# Patient Record
Sex: FEMALE | Race: WHITE | HISPANIC OR LATINO | Employment: UNEMPLOYED | URBAN - METROPOLITAN AREA
[De-identification: names, ages, dates, MRNs, and addresses within clinical notes are randomized per-mention and may not be internally consistent; named-entity substitution may affect disease eponyms.]

---

## 2017-01-13 ENCOUNTER — NON-PROVIDER VISIT (OUTPATIENT)
Dept: OBGYN | Facility: CLINIC | Age: 32
End: 2017-01-13
Payer: MEDICAID

## 2017-01-13 DIAGNOSIS — Z32.01 PREGNANCY TEST POSITIVE: ICD-10-CM

## 2017-01-13 LAB
INT CON NEG: NORMAL
INT CON POS: NORMAL
POC URINE PREGNANCY TEST: POSITIVE

## 2017-01-13 PROCEDURE — 81025 URINE PREGNANCY TEST: CPT | Performed by: PHYSICIAN ASSISTANT

## 2017-02-15 ENCOUNTER — INITIAL PRENATAL (OUTPATIENT)
Dept: OBGYN | Facility: CLINIC | Age: 32
End: 2017-02-15
Payer: MEDICAID

## 2017-02-15 VITALS
DIASTOLIC BLOOD PRESSURE: 62 MMHG | HEIGHT: 61 IN | WEIGHT: 150 LBS | BODY MASS INDEX: 28.32 KG/M2 | SYSTOLIC BLOOD PRESSURE: 104 MMHG

## 2017-02-15 DIAGNOSIS — Z34.81 ENCOUNTER FOR SUPERVISION OF OTHER NORMAL PREGNANCY IN FIRST TRIMESTER: ICD-10-CM

## 2017-02-15 DIAGNOSIS — Z87.59 H/O FETAL DEMISE, NOT CURRENTLY PREGNANT: ICD-10-CM

## 2017-02-15 DIAGNOSIS — Z32.01 PREGNANCY EXAMINATION OR TEST, POSITIVE RESULT: Primary | ICD-10-CM

## 2017-02-15 DIAGNOSIS — O09.291 PREVIOUS PREGNANCY COMPLICATED BY CHROMOSOMAL ABNORMALITY IN FIRST TRIMESTER, ANTEPARTUM: ICD-10-CM

## 2017-02-15 PROBLEM — O09.891 SUPERVISION OF OTHER HIGH RISK PREGNANCIES, FIRST TRIMESTER: Status: ACTIVE | Noted: 2017-02-15

## 2017-02-15 LAB
APPEARANCE UR: NORMAL
BILIRUB UR STRIP-MCNC: NORMAL MG/DL
COLOR UR AUTO: NORMAL
GLUCOSE UR STRIP.AUTO-MCNC: NORMAL MG/DL
INT CON NEG: NEGATIVE
INT CON POS: POSITIVE
KETONES UR STRIP.AUTO-MCNC: NEGATIVE MG/DL
LEUKOCYTE ESTERASE UR QL STRIP.AUTO: NORMAL
NITRITE UR QL STRIP.AUTO: NEGATIVE
PH UR STRIP.AUTO: 6 [PH] (ref 5–8)
POC URINE PREGNANCY TEST: POSITIVE
PROT UR QL STRIP: NEGATIVE MG/DL
RBC UR QL AUTO: NORMAL
SP GR UR STRIP.AUTO: 1.03
UROBILINOGEN UR STRIP-MCNC: NORMAL MG/DL

## 2017-02-15 PROCEDURE — 81025 URINE PREGNANCY TEST: CPT | Performed by: NURSE PRACTITIONER

## 2017-02-15 PROCEDURE — 81002 URINALYSIS NONAUTO W/O SCOPE: CPT | Performed by: NURSE PRACTITIONER

## 2017-02-15 PROCEDURE — 59401 PR NEW OB VISIT: CPT | Performed by: NURSE PRACTITIONER

## 2017-02-15 NOTE — MR AVS SNAPSHOT
"        Buffy Chong   2/15/2017 3:00 PM   Initial Prenatal   MRN: [id number]    [facility]   Dept Phone:  [phone]    Description:  Female : 1985   Provider:  Loyda Winters C.N.M.; [facility] INTA[facility]           Allergies as of 2/15/2017     No Known Allergies      You were diagnosed with     Pregnancy examination or test, positive result   [V72.42.ICD-9-CM]  -  Primary     Encounter for supervision of other normal pregnancy in first trimester   [1698692]       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, antepartum   [1480626]       H/O fetal demise, not currently pregnant   [[id number]]         Vital Signs     Blood Pressure Height Weight Body Mass Index Last Menstrual Period Breastfeeding?    104/62 mmHg 1.549 m (5' 1\") 68.04 kg (150 lb) 28.36 kg/m2 2016 Unknown    Smoking Status                   Never Smoker            Basic Information     Date Of Birth Sex Race Ethnicity Preferred Language    1985 Female  or , White  Origin (Bulgarian,Brazilian,Colombian,Sumanth, etc) English      Problem List              ICD-10-CM Priority Class Noted - Resolved    H/O fetal demise Z87.59   8/10/2016 - Present    Supervision of other high risk pregnancies, first trimester O09.891   2/15/2017 - Present    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, 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   2/15/2017 - Present      Health Maintenance        Date Due Completion Dates    IMM DTaP/Tdap/Td Vaccine (1 - Tdap) 2002 ---    PAP SMEAR 2004 ---    IMM INFLUENZA (1) 2016 ---            Results     POCT Urinalysis      Component Value Standard Range & Units    POC Color  Negative    POC Appearance  Negative    POC Leukocyte Esterase Small Negative    POC Nitrites Negative Negative    POC Urobiligen  Negative (0.2) mg/dL    POC Protein Negative Negative mg/dL    POC Urine PH 6.0 5.0 - 8.0    POC Blood Trace Negative   " POC Specific Gravity 1.030 <1.005 - >1.030    POC Ketones Negative Negative mg/dL    POC Biliruben  Negative mg/dL    POC Glucose 1+ Negative mg/dL                POCT Pregnancy      Component Value Standard Range & Units    POC Urine Pregnancy Test Positive Negative    LMP: 11/27/2016    Internal Control Positive Positive     Internal Control Negative Negative                         Current Immunizations     No immunizations on file.      Below and/or attached are the medications your provider expects you to take. Review all of your home medications and newly ordered medications with your provider and/or pharmacist. Follow medication instructions as directed by your provider and/or pharmacist. Please keep your medication list with you and share with your provider. Update the information when medications are discontinued, doses are changed, or new medications (including over-the-counter products) are added; and carry medication information at all times in the event of emergency situations     Allergies:  No Known Allergies          Medications  Valid as of: February 15, 2017 -  4:38 PM    Generic Name Brand Name Tablet Size Instructions for use    Prenatal MV-Min-Fe Fum-FA-DHA   Take  by mouth.        .                 Medicines prescribed today were sent to:     [facility]/PHARMACY #[id number] [facility] [address] [address]    Phone: [phone] Fax: [phone]    Open 24 Hours?: No      Medication refill instructions:       If your prescription bottle indicates you have medication refills left, it is not necessary to call your provider’s office. Please contact your pharmacy and they will refill your medication.    If your prescription bottle indicates you do not have any refills left, you may request refills at any time through one of the following ways: The online 3D Data system (except Urgent Care), by calling your provider’s office, or by asking your pharmacy to  contact your provider’s office with a refill request. Medication refills are processed only during regular business hours and may not be available until the next business day. Your provider may request additional information or to have a follow-up visit with you prior to refilling your medication.   *Please Note: Medication refills are assigned a new Rx number when refilled electronically. Your pharmacy may indicate that no refills were authorized even though a new prescription for the same medication is available at the pharmacy. Please request the medicine by name with the pharmacy before contacting your provider for a refill.        Your To Do List     Future Labs/Procedures Complete By Expires    PREG CNTR PRENATAL PN  As directed 2/16/2018    THINPREP RFLX HPV ASCUS W/CTNG  As directed 2/16/2018      Referral     A referral request has been sent to our patient care coordination department. Please allow 3-5 business days for us to process this request and contact you either by phone or mail. If you do not hear from us by the 5th business day, please call us at [phone].        Instructions    P:  1.  GC/CT done. Pap done.         2.  Prenatal labs ordered - lab slip given        3.  Discussed PNV, diet, and adequate water intake        4.  NOB packet given        5.  Return to office in 4 wks        6.  Referral sent to [facility] [place] Access Code: [email or web address]  Expires: 3/17/2017  3:20 PM    [employer]  A secure, online tool to manage your health information     [employer]’s [employer]® is a secure, online tool that connects you to your personalized health information from the privacy of your home -- day or night - making it very easy for you to manage your healthcare. Once the activation process is completed, you can even access your medical information using the [employer] corey, which is available for free in the Apple Corey store or Google Play store.     [employer] provides the following levels of  access (as shown below):   My Chart Features   [facility] Doctor [facility]  Specialists [facility]  Urgent  Care Non-[facility]  Primary Care  Doctor   Email your healthcare team securely and privately 24/7 X X X    Manage appointments: schedule your next appointment; view details of past/upcoming appointments X      Request prescription refills. X      View recent personal medical records, including lab and immunizations X X X X   View health record, including health history, allergies, medications X X X X   Read reports about your outpatient visits, procedures, consult and ER notes X X X X   See your discharge summary, which is a recap of your hospital and/or ER visit that includes your diagnosis, lab results, and care plan. X X       How to register for natue:  1. Go to  [email or web address].  2. Click on the Sign Up Now box, which takes you to the New Member Sign Up page. You will need to provide the following information:  a. Enter your natue Access Code exactly as it appears at the top of this page. (You will not need to use this code after you’ve completed the sign-up process. If you do not sign up before the expiration date, you must request a new code.)   b. Enter your date of birth.   c. Enter your home email address.   d. Click Submit, and follow the next screen’s instructions.  3. Create a natue ID. This will be your natue login ID and cannot be changed, so think of one that is secure and easy to remember.  4. Create a natue password. You can change your password at any time.  5. Enter your Password Reset Question and Answer. This can be used at a later time if you forget your password.   6. Enter your e-mail address. This allows you to receive e-mail notifications when new information is available in natue.  7. Click Sign Up. You can now view your health information.    For assistance activating your natue account, call [phone]

## 2017-02-15 NOTE — PROGRESS NOTES
Pt here for NOB visit  Pt c/o nausea. Pt states she bled for 1 day and passed clots 2 weeks ago.   Good phone #[phone]

## 2017-02-16 NOTE — PATIENT INSTRUCTIONS
P:  1.  GC/CT done. Pap done.         2.  Prenatal labs ordered - lab slip given        3.  Discussed PNV, diet, and adequate water intake        4.  NOB packet given        5.  Return to office in 4 wks        6.  Referral sent to [facility].

## 2017-02-16 NOTE — PROGRESS NOTES
"S:  Buffy Chong is a 31 y.o.   @ EGA: 11w3d CAROLE: Estimated Date of Delivery: 9/3/17  per [facility] who presents for her new OB exam.  She has no complaints.  Desires AFP.  Declines CF.  Reports good FM.  Denies LOF, or cramping.  Denies dysuria, vaginal DC.  Pt is single and lives with boyfriend (Srinivasa). Different FOB as IUFD.   Works in a [employer].  Pregnancy is unplanned but wanted.  Last preg was a hx IUFD @ 24wks.  Baby had obvious chromosomal problems, but no testing was done.    O:    Filed Vitals:    02/15/17 1603   BP: 104/62   Height: 1.549 m (5' 1\")   Weight: 68.04 kg (150 lb)    See H&P Prenatal Physical.  Wet mount: deferred        FHTs: 155        Fundal ht: 13     A:   1.  IUP @ 11w3d CAROLE: Estimated Date of Delivery: 9/3/17 per [facility]         2.  S=D        3.    Patient Active Problem List    Diagnosis Date Noted   • Supervision of other high risk pregnancies, first trimester 02/15/2017   • 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, antepartum 02/15/2017   • H/O fetal demise 08/10/2016         P:  1.  GC/CT done. Pap done.         2.  Prenatal labs ordered - lab slip given        3.  Discussed PNV, diet, and adequate water intake        4.  NOB packet given        5.  Return to office in 4 wks        6.  Referral sent to [facility].    "

## 2017-02-16 NOTE — PROGRESS NOTES
Referral faxed to [facility] on 2/16/17  They will contact patient to schedule appt.  Please check with patient if appt was made/ document. Thank you

## 2017-03-01 ENCOUNTER — TELEPHONE (OUTPATIENT)
Dept: OBGYN | Facility: CLINIC | Age: 32
End: 2017-03-01

## 2017-03-01 DIAGNOSIS — Z87.59 H/O FETAL DEMISE, NOT CURRENTLY PREGNANT: ICD-10-CM

## 2017-03-01 DIAGNOSIS — O09.891 SUPERVISION OF OTHER HIGH RISK PREGNANCIES, FIRST TRIMESTER: Primary | ICD-10-CM

## 2017-03-01 NOTE — TELEPHONE ENCOUNTER
Cherie from [facility] called stated that pt's CAROLE is wrong pt is 16 weeks and needs to have an AFP, lupus anti-coagulant, Anti cardio lipin, Beta 2 glyco protein.     Lab ordered by Singh Scales. KERLINE.    Pt to  lab work to go to [facility].

## 2017-03-03 NOTE — TELEPHONE ENCOUNTER
Pt still not pick in up lab slips to go to [facility].  Lab order @ .    Unable to contact pt msg left to call back

## 2017-03-17 ENCOUNTER — ROUTINE PRENATAL (OUTPATIENT)
Dept: OBGYN | Facility: CLINIC | Age: 32
End: 2017-03-17
Payer: MEDICAID

## 2017-03-17 VITALS — BODY MASS INDEX: 29.49 KG/M2 | DIASTOLIC BLOOD PRESSURE: 62 MMHG | SYSTOLIC BLOOD PRESSURE: 102 MMHG | WEIGHT: 156 LBS

## 2017-03-17 DIAGNOSIS — O09.291 PREVIOUS PREGNANCY COMPLICATED BY CHROMOSOMAL ABNORMALITY IN FIRST TRIMESTER, ANTEPARTUM: ICD-10-CM

## 2017-03-17 DIAGNOSIS — O09.891 SUPERVISION OF OTHER HIGH RISK PREGNANCIES, FIRST TRIMESTER: Primary | ICD-10-CM

## 2017-03-17 PROCEDURE — 90040 PR PRENATAL FOLLOW UP: CPT | Performed by: NURSE PRACTITIONER

## 2017-03-17 NOTE — PATIENT INSTRUCTIONS
P:  1.  Reviewed labs w pt. Encouraged pt to complete other labs asap.        2.  AFP wnl.        3.  D/w pt helps for dry nose.          4.  Questions answered.        5.  Encouraged adequate water intake.        6.  F/u 4 wks.        7.  FYI: none.

## 2017-03-17 NOTE — MR AVS SNAPSHOT
Buffy TongKevon   3/17/2017 3:00 PM   Routine Prenatal   MRN: [id number]    [facility]   Dept Phone:  [phone]    Description:  Female : 1985   Provider:  Loyda Winters C.N.M.           Allergies as of 3/17/2017     No Known Allergies      You were diagnosed with     Supervision of other high risk pregnancies, first trimester   [4836029]  -  Primary     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, antepartum   [[id number]]         Vital Signs     Blood Pressure Weight Last Menstrual Period Smoking Status          102/62 mmHg 70.761 kg (156 lb) 2016 Never Smoker         Basic Information     Date Of Birth Sex Race Ethnicity Preferred Language    1985 Female  or , White  Origin (South African,Azerbaijani,Tajik,Sumanth, etc) English      Problem List              ICD-10-CM Priority Class Noted - Resolved    H/O fetal demise Z87.59   8/10/2016 - Present    Supervision of other high risk pregnancies, first trimester O09.891   2/15/2017 - Present    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, 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   2/15/2017 - Present      Health Maintenance        Date Due Completion Dates    IMM DTaP/Tdap/Td Vaccine (1 - Tdap) 2002 ---    IMM INFLUENZA (1) 2016 ---    PAP SMEAR 2020            Current Immunizations     No immunizations on file.      Below and/or attached are the medications your provider expects you to take. Review all of your home medications and newly ordered medications with your provider and/or pharmacist. Follow medication instructions as directed by your provider and/or pharmacist. Please keep your medication list with you and share with your provider. Update the information when medications are discontinued, doses are changed, or new medications (including over-the-counter products) are added; and carry medication information at all times  in the event of emergency situations     Allergies:  No Known Allergies          Medications  Valid as of: March 17, 2017 -  3:02 PM    Generic Name Brand Name Tablet Size Instructions for use    Prenatal MV-Min-Fe Fum-FA-DHA   Take  by mouth.        .                 Medicines prescribed today were sent to:     [facility]/PHARMACY #[id number] - [place], [place] - [address]    [address] [address]    Phone: [phone] Fax: [phone]    Open 24 Hours?: No      Medication refill instructions:       If your prescription bottle indicates you have medication refills left, it is not necessary to call your provider’s office. Please contact your pharmacy and they will refill your medication.    If your prescription bottle indicates you do not have any refills left, you may request refills at any time through one of the following ways: The online SMART system (except Urgent Care), by calling your provider’s office, or by asking your pharmacy to contact your provider’s office with a refill request. Medication refills are processed only during regular business hours and may not be available until the next business day. Your provider may request additional information or to have a follow-up visit with you prior to refilling your medication.   *Please Note: Medication refills are assigned a new Rx number when refilled electronically. Your pharmacy may indicate that no refills were authorized even though a new prescription for the same medication is available at the pharmacy. Please request the medicine by name with the pharmacy before contacting your provider for a refill.        Instructions    P:  1.  Reviewed labs w pt. Encouraged pt to complete other labs asap.        2.  AFP wnl.        3.  D/w pt helps for dry nose.          4.  Questions answered.        5.  Encouraged adequate water intake.        6.  F/u 4 wks.        7.  FYI: none.            SMART Access Code: [email or web address]  Expires:  3/17/2017  4:20 PM    [employer]  A secure, online tool to manage your health information     [employer]’s [employer]® is a secure, online tool that connects you to your personalized health information from the privacy of your home -- day or night - making it very easy for you to manage your healthcare. Once the activation process is completed, you can even access your medical information using the [email or web address] corey, which is available for free in the Apple Corey store or Google Play store.     [email or web address] provides the following levels of access (as shown below):   My Chart Features   [facility] Doctor [facility]  Specialists [facility]  Urgent  Care Non-[facility]  Primary Care  Doctor   Email your healthcare team securely and privately 24/7 X X X    Manage appointments: schedule your next appointment; view details of past/upcoming appointments X      Request prescription refills. X      View recent personal medical records, including lab and immunizations X X X X   View health record, including health history, allergies, medications X X X X   Read reports about your outpatient visits, procedures, consult and ER notes X X X X   See your discharge summary, which is a recap of your hospital and/or ER visit that includes your diagnosis, lab results, and care plan. X X       How to register for [email or web address]:  1. Go to  [email or web address].  2. Click on the Sign Up Now box, which takes you to the New Member Sign Up page. You will need to provide the following information:  a. Enter your [email or web address] Access Code exactly as it appears at the top of this page. (You will not need to use this code after you’ve completed the sign-up process. If you do not sign up before the expiration date, you must request a new code.)   b. Enter your date of birth.   c. Enter your home email address.   d. Click Submit, and follow the next screen’s instructions.  3. Create a [email or web address] ID. This will be your [email or web address] login ID and cannot be changed, so think of one  that is secure and easy to remember.  4. Create a [employer] password. You can change your password at any time.  5. Enter your Password Reset Question and Answer. This can be used at a later time if you forget your password.   6. Enter your e-mail address. This allows you to receive e-mail notifications when new information is available in [employer].  7. Click Sign Up. You can now view your health information.    For assistance activating your [employer] account, call [phone]

## 2017-03-19 LAB
2ND TRIMESTER 4 SCREEN SERPL-IMP: NEGATIVE
ABO GROUP BLD: NORMAL
BLD GP AB SCN SERPL QL: NEGATIVE
HCT VFR BLD AUTO: 11.6 %
HCV AB S/CO SERPL IA: NEGATIVE
HGB BLD-MCNC: 35.5 G/DL
HIV 1 0 2 IC ZHVIC: NEGATIVE
PLATELET # BLD AUTO: 322 10*3/UL
RH BLD: POSITIVE
RPR SER QL: NEGATIVE
RUBV IGG SERPL IA-ACNC: NORMAL

## 2017-04-14 ENCOUNTER — ROUTINE PRENATAL (OUTPATIENT)
Dept: OBGYN | Facility: CLINIC | Age: 32
End: 2017-04-14
Payer: MEDICAID

## 2017-04-14 VITALS — BODY MASS INDEX: 29.87 KG/M2 | WEIGHT: 158 LBS | SYSTOLIC BLOOD PRESSURE: 110 MMHG | DIASTOLIC BLOOD PRESSURE: 60 MMHG

## 2017-04-14 DIAGNOSIS — O09.891 SUPERVISION OF OTHER HIGH RISK PREGNANCIES, FIRST TRIMESTER: ICD-10-CM

## 2017-04-14 DIAGNOSIS — O09.291 PREVIOUS PREGNANCY COMPLICATED BY CHROMOSOMAL ABNORMALITY IN FIRST TRIMESTER, ANTEPARTUM: ICD-10-CM

## 2017-04-14 PROCEDURE — 90040 PR PRENATAL FOLLOW UP: CPT | Performed by: NURSE PRACTITIONER

## 2017-04-14 NOTE — PROGRESS NOTES
S) Pt is a 31 y.o.   at 22w4d  gestation. Routine prenatal care today. States no specific problems today.  Reports good  fetal movement. Denies cramping, bleeding or leaking of fluid. Denies dysuria. Generally feels well today. Good self-care activities identified. Denies headaches.     O) see flow         Filed Vitals:    17 0813   BP: 110/60   Weight: 71.668 kg (158 lb)           Lab: normal prenatal panel, normal AFP       Pertinent ultrasound - See [facility]           A) IUP at 22w4d       S=D         Patient Active Problem List    Diagnosis Date Noted   • Supervision of other high risk pregnancies, first trimester 02/15/2017   • 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, antepartum 02/15/2017   • H/O fetal demise 08/10/2016       P) s/s ptl vs general discomforts. Fetal movements reviewed. General ed and anticipatory guidance. Nutrition/exercise/vitamin. Needs full fetal survey - schedule at [facility]. Per [facility] recommendation needs lupus labs, has not yet done. Lab slip given. Also slip given and explanation for glucose testing.

## 2017-04-14 NOTE — MR AVS SNAPSHOT
Buffy QUINTEROS Arlette   2017 8:00 AM   Routine Prenatal   MRN: [id number]    [facility]   Dept Phone:  [phone]    Description:  Female : 1985   Provider:  Chelsea Torres D.N.P.           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, antepartum   [[id number]]         Vital Signs     Blood Pressure Weight Last Menstrual Period Smoking Status          110/60 mmHg 71.668 kg (158 lb) 2016 Never Smoker         Basic Information     Date Of Birth Sex Race Ethnicity Preferred Language    1985 Female  or , White  Origin (Portuguese,[place]er,[place]an,Sumanth, etc) Portuguese      Problem List              ICD-10-CM Priority Class Noted - Resolved    H/O fetal demise Z87.59   8/10/2016 - Present    Supervision of other high risk pregnancies, first trimester O09.891   2/15/2017 - Present    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      Below and/or attached are the medications your provider expects you to take. Review all of your home medications and newly ordered medications with your provider and/or pharmacist. Follow medication instructions as directed by your provider and/or pharmacist. Please keep your medication list with you and share with your provider. Update the information when medications are discontinued, doses are changed, or new medications (including over-the-counter products) are added; and carry medication information at all times in the event of emergency situations     Allergies:  No Known Allergies          Medications  Valid as of: April 14, 2017 -  8:32 AM    Generic Name Brand Name Tablet Size Instructions for use    Prenatal MV-Min-Fe Fum-FA-DHA   Take  by mouth.        .                 Medicines prescribed today were sent to:     [facility]/PHARMACY #[id number] - [facility] [place] - [address]    [address] [address]    Phone: [phone] Fax: [phone]    Open 24 Hours?: No      Medication refill instructions:       If your prescription bottle indicates you have medication refills left, it is not necessary to call your provider’s office. Please contact your pharmacy and they will refill your medication.    If your prescription bottle indicates you do not have any refills left, you may request refills at any time through one of the following ways: The online Ezuza system (except Urgent Care), by calling your provider’s office, or by asking your pharmacy to contact your provider’s office with a refill request. Medication refills are processed only during regular business hours and may not be available until the next business day. Your provider may request additional information or to have a follow-up visit with you prior to refilling your medication.   *Please Note: Medication refills are assigned a new Rx number when refilled electronically. Your pharmacy may indicate that no refills were authorized even though a new prescription for the same medication is available at the pharmacy. Please request the medicine by name with the pharmacy before contacting your provider for a refill.        Your To Do List     Future Labs/Procedures Complete By Expires    GLUCOSE 1HR GESTATIONAL  As directed 4/14/2018    HCT  As directed 4/14/2018    HGB  As directed 4/14/2018    T.PALLIDUM AB EIA  As directed 4/14/2018    US-OB 2ND 3RD TRI COMPLETE  As directed 4/14/2018         Ezuza Access Code: [email or web address]  Expires: 4/17/2017 10:14 AM    Ezuza  A secure,  online tool to manage your health information     [employer]’s SimplyInsured® is a secure, online tool that connects you to your personalized health information from the privacy of your home -- day or night - making it very easy for you to manage your healthcare. Once the activation process is completed, you can even access your medical information using the SimplyInsured corey, which is available for free in the Apple Corey store or Google Play store.     SimplyInsured provides the following levels of access (as shown below):   My Chart Features   [facility] Primary [facility] Doctor [facility]  Specialists [facility]  Urgent  Care Non-[facility]  Primary Care  Doctor   Email your healthcare team securely and privately 24/7 X X X    Manage appointments: schedule your next appointment; view details of past/upcoming appointments X      Request prescription refills. X      View recent personal medical records, including lab and immunizations X X X X   View health record, including health history, allergies, medications X X X X   Read reports about your outpatient visits, procedures, consult and ER notes X X X X   See your discharge summary, which is a recap of your hospital and/or ER visit that includes your diagnosis, lab results, and care plan. X X       How to register for SimplyInsured:  1. Go to  [email or web address].  2. Click on the Sign Up Now box, which takes you to the New Member Sign Up page. You will need to provide the following information:  a. Enter your SimplyInsured Access Code exactly as it appears at the top of this page. (You will not need to use this code after you’ve completed the sign-up process. If you do not sign up before the expiration date, you must request a new code.)   b. Enter your date of birth.   c. Enter your home email address.   d. Click Submit, and follow the next screen’s instructions.  3. Create a SimplyInsured ID. This will be your SimplyInsured login ID and cannot be changed, so think of one that is secure and easy to  remember.  4. Create a Bilibot password. You can change your password at any time.  5. Enter your Password Reset Question and Answer. This can be used at a later time if you forget your password.   6. Enter your e-mail address. This allows you to receive e-mail notifications when new information is available in Bilibot.  7. Click Sign Up. You can now view your health information.    For assistance activating your Bilibot account, call [phone]

## 2017-04-14 NOTE — PROGRESS NOTES
Pt here today for OB follow up  Reports +FM  Denies any complaints   Labs ordered  WT:158lb  BP: 110/60  Good # [phone]

## 2017-05-04 ENCOUNTER — TELEPHONE (OUTPATIENT)
Dept: OBGYN | Facility: CLINIC | Age: 32
End: 2017-05-04

## 2017-05-04 DIAGNOSIS — O09.291 PREVIOUS PREGNANCY COMPLICATED BY CHROMOSOMAL ABNORMALITY IN FIRST TRIMESTER, ANTEPARTUM: ICD-10-CM

## 2017-05-04 DIAGNOSIS — Z87.59 H/O FETAL DEMISE, NOT CURRENTLY PREGNANT: ICD-10-CM

## 2017-05-04 NOTE — TELEPHONE ENCOUNTER
Pt  Here states was told that she needed a complete scan w/[facility], pt was originally scheduled w/[facility] on 5/4/17 and she went to [facility]'s office.  I consulted Dr. Janina ADLER. States she needs to go back to w/[facility] to f/u for detailed scan.  I called [facility] and notify Marleni CAN stated we need to send a new referral.

## 2017-05-11 ENCOUNTER — ROUTINE PRENATAL (OUTPATIENT)
Dept: OBGYN | Facility: CLINIC | Age: 32
End: 2017-05-11
Payer: MEDICAID

## 2017-05-11 VITALS — DIASTOLIC BLOOD PRESSURE: 60 MMHG | SYSTOLIC BLOOD PRESSURE: 108 MMHG | WEIGHT: 158 LBS | BODY MASS INDEX: 29.87 KG/M2

## 2017-05-11 DIAGNOSIS — O09.291 PREVIOUS PREGNANCY COMPLICATED BY CHROMOSOMAL ABNORMALITY IN FIRST TRIMESTER, ANTEPARTUM: ICD-10-CM

## 2017-05-11 DIAGNOSIS — O09.891 SUPERVISION OF OTHER HIGH RISK PREGNANCIES, FIRST TRIMESTER: Primary | ICD-10-CM

## 2017-05-11 PROCEDURE — 90040 PR PRENATAL FOLLOW UP: CPT | Performed by: NURSE PRACTITIONER

## 2017-05-11 NOTE — PATIENT INSTRUCTIONS
P:  1. Questions answered.           2. Encouraged adequate water intake        3.   labor precautions reviewed.        4.  F/u 4 wks.        5.  FYI: none.        6.  No f/u needed @ [facility].        7.  28wk labs will be done tomorrow.

## 2017-05-11 NOTE — PROGRESS NOTES
Ob F/U  +FM  Pt c/o nausea, increased pressure, hip pain and back pain.   Good phone number-[phone]  1 hr glucose lab work not done yet, pt states she has a appt tomorrow to get it done   Pt had a [facility] appt today, no follow up

## 2017-05-11 NOTE — PROGRESS NOTES
S:  Pt is  at 26w3d here for routine OB follow up.  Reports hip and back pain.  Reports good FM.  Denies VB, RUCs, LOF or vaginal DC.    O:  Please see above vitals.        FHTs: 151        Fundal ht: 26 cm.    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 Questions answered.           2. Encouraged adequate water intake        3.   labor precautions reviewed.        4.  F/u 4 wks.        5.  FYI: none.        6.  [facility]        7.  28wk labs will be done tomorrow.

## 2017-05-11 NOTE — MR AVS SNAPSHOT
Buffy QUINTEROS GonzalezMax   2017 4:30 PM   Routine Prenatal   MRN: [id number]    [facility]   Dept Phone:  [phone]    Description:  Female : 1985   Provider:  Loyda Winters C.N.M.           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, antepartum   [[id number]]         Vital Signs     Blood Pressure Weight Last Menstrual Period Smoking Status          108/60 mmHg 71.668 kg (158 lb) 2016 Never Smoker         Basic Information     Date Of Birth Sex Race Ethnicity Preferred Language    1985 Female  or , White  Origin (Citizen of the [place],Lebanese,Iranian,Sumanth, etc) Citizen of the [place]      Problem List              ICD-10-CM Priority Class Noted - Resolved    H/O fetal demise Z87.59   8/10/2016 - Present    Supervision of other high risk pregnancies, first trimester O09.891   2/15/2017 - Present    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      Below and/or attached are the medications your provider expects you to take. Review all of your home medications and newly ordered medications with your provider and/or pharmacist. Follow medication instructions as directed by your provider and/or pharmacist. Please keep your medication list with you and share with your provider. Update the information when medications are discontinued, doses are changed, or new medications (including over-the-counter products) are added; and carry medication information at all times in the event of emergency  situations     Allergies:  No Known Allergies          Medications  Valid as of: May 11, 2017 -  4:53 PM    Generic Name Brand Name Tablet Size Instructions for use    Prenatal MV-Min-Fe Fum-FA-DHA   Take  by mouth.        .                 Medicines prescribed today were sent to:     [facility]/PHARMACY #[id number] - [facility] [place] - [address]    [address] [address]    Phone: [phone] Fax: [phone]    Open 24 Hours?: No      Medication refill instructions:       If your prescription bottle indicates you have medication refills left, it is not necessary to call your provider’s office. Please contact your pharmacy and they will refill your medication.    If your prescription bottle indicates you do not have any refills left, you may request refills at any time through one of the following ways: The online TurningArt system (except Urgent Care), by calling your provider’s office, or by asking your pharmacy to contact your provider’s office with a refill request. Medication refills are processed only during regular business hours and may not be available until the next business day. Your provider may request additional information or to have a follow-up visit with you prior to refilling your medication.   *Please Note: Medication refills are assigned a new Rx number when refilled electronically. Your pharmacy may indicate that no refills were authorized even though a new prescription for the same medication is available at the pharmacy. Please request the medicine by name with the pharmacy before contacting your provider for a refill.        Instructions    P:  1. Questions answered.           2. Encouraged adequate water intake        3.   labor precautions reviewed.        4.  F/u 4 wks.        5.  FYI: none.        6.  No f/u needed @ [facility].        7.  28wk labs will be done tomorrow.        Other Notes About Your Plan     Baby Boy           TurningArt Access Code:  [email or web address]  Expires: 5/30/2017 10:36 AM    JoGuru  A secure, online tool to manage your health information     [employer]’s JoGuru® is a secure, online tool that connects you to your personalized health information from the privacy of your home -- day or night - making it very easy for you to manage your healthcare. Once the activation process is completed, you can even access your medical information using the JoGuru corey, which is available for free in the Apple Corey store or Google Play store.     JoGuru provides the following levels of access (as shown below):   My Chart Features   [facility] Primary Care Doctor [facility]  Specialists [facility]  Urgent  Care Non-[facility]  Primary Care  Doctor   Email your healthcare team securely and privately 24/7 X X X    Manage appointments: schedule your next appointment; view details of past/upcoming appointments X      Request prescription refills. X      View recent personal medical records, including lab and immunizations X X X X   View health record, including health history, allergies, medications X X X X   Read reports about your outpatient visits, procedures, consult and ER notes X X X X   See your discharge summary, which is a recap of your hospital and/or ER visit that includes your diagnosis, lab results, and care plan. X X       How to register for JoGuru:  1. Go to  [email or web address].  2. Click on the Sign Up Now box, which takes you to the New Member Sign Up page. You will need to provide the following information:  a. Enter your JoGuru Access Code exactly as it appears at the top of this page. (You will not need to use this code after you’ve completed the sign-up process. If you do not sign up before the expiration date, you must request a new code.)   b. Enter your date of birth.   c. Enter your home email address.   d. Click Submit, and follow the next screen’s instructions.  3. Create a JoGuru ID. This will be your JoGuru login ID and cannot be  changed, so think of one that is secure and easy to remember.  4. Create a WiredBenefits password. You can change your password at any time.  5. Enter your Password Reset Question and Answer. This can be used at a later time if you forget your password.   6. Enter your e-mail address. This allows you to receive e-mail notifications when new information is available in WiredBenefits.  7. Click Sign Up. You can now view your health information.    For assistance activating your WiredBenefits account, call [phone]

## 2017-05-23 ENCOUNTER — TELEPHONE (OUTPATIENT)
Dept: OBGYN | Facility: CLINIC | Age: 32
End: 2017-05-23

## 2017-05-23 NOTE — TELEPHONE ENCOUNTER
Pt called the triage line requesting a letter so she could be taken off work. I explained to the Pt that there were no medical indications for her to be off work, and that it was her request to be off work ad that is what the letter would state. Pt verbalized understanding and stated she would come by later to  the letter. Letter was signed off by Zoe Roque.

## 2017-06-01 ENCOUNTER — TELEPHONE (OUTPATIENT)
Dept: OBGYN | Facility: CLINIC | Age: 32
End: 2017-06-01

## 2017-06-01 NOTE — TELEPHONE ENCOUNTER
Needs:  1) 3 hr GTT, 2)Fe TID OTC w/meals, 3) ASA 81 mg QD, 4) repeat LA-dRVVT in 12 weeks, 5) repeat fetal growth @ 32 weeks. 6) NST's 2 x a week @ 32 weeks. Per Dr. Horne's notes.    Unable to contact pt msg left to call back.     Pt called back,   Pt notified of abnormal 1hr gtt and need to do 3hr gtt this time. Pt instructed to fast 8-10 hrs  pt only allow to drink plain water during fasting time. Pt will call lab to schedule an appt. Advised to bring a snack for after the test is done. Pt notified will be staying in the labs for the 3hr. Pt agreed to do it 6/3/17. Will  lab slip on 6/2/17 to go to [facility].     Pt notified of need to start on Iron supplementation to take 325 mg TID. Recommended to take it with orange juice, to avoid milk products 1hr before and 2hr after. Not to take with PNV. To increase water intake to decrease side effects of constipation.    Pt notified of need to start ASA 81 mg QD. Verbalized understanding.    Pt will call [facility] to set up an appt for growth @ 32 weeks.     Notified of need to start NST's 2 x a week @ 32 weeks.    Verbalized understanding

## 2017-06-06 ENCOUNTER — ROUTINE PRENATAL (OUTPATIENT)
Dept: OBGYN | Facility: CLINIC | Age: 32
End: 2017-06-06
Payer: MEDICAID

## 2017-06-06 VITALS — SYSTOLIC BLOOD PRESSURE: 110 MMHG | DIASTOLIC BLOOD PRESSURE: 54 MMHG | BODY MASS INDEX: 29.87 KG/M2 | WEIGHT: 158 LBS

## 2017-06-06 DIAGNOSIS — O09.291 PREVIOUS PREGNANCY COMPLICATED BY CHROMOSOMAL ABNORMALITY IN FIRST TRIMESTER, ANTEPARTUM: ICD-10-CM

## 2017-06-06 DIAGNOSIS — O09.891 SUPERVISION OF OTHER HIGH RISK PREGNANCIES, FIRST TRIMESTER: Primary | ICD-10-CM

## 2017-06-06 PROCEDURE — 90715 TDAP VACCINE 7 YRS/> IM: CPT | Performed by: PHYSICIAN ASSISTANT

## 2017-06-06 PROCEDURE — 90471 IMMUNIZATION ADMIN: CPT | Performed by: PHYSICIAN ASSISTANT

## 2017-06-06 PROCEDURE — 90040 PR PRENATAL FOLLOW UP: CPT | Performed by: PHYSICIAN ASSISTANT

## 2017-06-06 NOTE — Clinical Note
Cuente los Movimientos de montgomery Bebé  Otro paso importante para la vince de montgomery bebé    Lizeth Gonzalez-Kevon     [facility]            Dept: [phone]    ¿Cuántas semanas tiene de embarazo? 30w1d    Fecha cuando tiene que comenzar a contar el movimiento: 06/06/2017                  Ya debe usar silas diagrama    Rajiv manera en que montgomery doctor puede controlar a vince de montgomery bebé es sabiendo cuantas veces se mueve montgomery bebé en el útero, o por medio de las “pataditas”.  Usted podrá ayudarle a montgomery médico al usar cada día el siguiente diagrama.    Cada día, usted debe prestar atención a cuantas horas le lleva a montgomery bebé moverse 10 veces.  Comience a contar en la mañana, lo antes posible después de haberse levantado.    · Primeramente, escriba la hora en que se mueve montgomery bebé, hasta llegar a 10 veces.  · Colóquele un check o palomita a cada cuadrito cada vez que montgomery bebé se mueva hasta que complete 10 veces.  · Escriba la hora cuando termine de contar 10 veces en la última columna.  · Sume el total del tiempo que le llevó contar los 10 movimientos.  · Finalmente, complete el cuadrito de cuantas horas le llevó hacerlo.    Después de jany contado los 10 movimientos, ya no tendrá que contar los demás movimientos por el gwendolyn del día.  A la mañana siguiente, comience a contar de nuevo cuantas veces se mueve el bebé desde el momento en que se levante.    ¿Qué tendría que considerarse un “movimiento”?  Es difícil de decirlo porque es distinto de rajiv madre a otra, y de un embarazo a otro.  Lo importante es que cuente el movimiento de la misma manera loretta el transcurso de montgomery embarazo.  Si tiene preguntas adicionales, pregúntele a montgomery doctor.    ¡Cuente cuidadosamente cada día!     MUESTRA:  Semana 28    ¿Cuántas horas le ha llevado sentir 10 movimientos?        Hora de Inicio     1     2     3     4     5     6     7     8     9     10   Hora de Finlizar   Lin. 8:20 ·  ·  ·  ·  ·  ·  ·  ·  ·  ·  11:40   Mar.               Mié.                Jue.               Vie.               Sáb.               Dom.                 IMPORTANTE:  Usted debe contactar a montgomery doctor si le lleva más de 2 horas sentir 10 movimientos de montgomery bebé.    Cada mañana, escriba la hora de inicio y comience a contar los movimientos de montgomery bebé.  Hágalo colocándole un check o palomita a cada cuadrito cada vez que sienta un movimiento de montgomery bebé.  Cuando haya sentido 10 “pataditas”, escriba la hora en que terminó de contar en la última columna.  Luego, complete en la cajita (arriba de la qamar de check o palomita) el número total de horas que le llevó hacerlo.  Asegúrese de leer completamente las instrucciones en la página anterior.

## 2017-06-06 NOTE — PROGRESS NOTES
Pt has no complaints with cramping, UCs, Vb, LOF, though pt has had incr pressure, dawna with walking. +FM - [facility] sheet given today. [facility] US wnl, no f/u needed per notes. Per notes, 1hr GTT elevated and pt has anemia, so pt instructed to take iron TID, which she is doing, also 3hr GTT slip given to pt - MA requesting 1hr GTT records today, as we do not have hard copy. BTL consent signed today. TDap given. RTC 2 wk with MD, also will start NST then. If another US needed, will order then for growth. Also, repeat LA-dRVVT in 12 wk.

## 2017-06-06 NOTE — PROGRESS NOTES
Pt here today for OB follow up  Reports +FM  WT: 158 lb  BP: 110/54  Pt states no complaints today  Desires Tdap   Desires BTL. Consent to be signed today  MAGDALENO sheet given and explained today   Pt aware that she need to so the 3 hr gtt, due to 1 hr gtt results are elevated. Pt notified and instructions given today.  Good # [phone]

## 2017-06-06 NOTE — MR AVS SNAPSHOT
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, antepartum   [0812799]         Vital Signs     Blood Pressure Weight Last Menstrual Period Smoking Status          110/54 mmHg 71.668 kg (158 lb) 2016 Never Smoker         Basic Information     Date Of Birth Sex Race Ethnicity Preferred Language    1985 Female  or , White  Origin (Iraqi,Congolese,South African,Sumanth, etc) Iraqi      Problem List              ICD-10-CM Priority Class Noted - Resolved    H/O fetal demise Z87.59   8/10/2016 - Present    Supervision of other high risk pregnancies, first trimester O09.891   2/15/2017 - Present    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 Review all of your home medications and newly ordered medications with your provider and/or pharmacist. Follow medication instructions as directed by your provider and/or pharmacist. Please keep your medication list with you and share with your provider. Update the information when medications are discontinued, doses are changed, or new medications (including over-the-counter products) are added; and carry medication information at all times in the event of emergency  situations     Allergies:  No Known Allergies          Medications  Valid as of: June 06, 2017 -  5:11 PM    Generic Name Brand Name Tablet Size Instructions for use    Prenatal MV-Min-Fe Fum-FA-DHA   Take  by mouth.        .                 Medicines prescribed today were sent to:     [facility]/PHARMACY #[id number] - [facility] [place] - [address]    [address] [address]    Phone: [phone] Fax: [phone]    Open 24 Hours?: No      Medication refill instructions:       If your prescription bottle indicates you have medication refills left, it is not necessary to call your provider’s office. Please contact your pharmacy and they will refill your medication.    If your prescription bottle indicates you do not have any refills left, you may request refills at any time through one of the following ways: The online 15Five system (except Urgent Care), by calling your provider’s office, or by asking your pharmacy to contact your provider’s office with a refill request. Medication refills are processed only during regular business hours and may not be available until the next business day. Your provider may request additional information or to have a follow-up visit with you prior to refilling your medication.   *Please Note: Medication refills are assigned a new Rx number when refilled electronically. Your pharmacy may indicate that no refills were authorized even though a new prescription for the same medication is available at the pharmacy. Please request the medicine by name with the pharmacy before contacting your provider for a refill.        Your To Do List     Future Labs/Procedures Complete By Expires    GLUCOSE 3 HR GESTATIONAL  As directed 6/7/2018      Other Notes About Your Plan     Baby Boy - Srinivasa Barlow           15Five Access Code: [email or web address]  Expires: 7/6/2017  5:11 PM    15Five  A secure, online tool to manage your health information     [employer]’s 15Five® is a secure,  online tool that connects you to your personalized health information from the privacy of your home -- day or night - making it very easy for you to manage your healthcare. Once the activation process is completed, you can even access your medical information using the FiscalNote corey, which is available for free in the Apple Corey store or Google Play store.     FiscalNote provides the following levels of access (as shown below):   My Chart Features   [facility] Doctor [facility]  Specialists [facility]  Urgent  Care Non-[facility]  Primary Care  Doctor   Email your healthcare team securely and privately 24/7 X X X    Manage appointments: schedule your next appointment; view details of past/upcoming appointments X      Request prescription refills. X      View recent personal medical records, including lab and immunizations X X X X   View health record, including health history, allergies, medications X X X X   Read reports about your outpatient visits, procedures, consult and ER notes X X X X   See your discharge summary, which is a recap of your hospital and/or ER visit that includes your diagnosis, lab results, and care plan. X X       How to register for FiscalNote:  1. Go to  [email or web address].  2. Click on the Sign Up Now box, which takes you to the New Member Sign Up page. You will need to provide the following information:  a. Enter your FiscalNote Access Code exactly as it appears at the top of this page. (You will not need to use this code after you’ve completed the sign-up process. If you do not sign up before the expiration date, you must request a new code.)   b. Enter your date of birth.   c. Enter your home email address.   d. Click Submit, and follow the next screen’s instructions.  3. Create a FiscalNote ID. This will be your FiscalNote login ID and cannot be changed, so think of one that is secure and easy to remember.  4. Create a FiscalNote password. You can change your password at any time.  5. Enter your  Password Reset Question and Answer. This can be used at a later time if you forget your password.   6. Enter your e-mail address. This allows you to receive e-mail notifications when new information is available in Acer.  7. Click Sign Up. You can now view your health information.    For assistance activating your Acer account, call [phone]

## 2017-06-21 ENCOUNTER — ROUTINE PRENATAL (OUTPATIENT)
Dept: OBGYN | Facility: CLINIC | Age: 32
End: 2017-06-21
Payer: MEDICAID

## 2017-06-21 VITALS — WEIGHT: 160 LBS | SYSTOLIC BLOOD PRESSURE: 114 MMHG | BODY MASS INDEX: 30.25 KG/M2 | DIASTOLIC BLOOD PRESSURE: 62 MMHG

## 2017-06-21 DIAGNOSIS — O09.291 PREVIOUS PREGNANCY COMPLICATED BY CHROMOSOMAL ABNORMALITY IN FIRST TRIMESTER, ANTEPARTUM: ICD-10-CM

## 2017-06-21 DIAGNOSIS — Z87.59 H/O FETAL DEMISE, NOT CURRENTLY PREGNANT: ICD-10-CM

## 2017-06-21 DIAGNOSIS — O09.891 SUPERVISION OF OTHER HIGH RISK PREGNANCIES, FIRST TRIMESTER: ICD-10-CM

## 2017-06-21 LAB
NST ACOUSTIC STIMULATION: YES
NST ACTION NECESSARY: NORMAL
NST ASSESSMENT: NORMAL
NST BASELINE: 150
NST INDICATIONS: NORMAL
NST OTHER DATA: NORMAL
NST READ BY: NORMAL
NST RETURN: NORMAL
NST UTERINE ACTIVITY: NORMAL

## 2017-06-21 PROCEDURE — 90040 PR PRENATAL FOLLOW UP: CPT | Performed by: OBSTETRICS & GYNECOLOGY

## 2017-06-21 PROCEDURE — 59025 FETAL NON-STRESS TEST: CPT | Performed by: OBSTETRICS & GYNECOLOGY

## 2017-06-21 NOTE — MR AVS SNAPSHOT
Buffy Chong   2017 3:30 PM   Routine Prenatal   MRN: [id number]    Department[facility]  [facility]   Dept Phone:  [phone]    Description:  Female : 1985   Provider:  Rosalina Gonzales M.D.           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, antepartum   [[id number]]         Vital Signs     Blood Pressure Weight Last Menstrual Period Smoking Status          114/62 mmHg 72.576 kg (160 lb) 2016 Never Smoker         Basic Information     Date Of Birth Sex Race Ethnicity Preferred Language    1985 Female  or , White  Origin (Greek,Citizen of [place],Citizen of [place], etc) Greek      Problem List              ICD-10-CM Priority Class Noted - Resolved    H/O fetal demise Z87.59   8/10/2016 - Present    Supervision of other high risk pregnancies, first trimester O09.891   2/15/2017 - Present    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 Review all of your home medications and newly ordered medications with your provider and/or pharmacist. Follow medication instructions as directed by your provider and/or pharmacist. Please keep your medication list with you and share with your provider. Update the information when medications are discontinued, doses are changed, or new medications (including over-the-counter products) are added; and carry medication information at all times in the event of emergency situations     Allergies:  No Known Allergies          Medications  Valid as of: June 21, 2017 -  4:28 PM    Generic Name Brand Name Tablet Size Instructions for use    Prenatal MV-Min-Fe Fum-FA-DHA   Take  by mouth.        .                 Medicines prescribed today were sent to:     [facility]/PHARMACY #[id number] - [facility] [place] - [address]    [address] [address]    Phone: [phone] Fax: [phone]    Open 24 Hours?: No      Medication refill instructions:       If your prescription bottle indicates you have medication refills left, it is not necessary to call your provider’s office. Please contact your pharmacy and they will refill your medication.    If your prescription bottle indicates you do not have any refills left, you may request refills at any time through one of the following ways: The online Trace Technologies SA system (except Urgent Care), by calling your provider’s office, or by asking your pharmacy to contact your provider’s office with a refill request. Medication refills are processed only during regular business hours and may not be available until the next business day. Your provider may request additional information or to have a follow-up visit with you prior to refilling your medication.   *Please Note: Medication refills are assigned a new Rx number when refilled electronically. Your pharmacy may indicate that no refills were authorized even though a new prescription for the same medication is available at the pharmacy. Please request the medicine by name with the pharmacy before contacting your provider for a refill.        Instructions    PTL precautions       Other Notes About Your Plan     Baby Boy - Srinivasa Barlow           Trace [employer] SA Access Code: [email or web address]  Expires: 7/6/2017  5:11 PM    Trace Technologies SA  A secure, online tool to manage your health information     [employer]’s Trace Technologies SA® is a secure, online tool that connects you to your personalized health information from the privacy of your  home -- day or night - making it very easy for you to manage your healthcare. Once the activation process is completed, you can even access your medical information using the RelayRides corey, which is available for free in the Apple Corey store or Google Play store.     RelayRides provides the following levels of access (as shown below):   My Chart Features   [facility] Doctor [facility]  Specialists [facility]  Urgent  Care Non-[facility]  Primary Care  Doctor   Email your healthcare team securely and privately 24/7 X X X    Manage appointments: schedule your next appointment; view details of past/upcoming appointments X      Request prescription refills. X      View recent personal medical records, including lab and immunizations X X X X   View health record, including health history, allergies, medications X X X X   Read reports about your outpatient visits, procedures, consult and ER notes X X X X   See your discharge summary, which is a recap of your hospital and/or ER visit that includes your diagnosis, lab results, and care plan. X X       How to register for RelayRides:  1. Go to  [email or web address].  2. Click on the Sign Up Now box, which takes you to the New Member Sign Up page. You will need to provide the following information:  a. Enter your RelayRides Access Code exactly as it appears at the top of this page. (You will not need to use this code after you’ve completed the sign-up process. If you do not sign up before the expiration date, you must request a new code.)   b. Enter your date of birth.   c. Enter your home email address.   d. Click Submit, and follow the next screen’s instructions.  3. Create a RelayRides ID. This will be your RelayRides login ID and cannot be changed, so think of one that is secure and easy to remember.  4. Create a RelayRides password. You can change your password at any time.  5. Enter your Password Reset Question and Answer. This can be used at a later time if you forget your password.    6. Enter your e-mail address. This allows you to receive e-mail notifications when new information is available in gamigo.  7. Click Sign Up. You can now view your health information.    For assistance activating your gamigo account, call [phone]

## 2017-06-21 NOTE — PROGRESS NOTES
Pt denies CTX, LOF, VB or any other c/o.  Good fetal movement.    Lab:  Recent Results (from the past 672 hour(s))   GLUCOSE TOLERANCE 3 HOUR    Collection Time: 17 12:00 AM   Result Value Ref Range    Glucose 3 Hour 84, 173, 123, 111  mg/dL        A/P:  31 y.o.  at 32w2d presents for obstetric follow-up. H/O Fetal Demise @ 24 weeks - start NST now. Begin twice weekly NST    1.  Continue prenatal vitamins.  2.  Begin/continue kick counts at 28 weeks   3.  Watch weight gain.  4.  Increase water intake.  5.  Follow-up in 1 weeks.  6.  PTL/labor precautions given

## 2017-06-21 NOTE — PROGRESS NOTES
OB f/u   + FM; No VB, LOF or UC's.  Good phone # [phone]  Preferred pharmacy confirmed  3 hr GTT normal

## 2017-06-21 NOTE — MR AVS SNAPSHOT
Buffy GonzalezMax   2017 3:00 PM   Routine Prenatal   MRN: [id number]    Department[facility]  [facility]   Dept Phone:  [phone]    Description:  Female : 1985   Provider:  Rosalina Gonzales M.D.           Allergies as of 2017     No Known Allergies      You were diagnosed with     H/O fetal demise, not currently pregnant   [[id number]]         Vital Signs     Last Menstrual Period Smoking Status                2016 Never Smoker           Basic Information     Date Of Birth Sex Race Ethnicity Preferred Language    1985 Female  or , White  Origin (Citizen of [place]ian,[place]an,[place], etc) Citizen of [place]      Problem List              ICD-10-CM Priority Class Noted - Resolved    H/O fetal demise Z87.59   8/10/2016 - Present    Supervision of other high risk pregnancies, first trimester O09.891   2/15/2017 - Present    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, 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   2/15/2017 - Present      Health Maintenance        Date Due Completion Dates    PAP SMEAR 2020    IMM DTaP/Tdap/Td Vaccine (2 - Td) 2027            Results     POCT NST      Component    NST Indications    h/o fetal demise    NST Baseline    150    NST Uterine Activity    none    NST Acoustic Stimulation    yes    NST Assessment    reactive, cat 1    NST Action Necessary    NST Other Data    NST Return    NST Read By                        Current Immunizations     Tdap Vaccine 2017  4:54 PM      Below and/or attached are the medications your provider expects you to take. Review all of your home medications and newly ordered medications with your provider and/or pharmacist. Follow medication instructions as directed by your provider and/or pharmacist. Please keep your medication list with you and share with your provider. Update the information when medications are discontinued, doses are changed, or new medications (including  over-the-counter products) are added; and carry medication information at all times in the event of emergency situations     Allergies:  No Known Allergies          Medications  Valid as of: June 21, 2017 -  4:28 PM    Generic Name Brand Name Tablet Size Instructions for use    Prenatal MV-Min-Fe Fum-FA-DHA   Take  by mouth.        .                 Medicines prescribed today were sent to:     [facility]/PHARMACY #[id number] - [place], [place] - [address]    [address] [address]    Phone: [phone] Fax: [phone]    Open 24 Hours?: No      Medication refill instructions:       If your prescription bottle indicates you have medication refills left, it is not necessary to call your provider’s office. Please contact your pharmacy and they will refill your medication.    If your prescription bottle indicates you do not have any refills left, you may request refills at any time through one of the following ways: The online Vindi system (except Urgent Care), by calling your provider’s office, or by asking your pharmacy to contact your provider’s office with a refill request. Medication refills are processed only during regular business hours and may not be available until the next business day. Your provider may request additional information or to have a follow-up visit with you prior to refilling your medication.   *Please Note: Medication refills are assigned a new Rx number when refilled electronically. Your pharmacy may indicate that no refills were authorized even though a new prescription for the same medication is available at the pharmacy. Please request the medicine by name with the pharmacy before contacting your provider for a refill.        Other Notes About Your Plan     Baby Boy - Brett           Vindi Access Code: [email or web address]  Expires: 7/6/2017  5:11 PM    Vindi  A secure, online tool to manage your health information     [employer]’s Vindi® is a secure, online  tool that connects you to your personalized health information from the privacy of your home -- day or night - making it very easy for you to manage your healthcare. Once the activation process is completed, you can even access your medical information using the TELOS corey, which is available for free in the Apple Corey store or Google Play store.     TELOS provides the following levels of access (as shown below):   My Chart Features   [facility] Doctor [facility]  Specialists [facility]  Urgent  Care Non-[facility]  Primary Care  Doctor   Email your healthcare team securely and privately 24/7 X X X    Manage appointments: schedule your next appointment; view details of past/upcoming appointments X      Request prescription refills. X      View recent personal medical records, including lab and immunizations X X X X   View health record, including health history, allergies, medications X X X X   Read reports about your outpatient visits, procedures, consult and ER notes X X X X   See your discharge summary, which is a recap of your hospital and/or ER visit that includes your diagnosis, lab results, and care plan. X X       How to register for TELOS:  1. Go to  [email or web address].  2. Click on the Sign Up Now box, which takes you to the New Member Sign Up page. You will need to provide the following information:  a. Enter your TELOS Access Code exactly as it appears at the top of this page. (You will not need to use this code after you’ve completed the sign-up process. If you do not sign up before the expiration date, you must request a new code.)   b. Enter your date of birth.   c. Enter your home email address.   d. Click Submit, and follow the next screen’s instructions.  3. Create a TELOS ID. This will be your TELOS login ID and cannot be changed, so think of one that is secure and easy to remember.  4. Create a TELOS password. You can change your password at any time.  5. Enter your Password  Reset Question and Answer. This can be used at a later time if you forget your password.   6. Enter your e-mail address. This allows you to receive e-mail notifications when new information is available in MedPlastst.  7. Click Sign Up. You can now view your health information.    For assistance activating your Zoyi account, call [phone]

## 2017-06-26 ENCOUNTER — ROUTINE PRENATAL (OUTPATIENT)
Dept: OBGYN | Facility: CLINIC | Age: 32
End: 2017-06-26

## 2017-06-26 DIAGNOSIS — O09.293 PRIOR PREGNANCY WITH FETAL DEMISE AND CURRENT PREGNANCY, THIRD TRIMESTER: ICD-10-CM

## 2017-06-26 LAB
NST ACOUSTIC STIMULATION: NORMAL
NST ACTION NECESSARY: NORMAL
NST ASSESSMENT: NORMAL
NST BASELINE: 130
NST INDICATIONS: NORMAL
NST OTHER DATA: NORMAL
NST READ BY: NORMAL
NST RETURN: NORMAL
NST UTERINE ACTIVITY: NORMAL

## 2017-06-26 PROCEDURE — 59025 FETAL NON-STRESS TEST: CPT | Performed by: NURSE PRACTITIONER

## 2017-06-26 NOTE — MR AVS SNAPSHOT
Buffy Chong   2017 8:30 AM   Routine Prenatal   MRN: [id number]    [facility]   Dept Phone:  [phone]    Description:  Female : 1985   Provider:  KATYA JACOBS           Allergies as of 2017     No Known Allergies      You were diagnosed with     Prior pregnancy with fetal demise and current pregnancy, third trimester   [798013]         Vital Signs     Last Menstrual Period Smoking Status                2016 Never Smoker           Basic Information     Date Of Birth Sex Race Ethnicity Preferred Language    1985 Female  or , White  Origin (Bulgarian,Qatari,Angolan,South African, etc) Bulgarian      Your appointments     2017  2:30 PM   Fetal Non-Stress Test with KATYA JACOBS   [facility] [address]  [place] [address]   [phone]            2017  3:00 PM   OB Follow Up with KATYA ADLER   [facility] [address]  [place] [address]   [phone]            2017  3:30 PM   Fetal Non-Stress Test with KATYA JACOBS   [facility] [address]  [place] [address]   [phone]            2017  3:30 PM   Fetal Non-Stress Test with PC ARLENE   [facility] [address]  [place] [address]   [phone]            2017  4:00 PM   OB Follow Up with KATYA ADLER   [facility] [address]  [place] [address]   [phone]              Problem List              ICD-10-CM Priority Class Noted - Resolved    H/O fetal demise Z87.59   8/10/2016 - Present    Supervision of other high risk pregnancies, first trimester O09.891   2/15/2017 - Present    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 Review all of your home medications and newly ordered medications with your provider and/or pharmacist. Follow medication instructions as directed by your provider and/or pharmacist. Please keep your medication list with you and share with your provider. Update the information when medications are discontinued, doses are changed, or new medications (including over-the-counter products) are added; and carry medication information at all times in the event of emergency situations     Allergies:  No Known Allergies          Medications  Valid as of: June 26, 2017 -  9:30 AM    Generic Name Brand Name Tablet Size Instructions for use    Prenatal MV-Min-Fe Fum-FA-DHA   Take  by mouth.        .                 Medicines prescribed today were sent to:     [facility]Y #[id number] - [place], [place] - [address]    [address] [facility] [address]    Phone: [phone] Fax: [phone]    Open 24 Hours?: No      Medication refill instructions:       If your prescription bottle indicates you have medication refills left, it is not necessary to call your provider’s office. Please contact your pharmacy and they will refill your medication.    If your prescription bottle indicates you do not have any refills left, you may request refills at any time through one of the following ways: The online Sightly system (except Urgent Care), by calling your provider’s office, or by asking your pharmacy to contact your provider’s office with a refill request. Medication refills are processed only during regular business hours and may not be available until the next business day. Your provider may request additional information or to have a follow-up visit with you prior to refilling your medication.   *Please Note: Medication refills are assigned a  new Rx number when refilled electronically. Your pharmacy may indicate that no refills were authorized even though a new prescription for the same medication is available at the pharmacy. Please request the medicine by name with the pharmacy before contacting your provider for a refill.        Other Notes About Your Plan     Baby Boy - Srinivasa BreauxorianaFoodily Access Code: [email or web address]  Expires: 7/6/2017  5:11 PM    e|tab  A secure, online tool to manage your health information     [employer]’s e|tab® is a secure, online tool that connects you to your personalized health information from the privacy of your home -- day or night - making it very easy for you to manage your healthcare. Once the activation process is completed, you can even access your medical information using the e|tab corey, which is available for free in the Apple Corey store or Google Play store.     e|tab provides the following levels of access (as shown below):   My Chart Features   [facility] Primary Care Doctor [facility]  Specialists [facility]  Urgent  Care [facility]  Primary Care  Doctor   Email your healthcare team securely and privately 24/7 X X X    Manage appointments: schedule your next appointment; view details of past/upcoming appointments X      Request prescription refills. X      View recent personal medical records, including lab and immunizations X X X X   View health record, including health history, allergies, medications X X X X   Read reports about your outpatient visits, procedures, consult and ER notes X X X X   See your discharge summary, which is a recap of your hospital and/or ER visit that includes your diagnosis, lab results, and care plan. X X       How to register for e|tab:  1. Go to  [email or web address].  2. Click on the Sign Up Now box, which takes you to the New Member Sign Up page. You will need to provide the following information:  a. Enter your e|tab Access Code exactly as it appears at the  top of this page. (You will not need to use this code after you’ve completed the sign-up process. If you do not sign up before the expiration date, you must request a new code.)   b. Enter your date of birth.   c. Enter your home email address.   d. Click Submit, and follow the next screen’s instructions.  3. Create a Domain Media ID. This will be your Domain Media login ID and cannot be changed, so think of one that is secure and easy to remember.  4. Create a Domain Media password. You can change your password at any time.  5. Enter your Password Reset Question and Answer. This can be used at a later time if you forget your password.   6. Enter your e-mail address. This allows you to receive e-mail notifications when new information is available in Domain Media.  7. Click Sign Up. You can now view your health information.    For assistance activating your Domain Media account, call [phone]

## 2017-06-26 NOTE — PROGRESS NOTES
S: Pt is a 31 y.o.  at 33w0d with  Estimated Date of Delivery: 17 who presents for scheduled NST for history of FD at 24 weeks. No complaints.  Denies VB, RUCs, LOF.  Reports good FM.      O: LMP 2016         FHTs: baseline 130, accels positive,  decels negative,  Variability moderate       [place]: Irregular UCs           A/P  Patient Active Problem List    Diagnosis Date Noted   • Supervision of other high risk pregnancies, first trimester 02/15/2017   • 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, antepartum 02/15/2017   • H/O fetal demise 08/10/2016       1.  IUP @ 33w0d  2.  Reactive, category 1 NST.  3.  F/u as sched.  4.  Continue 2x weekly NST's

## 2017-06-29 ENCOUNTER — ROUTINE PRENATAL (OUTPATIENT)
Dept: OBGYN | Facility: CLINIC | Age: 32
End: 2017-06-29
Payer: MEDICAID

## 2017-06-29 VITALS — WEIGHT: 157 LBS | DIASTOLIC BLOOD PRESSURE: 64 MMHG | BODY MASS INDEX: 29.68 KG/M2 | SYSTOLIC BLOOD PRESSURE: 99 MMHG

## 2017-06-29 DIAGNOSIS — O09.291 PREVIOUS PREGNANCY COMPLICATED BY CHROMOSOMAL ABNORMALITY IN FIRST TRIMESTER, ANTEPARTUM: ICD-10-CM

## 2017-06-29 DIAGNOSIS — O09.891 SUPERVISION OF OTHER HIGH RISK PREGNANCIES, FIRST TRIMESTER: ICD-10-CM

## 2017-06-29 DIAGNOSIS — O09.293 PRIOR PREGNANCY WITH FETAL DEMISE AND CURRENT PREGNANCY IN THIRD TRIMESTER: ICD-10-CM

## 2017-06-29 LAB
NST ACOUSTIC STIMULATION: NORMAL
NST ACTION NECESSARY: NORMAL
NST ASSESSMENT: NORMAL
NST BASELINE: NORMAL
NST INDICATIONS: NORMAL
NST OTHER DATA: NORMAL
NST READ BY: NORMAL
NST RETURN: NORMAL
NST UTERINE ACTIVITY: NORMAL

## 2017-06-29 PROCEDURE — 90040 PR PRENATAL FOLLOW UP: CPT | Performed by: OBSTETRICS & GYNECOLOGY

## 2017-06-29 PROCEDURE — 59025 FETAL NON-STRESS TEST: CPT | Performed by: OBSTETRICS & GYNECOLOGY

## 2017-06-29 NOTE — PROGRESS NOTES
Pt. Here for OB/FU and NST today. Reports Good FM.   Good # [phone]  Pt states having contractions that come and go.   Pharmacy verified.

## 2017-06-29 NOTE — MR AVS SNAPSHOT
Buffy Chong   2017 2:30 PM   Routine Prenatal   MRN: [id number]    [facility]   Dept Phone:  [phone]    Description:  Female : 1985   Provider:  Jovana Atkinson M.D.           Allergies as of 2017     No Known Allergies      You were diagnosed with     Prior pregnancy with fetal demise and current pregnancy in third trimester   [4757585]         Vital Signs     Last Menstrual Period Smoking Status                2016 Never Smoker           Basic Information     Date Of Birth Sex Race Ethnicity Preferred Language    1985 Female  or , White  Origin (Cameroonian,Dutch,Indonesian,Sumanth, etc) Cameroonian      Your appointments     2017  3:30 PM   Fetal Non-Stress Test with PC NST   [facility] [address]  [place] [address]   [phone]            2017  3:30 PM   Fetal Non-Stress Test with PC NST   [facility] [address]  [place] [address]   [phone]            2017  4:00 PM   OB Follow Up with PC MD   [facility] [address]  [place] [address]   [phone]              Problem List              ICD-10-CM Priority Class Noted - Resolved    H/O fetal demise Z87.59   8/10/2016 - Present    Supervision of other high risk pregnancies, first trimester O09.891   2/15/2017 - Present    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, 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   2/15/2017 - Present      Health Maintenance        Date Due Completion Dates    PAP SMEAR 2020    IMM DTaP/Tdap/Td Vaccine (2 - Td) 2027            Results     POCT Fetal Nonstress Test      Component    NST Indications    Hx of fetal demise at 24 wks.    NST Baseline    130s    NST Uterine Activity    irregular with irritability    NST Acoustic Stimulation    NST Assessment    Cat 1    NST Action Necessary    NST  Other Data    NST Return    twice weekly    NST Read By    [place]                        Current Immunizations     Tdap Vaccine 6/6/2017  4:54 PM      Below and/or attached are the medications your provider expects you to take. Review all of your home medications and newly ordered medications with your provider and/or pharmacist. Follow medication instructions as directed by your provider and/or pharmacist. Please keep your medication list with you and share with your provider. Update the information when medications are discontinued, doses are changed, or new medications (including over-the-counter products) are added; and carry medication information at all times in the event of emergency situations     Allergies:  No Known Allergies          Medications  Valid as of: June 29, 2017 -  4:11 PM    Generic Name Brand Name Tablet Size Instructions for use    Prenatal MV-Min-Fe Fum-FA-DHA   Take  by mouth.        .                 Medicines prescribed today were sent to:     [facility]/PHARMACY #[id number] - [place], [place] - [address] [place] [address]    [address] [place] [facility]. [place] [address]    Phone: [phone] Fax: [phone]    Open 24 Hours?: No      Medication refill instructions:       If your prescription bottle indicates you have medication refills left, it is not necessary to call your provider’s office. Please contact your pharmacy and they will refill your medication.    If your prescription bottle indicates you do not have any refills left, you may request refills at any time through one of the following ways: The online zerved system (except Urgent Care), by calling your provider’s office, or by asking your pharmacy to contact your provider’s office with a refill request. Medication refills are processed only during regular business hours and may not be available until the next business day. Your provider may request additional information or to have a follow-up visit with you prior to refilling your medication.   *Please Note:  Medication refills are assigned a new Rx number when refilled electronically. Your pharmacy may indicate that no refills were authorized even though a new prescription for the same medication is available at the pharmacy. Please request the medicine by name with the pharmacy before contacting your provider for a refill.        Other Notes About Your Plan     Baby Boy - Srinivasa OsmanAlnara Pharmaceuticals Access Code: [email or web address]  Expires: 7/6/2017  5:11 PM    Stuffle  A secure, online tool to manage your health information     [employer]’s Stuffle® is a secure, online tool that connects you to your personalized health information from the privacy of your home -- day or night - making it very easy for you to manage your healthcare. Once the activation process is completed, you can even access your medical information using the Stuffle corey, which is available for free in the Apple Corey store or Google Play store.     Stuffle provides the following levels of access (as shown below):   My Chart Features   [facility] Primary Care Doctor [facility]  Specialists [facility]  Urgent  Care Non-[facility]  Primary Care  Doctor   Email your healthcare team securely and privately 24/7 X X X    Manage appointments: schedule your next appointment; view details of past/upcoming appointments X      Request prescription refills. X      View recent personal medical records, including lab and immunizations X X X X   View health record, including health history, allergies, medications X X X X   Read reports about your outpatient visits, procedures, consult and ER notes X X X X   See your discharge summary, which is a recap of your hospital and/or ER visit that includes your diagnosis, lab results, and care plan. X X       How to register for Stuffle:  1. Go to  [email or web address].  2. Click on the Sign Up Now box, which takes you to the New Member Sign Up page. You will need to provide the following information:  a. Enter your Stuffle Access  Code exactly as it appears at the top of this page. (You will not need to use this code after you’ve completed the sign-up process. If you do not sign up before the expiration date, you must request a new code.)   b. Enter your date of birth.   c. Enter your home email address.   d. Click Submit, and follow the next screen’s instructions.  3. Create a ABC Live ID. This will be your ABC Live login ID and cannot be changed, so think of one that is secure and easy to remember.  4. Create a ABC Live password. You can change your password at any time.  5. Enter your Password Reset Question and Answer. This can be used at a later time if you forget your password.   6. Enter your e-mail address. This allows you to receive e-mail notifications when new information is available in ABC Live.  7. Click Sign Up. You can now view your health information.    For assistance activating your ABC Live account, call [phone]

## 2017-06-29 NOTE — PROGRESS NOTES
31 y.o.  33w3d The patient is here for routine obstetrical followup. She is without complaints and reports good fetal movement. She denies contractions, vaginal bleeding, or leaking of fluid.    The patient's pregnancy is complicated by   Patient Active Problem List    Diagnosis Date Noted   • Supervision of other high risk pregnancies, first trimester 02/15/2017   • 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, antepartum 02/15/2017   • H/O fetal demise 08/10/2016     Cat 1 NST.  Irritability noted with irregular contractions - pt not feeling contractions.   Spec exam - cervix closed and thick.  Digital exam - closed/thick/high.    Followup in 1 week   labor precautions were discussed with patient  Fetal kick counts were discussed with patient

## 2017-06-29 NOTE — MR AVS SNAPSHOT
Buffy Chong   2017 3:00 PM   Routine Prenatal   MRN: [id number]    [facility]   Dept Phone:  [phone]    Description:  Female : 1985   Provider:  Jovana Atkinson M.D.           Allergies as of 2017     No Known Allergies      You were diagnosed with     Supervision of other high risk pregnancies, first trimester   [[id number]]       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, antepartum   [[id number]]         Vital Signs     Blood Pressure Weight Last Menstrual Period Smoking Status          99/64 mmHg 71.215 kg (157 lb) 2016 Never Smoker         Basic Information     Date Of Birth Sex Race Ethnicity Preferred Language    1985 Female  or , White  Origin (Citizen of [place],Northern Irish,Stateless,Sumanth, etc) Citizen of [place]      Your appointments     2017  3:30 PM   Fetal Non-Stress Test with PC NST   [facility] [address]  [place] [address]   [phone]            2017  3:30 PM   Fetal Non-Stress Test with PC NST   [facility])    [address] [place] [address] [address]  [place] [address]   [phone]            2017  4:00 PM   OB Follow Up with PC MD   [facility] [address]  [place] [address]   [phone]              Problem List              ICD-10-CM Priority Class Noted - Resolved    H/O fetal demise Z87.59   8/10/2016 - Present    Supervision of other high risk pregnancies, first trimester O09.891   2/15/2017 - Present    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 Review all of your home medications and newly ordered medications with your provider and/or pharmacist. Follow medication instructions as directed by your provider and/or pharmacist. Please keep your medication list with you and share with your provider. Update the information when medications are discontinued, doses are changed, or new medications (including over-the-counter products) are added; and carry medication information at all times in the event of emergency situations     Allergies:  No Known Allergies          Medications  Valid as of: June 29, 2017 -  4:11 PM    Generic Name Brand Name Tablet Size Instructions for use    Prenatal MV-Min-Fe Fum-FA-DHA   Take  by mouth.        .                 Medicines prescribed today were sent to:     [facility]/PHARMACY #[id number] - [place], [place] - [address] [place] [address]    [address] [place] [address]    Phone: [phone] Fax: [phone]    Open 24 Hours?: No      Medication refill instructions:       If your prescription bottle indicates you have medication refills left, it is not necessary to call your provider’s office. Please contact your pharmacy and they will refill your medication.    If your prescription bottle indicates you do not have any refills left, you may request refills at any time through one of the following ways: The online SoundOut system (except Urgent Care), by calling your provider’s office, or by asking your pharmacy to contact your provider’s office with a refill request. Medication refills are processed only during regular business hours and may not be available until the next business day. Your provider may request additional information or to have a follow-up visit with you prior to refilling your medication.   *Please Note: Medication refills are assigned a new Rx number when refilled electronically. Your pharmacy may indicate that no refills were authorized even though a new prescription for the same medication is available at the pharmacy.  Please request the medicine by name with the pharmacy before contacting your provider for a refill.        Other Notes About Your Plan     Baby Boy - Srinivasa BreauxInSequent Access Code: [email or web address]  Expires: 7/6/2017  5:11 PM    Vertascale  A secure, online tool to manage your health information     [employer]’s Vertascale® is a secure, online tool that connects you to your personalized health information from the privacy of your home -- day or night - making it very easy for you to manage your healthcare. Once the activation process is completed, you can even access your medical information using the Vertascale corey, which is available for free in the Apple Corey store or Google Play store.     Vertascale provides the following levels of access (as shown below):   My Chart Features   [facility] Primary Care Doctor [facility]  Specialists [facility]  Urgent  Care Non-[facility]  Primary Care  Doctor   Email your healthcare team securely and privately 24/7 X X X    Manage appointments: schedule your next appointment; view details of past/upcoming appointments X      Request prescription refills. X      View recent personal medical records, including lab and immunizations X X X X   View health record, including health history, allergies, medications X X X X   Read reports about your outpatient visits, procedures, consult and ER notes X X X X   See your discharge summary, which is a recap of your hospital and/or ER visit that includes your diagnosis, lab results, and care plan. X X       How to register for Vertascale:  1. Go to  [email or web address].  2. Click on the Sign Up Now box, which takes you to the New Member Sign Up page. You will need to provide the following information:  a. Enter your Vertascale Access Code exactly as it appears at the top of this page. (You will not need to use this code after you’ve completed the sign-up process. If you do not sign up before the expiration date, you must request a new code.)   b. Enter  your date of birth.   c. Enter your home email address.   d. Click Submit, and follow the next screen’s instructions.  3. Create a VoulezVousDiner ID. This will be your VoulezVousDiner login ID and cannot be changed, so think of one that is secure and easy to remember.  4. Create a ConXtecht password. You can change your password at any time.  5. Enter your Password Reset Question and Answer. This can be used at a later time if you forget your password.   6. Enter your e-mail address. This allows you to receive e-mail notifications when new information is available in VoulezVousDiner.  7. Click Sign Up. You can now view your health information.    For assistance activating your VoulezVousDiner account, call [phone]

## 2017-07-03 ENCOUNTER — ROUTINE PRENATAL (OUTPATIENT)
Dept: OBGYN | Facility: CLINIC | Age: 32
End: 2017-07-03
Payer: MEDICAID

## 2017-07-03 DIAGNOSIS — O09.299 PRIOR PREGNANCY WITH FETAL DEMISE: ICD-10-CM

## 2017-07-03 LAB
NST ACOUSTIC STIMULATION: NORMAL
NST ACTION NECESSARY: NORMAL
NST ASSESSMENT: REACTIVE
NST BASELINE: 135
NST INDICATIONS: NORMAL
NST OTHER DATA: NORMAL
NST READ BY: NORMAL
NST RETURN: NORMAL
NST UTERINE ACTIVITY: NORMAL

## 2017-07-03 PROCEDURE — 59025 FETAL NON-STRESS TEST: CPT | Performed by: OBSTETRICS & GYNECOLOGY

## 2017-07-03 PROCEDURE — 90040 PR PRENATAL FOLLOW UP: CPT | Performed by: OBSTETRICS & GYNECOLOGY

## 2017-07-03 NOTE — MR AVS SNAPSHOT
Buffy Chong   7/3/2017 3:30 PM   Routine Prenatal   MRN: [id number]    Department:  [facility]   Dept Phone:  [phone]    Description:  Female : 1985   Provider:  Malachi Calvin M.D.           Allergies as of 7/3/2017     No Known Allergies      You were diagnosed with     Prior pregnancy with fetal demise   [720182]         Vital Signs     Last Menstrual Period Smoking Status                2016 Never Smoker           Basic Information     Date Of Birth Sex Race Ethnicity Preferred Language    1985 Female  or , White  Origin (Wolof,Tajik,American,Sumanth, etc) Wolof      Your appointments     2017  3:30 PM   Fetal Non-Stress Test with PC NST   [facility])    [address]  [place] [id number] [phone]            2017  4:00 PM   OB Follow Up with PC MD   [facility])    [address]  [place] [address] [phone]              Problem List              ICD-10-CM Priority Class Noted - Resolved    H/O fetal demise Z87.59   8/10/2016 - Present    Supervision of other high risk pregnancies, first trimester O09.891   2/15/2017 - Present    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, 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   2/15/2017 - Present      Health Maintenance        Date Due Completion Dates    IMM INFLUENZA (1) 2017 ---    PAP SMEAR 2020    IMM DTaP/Tdap/Td Vaccine (2 - Td) 2027            Results     POCT Fetal Nonstress Test      Component    NST Indications    history of fetal demise    NST Baseline    135    NST Uterine Activity    none    NST Acoustic Stimulation    ×1    NST Assessment    reactive    NST Action Necessary    none    NST Other Data    NST Return    NST Read By                        Current Immunizations     Tdap Vaccine 2017  4:54 PM      Below and/or attached are the medications your provider  expects you to take. Review all of your home medications and newly ordered medications with your provider and/or pharmacist. Follow medication instructions as directed by your provider and/or pharmacist. Please keep your medication list with you and share with your provider. Update the information when medications are discontinued, doses are changed, or new medications (including over-the-counter products) are added; and carry medication information at all times in the event of emergency situations     Allergies:  No Known Allergies          Medications  Valid as of: July 03, 2017 -  4:18 PM    Generic Name Brand Name Tablet Size Instructions for use    Prenatal MV-Min-Fe Fum-FA-DHA   Take  by mouth.        .                 Medicines prescribed today were sent to:     [facility]/PHARMACY #[id number] - [place], [place] - [address]    [address] [place] [address]    Phone: [phone] Fax: [phone]    Open 24 Hours?: No      Medication refill instructions:       If your prescription bottle indicates you have medication refills left, it is not necessary to call your provider’s office. Please contact your pharmacy and they will refill your medication.    If your prescription bottle indicates you do not have any refills left, you may request refills at any time through one of the following ways: The online [employer] system (except Urgent Care), by calling your provider’s office, or by asking your pharmacy to contact your provider’s office with a refill request. Medication refills are processed only during regular business hours and may not be available until the next business day. Your provider may request additional information or to have a follow-up visit with you prior to refilling your medication.   *Please Note: Medication refills are assigned a new Rx number when refilled electronically. Your pharmacy may indicate that no refills were authorized even though a new prescription for the same medication is available at  the pharmacy. Please request the medicine by name with the pharmacy before contacting your provider for a refill.        Other Notes About Your Plan     Baby Boy - Srinivasa OsmanZaizher.im Access Code: [email or web address]  Expires: 7/6/2017  5:11 PM    [employer]  A secure, online tool to manage your health information     [employer]’s [employer]® is a secure, online tool that connects you to your personalized health information from the privacy of your home -- day or night - making it very easy for you to manage your healthcare. Once the activation process is completed, you can even access your medical information using the [employer] corey, which is available for free in the Apple Corey store or Google Play store.     [employer] provides the following levels of access (as shown below):   My Chart Features   [facility] Primary [facility] Doctor [facility]  Specialists [facility]  Urgent  Care Non-[facility]  Primary Care  Doctor   Email your healthcare team securely and privately 24/7 X X X    Manage appointments: schedule your next appointment; view details of past/upcoming appointments X      Request prescription refills. X      View recent personal medical records, including lab and immunizations X X X X   View health record, including health history, allergies, medications X X X X   Read reports about your outpatient visits, procedures, consult and ER notes X X X X   See your discharge summary, which is a recap of your hospital and/or ER visit that includes your diagnosis, lab results, and care plan. X X       How to register for [employer]:  1. Go to  [email or web address].  2. Click on the Sign Up Now box, which takes you to the New Member Sign Up page. You will need to provide the following information:  a. Enter your [employer] Access Code exactly as it appears at the top of this page. (You will not need to use this code after you’ve completed the sign-up process. If you do not sign up before the expiration date, you must request a new  code.)   b. Enter your date of birth.   c. Enter your home email address.   d. Click Submit, and follow the next screen’s instructions.  3. Create a TripOvation ID. This will be your TripOvation login ID and cannot be changed, so think of one that is secure and easy to remember.  4. Create a Hyperfairt password. You can change your password at any time.  5. Enter your Password Reset Question and Answer. This can be used at a later time if you forget your password.   6. Enter your e-mail address. This allows you to receive e-mail notifications when new information is available in TripOvation.  7. Click Sign Up. You can now view your health information.    For assistance activating your TripOvation account, call [phone]

## 2017-07-06 ENCOUNTER — ROUTINE PRENATAL (OUTPATIENT)
Dept: OBGYN | Facility: CLINIC | Age: 32
End: 2017-07-06
Payer: MEDICAID

## 2017-07-06 VITALS — BODY MASS INDEX: 30.44 KG/M2 | WEIGHT: 161 LBS | DIASTOLIC BLOOD PRESSURE: 56 MMHG | SYSTOLIC BLOOD PRESSURE: 99 MMHG

## 2017-07-06 DIAGNOSIS — Z87.59 H/O FETAL DEMISE, NOT CURRENTLY PREGNANT: ICD-10-CM

## 2017-07-06 DIAGNOSIS — O09.291 PREVIOUS PREGNANCY COMPLICATED BY CHROMOSOMAL ABNORMALITY IN FIRST TRIMESTER, ANTEPARTUM: ICD-10-CM

## 2017-07-06 DIAGNOSIS — O09.891 SUPERVISION OF OTHER HIGH RISK PREGNANCIES, FIRST TRIMESTER: ICD-10-CM

## 2017-07-06 LAB
NST ACOUSTIC STIMULATION: NORMAL
NST ACTION NECESSARY: NORMAL
NST ASSESSMENT: REACTIVE
NST BASELINE: 140
NST INDICATIONS: NORMAL
NST OTHER DATA: NORMAL
NST READ BY: NORMAL
NST RETURN: NORMAL
NST UTERINE ACTIVITY: NORMAL

## 2017-07-06 PROCEDURE — 90040 PR PRENATAL FOLLOW UP: CPT | Performed by: OBSTETRICS & GYNECOLOGY

## 2017-07-06 PROCEDURE — 59025 FETAL NON-STRESS TEST: CPT | Performed by: OBSTETRICS & GYNECOLOGY

## 2017-07-06 NOTE — MR AVS SNAPSHOT
Buffy Chong   2017 4:00 PM   Routine Prenatal   MRN: [id number]    Department[facility]  [facility]   Dept Phone:  [phone]    Description:  Female : 1985   Provider:  Buffy Parkinson M.D.           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, antepartum   [9290902]         Vital Signs     Blood Pressure Weight Last Menstrual Period Smoking Status          99/56 mmHg 73.029 kg (161 lb) 2016 Never Smoker         Basic Information     Date Of Birth Sex Race Ethnicity Preferred Language    1985 Female  or , White  Origin (Luxembour[place],Citizen of [place],Norwegian,Guinean, etc) Luxembourger      Your appointments     Jul 10, 2017  2:00 PM   Fetal Non-Stress Test with PC NST   [facility])    [address] [place] [address] [address]  [facility] [address]   [phone]            2017  2:30 PM   Fetal Non-Stress Test with PC NST   [facility])    [address] [place] [address] [address]  [place] [address]   [phone]            2017  3:00 PM   OB Follow Up with LEVAR Avendano   [facility])    [address] [place] [address] [address]  [place] [address]   [phone]              Problem List              ICD-10-CM Priority Class Noted - Resolved    H/O fetal demise Z87.59   8/10/2016 - Present    Supervision of other high risk pregnancies, first trimester O09.891   2/15/2017 - Present    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 Review all of your home medications and newly ordered medications with your provider and/or pharmacist. Follow medication instructions as directed by your provider and/or pharmacist. Please keep your medication list with you and share with your provider. Update the information when medications are discontinued, doses are changed, or new medications (including over-the-counter products) are added; and carry medication information at all times in the event of emergency situations     Allergies:  No Known Allergies          Medications  Valid as of: July 06, 2017 -  4:13 PM    Generic Name Brand Name Tablet Size Instructions for use    Prenatal MV-Min-Fe Fum-FA-DHA   Take  by mouth.        .                 Medicines prescribed today were sent to:     [facility]/PHARMACY #[id number] - [place], [place] - [address] [place] [address]    [address] [place] [address] [place] [address]    Phone: [phone] Fax: [phone]    Open 24 Hours?: No      Medication refill instructions:       If your prescription bottle indicates you have medication refills left, it is not necessary to call your provider’s office. Please contact your pharmacy and they will refill your medication.    If your prescription bottle indicates you do not have any refills left, you may request refills at any time through one of the following ways: The online [employer] system (except Urgent Care), by calling your provider’s office, or by asking your pharmacy to contact your provider’s office with a refill request. Medication refills are processed only during regular business hours and may not be available until the next business day. Your provider may request additional information or to have a follow-up visit with you prior to refilling your medication.   *Please Note: Medication refills are assigned a new Rx number when refilled electronically. Your pharmacy may indicate that no refills were authorized even though a new  prescription for the same medication is available at the pharmacy. Please request the medicine by name with the pharmacy before contacting your provider for a refill.        Other Notes About Your Plan     Baby Boy - Srinivasa Sierra Access Code: [email or web address]  Expires: 7/6/2017  5:11 PM    Habet  A secure, online tool to manage your health information     [employer]’s Habet® is a secure, online tool that connects you to your personalized health information from the privacy of your home -- day or night - making it very easy for you to manage your healthcare. Once the activation process is completed, you can even access your medical information using the Habet corey, which is available for free in the Apple Corey store or Google Play store.     Habet provides the following levels of access (as shown below):   My Chart Features   [facility]  Specialists [facility]  Urgent  Care Non-[facility]  Primary Care  Doctor   Email your healthcare team securely and privately 24/7 X X X    Manage appointments: schedule your next appointment; view details of past/upcoming appointments X      Request prescription refills. X      View recent personal medical records, including lab and immunizations X X X X   View health record, including health history, allergies, medications X X X X   Read reports about your outpatient visits, procedures, consult and ER notes X X X X   See your discharge summary, which is a recap of your hospital and/or ER visit that includes your diagnosis, lab results, and care plan. X X       How to register for Habet:  1. Go to  [email or web address].  2. Click on the Sign Up Now box, which takes you to the New Member Sign Up page. You will need to provide the following information:  a. Enter your Habet Access Code exactly as it appears at the top of this page. (You will not need to use this code after you’ve completed the sign-up process. If you do not sign up  before the expiration date, you must request a new code.)   b. Enter your date of birth.   c. Enter your home email address.   d. Click Submit, and follow the next screen’s instructions.  3. Create a Spanlink Communicationst ID. This will be your Span[employer] Communicationst login ID and cannot be changed, so think of one that is secure and easy to remember.  4. Create a Spanlink Communicationst password. You can change your password at any time.  5. Enter your Password Reset Question and Answer. This can be used at a later time if you forget your password.   6. Enter your e-mail address. This allows you to receive e-mail notifications when new information is available in [employer].  7. Click Sign Up. You can now view your health information.    For assistance activating your [employer] account, call [phone]

## 2017-07-06 NOTE — PROGRESS NOTES
Patient is at 34w3d. Doing fine. Good FM, no contractions, no LOF< no VB  Patients' weight gain, fluid intake and exercise level discussed.Vitals, fundal height , fetal position, and FHR reviewed on flowsheet.    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, antepartum 02/15/2017   • H/O fetal demise 08/10/2016     Lab:  Recent Results (from the past 336 hour(s))   POCT Fetal Nonstress Test    Collection Time: 06/26/17  8:54 AM   Result Value Ref Range    NST Indications h/o FD     NST Baseline 130     NST Uterine Activity irreg UC's     NST Acoustic Stimulation n/a     NST Assessment Reactive, Cat 1     NST Action Necessary cont 2x wkly NST     NST Other Data 33wks     NST Return as sched     NST Read By SAIMA Roque CNM    POCT Fetal Nonstress Test    Collection Time: 06/29/17  3:08 PM   Result Value Ref Range    NST Indications Hx of fetal demise at 24 wks.     NST Baseline 130s     NST Uterine Activity irregular with irritability     NST Acoustic Stimulation      NST Assessment Cat 1     NST Action Necessary      NST Other Data      NST Return twice weekly     NST Read By [place]    POCT Fetal Nonstress Test    Collection Time: 07/03/17  4:14 PM   Result Value Ref Range    NST Indications history of fetal demise     NST Baseline 135     NST Uterine Activity none     NST Acoustic Stimulation ×1     NST Assessment reactive     NST Action Necessary none     NST Other Data      NST Return      NST Read By     POCT NST    Collection Time: 07/06/17  3:30 PM   Result Value Ref Range    NST Indications h/o fetal demise     NST Baseline 140     NST Uterine Activity none     NST Acoustic Stimulation none     NST  Assessment reactive     NST Action Necessary      NST Other Data      NST Return 2 x a week     NST Read By SM      Assessment: 31 year old   1  34w3d  2. Doing well  3. Size equals Dates and/or Scan  4. Weight gain: normal: Yes, excessive:No                    Plan:  1. Rediscuss diet.  2. Increase water intake   3. Continue vitamins.  4. Fetal kick counts  5. Continue NST 2 x a week  6. OB ff-up visit 1 week

## 2017-07-06 NOTE — MR AVS SNAPSHOT
Buffy Chong   2017 3:30 PM   Routine Prenatal   MRN: [id number]    Department[facility]  [facility]   Dept Phone:  [phone]    Description:  Female : 1985   Provider:  Buffy Parkinson M.D.           Allergies as of 2017     No Known Allergies      You were diagnosed with     H/O fetal demise, not currently pregnant   [7577731]         Vital Signs     Last Menstrual Period Smoking Status                2016 Never Smoker           Basic Information     Date Of Birth Sex Race Ethnicity Preferred Language    1985 Female  or , White  Origin (Greek,Haitian,Micronesian,Sumanth, etc) Greek      Your appointments     Jul 10, 2017  2:00 PM   Fetal Non-Stress Test with PC NST   [facility])    [address] [place] [address] [address]  [place] [address]   [phone]            2017  2:30 PM   Fetal Non-Stress Test with PC NST   [facility] [address]  [place] [address] [phone]            2017  3:00 PM   OB Follow Up with LEVAR Avendano   [facility])    [address] [place] [address] [address]  [place] [address] [phone]              Problem List              ICD-10-CM Priority Class Noted - Resolved    H/O fetal demise Z87.59   8/10/2016 - Present    Supervision of other high risk pregnancies, first trimester O09.891   2/15/2017 - Present    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, 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   2/15/2017 - Present      Health Maintenance        Date Due Completion Dates    IMM INFLUENZA (1) 2017 ---    PAP SMEAR 2020    IMM DTaP/Tdap/Td Vaccine (2 - Td) 2027            Results     POCT NST      Component    NST Indications    h/o fetal demise    NST Baseline    140    NST Uterine Activity    none    NST Acoustic Stimulation    none    NST Assessment    reactive    NST Action Necessary    NST Other Data    NST Return    2 x a week    NST Read By                            Current Immunizations     Tdap Vaccine 6/6/2017  4:54 PM      Below and/or attached are the medications your provider expects you to take. Review all of your home medications and newly ordered medications with your provider and/or pharmacist. Follow medication instructions as directed by your provider and/or pharmacist. Please keep your medication list with you and share with your provider. Update the information when medications are discontinued, doses are changed, or new medications (including over-the-counter products) are added; and carry medication information at all times in the event of emergency situations     Allergies:  No Known Allergies          Medications  Valid as of: July 06, 2017 -  4:13 PM    Generic Name Brand Name Tablet Size Instructions for use    Prenatal MV-Min-Fe Fum-FA-DHA   Take  by mouth.        .                 Medicines prescribed today were sent to:     [facility]/PHARMACY #[id number] - [place], [place] - [address] [place] [address]    [address] [facility]. [place] [address]    Phone: [phone] Fax: [phone]    Open 24 Hours?: No      Medication refill instructions:       If your prescription bottle indicates you have medication refills left, it is not necessary to call your provider’s office. Please contact your pharmacy and they will refill your medication.    If your prescription bottle indicates you do not have any refills left, you may request refills at any time through one of the following ways: The online [employer] system (except Urgent Care), by calling your provider’s office, or by asking your pharmacy to contact your provider’s office with a refill request. Medication refills are processed only during regular business hours and may not be available until the next business day. Your provider may request additional information or to have a follow-up visit with you prior to refilling your medication.   *Please Note: Medication refills are  assigned a new Rx number when refilled electronically. Your pharmacy may indicate that no refills were authorized even though a new prescription for the same medication is available at the pharmacy. Please request the medicine by name with the pharmacy before contacting your provider for a refill.        Other Notes About Your Plan     Baby Boy - Srinivasa BreauxorianaData [employer] Access Code: [email or web address]  Expires: 7/6/2017  5:11 PM    [employer]  A secure, online tool to manage your health information     [employer]’s [employer]® is a secure, online tool that connects you to your personalized health information from the privacy of your home -- day or night - making it very easy for you to manage your healthcare. Once the activation process is completed, you can even access your medical information using the [employer] corey, which is available for free in the Apple Corey store or Google Play store.     [employer] provides the following levels of access (as shown below):   My Chart Features   [facility] Primary Care Doctor [facility]  Specialists [facility]  Urgent  Care Non-[facility]  Primary Care  Doctor   Email your healthcare team securely and privately 24/7 X X X    Manage appointments: schedule your next appointment; view details of past/upcoming appointments X      Request prescription refills. X      View recent personal medical records, including lab and immunizations X X X X   View health record, including health history, allergies, medications X X X X   Read reports about your outpatient visits, procedures, consult and ER notes X X X X   See your discharge summary, which is a recap of your hospital and/or ER visit that includes your diagnosis, lab results, and care plan. X X       How to register for [employer]:  1. Go to  [email or web address].  2. Click on the Sign Up Now box, which takes you to the New Member Sign Up page. You will need to provide the following information:  a. Enter your [employer] Access Code exactly as it  appears at the top of this page. (You will not need to use this code after you’ve completed the sign-up process. If you do not sign up before the expiration date, you must request a new code.)   b. Enter your date of birth.   c. Enter your home email address.   d. Click Submit, and follow the next screen’s instructions.  3. Create a Recombine ID. This will be your Re[employer] login ID and cannot be changed, so think of one that is secure and easy to remember.  4. Create a [employer]ine password. You can change your password at any time.  5. Enter your Password Reset Question and Answer. This can be used at a later time if you forget your password.   6. Enter your e-mail address. This allows you to receive e-mail notifications when new information is available in Recombine.  7. Click Sign Up. You can now view your health information.    For assistance activating your Re[employer]ine account, call [phone]

## 2017-07-06 NOTE — PROGRESS NOTES
Pt here for OB f/u. Denies VB, LOF. Reports +FM and irregular UCs.  Good phone# [phone]  Pharmacy verified with pt.

## 2017-07-10 ENCOUNTER — ROUTINE PRENATAL (OUTPATIENT)
Dept: OBGYN | Facility: CLINIC | Age: 32
End: 2017-07-10
Payer: MEDICAID

## 2017-07-10 DIAGNOSIS — O09.299 PRIOR PREGNANCY WITH FETAL DEMISE: ICD-10-CM

## 2017-07-10 LAB
NST ACOUSTIC STIMULATION: NO
NST ACTION NECESSARY: NORMAL
NST ASSESSMENT: REACTIVE
NST BASELINE: 130
NST INDICATIONS: NORMAL
NST OTHER DATA: NORMAL
NST READ BY: NORMAL
NST RETURN: NORMAL
NST UTERINE ACTIVITY: NO

## 2017-07-10 PROCEDURE — 59025 FETAL NON-STRESS TEST: CPT | Performed by: OBSTETRICS & GYNECOLOGY

## 2017-07-10 NOTE — MR AVS SNAPSHOT
Buffy Chong   7/10/2017 2:00 PM   Routine Prenatal   MRN: [id number]    [facility]   Dept Phone:  [phone]    Description:  Female : 1985   Provider:  Leti Campbell M.D.           Allergies as of 7/10/2017     No Known Allergies      You were diagnosed with     Prior pregnancy with fetal demise   [679109]         Vital Signs     Last Menstrual Period Smoking Status                2016 Never Smoker           Basic Information     Date Of Birth Sex Race Ethnicity Preferred Language    1985 Female  or , White  Origin (Danish,Tanzanian,Estonian,Comoran, etc) Danish      Your appointments     2017  2:30 PM   Fetal Non-Stress Test with PC NST   [facility])    [address]  [facility] [id number] [phone]            2017  3:00 PM   OB Follow Up with LEVAR Avendano   [facility])    [address] [place] [address] [address]  [facility] [id number] [phone]              Problem List              ICD-10-CM Priority Class Noted - Resolved    H/O fetal demise Z87.59   8/10/2016 - Present    Supervision of other high risk pregnancies, first trimester O09.891   2/15/2017 - Present    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, 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   2/15/2017 - Present      Health Maintenance        Date Due Completion Dates    IMM INFLUENZA (1) 2017 ---    PAP SMEAR 2020    IMM DTaP/Tdap/Td Vaccine (2 - Td) 2027            Results     POCT Fetal Nonstress Test      Component    NST Indications    h/o iufd    NST Baseline    130    NST Uterine Activity    no    NST Acoustic Stimulation    no    NST Assessment    reactive    NST Action Necessary    NST Other Data    NST Return    NST Read By                        Current Immunizations     Tdap Vaccine 2017  4:54 PM      Below and/or attached are the medications your provider  expects you to take. Review all of your home medications and newly ordered medications with your provider and/or pharmacist. Follow medication instructions as directed by your provider and/or pharmacist. Please keep your medication list with you and share with your provider. Update the information when medications are discontinued, doses are changed, or new medications (including over-the-counter products) are added; and carry medication information at all times in the event of emergency situations     Allergies:  No Known Allergies          Medications  Valid as of: July 10, 2017 -  2:39 PM    Generic Name Brand Name Tablet Size Instructions for use    Prenatal MV-Min-Fe Fum-FA-DHA   Take  by mouth.        .                 Medicines prescribed today were sent to:     [id number] - [place], [place] - [address]    [address] [facility]. [place] [address]    Phone: [phone] Fax: [phone]    Open 24 Hours?: No      Medication refill instructions:       If your prescription bottle indicates you have medication refills left, it is not necessary to call your provider’s office. Please contact your pharmacy and they will refill your medication.    If your prescription bottle indicates you do not have any refills left, you may request refills at any time through one of the following ways: The online aScentias system (except Urgent Care), by calling your provider’s office, or by asking your pharmacy to contact your provider’s office with a refill request. Medication refills are processed only during regular business hours and may not be available until the next business day. Your provider may request additional information or to have a follow-up visit with you prior to refilling your medication.   *Please Note: Medication refills are assigned a new Rx number when refilled electronically. Your pharmacy may indicate that no refills were authorized even though a new prescription for the same medication is available at  the pharmacy. Please request the medicine by name with the pharmacy before contacting your provider for a refill.        Other Notes About Your Plan     Baby Boy - Srinivasa OsmanCardiio Access Code: [email or web address]  Expires: 8/9/2017  2:39 PM    Nex3 Communications  A secure, online tool to manage your health information     [employer]’s Nex3 Communications® is a secure, online tool that connects you to your personalized health information from the privacy of your home -- day or night - making it very easy for you to manage your healthcare. Once the activation process is completed, you can even access your medical information using the Nex3 Communications corey, which is available for free in the Apple Corey store or Google Play store.     Nex3 Communications provides the following levels of access (as shown below):   My Chart Features   [facility] Doctor [facility]  Specialists [facility]  Urgent  Care [facility]  Primary Care  Doctor   Email your healthcare team securely and privately 24/7 X X X    Manage appointments: schedule your next appointment; view details of past/upcoming appointments X      Request prescription refills. X      View recent personal medical records, including lab and immunizations X X X X   View health record, including health history, allergies, medications X X X X   Read reports about your outpatient visits, procedures, consult and ER notes X X X X   See your discharge summary, which is a recap of your hospital and/or ER visit that includes your diagnosis, lab results, and care plan. X X       How to register for Nex3 Communications:  1. Go to  [email or web address].  2. Click on the Sign Up Now box, which takes you to the New Member Sign Up page. You will need to provide the following information:  a. Enter your Nex3 Communications Access Code exactly as it appears at the top of this page. (You will not need to use this code after you’ve completed the sign-up process. If you do not sign up before the expiration date, you must request a new  code.)   b. Enter your date of birth.   c. Enter your home email address.   d. Click Submit, and follow the next screen’s instructions.  3. Create a Informed Trades ID. This will be your Informed Trades login ID and cannot be changed, so think of one that is secure and easy to remember.  4. Create a Neos Therapeuticst password. You can change your password at any time.  5. Enter your Password Reset Question and Answer. This can be used at a later time if you forget your password.   6. Enter your e-mail address. This allows you to receive e-mail notifications when new information is available in Informed Trades.  7. Click Sign Up. You can now view your health information.    For assistance activating your Informed Trades account, call [phone]

## 2017-07-13 ENCOUNTER — ROUTINE PRENATAL (OUTPATIENT)
Dept: OBGYN | Facility: CLINIC | Age: 32
End: 2017-07-13
Payer: MEDICAID

## 2017-07-13 VITALS
WEIGHT: 162 LBS | HEIGHT: 61 IN | SYSTOLIC BLOOD PRESSURE: 97 MMHG | BODY MASS INDEX: 30.58 KG/M2 | DIASTOLIC BLOOD PRESSURE: 56 MMHG

## 2017-07-13 DIAGNOSIS — O09.291 PREVIOUS PREGNANCY COMPLICATED BY CHROMOSOMAL ABNORMALITY IN FIRST TRIMESTER, ANTEPARTUM: ICD-10-CM

## 2017-07-13 DIAGNOSIS — Z87.59 H/O FETAL DEMISE, NOT CURRENTLY PREGNANT: ICD-10-CM

## 2017-07-13 DIAGNOSIS — S72.001D CLOSED FRACTURE OF RIGHT HIP WITH ROUTINE HEALING: ICD-10-CM

## 2017-07-13 DIAGNOSIS — O09.891 SUPERVISION OF OTHER HIGH RISK PREGNANCIES, FIRST TRIMESTER: ICD-10-CM

## 2017-07-13 LAB
NST ACOUSTIC STIMULATION: NO
NST ACTION NECESSARY: NORMAL
NST ASSESSMENT: NORMAL
NST BASELINE: 130
NST INDICATIONS: NORMAL
NST OTHER DATA: NORMAL
NST READ BY: NORMAL
NST RETURN: NORMAL
NST UTERINE ACTIVITY: NORMAL

## 2017-07-13 PROCEDURE — 90040 PR PRENATAL FOLLOW UP: CPT | Performed by: NURSE PRACTITIONER

## 2017-07-13 PROCEDURE — 59025 FETAL NON-STRESS TEST: CPT | Performed by: OBSTETRICS & GYNECOLOGY

## 2017-07-13 NOTE — MR AVS SNAPSHOT
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, antepartum   [6028421]         Vital Signs     Blood Pressure Height Weight Body Mass Index Last Menstrual Period Smoking Status    97/56 mmHg 1.549 m (5' 1\") 73.483 kg (162 lb) 30.63 kg/m2 2016 Never Smoker       Basic Information     Date Of Birth Sex Race Ethnicity Preferred Language    1985 Female  or , White  Origin (Cape Verdean,Bruneian,Dutch,Malagasy, etc) Cape Verdean      Your appointments     2017  1:30 PM   Fetal Non-Stress Test with PC NST   [facility])    [address] [place] [address] [address]  [place] [address]   [phone]            2017  3:00 PM   Fetal Non-Stress Test with PC NST   [facility])    [address] [place] [address] [address]  [place] [address]   [phone]            2017  3:30 PM   OB Follow Up with Chelsea Torres D.N.P.   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 Review all of your home medications and newly ordered medications with your provider and/or pharmacist. Follow medication instructions as directed by your provider and/or pharmacist. Please keep your medication list with you and share with your provider. Update the information when medications are discontinued, doses are changed, or new medications (including over-the-counter products) are added; and carry medication information at all times in the event of emergency situations     Allergies:  No Known Allergies          Medications  Valid as of: July 13, 2017 -  3:40 PM    Generic Name Brand Name Tablet Size Instructions for use    Prenatal MV-Min-Fe Fum-FA-DHA   Take  by mouth.        .                 Medicines prescribed today were sent to:     [facility] #[id number] - [place], [place] - [address] [place] [address]    [address] [place] [address] [place] [address]    Phone: [phone] Fax: [phone]    Open 24 Hours?: No      Medication refill instructions:       If your prescription bottle indicates you have medication refills left, it is not necessary to call your provider’s office. Please contact your pharmacy and they will refill your medication.    If your prescription bottle indicates you do not have any refills left, you may request refills at any time through one of the following ways: The online AdScore system (except Urgent Care), by calling your provider’s office, or by asking your pharmacy to contact your provider’s office with a refill request. Medication refills are processed only during regular business hours and may not be available until the next business day. Your provider may request additional information or to have a follow-up visit with you prior to refilling your medication.   *Please Note: Medication refills are assigned a new Rx number when refilled electronically. Your pharmacy may indicate that no refills  were authorized even though a new prescription for the same medication is available at the pharmacy. Please request the medicine by name with the pharmacy before contacting your provider for a refill.        Other Notes About Your Plan     Baby Boy - Srinivasa Sierra Access Code: [email or web address]  Expires: 8/9/2017  2:39 PM    Xinguodu  A secure, online tool to manage your health information     [employer]’s Xinguodu® is a secure, online tool that connects you to your personalized health information from the privacy of your home -- day or night - making it very easy for you to manage your healthcare. Once the activation process is completed, you can even access your medical information using the Xinguodu corey, which is available for free in the Apple Corey store or Google Play store.     Xinguodu provides the following levels of access (as shown below):   My Chart Features   [facility] Doctor [facility]  Specialists [facility]  Urgent  Care Non-[facility]  Primary Care  Doctor   Email your healthcare team securely and privately 24/7 X X X    Manage appointments: schedule your next appointment; view details of past/upcoming appointments X      Request prescription refills. X      View recent personal medical records, including lab and immunizations X X X X   View health record, including health history, allergies, medications X X X X   Read reports about your outpatient visits, procedures, consult and ER notes X X X X   See your discharge summary, which is a recap of your hospital and/or ER visit that includes your diagnosis, lab results, and care plan. X X       How to register for Xinguodu:  1. Go to  [email or web address].  2. Click on the Sign Up Now box, which takes you to the New Member Sign Up page. You will need to provide the following information:  a. Enter your Xinguodu Access Code exactly as it appears at the top of this page. (You will not need to use this code after you’ve completed the sign-up  process. If you do not sign up before the expiration date, you must request a new code.)   b. Enter your date of birth.   c. Enter your home email address.   d. Click Submit, and follow the next screen’s instructions.  3. Create a Cogniit ID. This will be your Cogniit login ID and cannot be changed, so think of one that is secure and easy to remember.  4. Create a Cogniit password. You can change your password at any time.  5. Enter your Password Reset Question and Answer. This can be used at a later time if you forget your password.   6. Enter your e-mail address. This allows you to receive e-mail notifications when new information is available in [employer].  7. Click Sign Up. You can now view your health information.    For assistance activating your [employer] account, call [phone]

## 2017-07-13 NOTE — PROGRESS NOTES
Complains of pain in right hip from where it was broken in 2011.  Records requested from orthopedic and pain cliniic.  Is requesting IOL early because of pain.  Consiult with Dr Calvin and he says unless there is more of a medical reason it is impossible to schedule IOl prior to 39 weeks gestation.  Comfort measures reviewed at length,  GBS today.  Continue daily kick counts.

## 2017-07-13 NOTE — PROGRESS NOTES
Pt here today for ob follow up, good fetal movement, denies LOF,VB.  Pt states no issues or concerns today   GBS today

## 2017-07-13 NOTE — MR AVS SNAPSHOT
Buffy Chong   2017 2:30 PM   Routine Prenatal   MRN: [id number]    [facility]   Dept Phone:  [phone]    Description:  Female : 1985   Provider:  Rosalina Gonzales M.D.           Allergies as of 2017     No Known Allergies      You were diagnosed with     H/O fetal demise, not currently pregnant   [[id number]]         Vital Signs     Last Menstrual Period Smoking Status                2016 Never Smoker           Basic Information     Date Of Birth Sex Race Ethnicity Preferred Language    1985 Female  or , White  Origin (Tristanian,[place] citizen,Libyan,Sumanth, etc) Tristanian      Your appointments     2017  1:30 PM   Fetal Non-Stress Test with PC NST   [facility])    [address] [place] [address] [address]  [place] [address]   [phone]            2017  3:00 PM   Fetal Non-Stress Test with PC NST   [facility] [address]  [place] [address]   [phone]            2017  3:30 PM   OB Follow Up with Chelsea Torres D.N.P.   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   2/15/2017 - Present      Health Maintenance        Date Due Completion Dates    IMM INFLUENZA (1) 2017 ---    PAP SMEAR 2020    IMM DTaP/Tdap/Td Vaccine (2 - Td) 2027            Results     POCT NST      Component    NST Indications    H/O IUFD    NST Baseline    130    NST Uterine Activity    none    NST Acoustic Stimulation    no    NST Assessment    reactive, cat 1    NST Action Necessary    NST Other Data    NST Return    NST  Read By                        Current Immunizations     Tdap Vaccine 6/6/2017  4:54 PM      Below and/or attached are the medications your provider expects you to take. Review all of your home medications and newly ordered medications with your provider and/or pharmacist. Follow medication instructions as directed by your provider and/or pharmacist. Please keep your medication list with you and share with your provider. Update the information when medications are discontinued, doses are changed, or new medications (including over-the-counter products) are added; and carry medication information at all times in the event of emergency situations     Allergies:  No Known Allergies          Medications  Valid as of: July 13, 2017 -  3:40 PM    Generic Name Brand Name Tablet Size Instructions for use    Prenatal MV-Min-Fe Fum-FA-DHA   Take  by mouth.        .                 Medicines prescribed today were sent to:     [id number] - [place], [place] - [address] [place] [address]    [address] [place] [address] [place] [address]    Phone: [phone] Fax: [phone]    Open 24 Hours?: No      Medication refill instructions:       If your prescription bottle indicates you have medication refills left, it is not necessary to call your provider’s office. Please contact your pharmacy and they will refill your medication.    If your prescription bottle indicates you do not have any refills left, you may request refills at any time through one of the following ways: The online [email or web address].com system (except Urgent Care), by calling your provider’s office, or by asking your pharmacy to contact your provider’s office with a refill request. Medication refills are processed only during regular business hours and may not be available until the next business day. Your provider may request additional information or to have a follow-up visit with you prior to refilling your medication.   *Please Note: Medication refills are assigned a new Rx number when  refilled electronically. Your pharmacy may indicate that no refills were authorized even though a new prescription for the same medication is available at the pharmacy. Please request the medicine by name with the pharmacy before contacting your provider for a refill.        Other Notes About Your Plan     Baby Boy - Srinivasa OsmanVitaFlavor Access Code: [email or web address]  Expires: 8/9/2017  2:39 PM    HylioSoft  A secure, online tool to manage your health information     [employer]’s HylioSoft® is a secure, online tool that connects you to your personalized health information from the privacy of your home -- day or night - making it very easy for you to manage your healthcare. Once the activation process is completed, you can even access your medical information using the HylioSoft corey, which is available for free in the Apple Corey store or Google Play store.     HylioSoft provides the following levels of access (as shown below):   My Chart Features   [facility]  Specialists [facility]  Urgent  Care Non-[facility]  Primary Care  Doctor   Email your healthcare team securely and privately 24/7 X X X    Manage appointments: schedule your next appointment; view details of past/upcoming appointments X      Request prescription refills. X      View recent personal medical records, including lab and immunizations X X X X   View health record, including health history, allergies, medications X X X X   Read reports about your outpatient visits, procedures, consult and ER notes X X X X   See your discharge summary, which is a recap of your hospital and/or ER visit that includes your diagnosis, lab results, and care plan. X X       How to register for HylioSoft:  1. Go to  [email or web address].  2. Click on the Sign Up Now box, which takes you to the New Member Sign Up page. You will need to provide the following information:  a. Enter your HylioSoft Access Code exactly as it appears at the top of this page.  (You will not need to use this code after you’ve completed the sign-up process. If you do not sign up before the expiration date, you must request a new code.)   b. Enter your date of birth.   c. Enter your home email address.   d. Click Submit, and follow the next screen’s instructions.  3. Create a Wardrobe Housekeeper ID. This will be your Wardrobe Housekeeper login ID and cannot be changed, so think of one that is secure and easy to remember.  4. Create a Wardrobe Housekeeper password. You can change your password at any time.  5. Enter your Password Reset Question and Answer. This can be used at a later time if you forget your password.   6. Enter your e-mail address. This allows you to receive e-mail notifications when new information is available in Wardrobe Housekeeper.  7. Click Sign Up. You can now view your health information.    For assistance activating your Wardrobe Housekeeper account, call [phone]

## 2017-07-17 ENCOUNTER — ROUTINE PRENATAL (OUTPATIENT)
Dept: OBGYN | Facility: CLINIC | Age: 32
End: 2017-07-17
Payer: MEDICAID

## 2017-07-17 DIAGNOSIS — Z87.59 H/O FETAL DEMISE, NOT CURRENTLY PREGNANT: ICD-10-CM

## 2017-07-17 LAB
GP B STREP DNA SPEC QL NAA+PROBE: NEGATIVE
NST ACOUSTIC STIMULATION: NORMAL
NST ACTION NECESSARY: NORMAL
NST ASSESSMENT: NORMAL
NST BASELINE: NORMAL
NST INDICATIONS: NORMAL
NST OTHER DATA: NORMAL
NST READ BY: NORMAL
NST RETURN: NORMAL
NST UTERINE ACTIVITY: NORMAL

## 2017-07-17 PROCEDURE — 59025 FETAL NON-STRESS TEST: CPT | Performed by: NURSE PRACTITIONER

## 2017-07-17 NOTE — MR AVS SNAPSHOT
Buffy Chong   2017 1:30 PM   Routine Prenatal   MRN: [id number]    Department[facility]  [facility]   Dept Phone:  [phone]    Description:  Female : 1985   Provider:  Loyda Winters C.N.M.           Allergies as of 2017     No Known Allergies      You were diagnosed with     H/O fetal demise, not currently pregnant   [3655843]         Vital Signs     Last Menstrual Period Smoking Status                2016 Never Smoker           Basic Information     Date Of Birth Sex Race Ethnicity Preferred Language    1985 Female  or , White  Origin (Italian,Grenadian,South Sudanese,Sumanth, etc) Italian      Your appointments     2017  3:00 PM   Fetal Non-Stress Test with PC ARLENE   [facility])    [address] [place] [address] [address]  [facility] [address]   [phone]            2017  3:30 PM   OB Follow Up with Chelsea Torres D.N.P.   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 Review all of your home medications and newly ordered medications with your provider and/or pharmacist. Follow medication instructions as directed by your provider  and/or pharmacist. Please keep your medication list with you and share with your provider. Update the information when medications are discontinued, doses are changed, or new medications (including over-the-counter products) are added; and carry medication information at all times in the event of emergency situations     Allergies:  No Known Allergies          Medications  Valid as of: July 17, 2017 -  2:15 PM    Generic Name Brand Name Tablet Size Instructions for use    Prenatal MV-Min-Fe Fum-FA-DHA   Take  by mouth.        .                 Medicines prescribed today were sent to:     [facility]/PHARMACY #[id number] - [place], [place] - [address].    [address] [place] [address]    Phone: [phone] Fax: [phone]    Open 24 Hours?: No      Medication refill instructions:       If your prescription bottle indicates you have medication refills left, it is not necessary to call your provider’s office. Please contact your pharmacy and they will refill your medication.    If your prescription bottle indicates you do not have any refills left, you may request refills at any time through one of the following ways: The online Socialcast system (except Urgent Care), by calling your provider’s office, or by asking your pharmacy to contact your provider’s office with a refill request. Medication refills are processed only during regular business hours and may not be available until the next business day. Your provider may request additional information or to have a follow-up visit with you prior to refilling your medication.   *Please Note: Medication refills are assigned a new Rx number when refilled electronically. Your pharmacy may indicate that no refills were authorized even though a new prescription for the same medication is available at the pharmacy. Please request the medicine by name with the pharmacy before contacting your provider for a refill.        Other Notes About Your Plan     Baby Boy - Srinivasa Barlow            Agito Networks Access Code: [email or web address]  Expires: 8/9/2017  2:39 PM    Agito Networks  A secure, online tool to manage your health information     [employer]’s Agito Networks® is a secure, online tool that connects you to your personalized health information from the privacy of your home -- day or night - making it very easy for you to manage your healthcare. Once the activation process is completed, you can even access your medical information using the Agito Networks corey, which is available for free in the Apple Corey store or Google Play store.     Agito Networks provides the following levels of access (as shown below):   My Chart Features   [facility] Primary Care Doctor [facility]  Specialists [facility]  Urgent  Care Non-[facility]  Primary Care  Doctor   Email your healthcare team securely and privately 24/7 X X X    Manage appointments: schedule your next appointment; view details of past/upcoming appointments X      Request prescription refills. X      View recent personal medical records, including lab and immunizations X X X X   View health record, including health history, allergies, medications X X X X   Read reports about your outpatient visits, procedures, consult and ER notes X X X X   See your discharge summary, which is a recap of your hospital and/or ER visit that includes your diagnosis, lab results, and care plan. X X       How to register for Agito Networks:  1. Go to  [email or web address].  2. Click on the Sign Up Now box, which takes you to the New Member Sign Up page. You will need to provide the following information:  a. Enter your Agito Networks Access Code exactly as it appears at the top of this page. (You will not need to use this code after you’ve completed the sign-up process. If you do not sign up before the expiration date, you must request a new code.)   b. Enter your date of birth.   c. Enter your home email address.   d. Click Submit, and follow the next screen’s instructions.  3. Create a Agito Networks ID. This will be your Agito Networks  login ID and cannot be changed, so think of one that is secure and easy to remember.  4. Create a Virdia password. You can change your password at any time.  5. Enter your Password Reset Question and Answer. This can be used at a later time if you forget your password.   6. Enter your e-mail address. This allows you to receive e-mail notifications when new information is available in Virdia.  7. Click Sign Up. You can now view your health information.    For assistance activating your Virdia account, call [phone]

## 2017-07-20 ENCOUNTER — ROUTINE PRENATAL (OUTPATIENT)
Dept: OBGYN | Facility: CLINIC | Age: 32
End: 2017-07-20

## 2017-07-20 ENCOUNTER — ROUTINE PRENATAL (OUTPATIENT)
Dept: OBGYN | Facility: CLINIC | Age: 32
End: 2017-07-20
Payer: MEDICAID

## 2017-07-20 VITALS — WEIGHT: 164 LBS | SYSTOLIC BLOOD PRESSURE: 101 MMHG | DIASTOLIC BLOOD PRESSURE: 61 MMHG | BODY MASS INDEX: 31 KG/M2

## 2017-07-20 DIAGNOSIS — O09.291 PREVIOUS PREGNANCY COMPLICATED BY CHROMOSOMAL ABNORMALITY IN FIRST TRIMESTER, ANTEPARTUM: ICD-10-CM

## 2017-07-20 DIAGNOSIS — O09.891 SUPERVISION OF OTHER HIGH RISK PREGNANCIES, FIRST TRIMESTER: ICD-10-CM

## 2017-07-20 DIAGNOSIS — Z87.59 H/O FETAL DEMISE, NOT CURRENTLY PREGNANT: ICD-10-CM

## 2017-07-20 DIAGNOSIS — S72.001D CLOSED FRACTURE OF RIGHT HIP WITH ROUTINE HEALING: ICD-10-CM

## 2017-07-20 PROCEDURE — 90040 PR PRENATAL FOLLOW UP: CPT | Performed by: NURSE PRACTITIONER

## 2017-07-20 PROCEDURE — 59025 FETAL NON-STRESS TEST: CPT | Performed by: NURSE PRACTITIONER

## 2017-07-20 NOTE — PROGRESS NOTES
S) Pt is a 31 y.o.   at 36w3d  gestation. Routine prenatal care today. Patient wishes to start maternity leave. States has severe hip pain, often has to use assistive device. States has had to use norco and get injections for pain mgt since .  Reports good  fetal movement. Denies cramping, bleeding or leaking of fluid. Denies dysuria. Generally feels well today. Good self-care activities identified. Denies headaches.     O) see flow         Filed Vitals:    17 1554   BP: 101/61   Weight: 74.39 kg (164 lb)           Lab:  Recent Results (from the past 336 hour(s))   POCT Fetal Nonstress Test    Collection Time: 07/10/17  2:10 PM   Result Value Ref Range    NST Indications h/o iufd     NST Baseline 130     NST Uterine Activity no     NST Acoustic Stimulation no     NST Assessment reactive     NST Action Necessary      NST Other Data      NST Return      NST Read By     POCT NST    Collection Time: 17  2:25 PM   Result Value Ref Range    NST Indications H/O IUFD     NST Baseline 130     NST Uterine Activity none     NST Acoustic Stimulation no     NST Assessment reactive, cat 1     NST Action Necessary      NST Other Data      NST Return      NST Read By     POCT NST    Collection Time: 17  1:25 PM   Result Value Ref Range    NST Indications Hx of IUFD     NST Baseline 140s     NST Uterine Activity One UC     NST Acoustic Stimulation VASx1     NST Assessment       Reactive NST cat I FHTs +accel -decel mod variability    NST Action Necessary      NST Other Data      NST Return Cont 2x/wk NST, 1wk ELDA     NST Read By [facility]    POCT NST    Collection Time: 17  3:12 PM   Result Value Ref Range    NST Indications      NST Baseline      NST Uterine Activity      NST Acoustic Stimulation      NST Assessment      NST Action Necessary      NST Other Data      NST Return      NST Read By            Pertinent ultrasound -        Normal fetal survey     A) IUP at 36w3d       S=D         Patient  Active Problem List    Diagnosis Date Noted   • Closed fracture of right hip with routine healing 2011 07/13/2017   • Supervision of other high risk pregnancies, first trimester 02/15/2017   • 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, antepartum 02/15/2017   • H/O fetal demise 08/10/2016       P) s/s labor vs general discomforts. Fetal movements reviewed. General ed and anticipatory guidance. Nutrition/exercise/vitamin. Continue PNV. Referral for PT. Long term use of norco/injections and assistive device for ambulation are not good long term solutions. Letter given to start maternity leave.

## 2017-07-20 NOTE — MR AVS SNAPSHOT
Buffy Chong   2017 3:30 PM   Routine Prenatal   MRN: [id number]    [facility]   Dept Phone:  [phone]    Description:  Female : 1985   Provider:  Chelsea Torres D.N.P.           Allergies as of 2017     No Known Allergies      You were diagnosed with     Supervision of other high risk pregnancies, first trimester   [[id number]]       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, antepartum   [[id number]]       Closed fracture of right hip with routine healing   [[id number]]         Vital Signs     Blood Pressure Weight Last Menstrual Period Smoking Status          101/61 mmHg 74.39 kg (164 lb) 2016 Never Smoker         Basic Information     Date Of Birth Sex Race Ethnicity Preferred Language    1985 Female  or , White  Origin (Telugu,Gibraltarian,Burundian,Sumanth, etc) English      Your appointments     2017  1:30 PM   Fetal Non-Stress Test with PC NST   [facility])    [address] [place] [address] [address]  [place] [address]   [phone]            2017  2:30 PM   Fetal Non-Stress Test with PC NST   [facility])    [address] [place] [address] [address]  [place] [address]   [phone]            2017  3:00 PM   OB Follow Up with Loyda Winters C.N.M.   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 Review all of your home medications and newly ordered medications with your provider and/or pharmacist. Follow medication instructions as directed by your provider and/or pharmacist. Please keep your medication list with you and share with your provider. Update the information when medications are discontinued, doses are changed, or new medications (including over-the-counter products) are added; and carry medication information at all times in the event of emergency situations     Allergies:  No Known Allergies          Medications  Valid as of: July 20, 2017 -  4:07 PM    Generic Name Brand Name Tablet Size Instructions for use    Prenatal MV-Min-Fe Fum-FA-DHA   Take  by mouth.        .                 Medicines prescribed today were sent to:     [facility]/PHARMACY #[id number] - [place], [place] - [address] [place] [address]    [address] [place] [address] [place] [address]    Phone: [phone] Fax: [phone]    Open 24 Hours?: No      Medication refill instructions:       If your prescription bottle indicates you have medication refills left, it is not necessary to call your provider’s office. Please contact your pharmacy and they will refill your medication.    If your prescription bottle indicates you do not have any refills left, you may request refills at any time through one of the following ways: The online Artist Growth system (except Urgent Care), by calling your provider’s office, or by asking your pharmacy to contact your provider’s office with a refill request. Medication refills are processed only during regular business hours and may not be available until the next business day. Your provider may request additional information or to have a follow-up visit with you prior to refilling your medication.   *Please Note:  Medication refills are assigned a new Rx number when refilled electronically. Your pharmacy may indicate that no refills were authorized even though a new prescription for the same medication is available at the pharmacy. Please request the medicine by name with the pharmacy before contacting your provider for a refill.        Other Notes About Your Plan     Baby Diogo - Srinivasa OsmanZero Locus Access Code: [email or web address]  Expires: 8/9/2017  2:39 PM    mo9 (moKredit)  A secure, online tool to manage your health information     [employer]’s mo9 (moKredit)® is a secure, online tool that connects you to your personalized health information from the privacy of your home -- day or night - making it very easy for you to manage your healthcare. Once the activation process is completed, you can even access your medical information using the mo9 (moKredit) corey, which is available for free in the Apple Corey store or Google Play store.     mo9 (moKredit) provides the following levels of access (as shown below):   My Chart Features   [facility] Primary Care Doctor [facility]  Specialists [facility]  Urgent  Care Non-[facility]  Primary Care  Doctor   Email your healthcare team securely and privately 24/7 X X X    Manage appointments: schedule your next appointment; view details of past/upcoming appointments X      Request prescription refills. X      View recent personal medical records, including lab and immunizations X X X X   View health record, including health history, allergies, medications X X X X   Read reports about your outpatient visits, procedures, consult and ER notes X X X X   See your discharge summary, which is a recap of your hospital and/or ER visit that includes your diagnosis, lab results, and care plan. X X       How to register for mo9 (moKredit):  1. Go to  [email or web address].  2. Click on the Sign Up Now box, which takes you to the New Member Sign Up page. You will need to provide the following information:  a. Enter your mo9 (moKredit) Access  Code exactly as it appears at the top of this page. (You will not need to use this code after you’ve completed the sign-up process. If you do not sign up before the expiration date, you must request a new code.)   b. Enter your date of birth.   c. Enter your home email address.   d. Click Submit, and follow the next screen’s instructions.  3. Create a Mingxieku ID. This will be your Mingxieku login ID and cannot be changed, so think of one that is secure and easy to remember.  4. Create a Mingxieku password. You can change your password at any time.  5. Enter your Password Reset Question and Answer. This can be used at a later time if you forget your password.   6. Enter your e-mail address. This allows you to receive e-mail notifications when new information is available in Mingxieku.  7. Click Sign Up. You can now view your health information.    For assistance activating your Mingxieku account, call [phone]

## 2017-07-20 NOTE — Clinical Note
July 20, 2017            Buffy Chong is pregnant with an estimated delivery date of 8/14/17 at this time. She will start her maternity leave.         Thank you,          Chelsea Torres D.N.P.    Electronically Signed

## 2017-07-24 ENCOUNTER — ROUTINE PRENATAL (OUTPATIENT)
Dept: OBGYN | Facility: CLINIC | Age: 32
End: 2017-07-24
Payer: MEDICAID

## 2017-07-24 DIAGNOSIS — O09.299 PRIOR PREGNANCY WITH FETAL DEMISE: ICD-10-CM

## 2017-07-24 PROCEDURE — 59025 FETAL NON-STRESS TEST: CPT | Performed by: NURSE PRACTITIONER

## 2017-07-24 NOTE — MR AVS SNAPSHOT
Buffy Chong   2017 1:30 PM   Routine Prenatal   MRN: [id number]    Department[facility]   Dept Phone:  [phone]    Description:  Female : 1985   Provider:  Lexis Roque C.N.M.           Allergies as of 2017     No Known Allergies      You were diagnosed with     Prior pregnancy with fetal demise   [[id number]]         Vital Signs     Last Menstrual Period Smoking Status                2016 Never Smoker           Basic Information     Date Of Birth Sex Race Ethnicity Preferred Language    1985 Female  or , White  Origin (Tongan,Libyan,Northern Irish,Sumanth, etc) English      Your appointments     2017  2:30 PM   Fetal Non-Stress Test with PC NST   [facility])    [address]  [place] [address] [phone]            2017  3:00 PM   OB Follow Up with Loyda Winters C.N.M.   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, 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   2/15/2017 - Present    Closed fracture of right hip with routine healing  S72.001D   2017 - Present      Health Maintenance        Date Due Completion Dates    IMM INFLUENZA (1) 2017 ---    PAP SMEAR 2020    IMM DTaP/Tdap/Td Vaccine (2 - Td) 2027            Results     POCT Fetal Nonstress Test      Component    NST Indications    H/O fetal demise    NST Baseline    150    NST Uterine Activity    irrit    NST Acoustic Stimulation    yes    NST Assessment    Reactive, cat 1    NST Action Necessary    cont 2x wkly nst    NST Other Data    NST Return    as sched    NST Read By       SAIMA Roque CNM                        Current Immunizations     Tdap Vaccine 6/6/2017  4:54 PM      Below and/or attached are the medications your provider expects you to take. Review all of your home medications and newly ordered medications with your provider and/or pharmacist. Follow medication instructions as directed by your provider and/or pharmacist. Please keep your medication list with you and share with your provider. Update the information when medications are discontinued, doses are changed, or new medications (including over-the-counter products) are added; and carry medication information at all times in the event of emergency situations     Allergies:  No Known Allergies          Medications  Valid as of: July 24, 2017 -  3:39 PM    Generic Name Brand Name Tablet Size Instructions for use    Prenatal MV-Min-Fe Fum-FA-DHA   Take  by mouth.        .                 Medicines prescribed today were sent to:     [facility]/PHARMACY #[id number] - [place], [place] - [address] [place] [address]    [address] [place] [address] [place] [address]    Phone: [phone] Fax: [phone]    Open 24 Hours?: No      Medication refill instructions:       If your prescription bottle indicates you have medication refills left, it is not necessary to call your provider’s office. Please contact your pharmacy and they will refill your medication.    If your prescription bottle indicates you do not have any refills left, you may request refills at any time through one of the following ways: The online Moncai system (except Urgent Care), by calling your provider’s office, or by asking your pharmacy to contact your provider’s office with a refill request. Medication refills are processed only during regular business hours and may not be available until the next business day. Your provider may request additional information or to have a follow-up visit with you prior to refilling your medication.   *Please Note: Medication refills are assigned a new Rx number when  refilled electronically. Your pharmacy may indicate that no refills were authorized even though a new prescription for the same medication is available at the pharmacy. Please request the medicine by name with the pharmacy before contacting your provider for a refill.        Other Notes About Your Plan     Baby Boy - Srinivasa Osmanfinalsite Access Code: [email or web address]  Expires: 8/9/2017  2:39 PM    bTendo  A secure, online tool to manage your health information     [employer]’s bTendo® is a secure, online tool that connects you to your personalized health information from the privacy of your home -- day or night - making it very easy for you to manage your healthcare. Once the activation process is completed, you can even access your medical information using the bTendo corey, which is available for free in the Apple Corey store or Google Play store.     bTendo provides the following levels of access (as shown below):   My Chart Features   [facility] Primary Care Doctor [facility]  Specialists [facility]  Urgent  Care Non-[facility]  Primary Care  Doctor   Email your healthcare team securely and privately 24/7 X X X    Manage appointments: schedule your next appointment; view details of past/upcoming appointments X      Request prescription refills. X      View recent personal medical records, including lab and immunizations X X X X   View health record, including health history, allergies, medications X X X X   Read reports about your outpatient visits, procedures, consult and ER notes X X X X   See your discharge summary, which is a recap of your hospital and/or ER visit that includes your diagnosis, lab results, and care plan. X X       How to register for bTendo:  1. Go to  [email or web address].  2. Click on the Sign Up Now box, which takes you to the New Member Sign Up page. You will need to provide the following information:  a. Enter your bTendo Access Code exactly as it appears at the top of this page.  (You will not need to use this code after you’ve completed the sign-up process. If you do not sign up before the expiration date, you must request a new code.)   b. Enter your date of birth.   c. Enter your home email address.   d. Click Submit, and follow the next screen’s instructions.  3. Create a goviral ID. This will be your goviral login ID and cannot be changed, so think of one that is secure and easy to remember.  4. Create a goviral password. You can change your password at any time.  5. Enter your Password Reset Question and Answer. This can be used at a later time if you forget your password.   6. Enter your e-mail address. This allows you to receive e-mail notifications when new information is available in goviral.  7. Click Sign Up. You can now view your health information.    For assistance activating your goviral account, call [phone]

## 2017-07-26 ENCOUNTER — HOSPITAL ENCOUNTER (OUTPATIENT)
Facility: MEDICAL CENTER | Age: 32
End: 2017-07-27
Attending: OBSTETRICS & GYNECOLOGY | Admitting: OBSTETRICS & GYNECOLOGY
Payer: MEDICAID

## 2017-07-26 ENCOUNTER — TELEPHONE (OUTPATIENT)
Dept: OBGYN | Facility: CLINIC | Age: 32
End: 2017-07-26

## 2017-07-26 PROCEDURE — 59025 FETAL NON-STRESS TEST: CPT | Performed by: OBSTETRICS & GYNECOLOGY

## 2017-07-26 NOTE — TELEPHONE ENCOUNTER
Pt calling stating when she woke up this am her underwear and shorts were wet with a clear and odorless fluid and but now continue having some fluid but is more yellowish. Pt denies VB. Report +FM and pelvic pressure. Consulted with STEVE Whiteside CNM and advised for pt to go to L&D to be evaluated. Pt agreed and verbalized understanding.

## 2017-07-27 ENCOUNTER — ROUTINE PRENATAL (OUTPATIENT)
Dept: OBGYN | Facility: CLINIC | Age: 32
End: 2017-07-27
Payer: MEDICAID

## 2017-07-27 ENCOUNTER — TELEPHONE (OUTPATIENT)
Dept: OBGYN | Facility: CLINIC | Age: 32
End: 2017-07-27

## 2017-07-27 VITALS
HEART RATE: 78 BPM | TEMPERATURE: 98.5 F | HEIGHT: 60 IN | SYSTOLIC BLOOD PRESSURE: 109 MMHG | BODY MASS INDEX: 32 KG/M2 | DIASTOLIC BLOOD PRESSURE: 68 MMHG | WEIGHT: 163 LBS

## 2017-07-27 VITALS — DIASTOLIC BLOOD PRESSURE: 63 MMHG | SYSTOLIC BLOOD PRESSURE: 97 MMHG | BODY MASS INDEX: 31.83 KG/M2 | WEIGHT: 163 LBS

## 2017-07-27 DIAGNOSIS — O09.891 SUPERVISION OF OTHER HIGH RISK PREGNANCIES, FIRST TRIMESTER: Primary | ICD-10-CM

## 2017-07-27 DIAGNOSIS — O09.299 PRIOR PREGNANCY WITH FETAL DEMISE: ICD-10-CM

## 2017-07-27 DIAGNOSIS — O09.291 PREVIOUS PREGNANCY COMPLICATED BY CHROMOSOMAL ABNORMALITY IN FIRST TRIMESTER, ANTEPARTUM: ICD-10-CM

## 2017-07-27 LAB
NST ACOUSTIC STIMULATION: NORMAL
NST ACTION NECESSARY: NORMAL
NST ASSESSMENT: NORMAL
NST BASELINE: 145
NST INDICATIONS: NORMAL
NST OTHER DATA: NORMAL
NST READ BY: NORMAL
NST RETURN: NORMAL
NST UTERINE ACTIVITY: NORMAL

## 2017-07-27 PROCEDURE — 59025 FETAL NON-STRESS TEST: CPT | Performed by: NURSE PRACTITIONER

## 2017-07-27 PROCEDURE — 90040 PR PRENATAL FOLLOW UP: CPT | Performed by: NURSE PRACTITIONER

## 2017-07-27 NOTE — MR AVS SNAPSHOT
Buffy QUINTEROS Arlette   2017 3:00 PM   Routine Prenatal   MRN: [id number]    [facility]   Dept Phone:  [phone]    Description:  Female : 1985   Provider:  Loyda Winters C.N.M.           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, antepartum   [9181159]         Vital Signs     Blood Pressure Weight Last Menstrual Period Smoking Status          97/63 mmHg 73.936 kg (163 lb) 2016 Never Smoker         Basic Information     Date Of Birth Sex Race Ethnicity Preferred Language    1985 Female  or , White  Origin (P[place]inean,Malagasy,Afghan,Sumanth, etc) English      Problem List              ICD-10-CM Priority Class Noted - Resolved    H/O fetal demise Z87.59   8/10/2016 - Present    Supervision of other high risk pregnancies, first trimester O09.891   2/15/2017 - Present    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 Review all of your home medications and newly ordered medications with your provider and/or pharmacist. Follow medication instructions as directed by your provider and/or pharmacist. Please keep your medication list with you and share with your provider. Update the information when medications are discontinued, doses are changed, or new medications  (including over-the-counter products) are added; and carry medication information at all times in the event of emergency situations     Allergies:  No Known Allergies          Medications  Valid as of: July 27, 2017 -  3:06 PM    Generic Name Brand Name Tablet Size Instructions for use    Prenatal MV-Min-Fe Fum-FA-DHA   Take  by mouth.        .                 Medicines prescribed today were sent to:     [id number] - [place], [place] - [address].    [address] [place] [address]    Phone: [phone] Fax: [phone]    Open 24 Hours?: No      Medication refill instructions:       If your prescription bottle indicates you have medication refills left, it is not necessary to call your provider’s office. Please contact your pharmacy and they will refill your medication.    If your prescription bottle indicates you do not have any refills left, you may request refills at any time through one of the following ways: The online Kizziang system (except Urgent Care), by calling your provider’s office, or by asking your pharmacy to contact your provider’s office with a refill request. Medication refills are processed only during regular business hours and may not be available until the next business day. Your provider may request additional information or to have a follow-up visit with you prior to refilling your medication.   *Please Note: Medication refills are assigned a new Rx number when refilled electronically. Your pharmacy may indicate that no refills were authorized even though a new prescription for the same medication is available at the pharmacy. Please request the medicine by name with the pharmacy before contacting your provider for a refill.        Instructions    P:  1.  Continue FKCs.         2.  Labor precautions given.  Instructions given on where to go.  Pt receptive to education.         3.  Reviewed GBS status w pt.       4.  Questions answered.         5.  Encouraged adequate water  intake       6.  F/u 1wk, cont 2x/wk NSTs       Other Notes About Your Plan     Baby Boy - Srinivasa Barlow           Gelato Fiasco Access Code: [email or web address]  Expires: 8/9/2017  2:39 PM    Gelato Fiasco  A secure, online tool to manage your health information     [employer] Gelato Fiasco® is a secure, online tool that connects you to your personalized health information from the privacy of your home -- day or night - making it very easy for you to manage your healthcare. Once the activation process is completed, you can even access your medical information using the Gelato Fiasco corey, which is available for free in the Apple Corey store or Google Play store.     Gelato Fiasco provides the following levels of access (as shown below):   My Chart Features   [facility] Doctor [facility]  Special[facility]  Urgent  Care Non-[facility]  Primary Care  Doctor   Email your healthcare team securely and privately 24/7 X X X    Manage appointments: schedule your next appointment; view details of past/upcoming appointments X      Request prescription refills. X      View recent personal medical records, including lab and immunizations X X X X   View health record, including health history, allergies, medications X X X X   Read reports about your outpatient visits, procedures, consult and ER notes X X X X   See your discharge summary, which is a recap of your hospital and/or ER visit that includes your diagnosis, lab results, and care plan. X X       How to register for Gelato Fiasco:  1. Go to  [email or web address].  2. Click on the Sign Up Now box, which takes you to the New Member Sign Up page. You will need to provide the following information:  a. Enter your Gelato Fiasco Access Code exactly as it appears at the top of this page. (You will not need to use this code after you’ve completed the sign-up process. If you do not sign up before the expiration date, you must request a new code.)   b. Enter your date of birth.   c. Enter your home email address.    d. Click Submit, and follow the next screen’s instructions.  3. Create a Roam Analyticst ID. This will be your Roam Analyticst login ID and cannot be changed, so think of one that is secure and easy to remember.  4. Create a Roam Analyticst password. You can change your password at any time.  5. Enter your Password Reset Question and Answer. This can be used at a later time if you forget your password.   6. Enter your e-mail address. This allows you to receive e-mail notifications when new information is available in SOASTA.  7. Click Sign Up. You can now view your health information.    For assistance activating your SOASTA account, call [phone]

## 2017-07-27 NOTE — MR AVS SNAPSHOT
Buffy QUINTEROS Arlette   2017 2:30 PM   Routine Prenatal   MRN: [id number]    Department[facility]   Dept Phone:  [phone]    Description:  Female : 1985   Provider:  Loyda Winters C.N.M.           Allergies as of 2017     No Known Allergies      You were diagnosed with     Prior pregnancy with fetal demise   [026125]         Vital Signs     Last Menstrual Period Smoking Status                2016 Never Smoker           Basic Information     Date Of Birth Sex Race Ethnicity Preferred Language    1985 Female  or , White  Origin (Tristanian,Dominican,Cambodian,Belgian, etc) English      Problem List              ICD-10-CM Priority Class Noted - Resolved    H/O fetal demise Z87.59   8/10/2016 - Present    Supervision of other high risk pregnancies, first trimester O09.891   2/15/2017 - Present    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, 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   2/15/2017 - Present    Closed fracture of right hip with routine healing  S72.001D   2017 - Present      Health Maintenance        Date Due Completion Dates    IMM INFLUENZA (1) 2017 ---    PAP SMEAR 2020    IMM DTaP/Tdap/Td Vaccine (2 - Td) 2027            Results     POCT Fetal Nonstress Test      Component    NST Indications    IUFD    NST Baseline    145    NST Uterine Activity    Irr    NST Acoustic Stimulation    None    NST Assessment    Reactive NST cat I FHTs +accels -decels mod variabilityu    NST Action Necessary    NST Other Data    NST Return    Cont 2x/wk NSTs,1 wk ELDA    NST Read By    [facility]                        Current Immunizations     Tdap Vaccine 2017  4:54 PM      Below and/or attached are the medications your provider expects you to take. Review all of your home medications and newly ordered medications with your provider and/or pharmacist. Follow medication instructions as directed by your  provider and/or pharmacist. Please keep your medication list with you and share with your provider. Update the information when medications are discontinued, doses are changed, or new medications (including over-the-counter products) are added; and carry medication information at all times in the event of emergency situations     Allergies:  No Known Allergies          Medications  Valid as of: July 27, 2017 -  3:06 PM    Generic Name Brand Name Tablet Size Instructions for use    Prenatal MV-Min-Fe Fum-FA-DHA   Take  by mouth.        .                 Medicines prescribed today were sent to:     [facility]Y #[id number] - [place], [place] - [address]    [address] [place] [address]    Phone: [phone] Fax: [phone]    Open 24 Hours?: No      Medication refill instructions:       If your prescription bottle indicates you have medication refills left, it is not necessary to call your provider’s office. Please contact your pharmacy and they will refill your medication.    If your prescription bottle indicates you do not have any refills left, you may request refills at any time through one of the following ways: The online ExpenseBot system (except Urgent Care), by calling your provider’s office, or by asking your pharmacy to contact your provider’s office with a refill request. Medication refills are processed only during regular business hours and may not be available until the next business day. Your provider may request additional information or to have a follow-up visit with you prior to refilling your medication.   *Please Note: Medication refills are assigned a new Rx number when refilled electronically. Your pharmacy may indicate that no refills were authorized even though a new prescription for the same medication is available at the pharmacy. Please request the medicine by name with the pharmacy before contacting your provider for a refill.        Other Notes About Your Plan     Baby Boy - Srinivasa Barlow              Tu Closet Mi Closet Access Code: [email or web address]  Expires: 8/9/2017  2:39 PM    Tu Closet Mi Closet  A secure, online tool to manage your health information     [employer]’s Tu Closet Mi Closet® is a secure, online tool that connects you to your personalized health information from the privacy of your home -- day or night - making it very easy for you to manage your healthcare. Once the activation process is completed, you can even access your medical information using the Tu Closet Mi Closet corey, which is available for free in the Apple Corey store or Google Play store.     Tu Closet Mi Closet provides the following levels of access (as shown below):   My Chart Features   [facility] Primary Care Doctor [facility]  Specialists [facility]  Urgent  Care Non-[facility]  Primary Care  Doctor   Email your healthcare team securely and privately 24/7 X X X    Manage appointments: schedule your next appointment; view details of past/upcoming appointments X      Request prescription refills. X      View recent personal medical records, including lab and immunizations X X X X   View health record, including health history, allergies, medications X X X X   Read reports about your outpatient visits, procedures, consult and ER notes X X X X   See your discharge summary, which is a recap of your hospital and/or ER visit that includes your diagnosis, lab results, and care plan. X X       How to register for Tu Closet Mi Closet:  1. Go to  [email or web address].  2. Click on the Sign Up Now box, which takes you to the New Member Sign Up page. You will need to provide the following information:  a. Enter your Tu Closet Mi Closet Access Code exactly as it appears at the top of this page. (You will not need to use this code after you’ve completed the sign-up process. If you do not sign up before the expiration date, you must request a new code.)   b. Enter your date of birth.   c. Enter your home email address.   d. Click Submit, and follow the next screen’s instructions.  3. Create a Tu Closet Mi Closet ID. This will be your  Gydget login ID and cannot be changed, so think of one that is secure and easy to remember.  4. Create a Gydget password. You can change your password at any time.  5. Enter your Password Reset Question and Answer. This can be used at a later time if you forget your password.   6. Enter your e-mail address. This allows you to receive e-mail notifications when new information is available in Gydget.  7. Click Sign Up. You can now view your health information.    For assistance activating your Gydget account, call [phone]

## 2017-07-27 NOTE — PROGRESS NOTES
Pt. Here for OB/FU and NST today. Reports Good FM.   Good # [phone]  Pt states went to [facility] on 7/26/17 for wetness and pressure and again went to [facility] on 7/27/17 12:41 for brownish discharge.   Pt states still having contractions that come and go.   Pharmacy verified.

## 2017-07-27 NOTE — PATIENT INSTRUCTIONS
P:  1.  Continue FKCs.         2.  Labor precautions given.  Instructions given on where to go.  Pt receptive to education.         3.  Reviewed GBS status w pt.       4.  Questions answered.         5.  Encouraged adequate water intake       6.  F/u 1wk, cont 2x/wk NSTs

## 2017-07-27 NOTE — TELEPHONE ENCOUNTER
-----Original Message-----  From: [email or web address] [[email or web address]   Sent:  7:31 AM  To: Marietta Doyle <[email or web address]>  Subject: [facility] Messages from [employer]    ===========[id number]  Thu 17 07:30a  ======================================  AW   CLINIC: [facility]  CALL TYPE: Est OB Pt  TO: /Ofc  NM: Buffy Gonzalez   PH: [phone]   PT NM: Buffy Gonzalez   : 81   REG DR: Radha   RE: 37 weeks pregnant. When she went  to the bathroom and wiped there was a  brownish mucus discharge on the  tissue.  Also having a lot of pressure     --------------------------------------  Message History  Account: [id number]  Taken:  2017 11:00p DB  Serial#: [id number]  ===========[id number]  I returned pt's call lvm for her to call back.

## 2017-07-27 NOTE — PROGRESS NOTES
Patient presented for second time since yesterday complaining of brownish discharge, she was checked yesterday.  No active bleeding, no ROM, category 1 fetal monitor strip.  Patient sent home with instructions.

## 2017-07-27 NOTE — PROGRESS NOTES
S:  Pt is  at 37w3d here for routine OB follow up.  No c/o today - just pelvic pain.  Reports good FM.  Denies VB, LOF, RUCs, or vaginal DC.     O:  Please see above vitals.        FHTs: 140        Fundal ht: 36        Fetal position: vertex        SVE: deferred        GBS neg on 17 -- reviewed w pt.      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  Continue FKCs.         2.  Labor precautions given.  Instructions given on where to go.  Pt receptive to education.         3.  Reviewed GBS status w pt.       4.  Questions answered.         5.  Encouraged adequate water intake       6.  F/u 1wk, cont 2x/wk NSTs

## 2017-07-27 NOTE — PROGRESS NOTES
2332 -  here with EDC of 17 = 37.3 weeks reporting brownish discharge. Reports positive FM, denies UC's, LOF or VB. Taken to [facility] accompanied by FOB, instructed to change and call out when ready.   2346 - POC discussed, questions answered. VS taken, monitors applied x2. Patient was here this afternoon and had cervical exam at that time, educated patient that spotting or pinkish discharge that turn to brown after an exam is normal, understanding verbalized. Patient desires to be rechecked at this time. SVE unchanged from previous exam, no blood or brown discharge on glove.   0026 - report given to STEVE Whiteside CNM. Discharge order received.   0032 - discharge instructions given as follows:  If you think you are in labor, time contractions (laying on your left side) from the beginning of one contraction to the beginning of the next contraction for at least one hour.   Increase fluid intake:10-12 8oz glasses non-caffeinated fluid per day.  Report any pressure or burning on urination to your physician.   Monitor fetal movement: You should be able to count 10 sets of movements in 2 hrs. If you notice an absence or marked decrease in fetal movement, call your physician or go to the hospital.   Report any sudden, sharp abdominal pain.   Report any bleeding, spotting or pinkish discharge is normal after vaginal exam and or sexual intercourse.   Call MD or return to unit if:  You have regular contractions that get progressively closer, longer and stronger.   Your water breaks (remember time and color).   You have bleeding like a period.  Decreased or absent fetal movement.   Questions answered, understanding verbalized.   0040 - discharged home with FOB in stable walking condition.

## 2017-07-31 ENCOUNTER — ROUTINE PRENATAL (OUTPATIENT)
Dept: OBGYN | Facility: CLINIC | Age: 32
End: 2017-07-31
Payer: MEDICAID

## 2017-07-31 DIAGNOSIS — O09.891 SUPERVISION OF OTHER HIGH RISK PREGNANCIES, FIRST TRIMESTER: ICD-10-CM

## 2017-07-31 DIAGNOSIS — Z87.59 H/O FETAL DEMISE, NOT CURRENTLY PREGNANT: ICD-10-CM

## 2017-07-31 DIAGNOSIS — O09.291 PREVIOUS PREGNANCY COMPLICATED BY CHROMOSOMAL ABNORMALITY IN FIRST TRIMESTER, ANTEPARTUM: ICD-10-CM

## 2017-07-31 PROCEDURE — 59025 FETAL NON-STRESS TEST: CPT | Performed by: OBSTETRICS & GYNECOLOGY

## 2017-07-31 NOTE — PROGRESS NOTES
Pt here for NST and per Dr. Gonzales to schedule IOL at 39 wks d/t h/o Fetal demise. Called L&D spoke to Karine and schedule IOL for 8/7/17 0900.   Instructions given to pt. Pt agreed and verbalized understanding.

## 2017-07-31 NOTE — MR AVS SNAPSHOT
Buffy Chong   2017 2:30 PM   Routine Prenatal   MRN: [id number]    Department[facility]   Dept Phone:  [phone]    Description:  Female : 1985   Provider:  Rosalina oGnzales M.D.           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, antepartum   [7234963]         Vital Signs     Last Menstrual Period Smoking Status                2016 Never Smoker           Basic Information     Date Of Birth Sex Race Ethnicity Preferred Language    1985 Female  or , White  Origin (Kiswahili,Greenlandic,Israeli,Palestinian, etc) English      Your appointments     Aug 03, 2017  3:30 PM   Fetal Non-Stress Test with KATYA JACOBS   [facility])    [address] [place] [address] [address]  [facility] [address]   [phone]            Aug 03, 2017  4:00 PM   OB Follow Up with KATYA ADLER   [facility])    [address] [place] [address] [address]  [facility] [address]   [phone]            Aug 07, 2017  9:00 AM   TPC INDUCTION OF LABOR with NON-SURGICAL L&D   LABOR & DELIVERY [facility] (--)    [address] [facility] [address]   [phone]              Problem List              ICD-10-CM Priority Class Noted - Resolved    H/O fetal demise Z87.59   8/10/2016 - Present    Supervision of other high risk pregnancies, first trimester O09.891   2/15/2017 - Present    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 Review all of your home medications and newly ordered medications with your provider and/or pharmacist. Follow medication instructions as directed by your provider and/or pharmacist. Please keep your medication list with you and share with your provider. Update the information when medications are discontinued, doses are changed, or new medications (including over-the-counter products) are added; and carry medication information at all times in the event of emergency situations     Allergies:  No Known Allergies          Medications  Valid as of: July 31, 2017 -  3:09 PM    Generic Name Brand Name Tablet Size Instructions for use    Prenatal MV-Min-Fe Fum-FA-DHA   Take  by mouth.        .                 Medicines prescribed today were sent to:     [facility]/PHARMACY #[id number] - [place], [place] - [address] [place] [address]    [address] [address] [place] [address]    Phone: [phone] Fax: [phone]    Open 24 Hours?: No      Medication refill instructions:       If your prescription bottle indicates you have medication refills left, it is not necessary to call your provider’s office. Please contact your pharmacy and they will refill your medication.    If your prescription bottle indicates you do not have any refills left, you may request refills at any time through one of the following ways: The online Mingly system (except Urgent Care), by calling your provider’s office, or by asking your pharmacy to contact your provider’s office with a refill request. Medication refills are processed only during regular business hours and may not be available until the next business day. Your provider may request additional information or to have a follow-up visit with you prior to refilling your medication.   *Please Note: Medication refills are assigned a new Rx number when refilled  electronically. Your pharmacy may indicate that no refills were authorized even though a new prescription for the same medication is available at the pharmacy. Please request the medicine by name with the pharmacy before contacting your provider for a refill.        Other Notes About Your Plan     Baby Diogo - Srinivasa OsmanVoAPPs Access Code: [email or web address]  Expires: 8/9/2017  2:39 PM    TrustDegrees  A secure, online tool to manage your health information     [employer]’s TrustDegrees® is a secure, online tool that connects you to your personalized health information from the privacy of your home -- day or night - making it very easy for you to manage your healthcare. Once the activation process is completed, you can even access your medical information using the TrustDegrees corey, which is available for free in the Apple Corey store or Google Play store.     TrustDegrees provides the following levels of access (as shown below):   My Chart Features   [facility]  Specialists [facility]  Urgent  Care Non-[facility]  Primary Care  Doctor   Email your healthcare team securely and privately 24/7 X X X    Manage appointments: schedule your next appointment; view details of past/upcoming appointments X      Request prescription refills. X      View recent personal medical records, including lab and immunizations X X X X   View health record, including health history, allergies, medications X X X X   Read reports about your outpatient visits, procedures, consult and ER notes X X X X   See your discharge summary, which is a recap of your hospital and/or ER visit that includes your diagnosis, lab results, and care plan. X X       How to register for TrustDegrees:  1. Go to  [email or web address].  2. Click on the Sign Up Now box, which takes you to the New Member Sign Up page. You will need to provide the following information:  a. Enter your TrustDegrees Access Code exactly as it appears at the top of this page. (You will  not need to use this code after you’ve completed the sign-up process. If you do not sign up before the expiration date, you must request a new code.)   b. Enter your date of birth.   c. Enter your home email address.   d. Click Submit, and follow the next screen’s instructions.  3. Create a Intrepid Bioinformaticst ID. This will be your Intrepid Bioinformaticst login ID and cannot be changed, so think of one that is secure and easy to remember.  4. Create a EcoVadis password. You can change your password at any time.  5. Enter your Password Reset Question and Answer. This can be used at a later time if you forget your password.   6. Enter your e-mail address. This allows you to receive e-mail notifications when new information is available in EcoVadis.  7. Click Sign Up. You can now view your health information.    For assistance activating your EcoVadis account, call [phone]

## 2017-08-03 ENCOUNTER — ROUTINE PRENATAL (OUTPATIENT)
Dept: OBGYN | Facility: CLINIC | Age: 32
End: 2017-08-03
Payer: MEDICAID

## 2017-08-03 VITALS — DIASTOLIC BLOOD PRESSURE: 70 MMHG | SYSTOLIC BLOOD PRESSURE: 104 MMHG | WEIGHT: 165 LBS | BODY MASS INDEX: 32.22 KG/M2

## 2017-08-03 DIAGNOSIS — O09.891 SUPERVISION OF OTHER HIGH RISK PREGNANCIES, FIRST TRIMESTER: ICD-10-CM

## 2017-08-03 DIAGNOSIS — O09.291 PREVIOUS PREGNANCY COMPLICATED BY CHROMOSOMAL ABNORMALITY IN FIRST TRIMESTER, ANTEPARTUM: ICD-10-CM

## 2017-08-03 DIAGNOSIS — O09.299 PRIOR PREGNANCY WITH FETAL DEMISE: ICD-10-CM

## 2017-08-03 LAB
NST ACOUSTIC STIMULATION: NO
NST ACTION NECESSARY: NORMAL
NST ASSESSMENT: REACTIVE
NST BASELINE: 135
NST INDICATIONS: NORMAL
NST OTHER DATA: NORMAL
NST READ BY: NORMAL
NST RETURN: NORMAL
NST UTERINE ACTIVITY: NORMAL

## 2017-08-03 PROCEDURE — 90040 PR PRENATAL FOLLOW UP: CPT | Performed by: OBSTETRICS & GYNECOLOGY

## 2017-08-03 PROCEDURE — 59025 FETAL NON-STRESS TEST: CPT | Performed by: OBSTETRICS & GYNECOLOGY

## 2017-08-03 NOTE — PROGRESS NOTES
OB f/u. + fetal movement.  No VB, LOF or UC's.  Good phone # [phone]  Preferred pharmacy confirmed.  IOL 8/7/17

## 2017-08-03 NOTE — MR AVS SNAPSHOT
Buffy Chong   8/3/2017 4:00 PM   Routine Prenatal   MRN: [id number]    Department[facility]  [facility]   Dept Phone:  [phone]    Description:  Female : 1985   Provider:  Malachi Calvin M.D.           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, antepartum   [7321676]         Vital Signs     Blood Pressure Weight Last Menstrual Period Smoking Status          104/70 mmHg 74.844 kg (165 lb) 2016 Never Smoker         Basic Information     Date Of Birth Sex Race Ethnicity Preferred Language    1985 Female  or , White  Origin (Uzbek,Palauan,Dutch,Sumanth, etc) English      Your appointments     Aug 07, 2017  9:00 AM   TPC INDUCTION OF LABOR with NON-SURGICAL L&D   LABOR & DELIVERY [facility] (--)    [address] [place] [id number] [phone]              Problem List              ICD-10-CM Priority Class Noted - Resolved    H/O fetal demise Z87.59   8/10/2016 - Present    Supervision of other high risk pregnancies, first trimester O09.891   2/15/2017 - Present    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 Review all of your home medications and newly ordered medications with your provider and/or pharmacist. Follow medication instructions as directed by your provider and/or  pharmacist. Please keep your medication list with you and share with your provider. Update the information when medications are discontinued, doses are changed, or new medications (including over-the-counter products) are added; and carry medication information at all times in the event of emergency situations     Allergies:  No Known Allergies          Medications  Valid as of: August 03, 2017 -  4:04 PM    Generic Name Brand Name Tablet Size Instructions for use    Prenatal MV-Min-Fe Fum-FA-DHA   Take  by mouth.        .                 Medicines prescribed today were sent to:     [facility]/PHARMACY #[id number] - [place], [place] - [address] [place] [facility].    [address] [place] [address] [place] [address]    Phone: [phone] Fax: [phone]    Open 24 Hours?: No      Medication refill instructions:       If your prescription bottle indicates you have medication refills left, it is not necessary to call your provider’s office. Please contact your pharmacy and they will refill your medication.    If your prescription bottle indicates you do not have any refills left, you may request refills at any time through one of the following ways: The online Handshake system (except Urgent Care), by calling your provider’s office, or by asking your pharmacy to contact your provider’s office with a refill request. Medication refills are processed only during regular business hours and may not be available until the next business day. Your provider may request additional information or to have a follow-up visit with you prior to refilling your medication.   *Please Note: Medication refills are assigned a new Rx number when refilled electronically. Your pharmacy may indicate that no refills were authorized even though a new prescription for the same medication is available at the pharmacy. Please request the medicine by name with the pharmacy before contacting your provider for a refill.        Other Notes About Your Plan     Baby Boy - Srinivasa Sierra  Access Code: [email or web address]  Expires: 8/9/2017  2:39 PM    Six Month Smiles  A secure, online tool to manage your health information     [employer]’s Six Month Smiles® is a secure, online tool that connects you to your personalized health information from the privacy of your home -- day or night - making it very easy for you to manage your healthcare. Once the activation process is completed, you can even access your medical information using the Six Month Smiles corey, which is available for free in the Apple Corey store or Google Play store.     Six Month Smiles provides the following levels of access (as shown below):   My Chart Features   [facility] Primary Care Doctor [facility]  Specialists [facility]  Urgent  Care Non-[facility]  Primary Care  Doctor   Email your healthcare team securely and privately 24/7 X X X    Manage appointments: schedule your next appointment; view details of past/upcoming appointments X      Request prescription refills. X      View recent personal medical records, including lab and immunizations X X X X   View health record, including health history, allergies, medications X X X X   Read reports about your outpatient visits, procedures, consult and ER notes X X X X   See your discharge summary, which is a recap of your hospital and/or ER visit that includes your diagnosis, lab results, and care plan. X X       How to register for Six Month Smiles:  1. Go to  [email or web address].  2. Click on the Sign Up Now box, which takes you to the New Member Sign Up page. You will need to provide the following information:  a. Enter your Six Month Smiles Access Code exactly as it appears at the top of this page. (You will not need to use this code after you’ve completed the sign-up process. If you do not sign up before the expiration date, you must request a new code.)   b. Enter your date of birth.   c. Enter your home email address.   d. Click Submit, and follow the next screen’s instructions.  3. Create a Six Month Smiles ID. This will be your Six Month Smiles login ID  and cannot be changed, so think of one that is secure and easy to remember.  4. Create a Market76 password. You can change your password at any time.  5. Enter your Password Reset Question and Answer. This can be used at a later time if you forget your password.   6. Enter your e-mail address. This allows you to receive e-mail notifications when new information is available in Market76.  7. Click Sign Up. You can now view your health information.    For assistance activating your Market76 account, call [phone]

## 2017-08-03 NOTE — MR AVS SNAPSHOT
Buffy Chong   8/3/2017 3:30 PM   Routine Prenatal   MRN: [id number]    [facility]   Dept Phone:  [phone]    Description:  Female : 1985   Provider:  Malachi Calvin M.D.           Allergies as of 8/3/2017     No Known Allergies      You were diagnosed with     Prior pregnancy with fetal demise   [[id number]]         Vital Signs     Last Menstrual Period Smoking Status                2016 Never Smoker           Basic Information     Date Of Birth Sex Race Ethnicity Preferred Language    1985 Female  or , White  Origin (Greek,Bahraini,Romanian,Sumanth, etc) English      Your appointments     Aug 07, 2017  9:00 AM   TPC INDUCTION OF LABOR with NON-SURGICAL L&D   [facility])    [address] [place] [address] [phone]              Problem List              ICD-10-CM Priority Class Noted - Resolved    H/O fetal demise Z87.59   8/10/2016 - Present    Supervision of other high risk pregnancies, first trimester O09.891   2/15/2017 - Present    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, 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   2/15/2017 - Present    Closed fracture of right hip with routine healing  S72.001D   2017 - Present      Health Maintenance        Date Due Completion Dates    IMM INFLUENZA (1) 2017 ---    PAP SMEAR 2020    IMM DTaP/Tdap/Td Vaccine (2 - Td) 2027            Results     POCT Fetal Nonstress Test      Component    NST Indications    diabetes    NST Baseline    135    NST Uterine Activity    none    NST Acoustic Stimulation    no    NST Assessment    reactive    NST Action Necessary    none    NST Other Data    NST Return    NST Read By                        Current Immunizations     Tdap Vaccine 2017  4:54 PM      Below and/or attached are the medications your provider expects you to take. Review all of your home medications and newly ordered  medications with your provider and/or pharmacist. Follow medication instructions as directed by your provider and/or pharmacist. Please keep your medication list with you and share with your provider. Update the information when medications are discontinued, doses are changed, or new medications (including over-the-counter products) are added; and carry medication information at all times in the event of emergency situations     Allergies:  No Known Allergies          Medications  Valid as of: August 03, 2017 -  4:04 PM    Generic Name Brand Name Tablet Size Instructions for use    Prenatal MV-Min-Fe Fum-FA-DHA   Take  by mouth.        .                 Medicines prescribed today were sent to:     [facility]/PHARMACY #[id number] - [place], [place] - [address] [place] [address]    [address] [facility]. [place] [address]    Phone: [phone] Fax: [phone]    Open 24 Hours?: No      Medication refill instructions:       If your prescription bottle indicates you have medication refills left, it is not necessary to call your provider’s office. Please contact your pharmacy and they will refill your medication.    If your prescription bottle indicates you do not have any refills left, you may request refills at any time through one of the following ways: The online Green Planet Architects system (except Urgent Care), by calling your provider’s office, or by asking your pharmacy to contact your provider’s office with a refill request. Medication refills are processed only during regular business hours and may not be available until the next business day. Your provider may request additional information or to have a follow-up visit with you prior to refilling your medication.   *Please Note: Medication refills are assigned a new Rx number when refilled electronically. Your pharmacy may indicate that no refills were authorized even though a new prescription for the same medication is available at the pharmacy. Please request the medicine by name with the pharmacy before  contacting your provider for a refill.        Other Notes About Your Plan     Baby Boy - Srinivasa BreauxPlayMob Access Code: [email or web address]  Expires: 8/9/2017  2:39 PM    JinggaMall.com  A secure, online tool to manage your health information     [employer]’s JinggaMall.com® is a secure, online tool that connects you to your personalized health information from the privacy of your home -- day or night - making it very easy for you to manage your healthcare. Once the activation process is completed, you can even access your medical information using the JinggaMall.com corey, which is available for free in the Apple Corey store or Google Play store.     JinggaMall.com provides the following levels of access (as shown below):   My Chart Features   [facility] Primary Care Doctor [facility]  Specialists [facility]  Urgent  Care Non-[facility]  Primary Care  Doctor   Email your healthcare team securely and privately 24/7 X X X    Manage appointments: schedule your next appointment; view details of past/upcoming appointments X      Request prescription refills. X      View recent personal medical records, including lab and immunizations X X X X   View health record, including health history, allergies, medications X X X X   Read reports about your outpatient visits, procedures, consult and ER notes X X X X   See your discharge summary, which is a recap of your hospital and/or ER visit that includes your diagnosis, lab results, and care plan. X X       How to register for JinggaMall.com:  1. Go to  [email or web address].  2. Click on the Sign Up Now box, which takes you to the New Member Sign Up page. You will need to provide the following information:  a. Enter your JinggaMall.com Access Code exactly as it appears at the top of this page. (You will not need to use this code after you’ve completed the sign-up process. If you do not sign up before the expiration date, you must request a new code.)   b. Enter your date of birth.   c. Enter your home email address.    d. Click Submit, and follow the next screen’s instructions.  3. Create a CircleUpt ID. This will be your CircleUpt login ID and cannot be changed, so think of one that is secure and easy to remember.  4. Create a CircleUpt password. You can change your password at any time.  5. Enter your Password Reset Question and Answer. This can be used at a later time if you forget your password.   6. Enter your e-mail address. This allows you to receive e-mail notifications when new information is available in [facility].  7. Click Sign Up. You can now view your health information.    For assistance activating your [facility] account, call [phone]

## 2017-08-03 NOTE — PROGRESS NOTES
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 Currently doing well . Good fetal movement no leakage of fluid no contractions or vaginal bleeding        Size equals dates, normal fetal heart rate    Cervix-1.5 cm/60% effaced    Labs; GBS negative    Labor precautions given  Increase oral hydration    Continue twice weekly NSTs due to history of previous fetal demise    Induction of labor scheduled 8/7/17 at 9 AM

## 2017-08-07 ENCOUNTER — HOSPITAL ENCOUNTER (INPATIENT)
Facility: MEDICAL CENTER | Age: 32
LOS: 4 days | End: 2017-08-11
Attending: OBSTETRICS & GYNECOLOGY | Admitting: OBSTETRICS & GYNECOLOGY
Payer: MEDICAID

## 2017-08-07 PROBLEM — Z86.59 HISTORY OF DEPRESSION: Status: ACTIVE | Noted: 2017-08-07

## 2017-08-07 LAB
BASOPHILS # BLD AUTO: 0.4 % (ref 0–1.8)
BASOPHILS # BLD: 0.03 K/UL (ref 0–0.12)
EOSINOPHIL # BLD AUTO: 0.07 K/UL (ref 0–0.51)
EOSINOPHIL NFR BLD: 0.9 % (ref 0–6.9)
ERYTHROCYTE [DISTWIDTH] IN BLOOD BY AUTOMATED COUNT: 51.9 FL (ref 35.9–50)
HCT VFR BLD AUTO: 36.6 % (ref 37–47)
HGB BLD-MCNC: 12.2 G/DL (ref 12–16)
HOLDING TUBE BB 8507: NORMAL
IMM GRANULOCYTES # BLD AUTO: 0.04 K/UL (ref 0–0.11)
IMM GRANULOCYTES NFR BLD AUTO: 0.5 % (ref 0–0.9)
LYMPHOCYTES # BLD AUTO: 1.73 K/UL (ref 1–4.8)
LYMPHOCYTES NFR BLD: 21.1 % (ref 22–41)
MCH RBC QN AUTO: 29.8 PG (ref 27–33)
MCHC RBC AUTO-ENTMCNC: 33.3 G/DL (ref 33.6–35)
MCV RBC AUTO: 89.5 FL (ref 81.4–97.8)
MONOCYTES # BLD AUTO: 0.45 K/UL (ref 0–0.85)
MONOCYTES NFR BLD AUTO: 5.5 % (ref 0–13.4)
NEUTROPHILS # BLD AUTO: 5.86 K/UL (ref 2–7.15)
NEUTROPHILS NFR BLD: 71.6 % (ref 44–72)
NRBC # BLD AUTO: 0 K/UL
NRBC BLD AUTO-RTO: 0 /100 WBC
PLATELET # BLD AUTO: 255 K/UL (ref 164–446)
PMV BLD AUTO: 10 FL (ref 9–12.9)
RBC # BLD AUTO: 4.09 M/UL (ref 4.2–5.4)
WBC # BLD AUTO: 8.2 K/UL (ref 4.8–10.8)

## 2017-08-07 PROCEDURE — A9270 NON-COVERED ITEM OR SERVICE: HCPCS | Performed by: PHYSICIAN ASSISTANT

## 2017-08-07 PROCEDURE — 700102 HCHG RX REV CODE 250 W/ 637 OVERRIDE(OP): Performed by: PHYSICIAN ASSISTANT

## 2017-08-07 PROCEDURE — 85025 COMPLETE CBC W/AUTO DIFF WBC: CPT

## 2017-08-07 PROCEDURE — 700105 HCHG RX REV CODE 258

## 2017-08-07 PROCEDURE — 700111 HCHG RX REV CODE 636 W/ 250 OVERRIDE (IP): Performed by: PHYSICIAN ASSISTANT

## 2017-08-07 PROCEDURE — 700111 HCHG RX REV CODE 636 W/ 250 OVERRIDE (IP): Performed by: NURSE PRACTITIONER

## 2017-08-07 PROCEDURE — 36415 COLL VENOUS BLD VENIPUNCTURE: CPT

## 2017-08-07 PROCEDURE — 700111 HCHG RX REV CODE 636 W/ 250 OVERRIDE (IP)

## 2017-08-07 PROCEDURE — 770002 HCHG ROOM/CARE - OB PRIVATE (112)

## 2017-08-07 RX ORDER — IBUPROFEN 600 MG/1
600 TABLET ORAL EVERY 6 HOURS PRN
Status: DISCONTINUED | OUTPATIENT
Start: 2017-08-07 | End: 2017-08-09

## 2017-08-07 RX ORDER — SODIUM CHLORIDE, SODIUM LACTATE, POTASSIUM CHLORIDE, CALCIUM CHLORIDE 600; 310; 30; 20 MG/100ML; MG/100ML; MG/100ML; MG/100ML
INJECTION, SOLUTION INTRAVENOUS
Status: COMPLETED
Start: 2017-08-07 | End: 2017-08-07

## 2017-08-07 RX ORDER — AMPICILLIN 1 G/1
1 INJECTION, POWDER, FOR SOLUTION INTRAMUSCULAR; INTRAVENOUS EVERY 4 HOURS
Status: DISCONTINUED | OUTPATIENT
Start: 2017-08-08 | End: 2017-08-08

## 2017-08-07 RX ORDER — ACETAMINOPHEN 325 MG/1
325 TABLET ORAL EVERY 4 HOURS PRN
Status: DISCONTINUED | OUTPATIENT
Start: 2017-08-07 | End: 2017-08-07

## 2017-08-07 RX ORDER — METHYLERGONOVINE MALEATE 0.2 MG/ML
0.2 INJECTION INTRAVENOUS
Status: DISCONTINUED | OUTPATIENT
Start: 2017-08-07 | End: 2017-08-08 | Stop reason: HOSPADM

## 2017-08-07 RX ORDER — AMPICILLIN 2 G/1
2 INJECTION, POWDER, FOR SOLUTION INTRAVENOUS ONCE
Status: COMPLETED | OUTPATIENT
Start: 2017-08-08 | End: 2017-08-07

## 2017-08-07 RX ORDER — LIDOCAINE HYDROCHLORIDE AND EPINEPHRINE 15; 5 MG/ML; UG/ML
INJECTION, SOLUTION EPIDURAL
Status: ACTIVE
Start: 2017-08-07 | End: 2017-08-08

## 2017-08-07 RX ORDER — MISOPROSTOL 200 UG/1
800 TABLET ORAL
Status: DISCONTINUED | OUTPATIENT
Start: 2017-08-07 | End: 2017-08-08 | Stop reason: HOSPADM

## 2017-08-07 RX ORDER — ACETAMINOPHEN 325 MG/1
650 TABLET ORAL EVERY 4 HOURS PRN
Status: DISCONTINUED | OUTPATIENT
Start: 2017-08-07 | End: 2017-08-09

## 2017-08-07 RX ORDER — ROPIVACAINE HYDROCHLORIDE 2 MG/ML
INJECTION, SOLUTION EPIDURAL; INFILTRATION; PERINEURAL
Status: COMPLETED
Start: 2017-08-07 | End: 2017-08-07

## 2017-08-07 RX ADMIN — Medication 1 MILLI-UNITS/MIN: at 12:31

## 2017-08-07 RX ADMIN — SODIUM CHLORIDE, POTASSIUM CHLORIDE, SODIUM LACTATE AND CALCIUM CHLORIDE 1000 ML: 600; 310; 30; 20 INJECTION, SOLUTION INTRAVENOUS at 15:24

## 2017-08-07 RX ADMIN — AMPICILLIN SODIUM 2 G: 2 INJECTION, POWDER, FOR SOLUTION INTRAMUSCULAR; INTRAVENOUS at 23:40

## 2017-08-07 RX ADMIN — SODIUM CHLORIDE, POTASSIUM CHLORIDE, SODIUM LACTATE AND CALCIUM CHLORIDE 1000 ML: 600; 310; 30; 20 INJECTION, SOLUTION INTRAVENOUS at 14:45

## 2017-08-07 RX ADMIN — SODIUM CHLORIDE, POTASSIUM CHLORIDE, SODIUM LACTATE AND CALCIUM CHLORIDE 1000 ML: 600; 310; 30; 20 INJECTION, SOLUTION INTRAVENOUS at 19:07

## 2017-08-07 RX ADMIN — FENTANYL CITRATE 100 MCG: 50 INJECTION, SOLUTION INTRAMUSCULAR; INTRAVENOUS at 14:24

## 2017-08-07 RX ADMIN — ROPIVACAINE HYDROCHLORIDE 100 ML: 2 INJECTION, SOLUTION EPIDURAL; INFILTRATION at 16:27

## 2017-08-07 RX ADMIN — ACETAMINOPHEN 650 MG: 325 TABLET, FILM COATED ORAL at 23:47

## 2017-08-07 RX ADMIN — SODIUM CHLORIDE, POTASSIUM CHLORIDE, SODIUM LACTATE AND CALCIUM CHLORIDE 1000 ML: 600; 310; 30; 20 INJECTION, SOLUTION INTRAVENOUS at 09:50

## 2017-08-07 ASSESSMENT — COPD QUESTIONNAIRES
DURING THE PAST 4 WEEKS HOW MUCH DID YOU FEEL SHORT OF BREATH: NONE/LITTLE OF THE TIME
DURING THE PAST 4 WEEKS HOW MUCH DID YOU FEEL SHORT OF BREATH: NONE/LITTLE OF THE TIME
HAVE YOU SMOKED AT LEAST 100 CIGARETTES IN YOUR ENTIRE LIFE: NO/DON'T KNOW
COPD SCREENING SCORE: 0
HAVE YOU SMOKED AT LEAST 100 CIGARETTES IN YOUR ENTIRE LIFE: NO/DON'T KNOW
COPD SCREENING SCORE: 0
DO YOU EVER COUGH UP ANY MUCUS OR PHLEGM?: NO/ONLY WITH OCCASIONAL COLDS OR INFECTIONS
DO YOU EVER COUGH UP ANY MUCUS OR PHLEGM?: NO/ONLY WITH OCCASIONAL COLDS OR INFECTIONS

## 2017-08-07 ASSESSMENT — PAIN SCALES - GENERAL: PAINLEVEL_OUTOF10: 2

## 2017-08-07 ASSESSMENT — LIFESTYLE VARIABLES
EVER_SMOKED: NEVER
DO YOU DRINK ALCOHOL: NO
ALCOHOL_USE: NO

## 2017-08-07 ASSESSMENT — PATIENT HEALTH QUESTIONNAIRE - PHQ9
SUM OF ALL RESPONSES TO PHQ9 QUESTIONS 1 AND 2: 0
SUM OF ALL RESPONSES TO PHQ QUESTIONS 1-9: 0
2. FEELING DOWN, DEPRESSED, IRRITABLE, OR HOPELESS: NOT AT ALL
1. LITTLE INTEREST OR PLEASURE IN DOING THINGS: NOT AT ALL

## 2017-08-07 NOTE — IP AVS SNAPSHOT
8/11/2017    Buffy Chong  [address] [address] [facility] [place] [address]    Dear Buffy:    [facility] wants to ensure your discharge home is safe and you or your loved ones have had all of your questions answered regarding your care after you leave the hospital.    Below is a list of resources and contact information should you have any questions regarding your hospital stay, follow-up instructions, or active medical symptoms.    Questions or Concerns Regarding… Contact   Medical Questions Related to Your Discharge  (7 days a week, 8am-5pm) Contact a Nurse Care Coordinator   [phone]   Medical Questions Not Related to Your Discharge  (24 hours a day / 7 days a week)  Contact the Nurse Health Line   [phone]    Medications or Discharge Instructions Refer to your discharge packet   or contact your [facility] Primary Care Provider   [phone]   Follow-up Appointment(s) Schedule your appointment via Glaukos   or contact Scheduling [phone]   Billing Review your statement via Glaukos  or contact Billing [phone]   Medical Records Review your records via Glaukos   or contact Medical Records [phone]     You may receive a telephone call within two days of discharge. This call is to make certain you understand your discharge instructions and have the opportunity to have any questions answered. You can also easily access your medical information, test results and upcoming appointments via the Glaukos free online health management tool. You can learn more and sign up at [employer]/Glaukos. For assistance setting up your Glaukos account, please call [phone].    Once again, we want to ensure your discharge home is safe and that you have a clear understanding of any next steps in your care. If you have any questions or concerns, please do not hesitate to contact us, we are here for you. Thank you for choosing [facility] for your healthcare needs.    Sincerely,    Your [facility] Healthcare Team

## 2017-08-07 NOTE — CARE PLAN
Problem: Safety  Goal: Free from accidental injury  Outcome: PROGRESSING AS EXPECTED  Safely ambulates to bathroom independently    Problem: Knowledge Deficit  Goal: Patient/Support person demonstrates understanding regarding the progression of labor, available options and participates in decision-making process  Outcome: PROGRESSING AS EXPECTED  POC discussed including pitocin administration, monitoring, labor process, etc

## 2017-08-07 NOTE — H&P
History and Physical    Buffy Chong is a 31 y.o. female  -Para:     Gestational Age:  39w0d  Admitted for:   Elective Induction at 39 wks  Admitted to  [facility] Delivery.  [facility]    HPI: Patient is admitted with the above mentioned Chief Complaint and States   Loss of fluid:   negative  Abdominal Pain:  negative  Uterine Contractions:  positive  Vaginal Bleeding:  negative  Fetal Movement:  normal  Patient denies fever, chills, nausea, vomiting , headache, visual disturbance, or dysuria  Patient's last menstrual period was 2016.  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    History:   has a past medical history of H/O fetal demise, not currently pregnant (8/10/2016); Pregnant; and Psychiatric problem.     has past surgical history that includes other orthopedic surgery (); other (Left, ); ear middle exploration (); and tendon repair ().    OB History    Para Term  AB SAB TAB Ectopic Multiple Living   2 1 0 1 0 0 0 0  0      # Outcome Date GA Lbr Tommy/2nd Weight Sex Delivery Anes PTL Lv   2 Current            1  16 24w0d    Vag-Spont  N FD      Complications: Chromosomal abnormality      Comments: Pt had cytotec          Medications:  No current facility-administered medications on file prior to encounter.     Current Outpatient Prescriptions on File Prior to Encounter   Medication Sig Dispense Refill   • Prenatal  "MV-Min-Fe Fum-FA-DHA (PRENATAL 1 PO) Take  by mouth.         Allergies:  Review of patient's allergies indicates no known allergies.    ROS:   Neuro: negative    Cardiovascular: negative  Gastro intestinal: negative  Genitourinary: negative            Physical Exam:  /62 mmHg  Pulse 85  Temp(Src) 36.4 °C (97.5 °F)  Ht 1.549 m (5' 1\")  Wt 74.844 kg (165 lb)  BMI 31.19 kg/m2  LMP 11/27/2016  Constitutional: healthy-appearing, Well-developed  No JVD: while supine  HEENT: PERRLA  Breast Exam: negative  Cardio: regular rate and rhythm, S1, S2 normal, no murmur, click, rub or gallop  Lung: unlabored respirations, no intercostal retractions or accessory muscle use  Abdomen: abdomen is soft without significant tenderness, masses, organomegaly or guarding  Extremity: extremities, peripheral pulses and reflexes normal, no edema, redness or tenderness in the calves or thighs    Cervical Exam: 50%  Cervix Dilatation: 3-4  Station: negative 2  Pelvis: Adequate  Fetal Assessment: Fetal heart variability: moderate  Estimated Fetal Weight: 2500 - 3000g      Labs:      Prenatal Results         1st Trimester Date Time   ABO  B  03/19/17    RH  Positive   03/19/17    Antibody  Negative  03/19/17    CBC/PLT/DIFF      HGB  10.8  g/dL  05/20/17    Platelets  322  03/19/17    HGB A1C       1 Hr GCT  147  mg/dL  05/20/17    3 Hr GTT  84, 173, 123, 111  mg/dL  06/20/17    Rubella  Immune   03/19/17    RPR  Negative   03/19/17    Urine Culture  Mixed skin samantha >100,000 cfu/mL  06/20/16 1402   24 Urine Protein       24 Urine Creatinine       HBsAg  Negative  07/22/16 1457   Hep CAB   Negative   03/19/17    HIV  Negative  03/19/17    Gonorrhea  NEG  02/21/17    Chlamydia  NEG  02/21/17    TSH       Free T4        TB      Pap  NEG  02/21/17    SYPHILUS TREP QUAL [id number] [[id number]][      2nd Trimester Date Time   HCT  32.5  %  05/20/17    HGB  10.8  g/dL  05/20/17    1 Hr GCT  147  mg/dL  05/20/17    3 Hr GTT  84, 173, 123, 111  mg/dL  " 17    24-28 Weeks Date Time   1 Hr GCT   147  mg/dL  17    TSH       Free T4       24 Urine Protein      24 Urine Creatinine      BUN      Creatinine      GFR      AST      ALT      Uric Acid      LDH      3rd Trimester Date Time   HCT  32.5  %  17    HGB  10.8  g/dL  17    TSH      Free T4      24 Hr Urine Protein      24 Hr Urine Creatinine      SYPHILUS TREP QUAL  NOT DETECTED  17    35-37 Weeks Date Time   GBS PCR LB  Negative  17    GBS PCR  Negative  17    Genetic Screening Date Time   Cystic Fibrosis      AFP Quad  Negative   17    Sickle Cell                  View all results for this pregnancy            Assessment:  Gestational Age:  39w0d  Labor State:   Early Labor for IOL with Pitocin  Risk Factors:   none  Pregnancy Complications: none    Patient Active Problem List    Diagnosis Date Noted   • Closed fracture of right hip with routine healing 2017   • Supervision of other high risk pregnancies, first trimester 02/15/2017   • 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

## 2017-08-07 NOTE — PROGRESS NOTES
with EDC of  making her 39.0 presents for scheduled IOL for hx of FD at 24 wk. Reports occas UCs that are uncomfortable but denies LOF or VB; reports good FM. Dr. Blount and JIGNESH Rocha CNM notified; will be in to assess pt soon

## 2017-08-07 NOTE — IP AVS SNAPSHOT
PeopleAdmin Access Code: [email or web address]  Expires: 9/10/2017  9:52 AM    PeopleAdmin  A secure, online tool to manage your health information     [employer]’s PeopleAdmin® is a secure, online tool that connects you to your personalized health information from the privacy of your home -- day or night - making it very easy for you to manage your healthcare. Once the activation process is completed, you can even access your medical information using the PeopleAdmin corey, which is available for free in the Apple Corey store or Google Play store.     PeopleAdmin provides the following levels of access (as shown below):   My Chart Features   [facility] Primary Care Doctor [facility]  Specialists [facility]  Urgent  Care Non-[facility]  Primary Care  Doctor   Email your healthcare team securely and privately 24/7 X X X X   Manage appointments: schedule your next appointment; view details of past/upcoming appointments X      Request prescription refills. X      View recent personal medical records, including lab and immunizations X X X X   View health record, including health history, allergies, medications X X X X   Read reports about your outpatient visits, procedures, consult and ER notes X X X X   See your discharge summary, which is a recap of your hospital and/or ER visit that includes your diagnosis, lab results, and care plan. X X       How to register for PeopleAdmin:  1. Go to  [email or web address].  2. Click on the Sign Up Now box, which takes you to the New Member Sign Up page. You will need to provide the following information:  a. Enter your PeopleAdmin Access Code exactly as it appears at the top of this page. (You will not need to use this code after you’ve completed the sign-up process. If you do not sign up before the expiration date, you must request a new code.)   b. Enter your date of birth.   c. Enter your home email address.   d. Click Submit, and follow the next screen’s instructions.  3. Create a PeopleAdmin ID. This will be your PeopleAdmin  login ID and cannot be changed, so think of one that is secure and easy to remember.  4. Create a [employer] password. You can change your password at any time.  5. Enter your Password Reset Question and Answer. This can be used at a later time if you forget your password.   6. Enter your e-mail address. This allows you to receive e-mail notifications when new information is available in Quisic.  7. Click Sign Up. You can now view your health information.    For assistance activating your [employer]isic account, call [phone]

## 2017-08-07 NOTE — IP AVS SNAPSHOT
Home Care Instructions                                                                                                                Buffy Chong   MRN: [id number]    Department:  POST PARTUM 31   2017           Your appointments     Aug 16, 2017  3:45 PM   Post Partum  Check with KATYA ADLER   The [facility] [address]  [facility] [address]   [phone]            Sep 12, 2017  1:00 PM   Post Partum with Chelsea Torres D.N.P.   The [address]  [facility] [id number] [phone]              Follow-up Information     1. Follow up with Abel Romero M.D. In 1 week.    Specialty:  OB/Gyn    Contact information    [address] [place] [id number] [phone]         I assume responsibility for securing a follow-up [place] Screening blood test on my baby within the specified date range.    -                  Discharge Instructions       POSTPARTUM DISCHARGE INSTRUCTIONS FOR MOM    YOB: 1985   Age: 31 y.o.               Admit Date: 2017     Discharge Date: 2017  Attending Doctor:  Kevin Blount M.D.                  Allergies:  Review of patient's allergies indicates no known allergies.    Discharged to home by car. Discharged via wheelchair, hospital escort: Yes.  Special equipment needed: Not Applicable  Belongings with: Personal  Be sure to schedule a follow-up appointment with your primary care doctor or any specialists as instructed.     Discharge Plan:   Diet Plan: Discussed  Activity Level: Discussed  Confirmed Follow up Appointment: Patient to Call and Schedule Appointment  Confirmed Symptoms Management: Discussed  Medication Reconciliation Updated: Yes  Influenza Vaccine Indication: Indicated: Not available from distributor/    REASONS TO CALL YOUR OBSTETRICIAN:  1.   Persistent fever or shaking chills (Temperature higher than 100.4)  2.   Heavy bleeding (soaking more than 1 pad per  "hour); Passing clots  3.   Foul odor from vagina  4.   Mastitis (Breast infection; breast pain, chills, fever, redness)  5.   Urinary pain, burning or frequency  6.   Episiotomy infection  7.   Abdominal incision infection  8.   Severe depression longer than 24 hours    HAND WASHING  · Prior to handling the baby.  · Before breastfeeding or bottle feeding baby.  · After using the bathroom or changing the baby's diaper.    WOUND CARE  Ask your physician for additional care instructions.  In general:    ·  Incision:      · Keep clean and dry.    · Do NOT lift anything heavier than your baby for up to 6 weeks.    · There should not be any opening or pus.      VAGINAL CARE  · Nothing inside vagina for 6 weeks: no sexual intercourse, tampons or douching.  · Bleeding may continue for 2-4 weeks.  Amount may vary.    · Call your physician for heavy bleeding which means soaking more than 1 pad per hour    BIRTH CONTROL  · It is possible to become pregnant at any time after delivery and while breastfeeding.  · Plan to discuss a method of birth control with your physician at your follow up visit. visit.    DIET AND ELIMINATION  · Eating more fiber (bran cereal, fruits, and vegetables) and drinking plenty of fluids will help to avoid constipation.  · Urinary frequency after childbirth is normal.    POSTPARTUM BLUES  During the first few days after birth, you may experience a sense of the \"blues\" which may include impatience, irritability or even crying.  These feeling come and go quickly.  However, as many as 1 in 10 women experience emotional symptoms known as postpartum depression.    Postpartum depression:  May start as early as the second or third day after delivery or take several weeks or months to develop.  Symptoms of \"blues\" are present, but are more intense:  Crying spells; loss of appetite; feelings of hopelessness or loss of control; fear of touching the baby; over concern or no concern at all about the " "baby; little or no concern about your own appearance/caring for yourself; and/or inability to sleep or excessive sleeping.  Contact your physician if you are experiencing any of these symptoms.    Crisis Hotline:  · [place] Crisis Hotline:  3-335-XDHDLNH  Or 1-572.200.3458  · [place] Crisis Hotline:  1-635.932.2525  Or 361-758-6962    PREVENTING SHAKEN BABY:  If you are angry or stressed, PUT THE BABY IN THE CRIB, step away, take some deep breaths, and wait until you are calm to care for the baby.  DO NOT SHAKE THE BABY.  You are not alone, call a supporter for help.    · Crisis Call Center 24/7 crisis line 531-774-1051 or 1-360.790.5968  · You can also text them, text \"ANSWER\" to 251001    QUIT SMOKING/TOBACCO USE:  I understand the use of any tobacco products increases my chance of suffering from future heart disease and could cause other illnesses which may shorten my life. Quitting the use of tobacco products is the single most important thing I can do to improve my health. For further information on smoking / tobacco cessation call a Toll Free Quit Line at 1-125.903.4472 (*National Cancer [place]) or 1-401.938.1818 (American Lung Association) or you can access the web based program at www.lungusa.org.    · [place] Tobacco Users Help Line:  (156) 717-3251       Toll Free: 1-983.187.3372  · Quit Tobacco Program [facility] (749)690-3326    DEPRESSION / SUICIDE RISK:  As you are discharged from this [facility], it is important to learn how to keep safe from harming yourself.    Recognize the warning signs:  · Abrupt changes in personality, positive or negative- including increase in energy   · Giving away possessions  · Change in eating patterns- significant weight changes-  positive or negative  · Change in sleeping patterns- unable to sleep or sleeping all the time   · Unwillingness or inability to communicate  · Depression  · Unusual sadness, discouragement and " loneliness  · Talk of wanting to die  · Neglect of personal appearance   · Rebelliousness- reckless behavior  · Withdrawal from people/activities they love  · Confusion- inability to concentrate     If you or a loved one observes any of these behaviors or has concerns about self-harm, here's what you can do:  · Talk about it- your feelings and reasons for harming yourself  · Remove any means that you might use to hurt yourself (examples: pills, rope, extension cords, firearm)  · Get professional help from the community (Mental Health, Substance Abuse, psychological counseling)  · Do not be alone:Call your Safe Contact- someone whom you trust who will be there for you.  · Call your local CRISIS HOTLINE 769-8665 or 376-867-8762  · Call your local Children's Mobile Crisis Response Team [place] (526) 083-0775 or www.Mr [employer]ana  · Call the toll free National Suicide Prevention Hotlines   · National Suicide Prevention Lifeline 243-065-ZYLU (1856)  · National Hope Line Network 800-SUICIDE (430-7082)    DISCHARGE SURVEY:  Thank you for choosing [facility].  We hope we provided you with very good care.  You may be receiving a survey in the mail.  Please fill it out.  Your opinion is valuable to us.    ADDITIONAL EDUCATIONAL MATERIALS GIVEN TO PATIENT:        My signature on this form indicates that:  1.  I have reviewed and understand the above information  2.  My questions regarding this information have been answered to my satisfaction.  3.  I have formulated a plan with my discharge nurse to obtain my prescribed medication for home.         Discharge Medication Instructions:    Below are the medications your physician expects you to take upon discharge:    Review all your home medications and newly ordered medications with your doctor and/or pharmacist. Follow medication instructions as directed by your doctor and/or pharmacist.    Please keep your medication list with you and share with your  physician.               Medication List      START taking these medications        Instructions    Morning Afternoon Evening Bedtime     MG Caps   Last time this was given:  100 mg on 8/11/2017  1:34 AM        Take 100 mg by mouth 2 times a day as needed for Constipation.   Dose:  100 mg                        ibuprofen 800 MG Tabs   Last time this was given:  800 mg on 8/11/2017  1:34 AM   Commonly known as:  MOTRIN        Take 1 Tab by mouth every 8 hours as needed (For cramping after delivery; do not give if patient is receiving ketorolac (Toradol)).   Dose:  800 mg                        oxycodone-acetaminophen 5-325 MG Tabs   Last time this was given:  1 Tab on 8/10/2017  6:10 PM   Commonly known as:  PERCOCET        Take 1 Tab by mouth every four hours as needed (for Moderate Pain (Pain Scale 4-6) after delivery).   Dose:  1 Tab                          CONTINUE taking these medications        Instructions    Morning Afternoon Evening Bedtime    PRENATAL 1 PO        Take  by mouth.                             Where to Get Your Medications      Information about where to get these medications is not yet available     ! Ask your nurse or doctor about these medications    -  MG Caps  - ibuprofen 800 MG Tabs  - oxycodone-acetaminophen 5-325 MG Tabs            Crisis Hotline:     [place] Crisis Hotline:  1-473-BYXLEIO or 1-826.500.3297    [place] Crisis Hotline:    1-467.931.8193 or 942-057-0855        Disclaimer           _____________________________________                     __________       ________       Patient/Mother Signature or Legal                          Date                   Time

## 2017-08-08 LAB
ERYTHROCYTE [DISTWIDTH] IN BLOOD BY AUTOMATED COUNT: 49.5 FL (ref 35.9–50)
HCT VFR BLD AUTO: 31 % (ref 37–47)
HGB BLD-MCNC: 10.6 G/DL (ref 12–16)
MCH RBC QN AUTO: 30.1 PG (ref 27–33)
MCHC RBC AUTO-ENTMCNC: 34.2 G/DL (ref 33.6–35)
MCV RBC AUTO: 88.1 FL (ref 81.4–97.8)
PLATELET # BLD AUTO: 200 K/UL (ref 164–446)
PMV BLD AUTO: 9.8 FL (ref 9–12.9)
RBC # BLD AUTO: 3.52 M/UL (ref 4.2–5.4)
WBC # BLD AUTO: 19 K/UL (ref 4.8–10.8)

## 2017-08-08 PROCEDURE — 700111 HCHG RX REV CODE 636 W/ 250 OVERRIDE (IP): Performed by: PHYSICIAN ASSISTANT

## 2017-08-08 PROCEDURE — 770002 HCHG ROOM/CARE - OB PRIVATE (112)

## 2017-08-08 PROCEDURE — 700105 HCHG RX REV CODE 258: Performed by: PHYSICIAN ASSISTANT

## 2017-08-08 PROCEDURE — 700111 HCHG RX REV CODE 636 W/ 250 OVERRIDE (IP): Performed by: ANESTHESIOLOGY

## 2017-08-08 PROCEDURE — 304964 HCHG RECOVERY ROOM TIME 1HR

## 2017-08-08 PROCEDURE — 306828 HCHG ANES-TIME GENERAL: Performed by: OBSTETRICS & GYNECOLOGY

## 2017-08-08 PROCEDURE — 500429 HCHG DRESSING, INTERCEED

## 2017-08-08 PROCEDURE — 305385 HCHG SURGICAL SERVICES 1/4 HOUR

## 2017-08-08 PROCEDURE — 700105 HCHG RX REV CODE 258: Performed by: OBSTETRICS & GYNECOLOGY

## 2017-08-08 PROCEDURE — 700105 HCHG RX REV CODE 258

## 2017-08-08 PROCEDURE — 700111 HCHG RX REV CODE 636 W/ 250 OVERRIDE (IP)

## 2017-08-08 PROCEDURE — 700105 HCHG RX REV CODE 258: Performed by: ANESTHESIOLOGY

## 2017-08-08 PROCEDURE — 700101 HCHG RX REV CODE 250: Mod: JW

## 2017-08-08 PROCEDURE — 59514 CESAREAN DELIVERY ONLY: CPT

## 2017-08-08 PROCEDURE — 36415 COLL VENOUS BLD VENIPUNCTURE: CPT

## 2017-08-08 PROCEDURE — 700102 HCHG RX REV CODE 250 W/ 637 OVERRIDE(OP): Performed by: ANESTHESIOLOGY

## 2017-08-08 PROCEDURE — 85027 COMPLETE CBC AUTOMATED: CPT

## 2017-08-08 PROCEDURE — 303615 HCHG EPIDURAL/SPINAL ANESTHESIA FOR LABOR

## 2017-08-08 PROCEDURE — A9270 NON-COVERED ITEM OR SERVICE: HCPCS | Performed by: ANESTHESIOLOGY

## 2017-08-08 RX ORDER — DOCUSATE SODIUM 100 MG/1
100 CAPSULE, LIQUID FILLED ORAL 2 TIMES DAILY PRN
Status: DISCONTINUED | OUTPATIENT
Start: 2017-08-08 | End: 2017-08-11 | Stop reason: HOSPADM

## 2017-08-08 RX ORDER — DIPHENHYDRAMINE HYDROCHLORIDE 50 MG/ML
25 INJECTION INTRAMUSCULAR; INTRAVENOUS EVERY 6 HOURS PRN
Status: DISCONTINUED | OUTPATIENT
Start: 2017-08-08 | End: 2017-08-11 | Stop reason: HOSPADM

## 2017-08-08 RX ORDER — MISOPROSTOL 200 UG/1
800 TABLET ORAL
Status: DISCONTINUED | OUTPATIENT
Start: 2017-08-08 | End: 2017-08-11 | Stop reason: HOSPADM

## 2017-08-08 RX ORDER — SODIUM CHLORIDE, SODIUM LACTATE, POTASSIUM CHLORIDE, CALCIUM CHLORIDE 600; 310; 30; 20 MG/100ML; MG/100ML; MG/100ML; MG/100ML
1500 INJECTION, SOLUTION INTRAVENOUS ONCE
Status: COMPLETED | OUTPATIENT
Start: 2017-08-08 | End: 2017-08-08

## 2017-08-08 RX ORDER — SODIUM CHLORIDE, SODIUM LACTATE, POTASSIUM CHLORIDE, CALCIUM CHLORIDE 600; 310; 30; 20 MG/100ML; MG/100ML; MG/100ML; MG/100ML
INJECTION, SOLUTION INTRAVENOUS PRN
Status: DISCONTINUED | OUTPATIENT
Start: 2017-08-08 | End: 2017-08-11 | Stop reason: HOSPADM

## 2017-08-08 RX ORDER — MISOPROSTOL 200 UG/1
800 TABLET ORAL
Status: DISCONTINUED | OUTPATIENT
Start: 2017-08-08 | End: 2017-08-08 | Stop reason: HOSPADM

## 2017-08-08 RX ORDER — SODIUM CHLORIDE, SODIUM LACTATE, POTASSIUM CHLORIDE, CALCIUM CHLORIDE 600; 310; 30; 20 MG/100ML; MG/100ML; MG/100ML; MG/100ML
INJECTION, SOLUTION INTRAVENOUS CONTINUOUS
Status: DISCONTINUED | OUTPATIENT
Start: 2017-08-08 | End: 2017-08-08 | Stop reason: HOSPADM

## 2017-08-08 RX ORDER — CLINDAMYCIN PHOSPHATE 900 MG/50ML
900 INJECTION, SOLUTION INTRAVENOUS EVERY 8 HOURS
Status: CANCELLED | OUTPATIENT
Start: 2017-08-08

## 2017-08-08 RX ORDER — CEFAZOLIN SODIUM 1 G/3ML
1 INJECTION, POWDER, FOR SOLUTION INTRAMUSCULAR; INTRAVENOUS ONCE
Status: DISCONTINUED | OUTPATIENT
Start: 2017-08-08 | End: 2017-08-08 | Stop reason: HOSPADM

## 2017-08-08 RX ORDER — NALOXONE HYDROCHLORIDE 0.4 MG/ML
0.1 INJECTION, SOLUTION INTRAMUSCULAR; INTRAVENOUS; SUBCUTANEOUS PRN
Status: DISCONTINUED | OUTPATIENT
Start: 2017-08-08 | End: 2017-08-09

## 2017-08-08 RX ORDER — KETOROLAC TROMETHAMINE 30 MG/ML
30 INJECTION, SOLUTION INTRAMUSCULAR; INTRAVENOUS EVERY 6 HOURS
Status: DISCONTINUED | OUTPATIENT
Start: 2017-08-08 | End: 2017-08-08

## 2017-08-08 RX ORDER — ONDANSETRON 2 MG/ML
4 INJECTION INTRAMUSCULAR; INTRAVENOUS EVERY 6 HOURS PRN
Status: DISCONTINUED | OUTPATIENT
Start: 2017-08-08 | End: 2017-08-09

## 2017-08-08 RX ORDER — OXYCODONE AND ACETAMINOPHEN 10; 325 MG/1; MG/1
1 TABLET ORAL EVERY 4 HOURS PRN
Status: DISCONTINUED | OUTPATIENT
Start: 2017-08-08 | End: 2017-08-09

## 2017-08-08 RX ORDER — METOCLOPRAMIDE HYDROCHLORIDE 5 MG/ML
10 INJECTION INTRAMUSCULAR; INTRAVENOUS ONCE
Status: COMPLETED | OUTPATIENT
Start: 2017-08-08 | End: 2017-08-08

## 2017-08-08 RX ORDER — SIMETHICONE 80 MG
80 TABLET,CHEWABLE ORAL 4 TIMES DAILY PRN
Status: DISCONTINUED | OUTPATIENT
Start: 2017-08-08 | End: 2017-08-11 | Stop reason: HOSPADM

## 2017-08-08 RX ORDER — OXYCODONE HYDROCHLORIDE AND ACETAMINOPHEN 5; 325 MG/1; MG/1
1 TABLET ORAL EVERY 4 HOURS PRN
Status: DISCONTINUED | OUTPATIENT
Start: 2017-08-08 | End: 2017-08-09

## 2017-08-08 RX ORDER — KETOROLAC TROMETHAMINE 30 MG/ML
30 INJECTION, SOLUTION INTRAMUSCULAR; INTRAVENOUS EVERY 6 HOURS
Status: COMPLETED | OUTPATIENT
Start: 2017-08-08 | End: 2017-08-09

## 2017-08-08 RX ADMIN — KETOROLAC TROMETHAMINE 30 MG: 30 INJECTION, SOLUTION INTRAMUSCULAR at 22:04

## 2017-08-08 RX ADMIN — AMPICILLIN SODIUM 1 G: 1 INJECTION, POWDER, FOR SOLUTION INTRAMUSCULAR; INTRAVENOUS at 08:27

## 2017-08-08 RX ADMIN — METOCLOPRAMIDE 10 MG: 5 INJECTION, SOLUTION INTRAMUSCULAR; INTRAVENOUS at 03:03

## 2017-08-08 RX ADMIN — KETOROLAC TROMETHAMINE 30 MG: 30 INJECTION, SOLUTION INTRAMUSCULAR at 11:00

## 2017-08-08 RX ADMIN — OXYCODONE HYDROCHLORIDE AND ACETAMINOPHEN 1 TABLET: 10; 325 TABLET ORAL at 21:34

## 2017-08-08 RX ADMIN — OXYCODONE HYDROCHLORIDE AND ACETAMINOPHEN 1 TABLET: 10; 325 TABLET ORAL at 11:00

## 2017-08-08 RX ADMIN — AMPICILLIN SODIUM 1 G: 1 INJECTION, POWDER, FOR SOLUTION INTRAMUSCULAR; INTRAVENOUS at 13:30

## 2017-08-08 RX ADMIN — SODIUM CITRATE AND CITRIC ACID MONOHYDRATE 30 ML: 500; 334 SOLUTION ORAL at 03:03

## 2017-08-08 RX ADMIN — KETOROLAC TROMETHAMINE 30 MG: 30 INJECTION, SOLUTION INTRAMUSCULAR at 16:30

## 2017-08-08 RX ADMIN — AMPICILLIN SODIUM 1 G: 1 INJECTION, POWDER, FOR SOLUTION INTRAMUSCULAR; INTRAVENOUS at 04:30

## 2017-08-08 RX ADMIN — OXYCODONE HYDROCHLORIDE AND ACETAMINOPHEN 1 TABLET: 10; 325 TABLET ORAL at 06:08

## 2017-08-08 RX ADMIN — FAMOTIDINE 20 MG: 10 INJECTION, SOLUTION INTRAVENOUS at 03:04

## 2017-08-08 RX ADMIN — SODIUM CHLORIDE, POTASSIUM CHLORIDE, SODIUM LACTATE AND CALCIUM CHLORIDE: 600; 310; 30; 20 INJECTION, SOLUTION INTRAVENOUS at 21:54

## 2017-08-08 RX ADMIN — AMPICILLIN SODIUM 1 G: 1 INJECTION, POWDER, FOR SOLUTION INTRAMUSCULAR; INTRAVENOUS at 22:04

## 2017-08-08 RX ADMIN — OXYCODONE HYDROCHLORIDE AND ACETAMINOPHEN 1 TABLET: 10; 325 TABLET ORAL at 15:13

## 2017-08-08 RX ADMIN — SODIUM CHLORIDE, POTASSIUM CHLORIDE, SODIUM LACTATE AND CALCIUM CHLORIDE 1500 ML: 600; 310; 30; 20 INJECTION, SOLUTION INTRAVENOUS at 03:15

## 2017-08-08 RX ADMIN — GENTAMICIN SULFATE 300 MG: 40 INJECTION, SOLUTION INTRAMUSCULAR; INTRAVENOUS at 00:25

## 2017-08-08 RX ADMIN — AMPICILLIN SODIUM 1 G: 1 INJECTION, POWDER, FOR SOLUTION INTRAMUSCULAR; INTRAVENOUS at 18:16

## 2017-08-08 RX ADMIN — SODIUM CHLORIDE, POTASSIUM CHLORIDE, SODIUM LACTATE AND CALCIUM CHLORIDE: 600; 310; 30; 20 INJECTION, SOLUTION INTRAVENOUS at 05:04

## 2017-08-08 ASSESSMENT — PAIN SCALES - GENERAL
PAINLEVEL_OUTOF10: 7
PAINLEVEL_OUTOF10: 5
PAINLEVEL_OUTOF10: 1
PAINLEVEL_OUTOF10: 5
PAINLEVEL_OUTOF10: 5
PAINLEVEL_OUTOF10: 4
PAINLEVEL_OUTOF10: 5
PAINLEVEL_OUTOF10: 5
PAINLEVEL_OUTOF10: 8
PAINLEVEL_OUTOF10: 3
PAINLEVEL_OUTOF10: 4
PAINLEVEL_OUTOF10: 7
PAINLEVEL_OUTOF10: 5

## 2017-08-08 ASSESSMENT — PATIENT HEALTH QUESTIONNAIRE - PHQ9
SUM OF ALL RESPONSES TO PHQ QUESTIONS 1-9: 0
SUM OF ALL RESPONSES TO PHQ9 QUESTIONS 1 AND 2: 0
2. FEELING DOWN, DEPRESSED, IRRITABLE, OR HOPELESS: NOT AT ALL
1. LITTLE INTEREST OR PLEASURE IN DOING THINGS: NOT AT ALL

## 2017-08-08 NOTE — PROGRESS NOTES
Pt oob to br and markus care given. Valdez removed. Pt tolerated well. Pt to NBN via w/c to visit with infant.

## 2017-08-08 NOTE — PROGRESS NOTES
Report received from ALEJANDRO Ortiz RN. POC discussed.   Pt resting comfortably, declines interventions at this time.   2330- T Hubert updated on maternal temp (see flowsheet). Orders received to start antibiotics and give tylenol (see mar.)  0215- T Hubert notified and asked to review tracing. Will be by to evaluated in person.  0220- T Hubert at bedside. POC discussed. Dr Blount will be by to evaluate in person.   0230- Dr Blount at bedside. C section called at 0245 for arrest of descent. Pt prepped for primary c section and transferred to OR at 0314.

## 2017-08-08 NOTE — PROGRESS NOTES
"Pharmacy Kinetics 31 y.o. female on gentamicin day # 1  2017    Dosing Weight: 60 kg (adjusted body weight)    Currently on Gentamicin 300 mg iv q24hr    Indication for treatment: maternal fever during labor    Pertinent history per medical record: Admitted on 2017 for induction of labor.  C -section for fetal tachycardia and suspected chorioamnionitis. Patient T max during labor 101.6 F, ampicillin/Gentamicin started for probable chorioamnionitis.     Other antibiotics: ampicillin 1gram iv every 4 hours    Allergies: Review of patient's allergies indicates no known allergies.     List concerns for renal function: none at this time    Pertinent cultures to date:   None reporting in EPIC    Recent Labs      17   0950  17   1145   WBC  8.2  19.0*   NEUTSPOLYS  71.60   --      No results for input(s): BUN, CREATININE, ALBUMIN in the last 72 hours.  No results for input(s): GENTTROUGH, GENTPEAK, GENTRANDOM in the last 72 hours.  Intake/Output Summary (Last 24 hours) at 17 1652  Last data filed at 17 1100   Gross per 24 hour   Intake   1900 ml   Output   3375 ml   Net  -1475 ml      Blood pressure 98/62, pulse 72, temperature 36.6 °C (97.8 °F), temperature source Oral, resp. rate 16, height 1.549 m (5' 1\"), weight 74.844 kg (165 lb), last menstrual period 2016, SpO2 96 %, unknown if currently breastfeeding. Temp (24hrs), Av.1 °C (98.8 °F), Min:36.3 °C (97.3 °F), Max:38.7 °C (101.6 °F)      A/P   1. Gentamicin dose change: new start  2. Next gentamicin level: 2-3 days  3. Goal trough: undetectable  4. Comments: No concerns for renal function or Gentamicin clearance. Will continue Gent 5mg/kg (300mg) iv every 24 hours. Will check a trough level if iv antibiotics to continue >72 hours    Jovana Ernandez,pharmD    "

## 2017-08-08 NOTE — OR SURGEON
Operative Report    PreOp Diagnosis: Term Pregnancy                                 Induction of Labor , Failed                                Chorioamnionitis                                Arrest of Descent     PostOp Diagnosis:  Term Pregnancy                                 Induction of Labor , Failed                                Chorioamnionitis                                Arrest of Descent       Procedure(s):  PRIMARY C SECTION, Low Transverse     Surgeon(s):  Kevin Blount M.D.    Anesthesiologist/Type of Anesthesia:  Anesthesiologist: (Unknown)/Spinal    Surgical Staff:  Circulator: (Unknown)  Scrub Person: (Unknown)    Specimens:  * No specimens in log *    Estimated Blood Loss: 650cc    Findings: viable male , Apgar 7/9    Complications: none        2017 4:35 AM Kevin Blount

## 2017-08-08 NOTE — PROGRESS NOTES
Pt resting well with epidural, but I was called to room due to fetal heart decels to 50s for 2 min. With positional changes, O2 and stopping the pitocin, the baby recovered. Pt currently on Ampicillin and given PO Tylenol for suspected chorioamnionitis, will start Gentamicin when it arrives from pharmacy.     Cervix: Rim/100/-1, LOP position  Maternal temp: 101.6  NST: FHT baseline 170bpm, +accels, decels to 80s with occ UCs, mod variability  Pit: Currently off    A/P: IUP at 39w0d - IOL for hx fetal demise, will start Gent and continue Amp and Tylenol for chorio, restart Pit at 3mU/min in ~30 min if fetus is stable. Anticipate , though will consider operative/assisted delivery if no improvement or imminent delivery over next hour.

## 2017-08-08 NOTE — CONSULTS
Basics of hospital grade breast pump use introduced today with mother. Written information to help support frequency and duration provided. Plan is to follow this mother while baby remains hospitalized to ensure the establishment and subsequent maintenance of adequate milk supply, and help direct her to appropriate resources.

## 2017-08-08 NOTE — PROGRESS NOTES
Pt pushing with great effort for over an hour now, but fetus still at 0-+1 station only. D/w Dr Blount and will have him come assess, but d/w pt possibility of need for  due to fetal tachycardia and chorio, also FTP with adequate UCs. Pt agrees to plan, will continue to push for now.     NST: Baseline 170s, min variability, early decels with UCs  IUPC: q 3-4 min  Pit: 3mU/min

## 2017-08-08 NOTE — CARE PLAN
Problem: Knowledge Deficit  Goal: Patient/Support person demonstrates understanding regarding the progression of labor, available options and participates in decision-making process  Intervention: Instruct patient/support person in basic interpretation of fetal monitor  Discussed basics of fetal monitor with Pt, fob and family.       Problem: Risk for Infection, Impaired Wound Healing  Goal: Remain free from signs and symptoms of infection  Intervention: Instruct Patient regarding signs and symptoms of infection  Discussed reason for limited checks, monitoring of vital signs and s/s of infection. Pt has been afebrile throughout the day. POC discussed and will continue to monitor.

## 2017-08-08 NOTE — CARE PLAN
Problem: Potential for postpartum infection related to surgical incision, compromised uterine condition, urinary tract or respiratory compromise  Goal: Patient will be afebrile and free from signs and symptoms of infection  Patient was febrile during delivery. Patient is on antibiotics.     Problem: Alteration in comfort related to surgical incision and/or after birth pains  Goal: Patient verbalizes acceptable pain level  Patient verbalizes acceptable pain level

## 2017-08-09 PROCEDURE — 700111 HCHG RX REV CODE 636 W/ 250 OVERRIDE (IP): Performed by: PHYSICIAN ASSISTANT

## 2017-08-09 PROCEDURE — 700111 HCHG RX REV CODE 636 W/ 250 OVERRIDE (IP): Performed by: OBSTETRICS & GYNECOLOGY

## 2017-08-09 PROCEDURE — A9270 NON-COVERED ITEM OR SERVICE: HCPCS | Performed by: OBSTETRICS & GYNECOLOGY

## 2017-08-09 PROCEDURE — 700102 HCHG RX REV CODE 250 W/ 637 OVERRIDE(OP): Performed by: OBSTETRICS & GYNECOLOGY

## 2017-08-09 PROCEDURE — 700105 HCHG RX REV CODE 258

## 2017-08-09 PROCEDURE — 700112 HCHG RX REV CODE 229: Performed by: OBSTETRICS & GYNECOLOGY

## 2017-08-09 PROCEDURE — 700111 HCHG RX REV CODE 636 W/ 250 OVERRIDE (IP): Performed by: ANESTHESIOLOGY

## 2017-08-09 PROCEDURE — 770002 HCHG ROOM/CARE - OB PRIVATE (112)

## 2017-08-09 PROCEDURE — 700105 HCHG RX REV CODE 258: Performed by: PHYSICIAN ASSISTANT

## 2017-08-09 PROCEDURE — 700111 HCHG RX REV CODE 636 W/ 250 OVERRIDE (IP)

## 2017-08-09 RX ORDER — ONDANSETRON 2 MG/ML
4 INJECTION INTRAMUSCULAR; INTRAVENOUS EVERY 6 HOURS PRN
Status: DISCONTINUED | OUTPATIENT
Start: 2017-08-09 | End: 2017-08-11 | Stop reason: HOSPADM

## 2017-08-09 RX ORDER — MORPHINE SULFATE 4 MG/ML
4 INJECTION, SOLUTION INTRAMUSCULAR; INTRAVENOUS
Status: DISCONTINUED | OUTPATIENT
Start: 2017-08-09 | End: 2017-08-11 | Stop reason: HOSPADM

## 2017-08-09 RX ORDER — OXYCODONE HYDROCHLORIDE AND ACETAMINOPHEN 5; 325 MG/1; MG/1
1 TABLET ORAL EVERY 4 HOURS PRN
Status: DISCONTINUED | OUTPATIENT
Start: 2017-08-09 | End: 2017-08-11 | Stop reason: HOSPADM

## 2017-08-09 RX ORDER — IBUPROFEN 800 MG/1
800 TABLET ORAL EVERY 8 HOURS PRN
Status: DISCONTINUED | OUTPATIENT
Start: 2017-08-09 | End: 2017-08-11 | Stop reason: HOSPADM

## 2017-08-09 RX ORDER — DIPHENHYDRAMINE HCL 25 MG
25 TABLET ORAL EVERY 6 HOURS PRN
Status: DISCONTINUED | OUTPATIENT
Start: 2017-08-09 | End: 2017-08-11 | Stop reason: HOSPADM

## 2017-08-09 RX ORDER — FERROUS SULFATE 325(65) MG
325 TABLET ORAL 2 TIMES DAILY WITH MEALS
Status: DISCONTINUED | OUTPATIENT
Start: 2017-08-09 | End: 2017-08-11 | Stop reason: HOSPADM

## 2017-08-09 RX ORDER — ACETAMINOPHEN 325 MG/1
650 TABLET ORAL EVERY 4 HOURS PRN
Status: DISCONTINUED | OUTPATIENT
Start: 2017-08-09 | End: 2017-08-11 | Stop reason: HOSPADM

## 2017-08-09 RX ORDER — DIPHENHYDRAMINE HYDROCHLORIDE 50 MG/ML
25 INJECTION INTRAMUSCULAR; INTRAVENOUS EVERY 6 HOURS PRN
Status: DISCONTINUED | OUTPATIENT
Start: 2017-08-09 | End: 2017-08-11 | Stop reason: HOSPADM

## 2017-08-09 RX ORDER — ONDANSETRON 4 MG/1
4 TABLET, ORALLY DISINTEGRATING ORAL EVERY 6 HOURS PRN
Status: DISCONTINUED | OUTPATIENT
Start: 2017-08-09 | End: 2017-08-11 | Stop reason: HOSPADM

## 2017-08-09 RX ORDER — OXYCODONE AND ACETAMINOPHEN 10; 325 MG/1; MG/1
1 TABLET ORAL EVERY 4 HOURS PRN
Status: DISCONTINUED | OUTPATIENT
Start: 2017-08-09 | End: 2017-08-11 | Stop reason: HOSPADM

## 2017-08-09 RX ADMIN — OXYCODONE HYDROCHLORIDE AND ACETAMINOPHEN 1 TABLET: 10; 325 TABLET ORAL at 13:28

## 2017-08-09 RX ADMIN — OXYCODONE HYDROCHLORIDE AND ACETAMINOPHEN 1 TABLET: 10; 325 TABLET ORAL at 09:26

## 2017-08-09 RX ADMIN — AMPICILLIN SODIUM 1 G: 1 INJECTION, POWDER, FOR SOLUTION INTRAMUSCULAR; INTRAVENOUS at 09:27

## 2017-08-09 RX ADMIN — OXYCODONE HYDROCHLORIDE AND ACETAMINOPHEN 1 TABLET: 10; 325 TABLET ORAL at 17:32

## 2017-08-09 RX ADMIN — AMPICILLIN SODIUM 1 G: 1 INJECTION, POWDER, FOR SOLUTION INTRAMUSCULAR; INTRAVENOUS at 22:09

## 2017-08-09 RX ADMIN — Medication 325 MG: at 17:32

## 2017-08-09 RX ADMIN — DOCUSATE SODIUM 100 MG: 100 CAPSULE ORAL at 22:49

## 2017-08-09 RX ADMIN — GENTAMICIN SULFATE 300 MG: 40 INJECTION, SOLUTION INTRAMUSCULAR; INTRAVENOUS at 01:03

## 2017-08-09 RX ADMIN — IBUPROFEN 800 MG: 800 TABLET, FILM COATED ORAL at 17:32

## 2017-08-09 RX ADMIN — IBUPROFEN 800 MG: 800 TABLET, FILM COATED ORAL at 07:59

## 2017-08-09 RX ADMIN — AMPICILLIN SODIUM 1 G: 1 INJECTION, POWDER, FOR SOLUTION INTRAMUSCULAR; INTRAVENOUS at 02:57

## 2017-08-09 RX ADMIN — DOCUSATE SODIUM 100 MG: 100 CAPSULE ORAL at 08:33

## 2017-08-09 RX ADMIN — Medication 325 MG: at 07:59

## 2017-08-09 RX ADMIN — OXYCODONE HYDROCHLORIDE AND ACETAMINOPHEN 1 TABLET: 10; 325 TABLET ORAL at 22:50

## 2017-08-09 RX ADMIN — AMPICILLIN SODIUM 1 G: 1 INJECTION, POWDER, FOR SOLUTION INTRAMUSCULAR; INTRAVENOUS at 17:32

## 2017-08-09 RX ADMIN — MORPHINE SULFATE 4 MG: 4 INJECTION INTRAVENOUS at 12:35

## 2017-08-09 RX ADMIN — KETOROLAC TROMETHAMINE 30 MG: 30 INJECTION, SOLUTION INTRAMUSCULAR at 04:19

## 2017-08-09 RX ADMIN — AMPICILLIN SODIUM 1 G: 1 INJECTION, POWDER, FOR SOLUTION INTRAMUSCULAR; INTRAVENOUS at 06:00

## 2017-08-09 RX ADMIN — AMPICILLIN SODIUM 1 G: 1 INJECTION, POWDER, FOR SOLUTION INTRAMUSCULAR; INTRAVENOUS at 13:28

## 2017-08-09 ASSESSMENT — PAIN SCALES - GENERAL
PAINLEVEL_OUTOF10: 7
PAINLEVEL_OUTOF10: 8
PAINLEVEL_OUTOF10: 7
PAINLEVEL_OUTOF10: 2
PAINLEVEL_OUTOF10: 7
PAINLEVEL_OUTOF10: 4
PAINLEVEL_OUTOF10: 2
PAINLEVEL_OUTOF10: 3

## 2017-08-09 NOTE — PROGRESS NOTES
Received bedside report from DEION Romano. Discussed plan of care. Patient will call for pain medication as needed. All needs met at this time.

## 2017-08-09 NOTE — PROGRESS NOTES
"Buffy Chong PP day 1 POD 1    Subjective: Abdominal pain. yes but controlled well, ambulating .yes, tolerating liquids .yes, tolerating regular diet .yes, flatus.yes, BM .no, Bleeding .mild, voiding .yes,dizziness .no, breast feeding.pumping, breast tenderness .no    Blood pressure 93/60, pulse 67, temperature 37.1 °C (98.7 °F), temperature source Oral, resp. rate 18, height 1.549 m (5' 1\"), weight 74.844 kg (165 lb), last menstrual period 11/27/2016, SpO2 97 %, unknown if currently breastfeeding.  Breast Exam: Tenderness .no, Engourgement .no, Mastitis .no  Abdomen soft, non-tender. BS normal. No masses,  No organomegaly  Incision: dry and intact  Fundus Tenderness:yes, Below umbilicus:Yes, firm  Perineumperineum intact  ExtremitiesNormal, no cyanosis, clubbing    Meds:   No current facility-administered medications on file prior to encounter.     Current Outpatient Prescriptions on File Prior to Encounter   Medication Sig Dispense Refill   • Prenatal MV-Min-Fe Fum-FA-DHA (PRENATAL 1 PO) Take  by mouth.         Lab:   Recent Results (from the past 48 hour(s))   CBC WITH DIFFERENTIAL    Collection Time: 08/07/17  9:50 AM   Result Value Ref Range    WBC 8.2 4.8 - 10.8 K/uL    RBC 4.09 (L) 4.20 - 5.40 M/uL    Hemoglobin 12.2 12.0 - 16.0 g/dL    Hematocrit 36.6 (L) 37.0 - 47.0 %    MCV 89.5 81.4 - 97.8 fL    MCH 29.8 27.0 - 33.0 pg    MCHC 33.3 (L) 33.6 - 35.0 g/dL    RDW 51.9 (H) 35.9 - 50.0 fL    Platelet Count 255 164 - 446 K/uL    MPV 10.0 9.0 - 12.9 fL    Neutrophils-Polys 71.60 44.00 - 72.00 %    Lymphocytes 21.10 (L) 22.00 - 41.00 %    Monocytes 5.50 0.00 - 13.40 %    Eosinophils 0.90 0.00 - 6.90 %    Basophils 0.40 0.00 - 1.80 %    Immature Granulocytes 0.50 0.00 - 0.90 %    Nucleated RBC 0.00 /100 WBC    Neutrophils (Absolute) 5.86 2.00 - 7.15 K/uL    Lymphs (Absolute) 1.73 1.00 - 4.80 K/uL    Monos (Absolute) 0.45 0.00 - 0.85 K/uL    Eos (Absolute) 0.07 0.00 - 0.51 K/uL    Baso (Absolute) 0.03 0.00 - " 0.12 K/uL    Immature Granulocytes (abs) 0.04 0.00 - 0.11 K/uL    NRBC (Absolute) 0.00 K/uL   Hold Blood Bank Specimen (Not Tested)    Collection Time: 08/07/17  9:50 AM   Result Value Ref Range    Holding Tube - Bb DONE    CBC without differential    Collection Time: 08/08/17 11:45 AM   Result Value Ref Range    WBC 19.0 (H) 4.8 - 10.8 K/uL    RBC 3.52 (L) 4.20 - 5.40 M/uL    Hemoglobin 10.6 (L) 12.0 - 16.0 g/dL    Hematocrit 31.0 (L) 37.0 - 47.0 %    MCV 88.1 81.4 - 97.8 fL    MCH 30.1 27.0 - 33.0 pg    MCHC 34.2 33.6 - 35.0 g/dL    RDW 49.5 35.9 - 50.0 fL    Platelet Count 200 164 - 446 K/uL    MPV 9.8 9.0 - 12.9 fL       Assessment and Plan  normal postpartum course  No heavy bleeding or foul vaginal discharge     Continue Routine postpartum care  Ambulate  Iron

## 2017-08-09 NOTE — PROGRESS NOTES
"Pharmacy Kinetics 31 y.o. female on gentamicin day # 2 2017    Dosing Weight: 60 kg (adjusted body weight)    Currently on Gentamicin 300 mg iv q24hr (0100)    Indication for treatment: possible chorioaminionitis    Pertinent history per medical record: Admitted on 2017 for induction of labor.  C -section for fetal tachycardia and suspected chorioamnionitis. Patient T max during labor 101.6 F, ampicillin/Gentamicin started for probable chorioamnionitis.     Other antibiotics: Ampicillin 1 g IV q 4 hours    Allergies: Review of patient's allergies indicates no known allergies.     List concerns for renal function: none    Pertinent cultures to date:   None reporting    Recent Labs      17   0950  17   1145   WBC  8.2  19.0*   NEUTSPOLYS  71.60   --      No results for input(s): BUN, CREATININE, ALBUMIN in the last 72 hours.  No results for input(s): GENTTROUGH, GENTPEAK, GENTRANDOM in the last 72 hours.No intake or output data in the 24 hours ending 17 1111   Blood pressure 96/57, pulse 66, temperature 36.7 °C (98 °F), temperature source Oral, resp. rate 16, height 1.549 m (5' 1\"), weight 74.844 kg (165 lb), last menstrual period 2016, SpO2 96 %, unknown if currently breastfeeding. Temp (24hrs), Av.8 °C (98.2 °F), Min:36.6 °C (97.8 °F), Max:37.1 °C (98.7 °F)      A/P   1. Gentamicin dose change: not indicated  2. Next gentamicin level: will order if cont'd > 72 hours  3. Goal trough: < 0.15 mcg/mL (undetectable)  4. Comments: afebrile now.  Urine output: no reported difficulty.  I/O data incomplete through.  Manan.    JUANI Mcdonald PharmD, BCPS    "

## 2017-08-09 NOTE — DISCHARGE PLANNING
:    Infant: Srinivasa Gonzalez (: 17)    Referral: MOB has a history of depression, past suicide attempt, and fetal demise.  Infant in the NICU.    Intervention:  Reviewed medical record and met with CATHIE, Buffy Chong.  The FOB is Srinivasa Goodrich.  Verified parent's phone number and address which is [address] [place] [address]. [place] [id number].  Phone number is [phone].  CATHIE states she is prepared for infant and is receiving Medicaid and WIC.  Discussed history of depression and past suicide attempt.  CATHIE states in the past several years she has gone through a lot of losses.  She stated her ex- killed her brother, she lost both of her parents in a 7 month period, and had a miscarriage at 24 weeks last summer.  She has been on anti-depressants in the past.  CATHIE stated the FOB is very supportive as well as her family.  Discussed counseling and provided MOB with a counseling resource.  Encouraged MOB to let us know how she was feeling and if there was anything she needed.  CATHIE is aware of post partum depression and plans to follow up with her doctor.  Mother was also given a pediatrician list, children's resource list, and a diaper bank referral.    Plan:  Follow and assist as needed.

## 2017-08-09 NOTE — PROGRESS NOTES
Report received from DEION Page. Pt is AAOx4, no family at bedside. Assessment completed. Fundus is firm, lochia light, PIV in place for ABX r/t Chorio. NB is in NICU. Pt complaining of pain 4/10 in her abdomen.  Medicated per MAR. Pt ambulates independently. Pt educated regarding plan of care, including pain management, ABX, and visiting the NICU . All questions answered. Call light and personal belongings in reach. No additional needs at this time.

## 2017-08-09 NOTE — CARE PLAN
Problem: Discharge Barriers/Planning  Goal: Patient’s continuum of care needs will be met  Intervention: Assess potential discharge barriers on admission and throughout hospital stay  C section day delivered 8/8 @ 0337. Patient is also receiving IV ABX, administered per MAR.      Problem: Potential for postpartum infection related to surgical incision, compromised uterine condition, urinary tract or respiratory compromise  Goal: Patient will be afebrile and free from signs and symptoms of infection  VSS, incision is well approximated and free of S/S of infection.

## 2017-08-10 PROCEDURE — A9270 NON-COVERED ITEM OR SERVICE: HCPCS | Performed by: OBSTETRICS & GYNECOLOGY

## 2017-08-10 PROCEDURE — 700112 HCHG RX REV CODE 229: Performed by: OBSTETRICS & GYNECOLOGY

## 2017-08-10 PROCEDURE — 770002 HCHG ROOM/CARE - OB PRIVATE (112)

## 2017-08-10 PROCEDURE — 700102 HCHG RX REV CODE 250 W/ 637 OVERRIDE(OP): Performed by: OBSTETRICS & GYNECOLOGY

## 2017-08-10 RX ADMIN — IBUPROFEN 800 MG: 800 TABLET, FILM COATED ORAL at 13:22

## 2017-08-10 RX ADMIN — Medication 325 MG: at 07:53

## 2017-08-10 RX ADMIN — OXYCODONE HYDROCHLORIDE AND ACETAMINOPHEN 1 TABLET: 5; 325 TABLET ORAL at 13:22

## 2017-08-10 RX ADMIN — DOCUSATE SODIUM 100 MG: 100 CAPSULE ORAL at 07:53

## 2017-08-10 RX ADMIN — OXYCODONE HYDROCHLORIDE AND ACETAMINOPHEN 1 TABLET: 5; 325 TABLET ORAL at 18:10

## 2017-08-10 RX ADMIN — OXYCODONE HYDROCHLORIDE AND ACETAMINOPHEN 1 TABLET: 10; 325 TABLET ORAL at 06:00

## 2017-08-10 RX ADMIN — Medication 325 MG: at 18:10

## 2017-08-10 ASSESSMENT — PAIN SCALES - GENERAL
PAINLEVEL_OUTOF10: 2
PAINLEVEL_OUTOF10: 6
PAINLEVEL_OUTOF10: 8
PAINLEVEL_OUTOF10: 1
PAINLEVEL_OUTOF10: 2
PAINLEVEL_OUTOF10: 1
PAINLEVEL_OUTOF10: 7

## 2017-08-10 NOTE — PROGRESS NOTES
"Verified mother's understanding of proper pump use and settings, mother states she was \"too tired\" to pump yesterday but plans to begin pumping this morning, educated on importance of frequent pumping to bring in and maintain adequate milk supply.    Plan is to pump Q 2 hours for 10-15 minutes during the day while awake and leave time for a 5 hour stretch of sleep at night.    Mother has [address] [facility], paperwork for pump provided, encouraged to pick pump up from [facility] today.    Encouraged to call for assistance as needed.    "

## 2017-08-10 NOTE — PROGRESS NOTES
Received bedside report from DEION Chakraborty. Discussed plan of care. Patient will call for pain medication as needed. All needs met at this time.

## 2017-08-10 NOTE — CARE PLAN
Problem: Safety  Goal: Will remain free from falls  Outcome: PROGRESSING AS EXPECTED  Fall precautions in place: treaded slipper socks on, mobility signs posted, hourly rounding, bed in lowest position, belongings and call light are within reach, family at bedside, and near nurses station.    Problem: Altered physiologic condition related to postoperative  delivery  Goal: Patient physiologically stable as evidenced by normal lochia, palpable uterine involution and vital signs within normal limits  Outcome: PROGRESSING AS EXPECTED  Assessment complete, VSS, fundus firm, lochia light.        Problem: Alteration in comfort related to surgical incision and/or after birth pains  Goal: Patient is able to ambulate, care for self and infant with acceptable pain level  Outcome: PROGRESSING AS EXPECTED  Pain managed well with PRN medication at this time.

## 2017-08-10 NOTE — PROGRESS NOTES
Post Partum Progress Note    Name:   Buffy Chong   Date/Time:  8/10/2017 - 7:47 AM  Chief Admitting Dx:  Pregnancy  Labor and delivery, indication for care  Labor and delivery, indication for care  Delivery Type:   for fetal distress, chorioamnionitis  Post-Op/Post Partum Days #:  2    Subjective:  Abdominal pain: no  Ambulating:   yes  Tolerating liquids:  yes  Tolerating food:  yes common adult  Flatus:   yes  BM:    no  Bleeding:   without any bleeding  Voiding:   yes  Dizziness:   no  Feeding:   breast    Filed Vitals:    17 1600 17 2000 08/10/17 0000 08/10/17 0500   BP: 105/62 100/65 95/64 97/60   Pulse: 60 68 75 66   Temp: 36.6 °C (97.9 °F) 36.7 °C (98 °F) 36.8 °C (98.2 °F) 36.9 °C (98.4 °F)   TempSrc:       Resp: 16 18 18 18   Height:       Weight:       SpO2: 96% 96% 96% 97%       Exam:  Breast: Tenderness no, Engorged no and Lactating no  Abdomen: Abdomen soft, non-tender. BS normal. No masses,  No organomegaly  Fundal Tenderness:  no  Fundus Firm: yes  Incision: healing well with good reapproximation  Below umbilicus: yes  Perineum: perineum intact  Lochia: mild  Extremities: Normal, no cyanosis, clubbing, 1+ edema extremities, peripheral pulses and reflexes normal, feet normal, good pulses, normal color, temperature and sensation    Meds:  Current Facility-Administered Medications   Medication Dose   • acetaminophen (TYLENOL) tablet 650 mg  650 mg   • oxycodone-acetaminophen (PERCOCET) 5-325 MG per tablet 1 Tab  1 Tab   • oxycodone-acetaminophen (PERCOCET-10)  MG per tablet 1 Tab  1 Tab   • morphine (pf) 4 mg/ml injection 4 mg  4 mg   • ondansetron (ZOFRAN) syringe/vial injection 4 mg  4 mg    Or   • ondansetron (ZOFRAN ODT) dispertab 4 mg  4 mg   • diphenhydrAMINE (BENADRYL) tablet/capsule 25 mg  25 mg    Or   • diphenhydrAMINE (BENADRYL) injection 25 mg  25 mg   • ibuprofen (MOTRIN) tablet 800 mg  800 mg   • ferrous sulfate tablet 325 mg  325 mg   • LR infusion     •  PRN oxytocin (PITOCIN) (20 Units/1000 mL) PRN for excessive uterine bleeding - See Admin Instr  125-999 mL/hr   • misoprostol (CYTOTEC) tablet 800 mcg  800 mcg   • docusate sodium (COLACE) capsule 100 mg  100 mg   • simethicone (MYLICON) chewable tab 80 mg  80 mg   • diphenhydrAMINE (BENADRYL) injection 25 mg  25 mg   • naloxone (NARCAN) 0.4 mg in NS 1,000 mL infusion  0.4 mg   • oxytocin (PITOCIN) infusion (for postpartum)   mL/hr       Labs:   Recent Labs      17   0950  17   1145   WBC  8.2  19.0*   RBC  4.09*  3.52*   HEMOGLOBIN  12.2  10.6*   HEMATOCRIT  36.6*  31.0*   MCV  89.5  88.1   MCH  29.8  30.1   MCHC  33.3*  34.2   RDW  51.9*  49.5   PLATELETCT  255  200   MPV  10.0  9.8       Assessment:  Chief Admitting Dx:  Pregnancy  Labor and delivery, indication for care  Labor and delivery, indication for care  Delivery Type:   for fetal distress, chorioamnionitis  Tubal Ligation:  no    Plan:  Continue routine post partum care.  Antibiotics discontinued as she is afebrile, and IV is infiltrated  Anticipate discharge POD#3    CARLA AritaN.M.

## 2017-08-11 VITALS
HEART RATE: 57 BPM | RESPIRATION RATE: 16 BRPM | OXYGEN SATURATION: 97 % | TEMPERATURE: 98 F | DIASTOLIC BLOOD PRESSURE: 74 MMHG | SYSTOLIC BLOOD PRESSURE: 117 MMHG | BODY MASS INDEX: 31.15 KG/M2 | HEIGHT: 61 IN | WEIGHT: 165 LBS

## 2017-08-11 PROCEDURE — A9270 NON-COVERED ITEM OR SERVICE: HCPCS | Performed by: OBSTETRICS & GYNECOLOGY

## 2017-08-11 PROCEDURE — 700102 HCHG RX REV CODE 250 W/ 637 OVERRIDE(OP): Performed by: OBSTETRICS & GYNECOLOGY

## 2017-08-11 PROCEDURE — 700112 HCHG RX REV CODE 229: Performed by: OBSTETRICS & GYNECOLOGY

## 2017-08-11 RX ORDER — IBUPROFEN 800 MG/1
800 TABLET ORAL EVERY 8 HOURS PRN
Qty: 30 TAB | Refills: 0 | Status: SHIPPED | OUTPATIENT
Start: 2017-08-11 | End: 2018-08-22

## 2017-08-11 RX ORDER — PSEUDOEPHEDRINE HCL 30 MG
100 TABLET ORAL 2 TIMES DAILY PRN
Qty: 60 CAP | Refills: 1 | Status: SHIPPED | OUTPATIENT
Start: 2017-08-11 | End: 2018-08-22

## 2017-08-11 RX ORDER — OXYCODONE HYDROCHLORIDE AND ACETAMINOPHEN 5; 325 MG/1; MG/1
1 TABLET ORAL EVERY 4 HOURS PRN
Qty: 30 TAB | Refills: 0 | Status: SHIPPED | OUTPATIENT
Start: 2017-08-11 | End: 2018-08-22

## 2017-08-11 RX ADMIN — IBUPROFEN 800 MG: 800 TABLET, FILM COATED ORAL at 01:34

## 2017-08-11 RX ADMIN — OXYCODONE HYDROCHLORIDE AND ACETAMINOPHEN 1 TABLET: 10; 325 TABLET ORAL at 01:34

## 2017-08-11 RX ADMIN — DOCUSATE SODIUM 100 MG: 100 CAPSULE ORAL at 01:34

## 2017-08-11 RX ADMIN — IBUPROFEN 800 MG: 800 TABLET, FILM COATED ORAL at 12:21

## 2017-08-11 RX ADMIN — OXYCODONE HYDROCHLORIDE AND ACETAMINOPHEN 1 TABLET: 5; 325 TABLET ORAL at 12:20

## 2017-08-11 RX ADMIN — Medication 325 MG: at 08:41

## 2017-08-11 RX ADMIN — DOCUSATE SODIUM 100 MG: 100 CAPSULE ORAL at 12:21

## 2017-08-11 RX ADMIN — OXYCODONE HYDROCHLORIDE AND ACETAMINOPHEN 1 TABLET: 10; 325 TABLET ORAL at 07:21

## 2017-08-11 ASSESSMENT — PAIN SCALES - GENERAL
PAINLEVEL_OUTOF10: 7
PAINLEVEL_OUTOF10: 5
PAINLEVEL_OUTOF10: 1
PAINLEVEL_OUTOF10: 2
PAINLEVEL_OUTOF10: 7
PAINLEVEL_OUTOF10: 0
PAINLEVEL_OUTOF10: 1
PAINLEVEL_OUTOF10: 1

## 2017-08-11 NOTE — PROGRESS NOTES
Met with this NICU mom before discharge. She has been pumping with the [employer], settings: 80-25%-10min. Denies nipple or breast pain. Her baby was born at 39.1 wks gestation. She has [address] [place] [address] and will  a loaner pump today before 4:30PM. Encouraged her to decrease rate to 60 with the [employer]meda when she sees milk, and also to do that with her loaner pump. She will follow up with [facility] for lactation concerns. Parvin RN, IBCLC

## 2017-08-11 NOTE — DISCHARGE SUMMARY
Discharge Summary:      Buffy Chong      Admit Date:   2017  Discharge Date:  2017     Admitting diagnosis:  Pregnancy  Labor and delivery, indication for care  Labor and delivery, indication for care  Discharge Diagnosis: Status post  for failure to progress, OP position.  Pregnancy Complications: Hx demise at 24wk  Tubal Ligation:  no        History:  Past Medical History   Diagnosis Date   • H/O fetal demise, not currently pregnant 8/10/2016   • Pregnant    • Psychiatric problem      depression     OB History    Para Term  AB SAB TAB Ectopic Multiple Living   2 1 0 1 0 0 0 0  0      # Outcome Date GA Lbr Tommy/2nd Weight Sex Delivery Anes PTL Lv   2 Current            1  16 24w0d    Vag-Spont  N FD      Complications: Chromosomal abnormality      Comments: Pt had cytotec           Review of patient's allergies indicates no known allergies.  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 , now para 1, was admitted with the above mentioned diagnosis, underwent Induction of Labor,  for failure to progress. Patient postpartum course was unremarkable, with progressive advancement in diet , ambulation and toleration of oral analgesia. Patient without complaints today and desires discharge.      Filed Vitals:    08/10/17 1300 08/10/17 2000 17 0000 17 0400   BP: 98/60 97/62 99/63 102/67   Pulse: 90 89 77 73   Temp: 36.7 °C (98.1 °F) 36.7 °C (98 °F) 36.7 °C (98 °F) 36.8 °C (98.3 °F)   TempSrc:       Resp:    Height:        Weight:       SpO2: 97% 97% 96% 94%       Current Facility-Administered Medications   Medication Dose   • acetaminophen (TYLENOL) tablet 650 mg  650 mg   • oxycodone-acetaminophen (PERCOCET) 5-325 MG per tablet 1 Tab  1 Tab   • oxycodone-acetaminophen (PERCOCET-10)  MG per tablet 1 Tab  1 Tab   • morphine (pf) 4 mg/ml injection 4 mg  4 mg   • ondansetron (ZOFRAN) syringe/vial injection 4 mg  4 mg    Or   • ondansetron (ZOFRAN ODT) dispertab 4 mg  4 mg   • diphenhydrAMINE (BENADRYL) tablet/capsule 25 mg  25 mg    Or   • diphenhydrAMINE (BENADRYL) injection 25 mg  25 mg   • ibuprofen (MOTRIN) tablet 800 mg  800 mg   • ferrous sulfate tablet 325 mg  325 mg   • LR infusion     • PRN oxytocin (PITOCIN) (20 Units/1000 mL) PRN for excessive uterine bleeding - See Admin Instr  125-999 mL/hr   • misoprostol (CYTOTEC) tablet 800 mcg  800 mcg   • docusate sodium (COLACE) capsule 100 mg  100 mg   • simethicone (MYLICON) chewable tab 80 mg  80 mg   • diphenhydrAMINE (BENADRYL) injection 25 mg  25 mg   • naloxone (NARCAN) 0.4 mg in NS 1,000 mL infusion  0.4 mg   • oxytocin (PITOCIN) infusion (for postpartum)   mL/hr       Exam:  Breast Exam: negative  Abdomen: Abdomen soft, non-tender. BS normal. No masses,  No organomegaly  Fundus Non Tender: yes  Incision: no evidence of infection, separation or keloid formation.  Perineum: perineum intact  Extremity: extremities, peripheral pulses and reflexes normal     Labs:  Recent Labs      08/08/17   1145   WBC  19.0*   RBC  3.52*   HEMOGLOBIN  10.6*   HEMATOCRIT  31.0*   MCV  88.1   MCH  30.1   MCHC  34.2   RDW  49.5   PLATELETCT  200   MPV  9.8        Activity:   Discharge to home  Pelvic Rest x 6 weeks    Assessment:  normal postpartum course  Discharge Assessment: No areas of skin breakdown/redness; surgical incision intact/healing     [facility]: [facility] in 5 weeks for vaginal ; 1 week for incision check.      Discharge Meds:   Current Outpatient  Prescriptions   Medication Sig Dispense Refill   • oxycodone-acetaminophen (PERCOCET) 5-325 MG Tab Take 1 Tab by mouth every four hours as needed (for Moderate Pain (Pain Scale 4-6) after delivery). 30 Tab 0   • ibuprofen (MOTRIN) 800 MG Tab Take 1 Tab by mouth every 8 hours as needed (For cramping after delivery; do not give if patient is receiving ketorolac (Toradol)). 30 Tab 0   • docusate sodium 100 MG Cap Take 100 mg by mouth 2 times a day as needed for Constipation. 60 Cap 1       Cornelius Gaspar, P.A.

## 2017-08-11 NOTE — PROGRESS NOTES
Received bedside report. Discussed plan of care. Patient will call for pain medication as needed. All needs met at this time.

## 2017-08-11 NOTE — DISCHARGE INSTRUCTIONS
POSTPARTUM DISCHARGE INSTRUCTIONS FOR MOM    YOB: 1985   Age: 31 y.o.               Admit Date: 2017     Discharge Date: 2017  Attending Doctor:  Kevin Blount M.D.                  Allergies:  Review of patient's allergies indicates no known allergies.    Discharged to home by car. Discharged via wheelchair, hospital escort: Yes.  Special equipment needed: Not Applicable  Belongings with: Personal  Be sure to schedule a follow-up appointment with your primary care doctor or any specialists as instructed.     Discharge Plan:   Diet Plan: Discussed  Activity Level: Discussed  Confirmed Follow up Appointment: Patient to Call and Schedule Appointment  Confirmed Symptoms Management: Discussed  Medication Reconciliation Updated: Yes  Influenza Vaccine Indication: Indicated: Not available from distributor/    REASONS TO CALL YOUR OBSTETRICIAN:  1.   Persistent fever or shaking chills (Temperature higher than 100.4)  2.   Heavy bleeding (soaking more than 1 pad per hour); Passing clots  3.   Foul odor from vagina  4.   Mastitis (Breast infection; breast pain, chills, fever, redness)  5.   Urinary pain, burning or frequency  6.   Episiotomy infection  7.   Abdominal incision infection  8.   Severe depression longer than 24 hours    HAND WASHING  · Prior to handling the baby.  · Before breastfeeding or bottle feeding baby.  · After using the bathroom or changing the baby's diaper.    WOUND CARE  Ask your physician for additional care instructions.  In general:    ·  Incision:      · Keep clean and dry.    · Do NOT lift anything heavier than your baby for up to 6 weeks.    · There should not be any opening or pus.      VAGINAL CARE  · Nothing inside vagina for 6 weeks: no sexual intercourse, tampons or douching.  · Bleeding may continue for 2-4 weeks.  Amount may vary.    · Call your physician for heavy bleeding which means soaking more than 1 pad per hour    BIRTH CONTROL  · It is  "possible to become pregnant at any time after delivery and while breastfeeding.  · Plan to discuss a method of birth control with your physician at your follow up visit. visit.    DIET AND ELIMINATION  · Eating more fiber (bran cereal, fruits, and vegetables) and drinking plenty of fluids will help to avoid constipation.  · Urinary frequency after childbirth is normal.    POSTPARTUM BLUES  During the first few days after birth, you may experience a sense of the \"blues\" which may include impatience, irritability or even crying.  These feeling come and go quickly.  However, as many as 1 in 10 women experience emotional symptoms known as postpartum depression.    Postpartum depression:  May start as early as the second or third day after delivery or take several weeks or months to develop.  Symptoms of \"blues\" are present, but are more intense:  Crying spells; loss of appetite; feelings of hopelessness or loss of control; fear of touching the baby; over concern or no concern at all about the baby; little or no concern about your own appearance/caring for yourself; and/or inability to sleep or excessive sleeping.  Contact your physician if you are experiencing any of these symptoms.    Crisis Hotline:  · [place] Crisis Hotline:  6-136-XUENSEC  Or 1-542.225.1652  · [place] Crisis Hotline:  1-228.938.1529  Or 832-162-1301    PREVENTING SHAKEN BABY:  If you are angry or stressed, PUT THE BABY IN THE CRIB, step away, take some deep breaths, and wait until you are calm to care for the baby.  DO NOT SHAKE THE BABY.  You are not alone, call a supporter for help.    · Crisis Call Center 24/7 crisis line 745-324-2466 or 1-886.239.3745  · You can also text them, text \"ANSWER\" to 977062    QUIT SMOKING/TOBACCO USE:  I understand the use of any tobacco products increases my chance of suffering from future heart disease and could cause other illnesses which may shorten my life. Quitting the use of tobacco products is the single most " important thing I can do to improve my health. For further information on smoking / tobacco cessation call a Toll Free Quit Line at 1-179.837.9308 (*[employer] [place]) or 1-450.867.2599 (American Lung Association) or you can access the web based program at www.lungusa.org.    · [place] Tobacco Users Help Line:  (939) 490-5468       Toll Free: 1-258.254.4009  · Quit Tobacco Program [facility] (103)983-4172    DEPRESSION / SUICIDE RISK:  As you are discharged from this [facility], it is important to learn how to keep safe from harming yourself.    Recognize the warning signs:  · Abrupt changes in personality, positive or negative- including increase in energy   · Giving away possessions  · Change in eating patterns- significant weight changes-  positive or negative  · Change in sleeping patterns- unable to sleep or sleeping all the time   · Unwillingness or inability to communicate  · Depression  · Unusual sadness, discouragement and loneliness  · Talk of wanting to die  · Neglect of personal appearance   · Rebelliousness- reckless behavior  · Withdrawal from people/activities they love  · Confusion- inability to concentrate     If you or a loved one observes any of these behaviors or has concerns about self-harm, here's what you can do:  · Talk about it- your feelings and reasons for harming yourself  · Remove any means that you might use to hurt yourself (examples: pills, rope, extension cords, firearm)  · Get professional help from the community (Mental Health, Substance Abuse, psychological counseling)  · Do not be alone:Call your Safe Contact- someone whom you trust who will be there for you.  · Call your local CRISIS HOTLINE 717-7439 or 614-268-3148  · Call your local Children's Mobile Crisis Response Team [place] (294) 561-0867 or www.Alegr[employer]­a  · Call the toll free National Suicide Prevention Hotlines   · National Suicide Prevention Lifeline 183-122-LXOM  (1555)  · [facility] 800-SUICIDE (646-4741)    DISCHARGE SURVEY:  Thank you for choosing [facility].  We hope we provided you with very good care.  You may be receiving a survey in the mail.  Please fill it out.  Your opinion is valuable to us.    ADDITIONAL EDUCATIONAL MATERIALS GIVEN TO PATIENT:        My signature on this form indicates that:  1.  I have reviewed and understand the above information  2.  My questions regarding this information have been answered to my satisfaction.  3.  I have formulated a plan with my discharge nurse to obtain my prescribed medication for home.

## 2017-08-12 NOTE — PROGRESS NOTES
Pt discharged to home in good condition-infant still in NICU-pt given written discharge instructions and prescriptions-all questions answered.

## 2017-08-16 ENCOUNTER — POST PARTUM (OUTPATIENT)
Dept: OBGYN | Facility: CLINIC | Age: 32
End: 2017-08-16
Payer: MEDICAID

## 2017-08-16 VITALS — DIASTOLIC BLOOD PRESSURE: 76 MMHG | SYSTOLIC BLOOD PRESSURE: 118 MMHG | BODY MASS INDEX: 28.36 KG/M2 | WEIGHT: 150 LBS

## 2017-08-16 DIAGNOSIS — O09.891 SUPERVISION OF OTHER HIGH RISK PREGNANCIES, FIRST TRIMESTER: ICD-10-CM

## 2017-08-16 DIAGNOSIS — O09.291 PREVIOUS PREGNANCY COMPLICATED BY CHROMOSOMAL ABNORMALITY IN FIRST TRIMESTER, ANTEPARTUM: ICD-10-CM

## 2017-08-16 PROCEDURE — 90050 PR POSTPARTUM VISIT: CPT | Performed by: OBSTETRICS & GYNECOLOGY

## 2017-08-16 NOTE — PROGRESS NOTES
SUBJECTIVE:  Buffy Chong presents to the clinic 1 weeks following C/S    Eating a regular diet without difficulty. Bowel movement are Normal.  Pain is controlled with current analgesics.  Medication(s) being used: ibuprofen and perocet. Light. Patient Denies Incisional pain, drainage or redness    OBJECTIVE:  /76 mmHg  Wt 68.04 kg (150 lb)  LMP 11/27/2016  Breastfeeding? Unknown  Current Outpatient Prescriptions on File Prior to Visit   Medication Sig Dispense Refill   • oxycodone-acetaminophen (PERCOCET) 5-325 MG Tab Take 1 Tab by mouth every four hours as needed (for Moderate Pain (Pain Scale 4-6) after delivery). 30 Tab 0   • ibuprofen (MOTRIN) 800 MG Tab Take 1 Tab by mouth every 8 hours as needed (For cramping after delivery; do not give if patient is receiving ketorolac (Toradol)). 30 Tab 0   • docusate sodium 100 MG Cap Take 100 mg by mouth 2 times a day as needed for Constipation. 60 Cap 1   • Prenatal MV-Min-Fe Fum-FA-DHA (PRENATAL 1 PO) Take  by mouth.       No current facility-administered medications on file prior to visit.       Constitutional:  alert, no distress.  Abdomen:  soft, bowel sounds active, non-tender.  Incision:  healing well, no drainage, no erythema, no hernia, no seroma, no swelling, no dehiscence, incision well approximated.    IMPRESSION: Doing well postoperatively.  Pt is to increase activities as tolerated.    Lab:   Recent Results (from the past 1008 hour(s))   POCT NST    Collection Time: 07/06/17  3:30 PM   Result Value Ref Range    NST Indications h/o fetal demise     NST Baseline 140     NST Uterine Activity none     NST Acoustic Stimulation none     NST Assessment reactive     NST Action Necessary      NST Other Data      NST Return 2 x a week     NST Read By     POCT Fetal Nonstress Test    Collection Time: 07/10/17  2:10 PM   Result Value Ref Range    NST Indications h/o iufd     NST Baseline 130     NST Uterine Activity no     NST Acoustic Stimulation no      NST Assessment reactive     NST Action Necessary      NST Other Data      NST Return      NST Read By     POCT NST    Collection Time: 07/13/17  2:25 PM   Result Value Ref Range    NST Indications H/O IUFD     NST Baseline 130     NST Uterine Activity none     NST Acoustic Stimulation no     NST Assessment reactive, cat 1     NST Action Necessary      NST Other Data      NST Return      NST Read By     GRP B STREP, BY PCR (HERNANDEZ BROTH)    Collection Time: 07/17/17 12:00 AM   Result Value Ref Range    Strep Gp B DNA PCR Negative    POCT NST    Collection Time: 07/17/17  1:25 PM   Result Value Ref Range    NST Indications Hx of IUFD     NST Baseline 140s     NST Uterine Activity One UC     NST Acoustic Stimulation VASx1     NST Assessment       Reactive NST cat I FHTs +accel -decel mod variability    NST Action Necessary      NST Other Data      NST Return Cont 2x/wk NST, 1wk ELDA     NST [place] [facility] [place]    POCT NST    Collection Time: 07/20/17  3:12 PM   Result Value Ref Range    NST Indications HO fetal demise at 24 week     NST Baseline 130     NST Uterine Activity NONE     NST Acoustic Stimulation      NST Assessment category one.     NST Action Necessary      NST Other Data      NST Return      NST Read By     POCT Fetal Nonstress Test    Collection Time: 07/24/17  1:45 PM   Result Value Ref Range    NST Indications H/O fetal demise     NST Baseline 150     NST Uterine Activity irrit     NST Acoustic Stimulation yes     NST Assessment Reactive, cat 1     NST Action Necessary cont 2x wkly nst     NST Other Data      NST Return as sched     NST Read By SAIMA BRASWELLN TEST    Collection Time: 07/26/17  1:17 PM   Result Value Ref Range    Fern Test On Amniotic Fluid see below Not present   POC UA    Collection Time: 07/26/17  2:02 PM   Result Value Ref Range    POC Color Yellow     POC Appearance Clear     POC Glucose 250 (A) Negative mg/dL    POC Ketones Trace (A) Negative mg/dL    POC Specific Gravity 1.025  1.005-1.030    POC Blood Negative Negative    POC Urine PH 5.5 5.0-8.0    POC Protein Negative Negative mg/dL    POC Nitrites Negative Negative    POC Leukocyte Esterase Negative Negative   POCT Fetal Nonstress Test    Collection Time: 07/27/17  2:28 PM   Result Value Ref Range    NST Indications IUFD     NST Baseline 145     NST Uterine Activity Irr     NST Acoustic Stimulation None     NST Assessment       Reactive NST cat I FHTs +accels -decels mod variabilityu    NST Action Necessary      NST Other Data      NST Return Cont 2x/wk NSTs,1 wk ELDA     [place] [facility] [place]    Collection Time: 07/31/17  2:36 PM   Result Value Ref Range    NST Indications H/O FD     NST Baseline 130     NST Uterine Activity irritability     NST Acoustic Stimulation no     NST Assessment reactive, cat 1     NST Action Necessary      NST Other Data      NST Return      NST Read By     POCT Fetal Nonstress Test    Collection Time: 08/03/17  3:33 PM   Result Value Ref Range    NST Indications diabetes     NST Baseline 135     NST Uterine Activity none     NST Acoustic Stimulation no     NST Assessment reactive     NST Action Necessary none     NST Other Data      NST Return      NST Read By     CBC WITH DIFFERENTIAL    Collection Time: 08/07/17  9:50 AM   Result Value Ref Range    WBC 8.2 4.8 - 10.8 K/uL    RBC 4.09 (L) 4.20 - 5.40 M/uL    Hemoglobin 12.2 12.0 - 16.0 g/dL    Hematocrit 36.6 (L) 37.0 - 47.0 %    MCV 89.5 81.4 - 97.8 fL    MCH 29.8 27.0 - 33.0 pg    MCHC 33.3 (L) 33.6 - 35.0 g/dL    RDW 51.9 (H) 35.9 - 50.0 fL    Platelet Count 255 164 - 446 K/uL    MPV 10.0 9.0 - 12.9 fL    Neutrophils-Polys 71.60 44.00 - 72.00 %    Lymphocytes 21.10 (L) 22.00 - 41.00 %    Monocytes 5.50 0.00 - 13.40 %    Eosinophils 0.90 0.00 - 6.90 %    Basophils 0.40 0.00 - 1.80 %    Immature Granulocytes 0.50 0.00 - 0.90 %    Nucleated RBC 0.00 /100 WBC    Neutrophils (Absolute) 5.86 2.00 - 7.15 K/uL    Lymphs (Absolute) 1.73 1.00 - 4.80 K/uL     Monos (Absolute) 0.45 0.00 - 0.85 K/uL    Eos (Absolute) 0.07 0.00 - 0.51 K/uL    Baso (Absolute) 0.03 0.00 - 0.12 K/uL    Immature Granulocytes (abs) 0.04 0.00 - 0.11 K/uL    NRBC (Absolute) 0.00 K/uL   Hold Blood Bank Specimen (Not Tested)    Collection Time: 08/07/17  9:50 AM   Result Value Ref Range    Holding Tube - Bb DONE    CBC without differential    Collection Time: 08/08/17 11:45 AM   Result Value Ref Range    WBC 19.0 (H) 4.8 - 10.8 K/uL    RBC 3.52 (L) 4.20 - 5.40 M/uL    Hemoglobin 10.6 (L) 12.0 - 16.0 g/dL    Hematocrit 31.0 (L) 37.0 - 47.0 %    MCV 88.1 81.4 - 97.8 fL    MCH 30.1 27.0 - 33.0 pg    MCHC 34.2 33.6 - 35.0 g/dL    RDW 49.5 35.9 - 50.0 fL    Platelet Count 200 164 - 446 K/uL    MPV 9.8 9.0 - 12.9 fL       PLAN:  Continue any current medications.  (See Med List for details.)  Return to clinic  : in 4 week(s).

## 2017-08-16 NOTE — MR AVS SNAPSHOT
Buffy QUINTEROS Arlette   2017 3:45 PM   Post Partum   MRN: [id number]    [facility]   Dept Phone:  [phone]    Description:  Female : 1985   Provider:  Rosalina Gonzales M.D.           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 Smoking Status       118/76 mmHg 68.04 kg (150 lb) 2016 Unknown Never Smoker        Basic Information     Date Of Birth Sex Race Ethnicity Preferred Language    1985 Female White, White  Origin (Kiswahili,[place]enegrin,[place],B[place]amian, etc) English      Your appointments     Sep 12, 2017  1:00 PM   Post Partum with Chelsea Torres D.N.P.   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 Review all of your home medications and newly ordered  medications with your provider and/or pharmacist. Follow medication instructions as directed by your provider and/or pharmacist. Please keep your medication list with you and share with your provider. Update the information when medications are discontinued, doses are changed, or new medications (including over-the-counter products) are added; and carry medication information at all times in the event of emergency situations     Allergies:  No Known Allergies          Medications  Valid as of: August 16, 2017 -  4:32 PM    Generic Name Brand Name Tablet Size Instructions for use    Docusate Sodium (Cap)  MG Take 100 mg by mouth 2 times a day as needed for Constipation.        Ibuprofen (Tab) MOTRIN 800 MG Take 1 Tab by mouth every 8 hours as needed (For cramping after delivery; do not give if patient is receiving ketorolac (Toradol)).        Oxycodone-Acetaminophen (Tab) PERCOCET 5-325 MG Take 1 Tab by mouth every four hours as needed (for Moderate Pain (Pain Scale 4-6) after delivery).        Prenatal MV-Min-Fe Fum-FA-DHA   Take  by mouth.        .                 Medicines prescribed today were sent to:     [id number] - [place], [place] - [address]    [address] [place] [address]    Phone: [phone] Fax: [phone]    Open 24 Hours?: No      Medication refill instructions:       If your prescription bottle indicates you have medication refills left, it is not necessary to call your provider’s office. Please contact your pharmacy and they will refill your medication.    If your prescription bottle indicates you do not have any refills left, you may request refills at any time through one of the following ways: The online Dr. [employer] system (except Urgent Care), by calling your provider’s office, or by asking your pharmacy to contact your provider’s office with a refill request. Medication refills are processed only during regular business hours and may not be available until the next business  day. Your provider may request additional information or to have a follow-up visit with you prior to refilling your medication.   *Please Note: Medication refills are assigned a new Rx number when refilled electronically. Your pharmacy may indicate that no refills were authorized even though a new prescription for the same medication is available at the pharmacy. Please request the medicine by name with the pharmacy before contacting your provider for a refill.        Instructions    Pelvic rest x 5 weeks       Other Notes About Your Plan     Baby Boy - Srinivasa Barlow           Numblebee Access Code: [email or web address]  Expires: 9/10/2017  9:52 AM    Numblebee  A secure, online tool to manage your health information     [employer]’s Numblebee® is a secure, online tool that connects you to your personalized health information from the privacy of your home -- day or night - making it very easy for you to manage your healthcare. Once the activation process is completed, you can even access your medical information using the Numblebee corey, which is available for free in the Apple Corey store or Google Play store.     Numblebee provides the following levels of access (as shown below):   My Chart Features   [facility] Doctor [facility]  Specialists [facility]  Urgent  Care Non-[facility]  Primary Care  Doctor   Email your healthcare team securely and privately 24/7 X X X    Manage appointments: schedule your next appointment; view details of past/upcoming appointments X      Request prescription refills. X      View recent personal medical records, including lab and immunizations X X X X   View health record, including health history, allergies, medications X X X X   Read reports about your outpatient visits, procedures, consult and ER notes X X X X   See your discharge summary, which is a recap of your hospital and/or ER visit that includes your diagnosis, lab results, and care plan. X X       How to register for Numblebee:  1. Go to   [email or web address].  2. Click on the Sign Up Now box, which takes you to the New Member Sign Up page. You will need to provide the following information:  a. Enter your [employer] Access Code exactly as it appears at the top of this page. (You will not need to use this code after you’ve completed the sign-up process. If you do not sign up before the expiration date, you must request a new code.)   b. Enter your date of birth.   c. Enter your home email address.   d. Click Submit, and follow the next screen’s instructions.  3. Create a [employer] ID. This will be your [employer] login ID and cannot be changed, so think of one that is secure and easy to remember.  4. Create a Integrated Ordering Systemst password. You can change your password at any time.  5. Enter your Password Reset Question and Answer. This can be used at a later time if you forget your password.   6. Enter your e-mail address. This allows you to receive e-mail notifications when new information is available in [employer].  7. Click Sign Up. You can now view your health information.    For assistance activating your [employer] account, call [phone]

## 2017-08-31 ENCOUNTER — TELEPHONE (OUTPATIENT)
Dept: OBGYN | Facility: CLINIC | Age: 32
End: 2017-08-31

## 2017-09-12 ENCOUNTER — POST PARTUM (OUTPATIENT)
Dept: OBGYN | Facility: CLINIC | Age: 32
End: 2017-09-12
Payer: MEDICAID

## 2017-09-12 VITALS
HEIGHT: 61 IN | DIASTOLIC BLOOD PRESSURE: 76 MMHG | SYSTOLIC BLOOD PRESSURE: 110 MMHG | BODY MASS INDEX: 27.38 KG/M2 | WEIGHT: 145 LBS

## 2017-09-12 DIAGNOSIS — O09.291 PREVIOUS PREGNANCY COMPLICATED BY CHROMOSOMAL ABNORMALITY IN FIRST TRIMESTER, ANTEPARTUM: ICD-10-CM

## 2017-09-12 DIAGNOSIS — O09.891 SUPERVISION OF OTHER HIGH RISK PREGNANCIES, FIRST TRIMESTER: ICD-10-CM

## 2017-09-12 PROCEDURE — 90050 PR POSTPARTUM VISIT: CPT | Performed by: NURSE PRACTITIONER

## 2017-09-12 RX ORDER — ACETAMINOPHEN AND CODEINE PHOSPHATE 120; 12 MG/5ML; MG/5ML
1 SOLUTION ORAL DAILY
Qty: 28 TAB | Refills: 6 | Status: SHIPPED | OUTPATIENT
Start: 2017-09-12 | End: 2018-08-22

## 2017-09-12 NOTE — PROGRESS NOTES
"Subjective:      Buffy Chong is a 31 y.o. female who presents with No chief complaint on file.            HPI    ROS       Objective:     /76   Ht 1.549 m (5' 1\")   Wt 65.8 kg (145 lb)   LMP 11/27/2016   BMI 27.40 kg/m²      Physical Exam   Constitutional: She appears well-developed and well-nourished.   HENT:   Head: Normocephalic.   Eyes: Pupils are equal, round, and reactive to light.   Neck: Normal range of motion. No thyromegaly present.   Cardiovascular: Normal rate, regular rhythm and normal heart sounds.    Pulmonary/Chest: Effort normal and breath sounds normal.   Abdominal: Soft. Bowel sounds are normal.   Genitourinary: Vagina normal and uterus normal.   Musculoskeletal: Normal range of motion.   Skin: Skin is warm and dry.   Psychiatric: She has a normal mood and affect. Her behavior is normal. Judgment and thought content normal.   Nursing note and vitals reviewed.              Assessment/Plan:     1. Supervision of other high risk pregnancies, first trimester      2. 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

## 2017-09-12 NOTE — NON-PROVIDER
Pt here today for postpartum exam.  Delivery type & date  2017  Birth control meth desired:pill now and nexplanon in the future  Currently :breast feeding and  bottle feeding   LMP:N/A   Last pap:2017 normal   Phone # [phone]  Sad, or crying : sometimes   C/O constipation

## 2017-09-12 NOTE — PROGRESS NOTES
Subjective   Subjective:    Buffy Chong is a 31 y.o. female who presents for her postpartum exam s/p LTCS for failed IOL, chorioamnionitis and arrest of descent. Her prenatal course was complicated by the below problem list. Her postpartum course was complicated by hip pain. She denies dysuria, vaginal bleeding, odor, itching or breast problems. She is both breast and bottle feeding. She desires an OCP for her birth control method. Reports no sex prior to this appointment. Eating a regular diet without difficulty. Bowel movement are Normal.  No current menses. Patient Denies Incisional pain, drainage or redness. Patient states having depression but has had for years. Was on antidepressant prior to pregnancy. She does not wish to resume meds and thinks she is stable without them and her mood swings are manageable.      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 0  Weight - 145 lb  Vitals - 110/76    Assessment   Assessment:    1. PP care of lactating women   2. Exam WNL   3. Pap WNL   4. Desires contraception  - OCP    Plan   Plan:    1. Breastfeeding support   2. Continue PNV   3. Contraceptive counseling - follow up w health dept or [facility] for ongoing women's health  4. Encouraged condom use for contraceptive start up  5. Discussed diet, exercise and resumption of sexual activity   6. Preconception guidance for next pregnancy if applicable. See problem list for risk factors. Folic acid for all women of childbearing age.   7.  Follow up needed  - referral for PT given.  8.  Smoking/etoh/drug screening - Narxcheck done, no recent use for narcotics except for dc from hospital from c/s.   9.  Mental health status evaluated patient does have hx of suicide attempt but seems to be doing fairly well. Resources offered.

## 2017-10-19 ENCOUNTER — NON-PROVIDER VISIT (OUTPATIENT)
Dept: URGENT CARE | Facility: PHYSICIAN GROUP | Age: 32
End: 2017-10-19

## 2017-10-19 DIAGNOSIS — Z02.1 PRE-EMPLOYMENT DRUG SCREENING: ICD-10-CM

## 2017-10-19 LAB
AMP AMPHETAMINE: NORMAL
BAR BARBITURATES: NORMAL
BZO BENZODIAZEPINES: NORMAL
COC COCAINE: NORMAL
INT CON NEG: NORMAL
INT CON POS: NORMAL
MDMA ECSTASY: NORMAL
MET METHAMPHETAMINES: NORMAL
MTD METHADONE: NORMAL
OPI OPIATES: NORMAL
OXY OXYCODONE: NORMAL
PCP PHENCYCLIDINE: NORMAL
POC URINE DRUG SCREEN OCDRS: NEGATIVE
THC: NORMAL

## 2017-10-19 PROCEDURE — 80305 DRUG TEST PRSMV DIR OPT OBS: CPT | Performed by: EMERGENCY MEDICINE

## 2017-12-18 ENCOUNTER — OFFICE VISIT (OUTPATIENT)
Dept: URGENT CARE | Facility: PHYSICIAN GROUP | Age: 32
End: 2017-12-18
Payer: COMMERCIAL

## 2017-12-18 VITALS
HEIGHT: 60 IN | BODY MASS INDEX: 28.86 KG/M2 | HEART RATE: 79 BPM | SYSTOLIC BLOOD PRESSURE: 130 MMHG | OXYGEN SATURATION: 100 % | TEMPERATURE: 97.8 F | DIASTOLIC BLOOD PRESSURE: 88 MMHG | WEIGHT: 147 LBS

## 2017-12-18 DIAGNOSIS — F41.9 ANXIETY: ICD-10-CM

## 2017-12-18 DIAGNOSIS — J02.0 STREP PHARYNGITIS: Primary | ICD-10-CM

## 2017-12-18 PROCEDURE — 99214 OFFICE O/P EST MOD 30 MIN: CPT | Performed by: NURSE PRACTITIONER

## 2017-12-18 RX ORDER — AMOXICILLIN 400 MG/5ML
500 POWDER, FOR SUSPENSION ORAL 2 TIMES DAILY
Qty: 126 ML | Refills: 0 | Status: SHIPPED | OUTPATIENT
Start: 2017-12-18 | End: 2017-12-28

## 2017-12-18 RX ORDER — LORAZEPAM 0.5 MG/1
0.5 TABLET ORAL EVERY 8 HOURS PRN
Qty: 10 TAB | Refills: 0 | Status: SHIPPED | OUTPATIENT
Start: 2017-12-18 | End: 2017-12-28

## 2017-12-18 ASSESSMENT — ENCOUNTER SYMPTOMS
SORE THROAT: 1
CHILLS: 1
NERVOUS/ANXIOUS: 1
TROUBLE SWALLOWING: 1
SWOLLEN GLANDS: 1
DEPRESSED MOOD: 0
COUGH: 0
NAUSEA: 0
INSOMNIA: 1
FEVER: 1
VOMITING: 0
WEAKNESS: 1
DIARRHEA: 0
DEPRESSION: 0
SPUTUM PRODUCTION: 0
MYALGIAS: 1
SHORTNESS OF BREATH: 0

## 2017-12-18 ASSESSMENT — LIFESTYLE VARIABLES: SUBSTANCE_ABUSE: 0

## 2017-12-19 ENCOUNTER — TELEPHONE (OUTPATIENT)
Dept: MEDICAL GROUP | Facility: PHYSICIAN GROUP | Age: 32
End: 2017-12-19

## 2017-12-19 NOTE — PROGRESS NOTES
Subjective:      Buffy Chong is a 32 y.o. female who presents with Lightheadedness (intermittent lightheadedness x1 week, also has a sore throat)            Medications, Allergies and Prior Medical Hx reviewed and updated in [facility].with patient/family today           Pharyngitis    This is a new problem. The current episode started in the past 7 days. The problem has been gradually worsening. Maximum temperature: subjective fever. The pain is moderate. Associated symptoms include congestion, swollen glands and trouble swallowing. Pertinent negatives include no coughing, diarrhea, ear discharge, ear pain, shortness of breath or vomiting. She has tried nothing for the symptoms. The treatment provided mild relief.   Anxiety   Presents for initial visit. Onset was 1 to 4 weeks ago. The problem has been unchanged. Symptoms include excessive worry, insomnia and nervous/anxious behavior. Patient reports no depressed mood, nausea, shortness of breath or suicidal ideas. Symptoms occur constantly. The severity of symptoms is moderate. The symptoms are aggravated by family issues and work stress. The quality of sleep is poor.     Her past medical history is significant for depression. Past treatments include nothing.       Review of Systems   Constitutional: Positive for chills, fever and malaise/fatigue.   HENT: Positive for congestion, sore throat and trouble swallowing. Negative for ear discharge and ear pain.    Respiratory: Negative for cough, sputum production and shortness of breath.    Gastrointestinal: Negative for diarrhea, nausea and vomiting.   Musculoskeletal: Positive for myalgias.   Neurological: Positive for weakness.   Psychiatric/Behavioral: Negative for depression, substance abuse and suicidal ideas. The patient is nervous/anxious and has insomnia.           Objective:     /88   Pulse 79   Temp 36.6 °C (97.8 °F)   Ht 1.524 m (5')   Wt 66.7 kg (147 lb)   LMP 11/27/2016   SpO2 100%   BMI  28.71 kg/m²      Physical Exam   Constitutional: She appears well-developed and well-nourished. No distress.   HENT:   Head: Normocephalic and atraumatic.   Right Ear: Tympanic membrane and ear canal normal.   Left Ear: Tympanic membrane and ear canal normal.   Nose: Rhinorrhea present.   Mouth/Throat: Uvula is midline and mucous membranes are normal. No trismus in the jaw. No uvula swelling. Posterior oropharyngeal edema and posterior oropharyngeal erythema present. No oropharyngeal exudate. Tonsils are 0 on the right. Tonsils are 0 on the left. No tonsillar exudate.   Eyes: Conjunctivae are normal. Pupils are equal, round, and reactive to light.   Neck: Neck supple.   Cardiovascular: Normal rate, regular rhythm and normal heart sounds.    Pulmonary/Chest: Effort normal and breath sounds normal. No respiratory distress. She has no wheezes.   Lymphadenopathy:     She has cervical adenopathy.   Neurological: She is alert.   Skin: Skin is warm and dry. Capillary refill takes less than 2 seconds.   Psychiatric: Her speech is normal and behavior is normal. Her mood appears anxious.   Vitals reviewed.              Assessment/Plan:       1. Strep pharyngitis  amoxicillin (AMOXIL) 400 MG/5ML suspension   2. Anxiety  lorazepam (ATIVAN) 0.5 MG Tab       Rest, Fluids, tylenol, ibuprofen, otc throat lozenges, gargle with warm salt water,   Pt will go to the ER for worsening or changing symptoms as discussed,  Follow-up with your primary care provider or return here if not improving in 5 days   Discharge instructions discussed with pt/family who verbalize understanding and agreement with poc    Do not drink alcohol or operate machinery with this medication  Pt reviewed on [place]  Aware,  no remarkable controlled substance prescription documentation noted    Pt will go to the ER for worsening or changing symptoms as discussed,   Follow-up with your primary care provider at the next available appt  Return to Urgent care for  any problems or concerns  Discharge instructions discussed with pt/family who verbalize understanding and agreement with poc

## 2017-12-19 NOTE — PATIENT INSTRUCTIONS
"Strep Throat  Strep throat is an infection of the throat caused by a bacteria named Streptococcus pyogenes. Your health care provider may call the infection streptococcal \"tonsillitis\" or \"pharyngitis\" depending on whether there are signs of inflammation in the tonsils or back of the throat. Strep throat is most common in children aged 5-15 years during the cold months of the year, but it can occur in people of any age during any season. This infection is spread from person to person (contagious) through coughing, sneezing, or other close contact.  SIGNS AND SYMPTOMS   · Fever or chills.  · Painful, swollen, red tonsils or throat.  · Pain or difficulty when swallowing.  · White or yellow spots on the tonsils or throat.  · Swollen, tender lymph nodes or \"glands\" of the neck or under the jaw.  · Red rash all over the body (rare).  DIAGNOSIS   Many different infections can cause the same symptoms. A test must be done to confirm the diagnosis so the right treatment can be given. A \"rapid strep test\" can help your health care provider make the diagnosis in a few minutes. If this test is not available, a light swab of the infected area can be used for a throat culture test. If a throat culture test is done, results are usually available in a day or two.  TREATMENT   Strep throat is treated with antibiotic medicine.  HOME CARE INSTRUCTIONS   · Gargle with 1 tsp of salt in 1 cup of warm water, 3-4 times per day or as needed for comfort.  · Family members who also have a sore throat or fever should be tested for strep throat and treated with antibiotics if they have the strep infection.  · Make sure everyone in your household washes their hands well.  · Do not share food, drinking cups, or personal items that could cause the infection to spread to others.  · You may need to eat a soft food diet until your sore throat gets better.  · Drink enough water and fluids to keep your urine clear or pale yellow. This will help prevent " dehydration.  · Get plenty of rest.  · Stay home from school, day care, or work until you have been on antibiotics for 24 hours.  · Take medicines only as directed by your health care provider.  · Take your antibiotic medicine as directed by your health care provider. Finish it even if you start to feel better.  SEEK MEDICAL CARE IF:   · The glands in your neck continue to enlarge.  · You develop a rash, cough, or earache.  · You cough up green, yellow-brown, or bloody sputum.  · You have pain or discomfort not controlled by medicines.  · Your problems seem to be getting worse rather than better.  · You have a fever.  SEEK IMMEDIATE MEDICAL CARE IF:   · You develop any new symptoms such as vomiting, severe headache, stiff or painful neck, chest pain, shortness of breath, or trouble swallowing.  · You develop severe throat pain, drooling, or changes in your voice.  · You develop swelling of the neck, or the skin on the neck becomes red and tender.  · You develop signs of dehydration, such as fatigue, dry mouth, and decreased urination.  · You become increasingly sleepy, or you cannot wake up completely.  MAKE SURE YOU:  · Understand these instructions.  · Will watch your condition.  · Will get help right away if you are not doing well or get worse.     This information is not intended to replace advice given to you by your health care provider. Make sure you discuss any questions you have with your health care provider.     Document Released: 12/15/2001 Document Revised: 01/08/2016 Document Reviewed: 04/11/2016  [employer] Patient Education ©2016 [employer]

## 2017-12-20 NOTE — TELEPHONE ENCOUNTER
Urgent care request for us to call the patient and schedule her a new patient appointment.  I attempted to contact the patient two different times today.  Left message both times.  Asked for the patient to call us back to schedule her an appointment with a provider here.  Left the number for the front office.

## 2018-07-05 ENCOUNTER — HOSPITAL ENCOUNTER (EMERGENCY)
Facility: MEDICAL CENTER | Age: 33
End: 2018-07-06
Attending: EMERGENCY MEDICINE
Payer: MEDICAID

## 2018-07-05 DIAGNOSIS — M54.41 CHRONIC RIGHT-SIDED LOW BACK PAIN WITH RIGHT-SIDED SCIATICA: ICD-10-CM

## 2018-07-05 DIAGNOSIS — G89.29 CHRONIC RIGHT-SIDED LOW BACK PAIN WITH RIGHT-SIDED SCIATICA: ICD-10-CM

## 2018-07-05 PROCEDURE — 700111 HCHG RX REV CODE 636 W/ 250 OVERRIDE (IP): Performed by: EMERGENCY MEDICINE

## 2018-07-05 PROCEDURE — 96372 THER/PROPH/DIAG INJ SC/IM: CPT

## 2018-07-05 PROCEDURE — 99284 EMERGENCY DEPT VISIT MOD MDM: CPT

## 2018-07-05 RX ORDER — METHOCARBAMOL 750 MG/1
750 TABLET, FILM COATED ORAL 4 TIMES DAILY
Qty: 28 TAB | Refills: 0 | Status: SHIPPED | OUTPATIENT
Start: 2018-07-05 | End: 2018-07-12

## 2018-07-05 RX ORDER — METHYLPREDNISOLONE 4 MG/1
TABLET ORAL
Qty: 1 KIT | Refills: 0 | Status: SHIPPED | OUTPATIENT
Start: 2018-07-05 | End: 2018-08-22

## 2018-07-05 RX ORDER — KETOROLAC TROMETHAMINE 30 MG/ML
30 INJECTION, SOLUTION INTRAMUSCULAR; INTRAVENOUS ONCE
Status: COMPLETED | OUTPATIENT
Start: 2018-07-05 | End: 2018-07-05

## 2018-07-05 RX ADMIN — KETOROLAC TROMETHAMINE 30 MG: 30 INJECTION, SOLUTION INTRAMUSCULAR at 23:45

## 2018-07-05 ASSESSMENT — PAIN SCALES - GENERAL
PAINLEVEL_OUTOF10: 9
PAINLEVEL_OUTOF10: 10

## 2018-07-06 VITALS
TEMPERATURE: 98.1 F | WEIGHT: 145.5 LBS | OXYGEN SATURATION: 99 % | RESPIRATION RATE: 16 BRPM | HEART RATE: 53 BPM | HEIGHT: 60 IN | DIASTOLIC BLOOD PRESSURE: 64 MMHG | SYSTOLIC BLOOD PRESSURE: 109 MMHG | BODY MASS INDEX: 28.57 KG/M2

## 2018-07-06 ASSESSMENT — PAIN SCALES - GENERAL: PAINLEVEL_OUTOF10: 8

## 2018-07-06 NOTE — ED TRIAGE NOTES
Chief Complaint   Patient presents with   • Low Back Pain     x1 week, pain while lifting, intermittent pain, progressively worsening, CMS+     Pt was seen last week at [facility] for same issue. She reports receiving a steroid shot and ibuprofen during that visit, however no relief occurred from the pain.    Ambulated to room.  Agree with triage assessment.  Changing into gown.  Chart placed for ERP eval.

## 2018-07-06 NOTE — ED PROVIDER NOTES
ED Provider Note    CHIEF COMPLAINT  Chief Complaint   Patient presents with   • Low Back Pain     x1 week, pain while lifting, intermittent pain, progressively worsening, CMS+       HPI  Buffy Chong is a 32 y.o. female who presents to the emergency Department chief complaint of right low back upper buttock pain rating down the right leg on and off since 2011 for which she used to see a pain specialist whom she stopped seeing in 2015. She states that over the last 2 weeks she's had a worsening flare. She was seen in [facility] 1 week ago when she was given a steroid shot and a Toradol shot and states that she's had no relief of symptoms. She states she used to be on Norco daily and that stopped helping as well which is why she stopped going to the pain specialist. She denies any weakness numbness or tingling but does state that it hurts to ambulate. He denies any difficulty with bowel or bladder habits or any falling  Pain is worse with movement and made better with rest    REVIEW OF SYSTEMS  Positives as above. Pertinent negatives include weakness numbness tingling  All other review of systems are negative    PAST MEDICAL HISTORY   has a past medical history of H/O fetal demise, not currently pregnant (8/10/2016); Pregnant; and Psychiatric problem.    SOCIAL HISTORY  Social History     Social History Main Topics   • Smoking status: Never Smoker   • Smokeless tobacco: Never Used   • Alcohol use No   • Drug use: No   • Sexual activity: Yes     Partners: Male     Birth control/ protection: Pill       SURGICAL HISTORY   has a past surgical history that includes other orthopedic surgery (2006); other (Left, 2001); ear middle exploration (2001); tendon repair (2006); and primary c section (8/8/2017).    CURRENT MEDICATIONS  Home Medications     Reviewed by Kecia Vee R.N. (Registered Nurse) on 07/05/18 at 1027  Med List Status: Partial   Medication Last Dose Status   docusate sodium 100 MG Cap   Active   ibuprofen (MOTRIN) 800 MG Tab 7/5/2018 Active   norethindrone (MICRONOR) 0.35 MG tablet  Active   oxycodone-acetaminophen (PERCOCET) 5-325 MG Tab  Active   Prenatal MV-Min-Fe Fum-FA-DHA (PRENATAL 1 PO) 2/14/2017 Active                ALLERGIES  No Known Allergies    PHYSICAL EXAM  VITAL SIGNS: /67   Pulse 85   Temp 36.8 °C (98.3 °F)   Resp 16   Ht 1.524 m (5')   Wt 66 kg (145 lb 8.1 oz)   LMP 11/27/2016   SpO2 97%   BMI 28.42 kg/m²    Pulse ox interpretation: I interpret this pulse ox as normal.  Constitutional: Alert in no apparent distress.  HENT: Normocephalic, Atraumatic, MMM  Eyes: PERound. Conjunctiva normal, non-icteric.   Heart: Regular rate and rythm, no murmurs.    Lungs: Clear to auscultation bilaterally. No resp distress, breath sounds equal  Abdomen: Non-tender, non-distended, normal bowel sounds  EXT: Tenderness to palpation in the right mid buttock over the right lateral hip no skin erythema or swelling of the joint ambulates with a steady gait strength in the right and left lower extremity 5 out of 5  Skin: Warm, Dry, No erythema, No rash.   Neurologic: Alert and oriented, Grossly non-focal.       DIFFERENTIAL DIAGNOSIS AND WORK UP PLAN    This is a 32 y.o. female who presents with signs and symptoms likely consistent with a chronic sciatica, she's had for the past number and x-rays, no signs or symptoms of infection at this time. The patient was treated with IM Toradol here and sent home with a Medrol Dosepak as well as Robaxin. She'll be referred to [facility] for further treatment for this chronic pain acute on chronic pain. We discussed stretching and ice packs at home    /64   Pulse (!) 53   Temp 36.7 °C (98.1 °F)   Resp 16   Ht 1.524 m (5')   Wt 66 kg (145 lb 8.1 oz)   LMP 11/27/2016   SpO2 99%   BMI 28.42 kg/m²     The patient will return for new or worsening symptoms and is stable at the time of discharge.    The patient is referred to a primary  physician for blood pressure management, diabetic screening, and for all other preventative health concerns.    DISPOSITION:  Patient will be discharged home in stable condition.    FOLLOW UP:  [address] [address]  [phone]  Schedule an appointment as soon as possible for a visit      [facility]  [address] [facility] [place] [address]    Schedule an appointment as soon as possible for a visit        OUTPATIENT MEDICATIONS:  Discharge Medication List as of 7/5/2018 11:57 PM      START taking these medications    Details   MethylPREDNISolone (MEDROL DOSEPAK) 4 MG Tablet Therapy Pack Use as directed, Disp-1 Kit, R-0, Normal      methocarbamol (ROBAXIN) 750 MG Tab Take 1 Tab by mouth 4 times a day for 7 days., Disp-28 Tab, R-0, Normal                 FINAL IMPRESSION  1. Chronic right-sided low back pain with right-sided sciatica               Electronically signed by: Alexandra Garcia, 7/5/2018 10:49 PM    This dictation has been created using voice recognition software and/or scribes. The accuracy of the dictation is limited by the abilities of the software and the expertise of the scribes. I expect there may be some errors of grammar and possibly content. I made every attempt to manually correct the errors within my dictation. However, errors related to voice recognition software and/or scribes may still exist and should be interpreted within the appropriate context.

## 2018-07-06 NOTE — ED TRIAGE NOTES
Buffy Chong  32 y.o.  Chief Complaint   Patient presents with   • Low Back Pain     x1 week, pain while lifting, intermittent pain, progressively worsening, CMS+     Explained wait time and triage process to pt. Pt placed back out in lobby, told to notify ED tech or triage RN of any changes, verbalized understanding.

## 2018-08-22 ENCOUNTER — HOSPITAL ENCOUNTER (EMERGENCY)
Facility: MEDICAL CENTER | Age: 33
End: 2018-08-22
Attending: EMERGENCY MEDICINE
Payer: MEDICAID

## 2018-08-22 VITALS
SYSTOLIC BLOOD PRESSURE: 126 MMHG | HEART RATE: 70 BPM | WEIGHT: 149.25 LBS | HEIGHT: 60 IN | BODY MASS INDEX: 29.3 KG/M2 | TEMPERATURE: 97.4 F | RESPIRATION RATE: 16 BRPM | OXYGEN SATURATION: 98 % | DIASTOLIC BLOOD PRESSURE: 86 MMHG

## 2018-08-22 DIAGNOSIS — K02.9 DENTAL CARIES: ICD-10-CM

## 2018-08-22 PROCEDURE — A9270 NON-COVERED ITEM OR SERVICE: HCPCS | Performed by: EMERGENCY MEDICINE

## 2018-08-22 PROCEDURE — 700102 HCHG RX REV CODE 250 W/ 637 OVERRIDE(OP): Performed by: EMERGENCY MEDICINE

## 2018-08-22 PROCEDURE — 99283 EMERGENCY DEPT VISIT LOW MDM: CPT

## 2018-08-22 RX ORDER — HYDROCODONE BITARTRATE AND ACETAMINOPHEN 5; 325 MG/1; MG/1
1 TABLET ORAL ONCE
Status: COMPLETED | OUTPATIENT
Start: 2018-08-22 | End: 2018-08-22

## 2018-08-22 RX ORDER — NAPROXEN 500 MG/1
500 TABLET ORAL 2 TIMES DAILY WITH MEALS
COMMUNITY
End: 2019-04-23

## 2018-08-22 RX ORDER — PENICILLIN V POTASSIUM 500 MG/1
500 TABLET ORAL 4 TIMES DAILY
Status: ON HOLD | COMMUNITY
End: 2019-04-27

## 2018-08-22 RX ADMIN — HYDROCODONE BITARTRATE AND ACETAMINOPHEN 1 TABLET: 5; 325 TABLET ORAL at 04:58

## 2018-08-22 ASSESSMENT — LIFESTYLE VARIABLES: DO YOU DRINK ALCOHOL: NO

## 2018-08-22 ASSESSMENT — PAIN SCALES - GENERAL
PAINLEVEL_OUTOF10: 10
PAINLEVEL_OUTOF10: 10

## 2018-08-22 NOTE — DISCHARGE INSTRUCTIONS
Dental Caries  Dental caries (cavities) are areas of tooth decay. Cavities are usually caused by a combination of poor dental care; sugar; tobacco, alcohol, and drug abuse; decreased saliva production; and receding gums. If cavities are not treated by a dentist, they grow in size. This can cause toothaches, infection, and loss of the tooth.  Cavities of the outer tooth enamel do not cause symptoms. Dental pain from cold drinks may be the first sign the enamel has broken down and decay has spread toward the root of the tooth. This can cause the tooth to die or become infected. If a cavity is treated before it causes toothache, the tooth can usually be saved. Cavities can be prevented by good oral hygiene. Brushing your teeth in the morning and before bed, and using dental floss once daily helps remove plaque and reduce bacteria.  Candy, soft drinks, and other sources of sugar promote tooth decay by promoting the growth of bacteria in the mouth. Proper diet, fluoride, dental cleaning, and fillings are important in preventing the loss of teeth from decay. Antibiotics, root canal treatment, or dental extraction may be needed if the decay is severe. Take any pain medication or antibiotics as directed by your caregiver. It is important that you follow up with a dentist for definitive care.  SEEK MEDICAL CARE IF:   · You or your child has an oral temperature above 102° F (38.9° C).   · There is difficulty opening the mouth.   · There is difficulty swallowing or handling secretions.   · There is difficulty breathing.   · There is chest pain.   · There are worsening or concerning symptoms.   Document Released: 01/25/2006 Document Revised: 03/11/2013 Document Reviewed: 04/12/2011  [employer]® Patient Information ©2013 [employer].

## 2018-08-22 NOTE — ED TRIAGE NOTES
"Buffy Chong  32 y.o.  Chief Complaint   Patient presents with   • Dental Pain     LEFT LOWER MOLAR, worsening x 2 days, rates pain10/10     Ambulatory to triage with steady gait for above.    States that she was seen at [facility] yesterday for he same \"but the medication they gave me did not work and today I feel worse.\"    Airway patent. Speaking in full sentences. Managing secretions without difficulty.      Triage process explained to patient, apologized for wait time, and returned to lobby.  "

## 2018-08-22 NOTE — ED NOTES
Buffy Chong discharged via ambulatory with self.  Discharge instructions given and reviewed, patient educated to follow up with dentist, verbalized understanding.  Prescriptions given x 0.  All personal belongings in possession.  No questions at this time.

## 2018-08-22 NOTE — ED PROVIDER NOTES
ED Provider Note      CHIEF COMPLAINT  Chief Complaint   Patient presents with   • Dental Pain     LEFT LOWER MOLAR, worsening x 2 days, rates pain10/10       HPI  Buffy Chong is a 32 y.o. female who presents with dental pain. Pain started 2 days ago. Pain is located left jaw. Pain is severe. Constant. It is getting worse. No fevers. No neck stiffness or headache.  No swelling.  Seen at [facility] yesterday.  Prescribed penicillin and naproxen.  Does not feel improved.    REVIEW OF SYSTEMS    See HPI    PAST MEDICAL HISTORY  Past Medical History:   Diagnosis Date   • H/O fetal demise, not currently pregnant 8/10/2016   • Pregnant    • Psychiatric problem     depression       SOCIAL HISTORY  Social History   Substance Use Topics   • Smoking status: Never Smoker   • Smokeless tobacco: Never Used   • Alcohol use No       ALLERGIES  No Known Allergies    PHYSICAL EXAM  VITAL SIGNS: /86   Pulse 70   Temp 36.3 °C (97.4 °F)   Resp 16   Ht 1.524 m (5')   Wt 67.7 kg (149 lb 4 oz)   LMP 07/13/2018   SpO2 98%   BMI 29.15 kg/m²   Constitutional: Well developed, Well nourished, No acute distress, Non-toxic appearance.   HENT:  Atraumatic, Normocephalic. Bilateral external ears normal, Oropharynx there is a crown of the left second mandibular molar.  This tooth is tender on palpation.  There is no surrounding swelling or abscess.  Eyes: Grossly Normal inspection  Neck: Normal range of motion  Cardiovascular: Normal heart rate  Thorax & Lungs: No respiratory distress  Skin: No rash.     COURSE & MEDICAL DECISION MAKING  Patient with dental dental pain.  Possible infection underlying crown.. No sign of abscess.  Antibiotics are appropriate.  Naproxen is appropriate.  In addition of naproxen she can take Tylenol at home.  I gave her 1 Norco tablet while here.  I have advised followup with dentist as soon as possible states that she will go to [facility] today. Asked to return to the  emergency department for swelling, high fevers, or concern.    Patient referred to primary provider for blood pressure management    FINAL IMPRESSION  1. dental caries      This dictation was created using voice recognition software. The accuracy of the dictation is limited to the abilities of the software.   The nursing notes were reviewed and certain aspects of this information were incorporated into this note.      Electronically signed by: Preston Amanda, 8/22/2018 4:53 AM

## 2018-08-22 NOTE — ED NOTES
Pt ambulates to yellow 64 with c/o dental pain to left lower molar.  Agree with triage assessment.  Pt changed into gown.  Chart up for ERP.

## 2019-04-12 ENCOUNTER — NON-PROVIDER VISIT (OUTPATIENT)
Dept: OBGYN | Facility: CLINIC | Age: 34
End: 2019-04-12
Payer: COMMERCIAL

## 2019-04-12 DIAGNOSIS — N92.6 MISSED PERIOD: ICD-10-CM

## 2019-04-12 LAB
INT CON NEG: NEGATIVE
INT CON POS: POSITIVE
POC URINE PREGNANCY TEST: POSITIVE

## 2019-04-12 PROCEDURE — 81025 URINE PREGNANCY TEST: CPT | Performed by: OBSTETRICS & GYNECOLOGY

## 2019-04-15 ENCOUNTER — HOSPITAL ENCOUNTER (OUTPATIENT)
Facility: MEDICAL CENTER | Age: 34
End: 2019-04-15
Attending: OBSTETRICS & GYNECOLOGY
Payer: COMMERCIAL

## 2019-04-15 ENCOUNTER — INITIAL PRENATAL (OUTPATIENT)
Dept: OBGYN | Facility: CLINIC | Age: 34
End: 2019-04-15
Payer: COMMERCIAL

## 2019-04-15 ENCOUNTER — ROUTINE PRENATAL (OUTPATIENT)
Dept: OBGYN | Facility: CLINIC | Age: 34
End: 2019-04-15
Payer: COMMERCIAL

## 2019-04-15 VITALS
HEIGHT: 60 IN | BODY MASS INDEX: 30.63 KG/M2 | WEIGHT: 156 LBS | DIASTOLIC BLOOD PRESSURE: 74 MMHG | SYSTOLIC BLOOD PRESSURE: 116 MMHG

## 2019-04-15 VITALS — WEIGHT: 156 LBS | SYSTOLIC BLOOD PRESSURE: 116 MMHG | DIASTOLIC BLOOD PRESSURE: 74 MMHG | BODY MASS INDEX: 30.47 KG/M2

## 2019-04-15 DIAGNOSIS — O09.93 HIGH-RISK PREGNANCY SUPERVISION, THIRD TRIMESTER: ICD-10-CM

## 2019-04-15 DIAGNOSIS — O09.33 LATE PRENATAL CARE AFFECTING PREGNANCY IN THIRD TRIMESTER: ICD-10-CM

## 2019-04-15 DIAGNOSIS — Z98.891 HX OF CESAREAN SECTION: ICD-10-CM

## 2019-04-15 DIAGNOSIS — Z87.59 H/O FETAL DEMISE, NOT CURRENTLY PREGNANT: ICD-10-CM

## 2019-04-15 DIAGNOSIS — Z87.59 HISTORY OF IUFD: ICD-10-CM

## 2019-04-15 DIAGNOSIS — O09.291 PREVIOUS PREGNANCY COMPLICATED BY CHROMOSOMAL ABNORMALITY IN FIRST TRIMESTER, ANTEPARTUM: ICD-10-CM

## 2019-04-15 PROBLEM — S72.001D CLOSED FRACTURE OF RIGHT HIP WITH ROUTINE HEALING: Status: RESOLVED | Noted: 2017-07-13 | Resolved: 2019-04-15

## 2019-04-15 PROBLEM — O09.891 SUPERVISION OF OTHER HIGH RISK PREGNANCIES, FIRST TRIMESTER: Status: RESOLVED | Noted: 2017-02-15 | Resolved: 2019-04-15

## 2019-04-15 LAB
APPEARANCE UR: NORMAL
BILIRUB UR STRIP-MCNC: NORMAL MG/DL
COLOR UR AUTO: NORMAL
GLUCOSE UR STRIP.AUTO-MCNC: NEGATIVE MG/DL
KETONES UR STRIP.AUTO-MCNC: NEGATIVE MG/DL
LEUKOCYTE ESTERASE UR QL STRIP.AUTO: NORMAL
NITRITE UR QL STRIP.AUTO: NEGATIVE
NST ACOUSTIC STIMULATION: NORMAL
NST ACTION NECESSARY: NORMAL
NST ASSESSMENT: NORMAL
NST BASELINE: NORMAL
NST INDICATIONS: NORMAL
NST OTHER DATA: NORMAL
NST READ BY: NORMAL
NST RETURN: NORMAL
NST UTERINE ACTIVITY: NORMAL
PH UR STRIP.AUTO: 6 [PH] (ref 5–8)
PROT UR QL STRIP: NEGATIVE MG/DL
RBC UR QL AUTO: NEGATIVE
SP GR UR STRIP.AUTO: 1.02
UROBILINOGEN UR STRIP-MCNC: NORMAL MG/DL

## 2019-04-15 PROCEDURE — 59402 PR NEW OB HIGH RISK: CPT | Performed by: OBSTETRICS & GYNECOLOGY

## 2019-04-15 PROCEDURE — 76815 OB US LIMITED FETUS(S): CPT | Performed by: OBSTETRICS & GYNECOLOGY

## 2019-04-15 PROCEDURE — 99999 POCT FETAL NONSTRESS TEST: CPT | Performed by: OBSTETRICS & GYNECOLOGY

## 2019-04-15 PROCEDURE — 87150 DNA/RNA AMPLIFIED PROBE: CPT

## 2019-04-15 PROCEDURE — 88175 CYTOPATH C/V AUTO FLUID REDO: CPT

## 2019-04-15 PROCEDURE — 59025 FETAL NON-STRESS TEST: CPT | Performed by: OBSTETRICS & GYNECOLOGY

## 2019-04-15 PROCEDURE — 87081 CULTURE SCREEN ONLY: CPT

## 2019-04-15 PROCEDURE — 87591 N.GONORRHOEAE DNA AMP PROB: CPT

## 2019-04-15 PROCEDURE — 90471 IMMUNIZATION ADMIN: CPT | Performed by: OBSTETRICS & GYNECOLOGY

## 2019-04-15 PROCEDURE — 90715 TDAP VACCINE 7 YRS/> IM: CPT | Performed by: OBSTETRICS & GYNECOLOGY

## 2019-04-15 PROCEDURE — 87491 CHLMYD TRACH DNA AMP PROBE: CPT

## 2019-04-15 NOTE — PROGRESS NOTES
Subjective:      Buffy Chong is a 33 y.o. female who presents for new OB            HPI patient is a 33-year-old  3 para 1101 who presents today for new OB exam.  She reports some pelvic pressure and some vaginal discharge.  Patient has had no prenatal care in this pregnancy and states she was in [place] and went for a few times for fetal heart rate check.  She has not had an ultrasound.  By her approximate last menstrual period,  She is approximately 37 weeks and 3 days gestation.  She reports good fetal movements.  Denies any bleeding or leaking.  Denies contractions denies headaches or scotoma.  Reports normal bowel and bladder functions    Her first pregnancy was ended at 24 weeks with intrauterine fetal demise with multiple chromosomal abnormalities.  Her second pregnancy was a  due to arrest of dilatation and descent.  Patient is taking prenatal vitamins    ROS all organ systems were reviewed and were negative except for complaints in HPI       Objective:     LMP 2018      Physical Exam   Constitutional: She is oriented to person, place, and time. She appears well-developed and well-nourished. No distress.   HENT:   Head: Normocephalic and atraumatic.   Eyes: Pupils are equal, round, and reactive to light. EOM are normal. Right eye exhibits no discharge. Left eye exhibits no discharge.   Neck: Normal range of motion. Neck supple. No tracheal deviation present. No thyromegaly present.   Cardiovascular: Normal rate, regular rhythm, normal heart sounds and intact distal pulses.  Exam reveals no friction rub.    No murmur heard.  Pulmonary/Chest: Effort normal and breath sounds normal. No respiratory distress. Right breast exhibits no inverted nipple, no mass, no nipple discharge, no skin change and no tenderness. Left breast exhibits no inverted nipple, no mass, no nipple discharge, no skin change and no tenderness.   Abdominal: Soft. Bowel sounds are normal. She exhibits no  distension. There is no tenderness. There is no guarding.   Genitourinary: Vagina normal.   Musculoskeletal: Normal range of motion. She exhibits no edema or deformity.   Neurological: She is alert and oriented to person, place, and time.   Skin: Skin is warm and dry. She is not diaphoretic. No erythema.   Psychiatric: She has a normal mood and affect. Her behavior is normal. Thought content normal.   Nursing note and vitals reviewed.            Wet prep was negative per my read  UA : negative except trace leuk.    Transabdominal ultrasound was performed and read by me: Indication is no prenatal care/unsure dates    Jorge intrauterine pregnancy noted in vertex presentation  Placenta is anterior fundal  Fetal biometry measurements gives a gestational age of 37 weeks and 1 day  EDC by ultrasound today is 5/5/2019  Estimated fetal weight is 3104 g  Fetal heart rate was 149 bpm  Amniotic fluid index is 10.3 cm    Impression: Normal intrauterine pregnancy at 37 weeks and 1 day gestation.  By approximate LMP, patient is approximately 37 weeks and 3 days gestation.  Findings reviewed with patient    Assessment/Plan:     1. High-risk pregnancy supervision, third trimester  Patient presents today for new OB exam.  Exam is within normal limits.  Patient is 37 weeks and 1 day gestation by ultrasound consistent with her approximate last menstrual period date.  Pregnancy care reviewed  Pregnancy precautions reviewed  Kick counts discussed  Prenatal labs ordered  NST twice weekly initiated    - GRP B STREP, BY PCR (HERNANDEZ BROTH); Future  - PREG CNTR PRENATAL PN; Future  - US-OB 2ND 3RD TRI COMPLETE; Future  - GLUCOSE 1HR GESTATIONAL; Future  - THINPREP RFLX HPV ASCUS W/CTNG; Future  - Tdap Vaccine =>8YO IM    2. Late prenatal care affecting pregnancy in third trimester  Patient counseled on prenatal care and risks of late care  - PREG CNTR PRENATAL PN; Future  - US-OB 2ND 3RD TRI COMPLETE; Future  - GLUCOSE 1HR GESTATIONAL;  Future    3. Hx of  section  Repeat  planned-order placed  - US-OB 2ND 3RD TRI COMPLETE; Future  - GLUCOSE 1HR GESTATIONAL; Future    4. 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, antepartum  No testing desired currently      5. H/O fetal demise  NST twice weekly started

## 2019-04-15 NOTE — LETTER
Cuente los Movimientos de montgomery Bebé  Otro paso importante para la vince de montgomery bebé    Lizeth Gonzalez-Kevon     [facility]            Dept: [phone]    ¿Cuántas semanas tiene de embarazo? 37w3d    Fecha cuando tiene que comenzar a contar el movimiento: 4/15/19                  Ya debe usar silas diagrama    Rajiv manera en que montgomery doctor puede controlar a vince de montgomery bebé es sabiendo cuantas veces se mueve montgomery bebé en el útero, o por medio de las “pataditas”.  Usted podrá ayudarle a montgomery médico al usar cada día el siguiente diagrama.    Cada día, usted debe prestar atención a cuantas horas le lleva a montgomery bebé moverse 10 veces.  Comience a contar en la mañana, lo antes posible después de haberse levantado.    · Primeramente, escriba la hora en que se mueve montgomery bebé, hasta llegar a 10 veces.  · Colóquele un check o palomita a cada cuadrito cada vez que montgomery bebé se mueva hasta que complete 10 veces.  · Escriba la hora cuando termine de contar 10 veces en la última columna.  · Sume el total del tiempo que le llevó contar los 10 movimientos.  · Finalmente, complete el cuadrito de cuantas horas le llevó hacerlo.    Después de jany contado los 10 movimientos, ya no tendrá que contar los demás movimientos por el gwendolyn del día.  A la mañana siguiente, comience a contar de nuevo cuantas veces se mueve el bebé desde el momento en que se levante.    ¿Qué tendría que considerarse un “movimiento”?  Es difícil de decirlo porque es distinto de rajiv madre a otra, y de un embarazo a otro.  Lo importante es que cuente el movimiento de la misma manera loretta el transcurso de montgomery embarazo.  Si tiene preguntas adicionales, pregúntele a montgomery doctor.    ¡Cuente cuidadosamente cada día!     MUESTRA:  Semana 28    ¿Cuántas horas le ha llevado sentir 10 movimientos?        Hora de Inicio     1     2     3     4     5     6     7     8     9     10   Hora de Finlizar   Lin. 8:20 ·  ·  ·  ·  ·  ·  ·  ·  ·  ·  11:40   Mar.               Mié.                Jue.               Vie.               Sáb.               Dom.                 IMPORTANTE:  Usted debe contactar a montgomery doctor si le lleva más de 2 horas sentir 10 movimientos de montgomery bebé.    Cada mañana, escriba la hora de inicio y comience a contar los movimientos de montgomery bebé.  Hágalo colocándole un check o palomita a cada cuadrito cada vez que sienta un movimiento de montgomery bebé.  Cuando haya sentido 10 “pataditas”, escriba la hora en que terminó de contar en la última columna.  Luego, complete en la cajita (arriba de la qamar de check o palomita) el número total de horas que le llevó hacerlo.  Asegúrese de leer completamente las instrucciones en la página anterior.

## 2019-04-15 NOTE — PROGRESS NOTES
NST per my read:  Indications: HX fetal demise    FHR baseline: 130s  Accelerations present without decelerations  Moderate FHR variability present  Earl:  Irregular contractions noted  NST is reactive

## 2019-04-15 NOTE — PROGRESS NOTES
Pt here today for   LMP: ~ 07/27/2019   Reports +FM  WT: 156 lb  BP: 116/74  Pt states having a lot of pelvic pressure and and right leg swelling, and yellowish vaginal discharge. Denies itching or odor.   PNP and 1 hr gtt labs ordered today, lab slip given today along with instructions.  GBS to be done today  Good # [phone]

## 2019-04-15 NOTE — LETTER
Estudios Para Detectar los Portadores de la Fibrosis Quística   (La Enfermedad Fibroquística del Páncreas)    Lizeth Arlette    Esta información esta relacionada con un examen de dom que puede determinar si usted o montgomery césar es portador del gen que causa la enfermedad hereditaria que se llama la fibrosis quística.     ¿QUE ES LA ENFERMEDAD FIBROSIS QUÍSTICA?  · La  fibrosis quística es rajiv enfermedad hereditaria que afecta a más de 25,000 niños y jóvenes adultos americanos.  · Los síntomas de la fibrosis quística varían de rajiv persona a otra.  Los síntomas más comunes son congestión pulmonar, diarrea y retraso en el crecimiento del jacque.  La mayoría de las personas con la fibrosis quística padecen de problemas médicos muy severos, y algunas mueren jóvenes.  Otras tienen tan pocos síntomas que no se percatan de que tienen fibrosis quística.  · La fibrosis quística no afecta en absoluto la inteligencia de la persona.  · Aunque actualmente no hay rajiv jeff para la fibrosis quística, los científicos están avanzando con respecto a nuevos tratamientos y en la búsqueda de la jeff.  Anteriormente la mayoría de las personas que padecían de fibrosis quística morían muy jóvenes.  Hoy en día, muchas personas que padecen de la fibrosis quística sobreviven hasta los 20 o 30 anos de edad.    ¿EXISTE LA POSIBILIDAD DE QUE MI JUANCARLOS PUEDA TENER FIBROSIS QUÍSTICA?  · Usted puede tener un hijo con la fibrosis quística aun cuando no hay nadie en montgomery funmi que la padezca.  (Slade la grafica que sigue.)  · Existe rajiv prueba que puede ayudarle a determinar si evert un gen para la fibrosis quística y si por eso corre un riesgo de tener un hijo con la enfermedad.  · Si ambos padres son portadores del gen para la fibrosis quística, hay rajiv (1) probabilidad entre 4 (25%) en cada embarazo, de que puedan tener un hijo afectada con fibrosis quística.  · Los portadores del gen para la fibrosis quística tienen rajiv copia del gen  normal y el otro gen alterado.  · Las personas con fibrosis quística tienen dos genes alterados para la fibrosis quística,  · Normalmente la mayoría de individuos tienen dos copias normales del gen para fibrosis quística.    Riesgo aproximado de que rajiv césar sin familiares con fibrosis quística pueda tener un hijo con fibrosis quística:  Origen / Riesgo  Rajiv césar Caucásica (de shaun radha):  1 en 2,500  Rajiv césar :  1 en 8,000  Rajiv césar Afroamericana (de shaun corrine):  1 en 15,000  Rajiv césar Asiática:  1 en 32,000    ¿EXISTEN PRUEBAS PARA DETECTAR LOS PORTADORES DE LA FIBROSIS QUÍSTICA?  · Hay rajiv prueba de dom para determinar si usted o montgomery césar portan el gen para la fibrosis quística.  · Es importante entender que la prueba no detecta todos los portadores del gen para la fibrosis quística.  · Si la prueba determina que ambos padres con portadores, se puede realizar otras pruebas para determinar si montgomery futuro yonis esta afectado.    ¿CUANTO WALTER LA PRUEBA PARA DETERMINAR SI SOY PORTADOR(A) DEL GEN PARA LA FIBROSIS QUÍSTICA?  · El costo de la prueba varia según montgomery póliza de seguro medico y el laboratorio donde se efectúa el análisis.  · El costo promedio de la prueba es de $300.  · Montgomery consejera genética puede darle mas información acera de esta prueba para determinar si usted es portador de la fibrosis quística.    _____  Si, yo estoy interesada y me gustaria obtener mas información al respecto.  _____  No tengo interés ni en hacerme la prueba para determinar si soy portador(a) de gen para la fibrosis quística ni en recibir mas información al respecto.    Firma del paciente: _____________________________   4/15/2019

## 2019-04-15 NOTE — PROGRESS NOTES
Pt here for NOB visit  LMP: ~ 07/27/2018  WT: 156 lb  BP: 116/74  Pt states having a lot of pelvic pressure and having swelling on her right leg, and having yellowish vaginal discharge.   Desires BTL  MAGDALENO sheet given and explained today  Good # [phone]    Tdap vaccine given. Left  Deltoid. VIS given and screening check list reviewed with pt.  Tdap vaccine verified by Basia Shaw. 04/15/2019

## 2019-04-16 DIAGNOSIS — O09.93 HIGH-RISK PREGNANCY SUPERVISION, THIRD TRIMESTER: ICD-10-CM

## 2019-04-16 LAB
C TRACH DNA GENITAL QL NAA+PROBE: NEGATIVE
CYTOLOGY REG CYTOL: NORMAL
N GONORRHOEA DNA GENITAL QL NAA+PROBE: NEGATIVE
SPECIMEN SOURCE: NORMAL

## 2019-04-17 LAB — GP B STREP DNA SPEC QL NAA+PROBE: NEGATIVE

## 2019-04-19 ENCOUNTER — HOSPITAL ENCOUNTER (OUTPATIENT)
Facility: MEDICAL CENTER | Age: 34
End: 2019-04-19
Attending: OBSTETRICS & GYNECOLOGY | Admitting: OBSTETRICS & GYNECOLOGY
Payer: COMMERCIAL

## 2019-04-19 ENCOUNTER — HOSPITAL ENCOUNTER (OUTPATIENT)
Dept: LAB | Facility: MEDICAL CENTER | Age: 34
End: 2019-04-19
Attending: OBSTETRICS & GYNECOLOGY
Payer: COMMERCIAL

## 2019-04-19 ENCOUNTER — ROUTINE PRENATAL (OUTPATIENT)
Dept: OBGYN | Facility: CLINIC | Age: 34
End: 2019-04-19
Payer: COMMERCIAL

## 2019-04-19 ENCOUNTER — APPOINTMENT (OUTPATIENT)
Dept: RADIOLOGY | Facility: MEDICAL CENTER | Age: 34
End: 2019-04-19
Attending: STUDENT IN AN ORGANIZED HEALTH CARE EDUCATION/TRAINING PROGRAM
Payer: COMMERCIAL

## 2019-04-19 VITALS
DIASTOLIC BLOOD PRESSURE: 66 MMHG | BODY MASS INDEX: 30.63 KG/M2 | SYSTOLIC BLOOD PRESSURE: 112 MMHG | TEMPERATURE: 98.9 F | HEART RATE: 79 BPM | HEIGHT: 60 IN | WEIGHT: 156 LBS

## 2019-04-19 DIAGNOSIS — Z98.891 HX OF CESAREAN SECTION: ICD-10-CM

## 2019-04-19 DIAGNOSIS — O09.291 PREVIOUS PREGNANCY COMPLICATED BY CHROMOSOMAL ABNORMALITY IN FIRST TRIMESTER, ANTEPARTUM: ICD-10-CM

## 2019-04-19 DIAGNOSIS — O09.293 PRIOR PREGNANCY WITH FETAL DEMISE AND CURRENT PREGNANCY, THIRD TRIMESTER: ICD-10-CM

## 2019-04-19 DIAGNOSIS — O09.33 LATE PRENATAL CARE AFFECTING PREGNANCY IN THIRD TRIMESTER: ICD-10-CM

## 2019-04-19 DIAGNOSIS — O09.93 HIGH-RISK PREGNANCY SUPERVISION, THIRD TRIMESTER: ICD-10-CM

## 2019-04-19 LAB
ABO GROUP BLD: NORMAL
APPEARANCE UR: ABNORMAL
BACTERIA #/AREA URNS HPF: ABNORMAL /HPF
BASOPHILS # BLD AUTO: 0.5 % (ref 0–1.8)
BASOPHILS # BLD: 0.04 K/UL (ref 0–0.12)
BILIRUB UR QL STRIP.AUTO: NEGATIVE
BLD GP AB SCN SERPL QL: NORMAL
COLOR UR: YELLOW
EOSINOPHIL # BLD AUTO: 0.08 K/UL (ref 0–0.51)
EOSINOPHIL NFR BLD: 1 % (ref 0–6.9)
EPI CELLS #/AREA URNS HPF: ABNORMAL /HPF
ERYTHROCYTE [DISTWIDTH] IN BLOOD BY AUTOMATED COUNT: 45.1 FL (ref 35.9–50)
GLUCOSE 1H P 50 G GLC PO SERPL-MCNC: 121 MG/DL (ref 70–139)
GLUCOSE UR STRIP.AUTO-MCNC: NEGATIVE MG/DL
HBV SURFACE AG SER QL: NEGATIVE
HCT VFR BLD AUTO: 37.5 % (ref 37–47)
HGB BLD-MCNC: 12 G/DL (ref 12–16)
HIV 1+2 AB+HIV1 P24 AG SERPL QL IA: NON REACTIVE
IMM GRANULOCYTES # BLD AUTO: 0.06 K/UL (ref 0–0.11)
IMM GRANULOCYTES NFR BLD AUTO: 0.8 % (ref 0–0.9)
KETONES UR STRIP.AUTO-MCNC: NEGATIVE MG/DL
LEUKOCYTE ESTERASE UR QL STRIP.AUTO: ABNORMAL
LYMPHOCYTES # BLD AUTO: 1.77 K/UL (ref 1–4.8)
LYMPHOCYTES NFR BLD: 23.2 % (ref 22–41)
MCH RBC QN AUTO: 29.8 PG (ref 27–33)
MCHC RBC AUTO-ENTMCNC: 32 G/DL (ref 33.6–35)
MCV RBC AUTO: 93.1 FL (ref 81.4–97.8)
MICRO URNS: ABNORMAL
MONOCYTES # BLD AUTO: 0.36 K/UL (ref 0–0.85)
MONOCYTES NFR BLD AUTO: 4.7 % (ref 0–13.4)
MUCOUS THREADS #/AREA URNS HPF: ABNORMAL /HPF
NEUTROPHILS # BLD AUTO: 5.32 K/UL (ref 2–7.15)
NEUTROPHILS NFR BLD: 69.8 % (ref 44–72)
NITRITE UR QL STRIP.AUTO: NEGATIVE
NRBC # BLD AUTO: 0.03 K/UL
NRBC BLD-RTO: 0.4 /100 WBC
NST ACOUSTIC STIMULATION: NO
NST ACTION NECESSARY: ABNORMAL
NST ASSESSMENT: ABNORMAL
NST BASELINE: ABNORMAL
NST INDICATIONS: ABNORMAL
NST OTHER DATA: ABNORMAL
NST READ BY: ABNORMAL
NST RETURN: ABNORMAL
NST UTERINE ACTIVITY: ABNORMAL
PH UR STRIP.AUTO: 6 [PH]
PLATELET # BLD AUTO: 255 K/UL (ref 164–446)
PMV BLD AUTO: 11.2 FL (ref 9–12.9)
PROT UR QL STRIP: NEGATIVE MG/DL
RBC # BLD AUTO: 4.03 M/UL (ref 4.2–5.4)
RBC # URNS HPF: ABNORMAL /HPF
RBC UR QL AUTO: NEGATIVE
RH BLD: NORMAL
RUBV AB SER QL: >500 IU/ML
SP GR UR STRIP.AUTO: 1.02
TREPONEMA PALLIDUM IGG+IGM AB [PRESENCE] IN SERUM OR PLASMA BY IMMUNOASSAY: NON REACTIVE
UROBILINOGEN UR STRIP.AUTO-MCNC: 0.2 MG/DL
WBC # BLD AUTO: 7.6 K/UL (ref 4.8–10.8)
WBC #/AREA URNS HPF: ABNORMAL /HPF

## 2019-04-19 PROCEDURE — 86850 RBC ANTIBODY SCREEN: CPT

## 2019-04-19 PROCEDURE — 86900 BLOOD TYPING SEROLOGIC ABO: CPT

## 2019-04-19 PROCEDURE — 76819 FETAL BIOPHYS PROFIL W/O NST: CPT

## 2019-04-19 PROCEDURE — 86762 RUBELLA ANTIBODY: CPT

## 2019-04-19 PROCEDURE — 87389 HIV-1 AG W/HIV-1&-2 AB AG IA: CPT

## 2019-04-19 PROCEDURE — 85025 COMPLETE CBC W/AUTO DIFF WBC: CPT

## 2019-04-19 PROCEDURE — 86901 BLOOD TYPING SEROLOGIC RH(D): CPT

## 2019-04-19 PROCEDURE — 82950 GLUCOSE TEST: CPT

## 2019-04-19 PROCEDURE — 81001 URINALYSIS AUTO W/SCOPE: CPT

## 2019-04-19 PROCEDURE — 59025 FETAL NON-STRESS TEST: CPT | Performed by: OBSTETRICS & GYNECOLOGY

## 2019-04-19 PROCEDURE — 36415 COLL VENOUS BLD VENIPUNCTURE: CPT

## 2019-04-19 PROCEDURE — 86780 TREPONEMA PALLIDUM: CPT

## 2019-04-19 PROCEDURE — 76816 OB US FOLLOW-UP PER FETUS: CPT

## 2019-04-19 PROCEDURE — 87340 HEPATITIS B SURFACE AG IA: CPT

## 2019-04-19 PROCEDURE — 59025 FETAL NON-STRESS TEST: CPT | Mod: XU

## 2019-04-19 NOTE — PROGRESS NOTES
33 y.o. , EDC 5/3=38w0d    Pt presents to L&D sent from [facility] for BPP after late deceleration noted during NST in office. Pt receiving NSTs for hx IUFD at 26 weeks. External monitors applied, VSS, orders placed      Report to A Scullion MD in unit. U/S at bedside at 1536    BPP , A Scullion to bedside. MD reviewed FHR tracing including late deceleration at 1505. Pt had labwork done today and has appointment scheduled for growth U/S. D/c order received. D/c by KEYONA Escudero RN, see note

## 2019-04-19 NOTE — PROGRESS NOTES
[facility] LABOR AND DELIVERY TRIAGE PROGRESS NOTE    PATIENT ID:  NAME:  Buffy Chong  MRN:               [id number]  YOB: 1985     33 y.o. female  at 38w0 dated by LMP with very limited PNC was sent from office after having late decel during NST.  Complaining of CTX  That started 1 week ago.  Hx of IUFD of 24 weeks, multiple chromosomal abnormalities.   She has been in [place] for most of pregnancy.  Currently staying in [place] with family.  Planning to deliver here.         positive  For CTXS.   positive Feels pain   negative for LOF  negative for vaginal bleeding.   positive for fetal movement    ROS: Patient denies any fever chills, nausea, vomiting, headache, chest pain, shortness of breath, or dysuria or unusual swelling of hands or feet.     Objective:    Vitals:    19 1446 19 1451   BP:  112/66   Pulse:  79   Temp:  37.2 °C (98.9 °F)   TempSrc:  Temporal   Weight: 70.8 kg (156 lb)    Height: 1.524 m (5')      Temp (24hrs), Av.2 °C (98.9 °F), Min:37.2 °C (98.9 °F), Max:37.2 °C (98.9 °F)    General: No acute distress, resting comfortably in bed.  HEENT: normocephalic, nontraumatic, PERRLA, EOMI  Cardiovascular: Heart RRR with no murmurs, rubs or gallops. Distal Pulses 2+  Respiratory: symmetric chest expansion, lungs CTAB, with no wheezes, rales, rhonci  Abdomen: gravid, nontender  Musculoskeletal: strength 5/5 in four extremities  Neuro: non focal with no numbness, tingling or changes in sensation    Cervix:  3/70/-2  Charlack: Uterine irritability   FHRM: 140, moderate variability, +acels,  1 late decel     Assessment: 33 y.o. female  at 38w0d.    BPP:   Reactive NST   Limited PNC: PNL completed today at outside lab, anatomy U/S scheduled   Planning repeat c/s   U/S: complete anatomy U/S if U/S available     Dispo:  Home   Continue twice weekly NST   Labor precautions given     Discussed case with Dr. Blount, TPN Attending. Case was discussed and attending agreed  with plan prior to discharge of patient.

## 2019-04-19 NOTE — PROGRESS NOTES
Patient here for nonstress tests  Fetal heart rate baseline is in the 130s with acceleration and 1 spontaneous late deceleration noted during monitoring.  Findings reviewed with patient and patient sent over to labor and delivery for BPP.  Charge nurse and on-call physician notified

## 2019-04-20 NOTE — PROGRESS NOTES
1710: Pt discharged home to self. General term labor instructions given with emphasis on fetal kick counts. Pt verbalized understanding.

## 2019-04-23 ENCOUNTER — ROUTINE PRENATAL (OUTPATIENT)
Dept: OBGYN | Facility: CLINIC | Age: 34
End: 2019-04-23
Payer: COMMERCIAL

## 2019-04-23 VITALS — SYSTOLIC BLOOD PRESSURE: 115 MMHG | DIASTOLIC BLOOD PRESSURE: 66 MMHG | WEIGHT: 161 LBS | BODY MASS INDEX: 31.44 KG/M2

## 2019-04-23 DIAGNOSIS — O09.33 LATE PRENATAL CARE AFFECTING PREGNANCY IN THIRD TRIMESTER: ICD-10-CM

## 2019-04-23 DIAGNOSIS — Z87.59 H/O FETAL DEMISE, NOT CURRENTLY PREGNANT: ICD-10-CM

## 2019-04-23 DIAGNOSIS — Z98.891 HX OF CESAREAN SECTION: ICD-10-CM

## 2019-04-23 DIAGNOSIS — Z86.59 HISTORY OF DEPRESSION: ICD-10-CM

## 2019-04-23 DIAGNOSIS — O09.293 PRIOR PREGNANCY WITH FETAL DEMISE AND CURRENT PREGNANCY, THIRD TRIMESTER: ICD-10-CM

## 2019-04-23 DIAGNOSIS — O09.93 HIGH-RISK PREGNANCY SUPERVISION, THIRD TRIMESTER: ICD-10-CM

## 2019-04-23 DIAGNOSIS — O09.291 PREVIOUS PREGNANCY COMPLICATED BY CHROMOSOMAL ABNORMALITY IN FIRST TRIMESTER, ANTEPARTUM: ICD-10-CM

## 2019-04-23 LAB
NST ACOUSTIC STIMULATION: NO
NST ACTION NECESSARY: NORMAL
NST ASSESSMENT: NORMAL
NST BASELINE: NORMAL
NST INDICATIONS: NORMAL
NST OTHER DATA: NORMAL
NST READ BY: NORMAL
NST RETURN: NORMAL
NST UTERINE ACTIVITY: NO

## 2019-04-23 PROCEDURE — 90040 PR PRENATAL FOLLOW UP: CPT | Performed by: OBSTETRICS & GYNECOLOGY

## 2019-04-23 PROCEDURE — 59025 FETAL NON-STRESS TEST: CPT | Performed by: OBSTETRICS & GYNECOLOGY

## 2019-04-23 NOTE — PROGRESS NOTES
Pt here today for OB follow up  Pt states some 's  Reports +  Good # [phone]  Pharmacy Confirmed.  Chaperone offered and provided.

## 2019-04-23 NOTE — PROGRESS NOTES
S: Pt presents for routine OB follow up. Reports normal fetal movements.  No contractions, vaginal bleeding, or leakage of fluid.    Questions answered.    O: /66   Wt 73 kg (161 lb)   LMP 07/27/2018   BMI 31.44 kg/m²   Patients' weight gain, fluid intake and exercise level discussed.  Vitals, fundal height , fetal position, and FHR reviewed on flowsheet    Lab:  Recent Results (from the past 336 hour(s))   POCT Pregnancy    Collection Time: 04/12/19  1:41 PM   Result Value Ref Range    POC Urine Pregnancy Test Positive Negative    Internal Control Positive Positive     Internal Control Negative Negative    POCT Urinalysis    Collection Time: 04/15/19 11:34 AM   Result Value Ref Range    POC Color Katelynn Negative    POC Appearance Slighlty-cloudy Negative    POC Leukocyte Esterase Trace Negative    POC Nitrites Negative Negative    POC Urobiligen  Negative (0.2) mg/dL    POC Protein Negative Negative mg/dL    POC Urine PH 6.0 5.0 - 8.0    POC Blood Negative Negative    POC Specific Gravity 1.020 <1.005 - >1.030    POC Ketones Negative Negative mg/dL    POC Bilirubin  Negative mg/dL    POC Glucose Negative Negative mg/dL   POCT Fetal Nonstress Test    Collection Time: 04/15/19 11:39 AM   Result Value Ref Range    NST Indications      NST Baseline      NST Uterine Activity      NST Acoustic Stimulation      NST Assessment      NST Action Necessary      NST Other Data      NST Return      NST Read By     GRP B STREP, BY PCR (HERNANDEZ BROTH)    Collection Time: 04/15/19 11:50 AM   Result Value Ref Range    Strep Gp B DNA PCR Negative Negative   THINPREP RFLX HPV ASCUS W/CTNG    Collection Time: 04/15/19 11:50 AM   Result Value Ref Range    Cytology Reg See Path Report     C. trachomatis by PCR Negative Negative    N. gonorrhoeae by PCR Negative Negative    Source Cervical    PREG CNTR PRENATAL PN    Collection Time: 04/19/19 12:17 PM   Result Value Ref Range    Color Yellow     Character Cloudy (A)     Specific  Gravity 1.021 <1.035    Ph 6.0 5.0 - 8.0    Glucose Negative Negative mg/dL    Ketones Negative Negative mg/dL    Protein Negative Negative mg/dL    Bilirubin Negative Negative    Urobilinogen, Urine 0.2 Negative    Nitrite Negative Negative    Leukocyte Esterase Moderate (A) Negative    Occult Blood Negative Negative    Micro Urine Req Microscopic     WBC 7.6 4.8 - 10.8 K/uL    RBC 4.03 (L) 4.20 - 5.40 M/uL    Hemoglobin 12.0 12.0 - 16.0 g/dL    Hematocrit 37.5 37.0 - 47.0 %    MCV 93.1 81.4 - 97.8 fL    MCH 29.8 27.0 - 33.0 pg    MCHC 32.0 (L) 33.6 - 35.0 g/dL    RDW 45.1 35.9 - 50.0 fL    Platelet Count 255 164 - 446 K/uL    MPV 11.2 9.0 - 12.9 fL    Neutrophils-Polys 69.80 44.00 - 72.00 %    Lymphocytes 23.20 22.00 - 41.00 %    Monocytes 4.70 0.00 - 13.40 %    Eosinophils 1.00 0.00 - 6.90 %    Basophils 0.50 0.00 - 1.80 %    Immature Granulocytes 0.80 0.00 - 0.90 %    Nucleated RBC 0.40 /100 WBC    Neutrophils (Absolute) 5.32 2.00 - 7.15 K/uL    Lymphs (Absolute) 1.77 1.00 - 4.80 K/uL    Monos (Absolute) 0.36 0.00 - 0.85 K/uL    Eos (Absolute) 0.08 0.00 - 0.51 K/uL    Baso (Absolute) 0.04 0.00 - 0.12 K/uL    Immature Granulocytes (abs) 0.06 0.00 - 0.11 K/uL    NRBC (Absolute) 0.03 K/uL    Hepatitis B Surface Antigen Negative Negative    Rubella IgG Antibody >500.0 IU/mL    Syphilis, Treponemal Qual Non Reactive Non Reactive   GLUCOSE 1HR GESTATIONAL    Collection Time: 04/19/19 12:17 PM   Result Value Ref Range    Glucose, Post Dose 121 70 - 139 mg/dL   HIV AG/AB COMBO ASSAY SCREENING    Collection Time: 04/19/19 12:17 PM   Result Value Ref Range    HIV Ag/Ab Combo Assay Non Reactive Non Reactive   OP PRENATAL PANEL-BLOOD BANK    Collection Time: 04/19/19 12:17 PM   Result Value Ref Range    ABO Grouping Only B     Rh Grouping Only POS     Antibody Screen Scrn NEG    URINE MICROSCOPIC (W/UA)    Collection Time: 04/19/19 12:17 PM   Result Value Ref Range    WBC 5-10 (A) /hpf    RBC 0-2 /hpf    Bacteria Few (A)  None /hpf    Epithelial Cells Few /hpf    Mucous Threads Many /hpf   POCT Fetal Nonstress Test    Collection Time: 19  1:45 PM   Result Value Ref Range    NST Indications Hx fetal demise     NST Baseline 130s     NST Uterine Activity 1 contraction     NST Acoustic Stimulation no     NST Assessment Reactive NST with one late deceleration     NST Action Necessary      NST Other Data      NST Return      NST Read By     POCT Fetal Nonstress Test    Collection Time: 19 10:27 AM   Result Value Ref Range    NST Indications HX fetal demise     NST Baseline 120s     NST Uterine Activity no     NST Acoustic Stimulation no     NST Assessment Reactive NST     NST Action Necessary      NST Other Data      NST Return      NST Read By         A/P:  33 y.o.  at 38w4d presents for routine obstetric follow-up.  Size equals dates  Encounter Diagnoses   Name Primary?   • High-risk pregnancy supervision, third trimester    • Late prenatal care affecting pregnancy in third trimester    • Hx of  section- has repeat -section scheduled    • History of depression    • 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, antepartum    • H/O fetal demise        1.  Continue prenatal vitamins.  2.  Fetal kick counts discussed.  3.  Exercise at least 30 minutes daily.  4.  Drink at least 2L of water daily  5.  Labor precautions educated.  6.  Follow-up in 1 weeks.  7.  Continue 2x wk NST until delivery  8. Precautions and plan of care discussed

## 2019-04-26 ENCOUNTER — HOSPITAL ENCOUNTER (INPATIENT)
Facility: MEDICAL CENTER | Age: 34
LOS: 1 days | End: 2019-04-27
Attending: OBSTETRICS & GYNECOLOGY | Admitting: OBSTETRICS & GYNECOLOGY
Payer: COMMERCIAL

## 2019-04-26 LAB
BASOPHILS # BLD AUTO: 0.2 % (ref 0–1.8)
BASOPHILS # BLD AUTO: 0.3 % (ref 0–1.8)
BASOPHILS # BLD: 0.03 K/UL (ref 0–0.12)
BASOPHILS # BLD: 0.04 K/UL (ref 0–0.12)
EOSINOPHIL # BLD AUTO: 0 K/UL (ref 0–0.51)
EOSINOPHIL # BLD AUTO: 0.02 K/UL (ref 0–0.51)
EOSINOPHIL NFR BLD: 0 % (ref 0–6.9)
EOSINOPHIL NFR BLD: 0.2 % (ref 0–6.9)
ERYTHROCYTE [DISTWIDTH] IN BLOOD BY AUTOMATED COUNT: 42.7 FL (ref 35.9–50)
ERYTHROCYTE [DISTWIDTH] IN BLOOD BY AUTOMATED COUNT: 43 FL (ref 35.9–50)
HCT VFR BLD AUTO: 31.8 % (ref 37–47)
HCT VFR BLD AUTO: 32.6 % (ref 37–47)
HGB BLD-MCNC: 10.8 G/DL (ref 12–16)
HGB BLD-MCNC: 10.9 G/DL (ref 12–16)
HOLDING TUBE BB 8507: NORMAL
IMM GRANULOCYTES # BLD AUTO: 0.16 K/UL (ref 0–0.11)
IMM GRANULOCYTES # BLD AUTO: 0.18 K/UL (ref 0–0.11)
IMM GRANULOCYTES NFR BLD AUTO: 0.9 % (ref 0–0.9)
IMM GRANULOCYTES NFR BLD AUTO: 1.4 % (ref 0–0.9)
LYMPHOCYTES # BLD AUTO: 1.19 K/UL (ref 1–4.8)
LYMPHOCYTES # BLD AUTO: 2.24 K/UL (ref 1–4.8)
LYMPHOCYTES NFR BLD: 13.1 % (ref 22–41)
LYMPHOCYTES NFR BLD: 9.2 % (ref 22–41)
MCH RBC QN AUTO: 30.4 PG (ref 27–33)
MCH RBC QN AUTO: 30.5 PG (ref 27–33)
MCHC RBC AUTO-ENTMCNC: 33.4 G/DL (ref 33.6–35)
MCHC RBC AUTO-ENTMCNC: 34 G/DL (ref 33.6–35)
MCV RBC AUTO: 89.8 FL (ref 81.4–97.8)
MCV RBC AUTO: 91.1 FL (ref 81.4–97.8)
MONOCYTES # BLD AUTO: 0.6 K/UL (ref 0–0.85)
MONOCYTES # BLD AUTO: 0.83 K/UL (ref 0–0.85)
MONOCYTES NFR BLD AUTO: 4.6 % (ref 0–13.4)
MONOCYTES NFR BLD AUTO: 4.9 % (ref 0–13.4)
NEUTROPHILS # BLD AUTO: 10.96 K/UL (ref 2–7.15)
NEUTROPHILS # BLD AUTO: 13.8 K/UL (ref 2–7.15)
NEUTROPHILS NFR BLD: 80.9 % (ref 44–72)
NEUTROPHILS NFR BLD: 84.3 % (ref 44–72)
NRBC # BLD AUTO: 0.05 K/UL
NRBC # BLD AUTO: 0.08 K/UL
NRBC BLD-RTO: 0.3 /100 WBC
NRBC BLD-RTO: 0.6 /100 WBC
PLATELET # BLD AUTO: 201 K/UL (ref 164–446)
PLATELET # BLD AUTO: 213 K/UL (ref 164–446)
PMV BLD AUTO: 10.1 FL (ref 9–12.9)
PMV BLD AUTO: 10.2 FL (ref 9–12.9)
RBC # BLD AUTO: 3.54 M/UL (ref 4.2–5.4)
RBC # BLD AUTO: 3.58 M/UL (ref 4.2–5.4)
WBC # BLD AUTO: 13 K/UL (ref 4.8–10.8)
WBC # BLD AUTO: 17.1 K/UL (ref 4.8–10.8)

## 2019-04-26 PROCEDURE — 700101 HCHG RX REV CODE 250

## 2019-04-26 PROCEDURE — 59409 OBSTETRICAL CARE: CPT

## 2019-04-26 PROCEDURE — 700102 HCHG RX REV CODE 250 W/ 637 OVERRIDE(OP): Performed by: OBSTETRICS & GYNECOLOGY

## 2019-04-26 PROCEDURE — 304965 HCHG RECOVERY SERVICES

## 2019-04-26 PROCEDURE — 85025 COMPLETE CBC W/AUTO DIFF WBC: CPT

## 2019-04-26 PROCEDURE — A9270 NON-COVERED ITEM OR SERVICE: HCPCS | Performed by: STUDENT IN AN ORGANIZED HEALTH CARE EDUCATION/TRAINING PROGRAM

## 2019-04-26 PROCEDURE — 0KQM0ZZ REPAIR PERINEUM MUSCLE, OPEN APPROACH: ICD-10-PCS | Performed by: STUDENT IN AN ORGANIZED HEALTH CARE EDUCATION/TRAINING PROGRAM

## 2019-04-26 PROCEDURE — 700111 HCHG RX REV CODE 636 W/ 250 OVERRIDE (IP): Performed by: OBSTETRICS & GYNECOLOGY

## 2019-04-26 PROCEDURE — 700105 HCHG RX REV CODE 258

## 2019-04-26 PROCEDURE — 700112 HCHG RX REV CODE 229: Performed by: STUDENT IN AN ORGANIZED HEALTH CARE EDUCATION/TRAINING PROGRAM

## 2019-04-26 PROCEDURE — 700111 HCHG RX REV CODE 636 W/ 250 OVERRIDE (IP)

## 2019-04-26 PROCEDURE — A9270 NON-COVERED ITEM OR SERVICE: HCPCS | Performed by: OBSTETRICS & GYNECOLOGY

## 2019-04-26 PROCEDURE — 770002 HCHG ROOM/CARE - OB PRIVATE (112)

## 2019-04-26 PROCEDURE — 36415 COLL VENOUS BLD VENIPUNCTURE: CPT

## 2019-04-26 RX ORDER — SODIUM CHLORIDE, SODIUM LACTATE, POTASSIUM CHLORIDE, CALCIUM CHLORIDE 600; 310; 30; 20 MG/100ML; MG/100ML; MG/100ML; MG/100ML
INJECTION, SOLUTION INTRAVENOUS CONTINUOUS
Status: ACTIVE | OUTPATIENT
Start: 2019-04-26 | End: 2019-04-26

## 2019-04-26 RX ORDER — ALUMINA, MAGNESIA, AND SIMETHICONE 2400; 2400; 240 MG/30ML; MG/30ML; MG/30ML
30 SUSPENSION ORAL EVERY 6 HOURS PRN
Status: DISCONTINUED | OUTPATIENT
Start: 2019-04-26 | End: 2019-04-26 | Stop reason: HOSPADM

## 2019-04-26 RX ORDER — IBUPROFEN 800 MG/1
800 TABLET ORAL EVERY 8 HOURS PRN
Status: DISCONTINUED | OUTPATIENT
Start: 2019-04-26 | End: 2019-04-27 | Stop reason: HOSPADM

## 2019-04-26 RX ORDER — ONDANSETRON 2 MG/ML
4 INJECTION INTRAMUSCULAR; INTRAVENOUS EVERY 6 HOURS PRN
Status: DISCONTINUED | OUTPATIENT
Start: 2019-04-26 | End: 2019-04-27 | Stop reason: HOSPADM

## 2019-04-26 RX ORDER — OXYCODONE HYDROCHLORIDE AND ACETAMINOPHEN 5; 325 MG/1; MG/1
1 TABLET ORAL EVERY 4 HOURS PRN
Status: DISCONTINUED | OUTPATIENT
Start: 2019-04-26 | End: 2019-04-27 | Stop reason: HOSPADM

## 2019-04-26 RX ORDER — ONDANSETRON 4 MG/1
4 TABLET, ORALLY DISINTEGRATING ORAL EVERY 6 HOURS PRN
Status: DISCONTINUED | OUTPATIENT
Start: 2019-04-26 | End: 2019-04-27 | Stop reason: HOSPADM

## 2019-04-26 RX ORDER — MISOPROSTOL 200 UG/1
800 TABLET ORAL
Status: DISCONTINUED | OUTPATIENT
Start: 2019-04-26 | End: 2019-04-26 | Stop reason: HOSPADM

## 2019-04-26 RX ORDER — LIDOCAINE HYDROCHLORIDE 10 MG/ML
INJECTION, SOLUTION INFILTRATION; PERINEURAL
Status: COMPLETED
Start: 2019-04-26 | End: 2019-04-26

## 2019-04-26 RX ORDER — ACETAMINOPHEN 325 MG/1
650 TABLET ORAL EVERY 4 HOURS PRN
Status: DISCONTINUED | OUTPATIENT
Start: 2019-04-26 | End: 2019-04-27 | Stop reason: HOSPADM

## 2019-04-26 RX ORDER — DOCUSATE SODIUM 100 MG/1
100 CAPSULE, LIQUID FILLED ORAL EVERY 12 HOURS PRN
Status: DISCONTINUED | OUTPATIENT
Start: 2019-04-26 | End: 2019-04-27 | Stop reason: HOSPADM

## 2019-04-26 RX ORDER — SODIUM CHLORIDE, SODIUM LACTATE, POTASSIUM CHLORIDE, CALCIUM CHLORIDE 600; 310; 30; 20 MG/100ML; MG/100ML; MG/100ML; MG/100ML
INJECTION, SOLUTION INTRAVENOUS
Status: COMPLETED
Start: 2019-04-26 | End: 2019-04-26

## 2019-04-26 RX ADMIN — OXYCODONE AND ACETAMINOPHEN 1 TABLET: 5; 325 TABLET ORAL at 09:35

## 2019-04-26 RX ADMIN — OXYCODONE AND ACETAMINOPHEN 1 TABLET: 5; 325 TABLET ORAL at 13:35

## 2019-04-26 RX ADMIN — OXYTOCIN 2000 ML/HR: 10 INJECTION, SOLUTION INTRAMUSCULAR; INTRAVENOUS at 09:30

## 2019-04-26 RX ADMIN — OXYCODONE AND ACETAMINOPHEN 1 TABLET: 5; 325 TABLET ORAL at 17:37

## 2019-04-26 RX ADMIN — FENTANYL CITRATE 100 MCG: 50 INJECTION, SOLUTION INTRAMUSCULAR; INTRAVENOUS at 09:22

## 2019-04-26 RX ADMIN — IBUPROFEN 800 MG: 800 TABLET, FILM COATED ORAL at 13:35

## 2019-04-26 RX ADMIN — LIDOCAINE HYDROCHLORIDE: 10 INJECTION, SOLUTION INFILTRATION; PERINEURAL at 09:15

## 2019-04-26 RX ADMIN — SODIUM CHLORIDE, SODIUM LACTATE, POTASSIUM CHLORIDE, CALCIUM CHLORIDE: 600; 310; 30; 20 INJECTION, SOLUTION INTRAVENOUS at 08:15

## 2019-04-26 RX ADMIN — SODIUM CHLORIDE, POTASSIUM CHLORIDE, SODIUM LACTATE AND CALCIUM CHLORIDE: 600; 310; 30; 20 INJECTION, SOLUTION INTRAVENOUS at 08:15

## 2019-04-26 RX ADMIN — DOCUSATE SODIUM 100 MG: 100 CAPSULE, LIQUID FILLED ORAL at 16:49

## 2019-04-26 ASSESSMENT — ENCOUNTER SYMPTOMS
RESPIRATORY NEGATIVE: 1
MUSCULOSKELETAL NEGATIVE: 1
CARDIOVASCULAR NEGATIVE: 1
CONSTITUTIONAL NEGATIVE: 1
PSYCHIATRIC NEGATIVE: 1
NEUROLOGICAL NEGATIVE: 1
EYES NEGATIVE: 1

## 2019-04-26 ASSESSMENT — EDINBURGH POSTNATAL DEPRESSION SCALE (EPDS)
I HAVE FELT SCARED OR PANICKY FOR NO GOOD REASON: YES, SOMETIMES
THE THOUGHT OF HARMING MYSELF HAS OCCURRED TO ME: SOMETIMES
I HAVE BEEN ABLE TO LAUGH AND SEE THE FUNNY SIDE OF THINGS: NOT QUITE SO MUCH NOW
I HAVE BEEN ANXIOUS OR WORRIED FOR NO GOOD REASON: YES, SOMETIMES
I HAVE FELT SAD OR MISERABLE: YES, QUITE OFTEN
I HAVE BLAMED MYSELF UNNECESSARILY WHEN THINGS WENT WRONG: YES, SOME OF THE TIME
I HAVE BEEN SO UNHAPPY THAT I HAVE BEEN CRYING: ONLY OCCASIONALLY
I HAVE BEEN SO UNHAPPY THAT I HAVE HAD DIFFICULTY SLEEPING: NOT AT ALL
THINGS HAVE BEEN GETTING ON TOP OF ME: YES, SOMETIMES I HAVEN'T BEEN COPING AS WELL AS USUAL
I HAVE LOOKED FORWARD WITH ENJOYMENT TO THINGS: RATHER LESS THAN I USED TO

## 2019-04-26 ASSESSMENT — PATIENT HEALTH QUESTIONNAIRE - PHQ9
1. LITTLE INTEREST OR PLEASURE IN DOING THINGS: NOT AT ALL
SUM OF ALL RESPONSES TO PHQ9 QUESTIONS 1 AND 2: 0
2. FEELING DOWN, DEPRESSED, IRRITABLE, OR HOPELESS: NOT AT ALL

## 2019-04-26 NOTE — PROGRESS NOTES
Report received from DEION Fernandes. Assessment done, Vitals stable, patient oriented to room & skylight. Call light placed within reach, patient educated in regards to safety of baby and herself. Patient instructed to call before ambulating. All questions answered.

## 2019-04-26 NOTE — H&P
Obstetrics & Gynecology History & Physical Note    Date of Service  2019    Chief Complaint  precipitous labor     History of Presenting Illness  Buffy Chong is a 33 y.o.  at 39w0d 5/3/2019, by Last Menstrual Period. Patient's last menstrual period was 2018. She is being admitted for labor management    Prenatal care is at [facility]  ( 3 visits )  and is complicated by:  1. Prev C/S   2. Hx of IUFD , with chromosomal abnormalities     Patient Active Problem List    Diagnosis Date Noted   • High-risk pregnancy supervision, third trimester 04/15/2019   • Late prenatal care affecting pregnancy in third trimester 04/15/2019   • Hx of  section 04/15/2019   • History of depression 2017   • 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, antepartum 02/15/2017   • H/O fetal demise 08/10/2016       Obstetric History  OB History    Para Term  AB Living   3 2 1 1 0 1   SAB TAB Ectopic Molar Multiple Live Births   0 0 0     1      # Outcome Date GA Lbr Tommy/2nd Weight Sex Delivery Anes PTL Lv   3 Current            2 Term 17 39w1d  3.145 kg (6 lb 14.9 oz) M CS-LTranv  N JOSIAH   1  16 24w0d    Vag-Spont  N FD      Complications: Chromosomal abnormality      Birth Comments: Pt had cytotec          Gynecologic History  None     Review of Systems  Review of Systems   Constitutional: Negative.    HENT: Negative.    Eyes: Negative.    Respiratory: Negative.    Cardiovascular: Negative.    Genitourinary: Negative.    Musculoskeletal: Negative.    Skin: Negative.    Neurological: Negative.    Endo/Heme/Allergies: Negative.    Psychiatric/Behavioral: Negative.        Medical History   has a past medical history of H/O fetal demise, not currently pregnant (8/10/2016); Pregnant; and Psychiatric problem. She also has no past medical history of Addisons disease (HCC); Adrenal disorder (HCC); Allergy; Anemia; Anxiety; Arrhythmia; Arthritis;  Asthma; Blood transfusion without reported diagnosis; Cancer (HCC); Cataract; CHF (congestive heart failure) (HCC); Clotting disorder (HCC); COPD (chronic obstructive pulmonary disease) (HCC); Cushings syndrome (HCC); Depression; Diabetes (HCC); Diabetic neuropathy (HCC); GERD (gastroesophageal reflux disease); Glaucoma; Goiter; Head ache; Heart attack (HCC); Heart murmur; HIV (human immunodeficiency virus infection) (HCC); Hyperlipidemia; Hypertension; IBD (inflammatory bowel disease); Kidney disease; Meningitis; Migraine; Muscle disorder; Osteoporosis; Parathyroid disorder (HCC); Pituitary disease (HCC); Pulmonary emphysema (HCC); Seizure (HCC); Sickle cell disease (HCC); Stroke (HCC); Substance abuse (HCC); Thyroid disease; Tuberculosis; or Urinary tract infection.    Surgical History   has a past surgical history that includes other orthopedic surgery (2006); other (Left, 2001); ear middle exploration (2001); tendon repair (2006); and primary c section (8/8/2017).     Family History  family history is not on file.     Social History   reports that she has never smoked. She has never used smokeless tobacco. She reports that she does not drink alcohol or use drugs.    Allergies  No Known Allergies    Medications  Prior to Admission Medications   Prescriptions Last Dose Informant Patient Reported? Taking?   Prenatal MV-Min-Fe Fum-FA-DHA (PRENATAL 1 PO)   Yes No   Sig: Take  by mouth.   penicillin v potassium (VEETID) 500 MG Tab   Yes No   Sig: Take 500 mg by mouth 4 times a day.      Facility-Administered Medications: None       Physical Exam  Vitals:    04/26/19 0830   Weight: 73.5 kg (162 lb)       General:   alert, cooperative, no distress   Skin:   normal   HEENT:  PERRLA and extraocular movements intact   Lungs:   clear to auscultation bilaterally   Heart:   regular rate and rhythm   Breasts:   deferred   Abdomen:  Abdomen soft, non-tender; gravid.   Pelvis: Vulva and vagina appear normal. Bimanual exam  reveals normal uterus and adnexa.   FHT:  145 BPM Fetal heart variability: moderate  Fetal Heart Rate decelerations: variable   [place]:     Presentations:   by exam    Cervix:     Dilation:  C    Effacement:  C    Station:   0    Consistency:      Position:         Laboratory:  Prenatal Results     General (Most Recent Result)     Test Value Reference Range Date Time    ABO B   04/19/19 1217    Rh POS   04/19/19 1217    Antibody screen NEG   04/19/19 1217    HbA1c        Gonorrhea Negative  Negative 04/15/19 1150    Chlamydia  by PCR Negative  Negative 04/15/19 1150    Chlamydia by DNA NEG   02/21/17     RPR/Syphilus Non Reactive  Non Reactive 04/19/19 1217    HSV 1/2 by PCR (non-serum)        HSV 1/2 (serum)        HSV 1        HSV 2        HPV (16)        HPV (18)        HPV (other)        HBsAg Negative  Negative 04/19/19 1217    HIV-1 HIV-2 Antibodies Non Reactive  Non Reactive 04/19/19 1217    Rubella >500.0 IU/mL IU/mL 04/19/19 1217    Tb              Pap Smear (Most Recent Result)     Test Value Reference Range Date Time    Pap smear        Pap smear w/HPV        Pap smear w/CTNG        Pap smar w/HPV CTNG        Pap smear (reflex HPV ACUS)        Pap smear (reflex HPV ASCUS w/CTNG)  (See Report)    04/15/19 1150    Pathology gyn specimen  (See Report)    04/15/19 1150          Urinalysis (Most Recent Result)     Test Value Reference Range Date Time    Urinalysis        Urinalysis (culture if indicated)        POC urinalysis  (See Report)    04/15/19 1134    Urine drug screen        Urine drug screen (w/o conf)        Urine culture ([id number])        Urine culture ([id number])  (See Report)    06/20/16 1402          1st Trimester     Test Value Reference Range Date Time    Hgb        Hct        Fasting Glucose Tolerance        GTT, 1 hour        GTT, 2 hours        GTT, 3 hours              2nd Trimester     Test Value Reference Range Date Time    Hgb        Hct        Urinalysis        Urine Culture        AST         ALT        Uric Acid        Fasting Glucose Tolerance        GTT, 1 hour        GTT, 2 hours        GTT, 3 hours        Urine Protein/Creatinine Ratio              3rd Trimester     Test Value Reference Range Date Time    Hgb 12.0 g/dL 12.0 - 16.0 g/dL 19 1217    Hct 37.5 % 37.0 - 47.0 % 19 1217    Platelet count 255 K/uL 164 - 446 K/uL 19 1217    GBS (HERNANDEZ BROTH) Negative  Negative 04/15/19 1150    GBS (Direct) Negative  Negative 04/15/19 1150    Urinalysis        Urine Culture        Urine Drug Screen        Urine Protein/Creatinine Ratio              Congenital Disease Screening     Test Value Reference Range Date Time    First Trimester Screen        Quad Screen        Sickle Cell        Thalassemia        [facility]        Cystic Fibrosis Carrier Study                      Urinalysis:    No results found     Imaging:  none      Assessment:  33 y.o.  39w0d who presents with  Pregnancy  Indication for care in labor or delivery  Prev C/S , imminent delivery   Explained TOLAC/  , with patient and family present , secondary to exam and imminent likihood of successful vaginal delivery , would not immediately reflex to C/S    Plan:  No new Assessment & Plan notes have been filed under this hospital service since the last note was generated.  Service: Obstetrics & Gynecology    1. Admit to L&D  2. Allow Vaginal Delivery  5. Pain Control: PRN      VTE prophylaxis: SCD's         Kevin Blount M.D.

## 2019-04-26 NOTE — PROGRESS NOTES
"Late Entry:   Pt arrived to LND c/o UC and SROM at 0730, clear. Pt brought in my REMSA with PIV on R hand. Pt screaming through UC. Pt denied any complications with this pregnancy. Hx of C/S x1.  at 39 weeks. Pt stated, \"I can't do this! You need to get him out\".   SVE C/+1. Pt educated safest POC is to deliver vaginally and unable to do epidural at this time. Emotional support provided to pt. Sister-Daniella at bedside providing support. Dr Blount and Dr Scullion called for delivery.   Pushing efforts started with Dr Aguero. Pt not pushing effectively. Emotional support provided to help decrease anxiety with pt. MD and RN at bedside continuously while pt pushing.    at 0859 of male with APGARs 7/9.     1045 Pt ambulated SBA to restroom and voided w/o difficulty. Pt and infant transferred in stable condition via w/c to PP unit. Report given to DEION Chris. Cuddles and baby bands verified.   "

## 2019-04-26 NOTE — PROGRESS NOTES
Resident (Jovana Aguero) called and informed regarding Depression scale of 15. Resident will check up on patient tomorrow morning. No new orders as this time.

## 2019-04-26 NOTE — L&D DELIVERY NOTE
[facility] SPONTANEOUS VAGINAL DELIVERY PRODEDURE NOTE    PATIENT ID:  NAME:  Buffy Chong  MRN:               [id number]  YOB: 1985   Patient was admitted to Labor and Delivery at 39w0d for active labor and Previous C/S , For TOLAC .    On 2019 at 0859, this 33 y.o., now 39w0d , GBS negative female delivered via  (previous c/sX1) under no anesthesia a viable male infant weight pending with APGAR scores of 7 and 9 at one and five minutes. There was no nuchal cord and infant was bulb suctioned at delivery. Cord was doubly clamped, cut and infant handed to RN in attendance. An intact placenta was delivered spontaneously with 3 vessel cord. Upon vaginal exam, there was 2nd degree perineal laceration which was repaired using 3.0 chromic in the usual sterile fashion. Estimated blood loss was 350cc. Patient will be transferred to postpartum in stable condition and infant to  nursery.    Jovana Aguero M.D.    Delivery attended by Dr. rodriguez who was present for the entire delivery.

## 2019-04-26 NOTE — LACTATION NOTE
CATHIE reports pumping breastmilk for her other baby for 6 months and that is her plan for this one; She does not want to put the infant to the breasts, but just wants to pump the milk out to given him. Pump kit with education on use given and reviewed to include pumping a minimum of 8x/24 hours with 2 pumping sessions between 1-6 am and a rest period from 9pm to 1-2 am, settings, and cleaning of parts. CATHIE voices understanding. Info given on safe storage of breastmilk. She states that she has WIC and will check in with them after discharge and she has a personal pump @home that she received from them last time. May want to rent a HG Pump initially; info given. Denies further need @this time.

## 2019-04-27 VITALS
TEMPERATURE: 97.2 F | RESPIRATION RATE: 16 BRPM | BODY MASS INDEX: 31.8 KG/M2 | SYSTOLIC BLOOD PRESSURE: 98 MMHG | HEIGHT: 60 IN | WEIGHT: 162 LBS | HEART RATE: 60 BPM | OXYGEN SATURATION: 97 % | DIASTOLIC BLOOD PRESSURE: 59 MMHG

## 2019-04-27 PROCEDURE — 700102 HCHG RX REV CODE 250 W/ 637 OVERRIDE(OP): Performed by: OBSTETRICS & GYNECOLOGY

## 2019-04-27 PROCEDURE — A9270 NON-COVERED ITEM OR SERVICE: HCPCS | Performed by: OBSTETRICS & GYNECOLOGY

## 2019-04-27 RX ORDER — OXYCODONE HYDROCHLORIDE AND ACETAMINOPHEN 5; 325 MG/1; MG/1
1 TABLET ORAL EVERY 4 HOURS PRN
Qty: 5 TAB | Refills: 0 | Status: SHIPPED | OUTPATIENT
Start: 2019-04-27 | End: 2019-04-30

## 2019-04-27 RX ORDER — IBUPROFEN 800 MG/1
800 TABLET ORAL EVERY 8 HOURS PRN
Qty: 30 TAB | Refills: 1 | Status: ON HOLD | OUTPATIENT
Start: 2019-04-27 | End: 2019-09-25

## 2019-04-27 RX ORDER — PNV NO.95/FERROUS FUM/FOLIC AC 28MG-0.8MG
1 TABLET ORAL DAILY
Qty: 30 TAB | Refills: 11 | Status: SHIPPED | OUTPATIENT
Start: 2019-04-27 | End: 2019-05-02

## 2019-04-27 RX ADMIN — IBUPROFEN 800 MG: 800 TABLET, FILM COATED ORAL at 12:16

## 2019-04-27 RX ADMIN — IBUPROFEN 800 MG: 800 TABLET, FILM COATED ORAL at 00:15

## 2019-04-27 RX ADMIN — OXYCODONE AND ACETAMINOPHEN 1 TABLET: 5; 325 TABLET ORAL at 02:18

## 2019-04-27 RX ADMIN — OXYCODONE AND ACETAMINOPHEN 1 TABLET: 5; 325 TABLET ORAL at 08:00

## 2019-04-27 NOTE — PROGRESS NOTES
Report received. Assumed care. Pt in bed awake. A/O x4. VSS. Responds appropriately. C/O pain, medicated per MAR, no SOB. Assessment complete. Fundus firm, lochia light. Discussed POC, pain control,  care, mobility, safety, DC planning, pt verbalizes understanding.  Call light and belongings within reach.  Bed in the lowest position. Treaded socks in place. Hourly rounding in progress. Will continue to monitor .
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
## 2019-04-27 NOTE — DISCHARGE SUMMARY
Discharge Summary:      Buffy Chong      Admit Date:   2019  Discharge Date:  2019     Admitting diagnosis:  Pregnancy. Admit for impending precip delivery  Indication for care in labor or delivery  Indication for care in labor or delivery  Discharge Diagnosis: Status post vaginal, spontaneous.  Pregnancy Complications: depression  Tubal Ligation:  no        History:  Past Medical History:   Diagnosis Date   • H/O fetal demise, not currently pregnant 8/10/2016   • Pregnant    • Psychiatric problem     depression     OB History    Para Term  AB Living   3 3 2 1 0 2   SAB TAB Ectopic Molar Multiple Live Births   0 0 0   0 2      # Outcome Date GA Lbr Tommy/2nd Weight Sex Delivery Anes PTL Lv   3 Term 19 39w0d / 00:39 3.225 kg (7 lb 1.8 oz) M Vag-Spont None N JOSIAH      Complications: Precipitate labor   2 Term 17 39w1d  3.145 kg (6 lb 14.9 oz) M CS-LTranv  N JOSIAH   1  16 24w0d    Vag-Spont  N FD      Complications: Chromosomal abnormality      Birth Comments: Pt had cytotec           Patient has no known allergies.  Patient Active Problem List    Diagnosis Date Noted   • Vaginal delivery 2019   • High-risk pregnancy supervision, third trimester 04/15/2019   • Late prenatal care affecting pregnancy in third trimester 04/15/2019   • Hx of  section 04/15/2019   • History of depression 2017   • 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, antepartum 02/15/2017   • H/O fetal demise 08/10/2016        Hospital Course:   33 y.o. , now para 3, was admitted with the above mentioned diagnosis, underwent Active Labor, vaginal, spontaneous. Patient postpartum course was unremarkable, with progressive advancement in diet , ambulation and toleration of oral analgesia. Patient without complaints today and desires discharge.      Vitals:    19 1030 19 1117 19 1400 19 1747   BP: 122/71 120/74 128/78  134/76   Pulse: 66 71 79 68   Resp:  17 15 16   Temp: 37.7 °C (99.8 °F) 36.8 °C (98.3 °F) 36.4 °C (97.5 °F) 36.4 °C (97.6 °F)   TempSrc: Temporal Temporal Temporal Temporal   SpO2:  90% 96% 96%   Weight:       Height:           Current Facility-Administered Medications   Medication Dose   • ondansetron (ZOFRAN ODT) dispertab 4 mg  4 mg    Or   • ondansetron (ZOFRAN) syringe/vial injection 4 mg  4 mg   • acetaminophen (TYLENOL) tablet 650 mg  650 mg   • oxyCODONE-acetaminophen (PERCOCET) 5-325 MG per tablet 1 Tab  1 Tab   • ibuprofen (MOTRIN) tablet 800 mg  800 mg   • oxytocin (PITOCIN) infusion (for postpartum)   mL/hr   • docusate sodium (COLACE) capsule 100 mg  100 mg       Exam:  Breast Exam: Inspection negative. No nipple discharge or bleeding. No masses or nodularity palpable  Abdomen: Abdomen soft, non-tender. BS normal. No masses,  No organomegaly  Fundus Non Tender: yes  Incision: none  Perineum: perineum intact with second degree  Extremity: extremities, peripheral pulses and reflexes normal     Labs:  Recent Labs      04/26/19   0947  04/26/19   1812   WBC  13.0*  17.1*   RBC  3.58*  3.54*   HEMOGLOBIN  10.9*  10.8*   HEMATOCRIT  32.6*  31.8*   MCV  91.1  89.8   MCH  30.4  30.5   MCHC  33.4*  34.0   RDW  43.0  42.7   PLATELETCT  213  201   MPV  10.2  10.1        Activity:   Discharge to home  Pelvic Rest x 6 weeks    Assessment:  normal postpartum course  Discharge Assessment: No areas of skin breakdown/redness; surgical incision intact/healing     [facility] weeks for vaginal ; 1 week for incision check.      Discharge Meds:   Current Outpatient Prescriptions   Medication Sig Dispense Refill   • oxyCODONE-acetaminophen (PERCOCET) 5-325 MG Tab Take 1 Tab by mouth every four hours as needed (for Moderate Pain (Pain Scale 4-6) after delivery) for up to 3 days. 5 Tab 0   • ibuprofen (MOTRIN) 800 MG Tab Take 1 Tab by mouth every 8 hours as needed (For cramping after  delivery; do not give if patient is receiving ketorolac (Toradol)). 30 Tab 1   • Prenatal Vit-Fe Fumarate-FA (PRENATAL VITAMIN) 27-0.8 MG Tab Take 1 Tab by mouth every day. 30 Tab 11     Patient will make appointment in 1 week with physician to consult for tubal ligation. She will also make appointment in one week with [facility].     Chelsea Torres D.N.P.

## 2019-04-27 NOTE — DISCHARGE INSTRUCTIONS
POSTPARTUM DISCHARGE INSTRUCTIONS FOR MOM    YOB: 1985   Age: 33 y.o.               Admit Date: 2019     Discharge Date: 2019  Attending Doctor:  Kevin Blount M.D.                  Allergies:  Patient has no known allergies.    Discharged to home by car. Discharged via wheelchair, hospital escort: Yes.  Special equipment needed: Not Applicable  Belongings with: Personal  Be sure to schedule a follow-up appointment with your primary care doctor or any specialists as instructed.     Discharge Plan:   Diet Plan: Discussed  Activity Level: Discussed  Confirmed Follow up Appointment: Patient to Call and Schedule Appointment  Confirmed Symptoms Management: Discussed    REASONS TO CALL YOUR OBSTETRICIAN:  1.   Persistent fever or shaking chills (Temperature higher than 100.4)  2.   Heavy bleeding (soaking more than 1 pad per hour); Passing clots  3.   Foul odor from vagina  4.   Mastitis (Breast infection; breast pain, chills, fever, redness)  5.   Urinary pain, burning or frequency  6.   Episiotomy infection  7.   Abdominal incision infection  8.   Severe depression longer than 24 hours    HAND WASHING  · Prior to handling the baby.  · Before breastfeeding or bottle feeding baby.  · After using the bathroom or changing the baby's diaper.    WOUND CARE  Ask your physician for additional care instructions.  In general:    ·  Incision:      · Keep clean and dry.    · Do NOT lift anything heavier than your baby for up to 6 weeks.    · There should not be any opening or pus.      VAGINAL CARE  · Nothing inside vagina for 6 weeks: no sexual intercourse, tampons or douching.  · Bleeding may continue for 2-4 weeks.  Amount may vary.    · Call your physician for heavy bleeding which means soaking more than 1 pad per hour    BIRTH CONTROL  · It is possible to become pregnant at any time after delivery and while breastfeeding.  · Plan to discuss a method of birth control with your physician at your  "follow up visit. visit.    DIET AND ELIMINATION  · Eating more fiber (bran cereal, fruits, and vegetables) and drinking plenty of fluids will help to avoid constipation.  · Urinary frequency after childbirth is normal.    POSTPARTUM BLUES  During the first few days after birth, you may experience a sense of the \"blues\" which may include impatience, irritability or even crying.  These feeling come and go quickly.  However, as many as 1 in 10 women experience emotional symptoms known as postpartum depression.    Postpartum depression:  May start as early as the second or third day after delivery or take several weeks or months to develop.  Symptoms of \"blues\" are present, but are more intense:  Crying spells; loss of appetite; feelings of hopelessness or loss of control; fear of touching the baby; over concern or no concern at all about the baby; little or no concern about your own appearance/caring for yourself; and/or inability to sleep or excessive sleeping.  Contact your physician if you are experiencing any of these symptoms.    Crisis Hotline:  · [place] Crisis Hotline:  4-146-UOYLIZR  Or 1-588.357.8486  · [place] Crisis Hotline:  1-965.733.5404  Or 554-353-9050    PREVENTING SHAKEN BABY:  If you are angry or stressed, PUT THE BABY IN THE CRIB, step away, take some deep breaths, and wait until you are calm to care for the baby.  DO NOT SHAKE THE BABY.  You are not alone, call a supporter for help.    · Crisis Call Center 24/7 crisis line 967-695-8468 or 1-474.769.6136  · You can also text them, text \"ANSWER\" to 727899    QUIT SMOKING/TOBACCO USE:  I understand the use of any tobacco products increases my chance of suffering from future heart disease and could cause other illnesses which may shorten my life. Quitting the use of tobacco products is the single most important thing I can do to improve my health. For further information on smoking / tobacco cessation call a Toll Free Quit Line at 1-750.143.6212 " (*[employer] [place]) or 1-847.896.8939 (American Lung Association) or you can access the web based program at www.lungusa.org.    · [place] Tobacco Users Help Line:  (431) 900-8020       Toll Free: 1-211.614.9329  · Quit Tobacco Program [facility] (121)033-0400    DEPRESSION / SUICIDE RISK:  As you are discharged from this [facility], it is important to learn how to keep safe from harming yourself.    Recognize the warning signs:  · Abrupt changes in personality, positive or negative- including increase in energy   · Giving away possessions  · Change in eating patterns- significant weight changes-  positive or negative  · Change in sleeping patterns- unable to sleep or sleeping all the time   · Unwillingness or inability to communicate  · Depression  · Unusual sadness, discouragement and loneliness  · Talk of wanting to die  · Neglect of personal appearance   · Rebelliousness- reckless behavior  · Withdrawal from people/activities they love  · Confusion- inability to concentrate     If you or a loved one observes any of these behaviors or has concerns about self-harm, here's what you can do:  · Talk about it- your feelings and reasons for harming yourself  · Remove any means that you might use to hurt yourself (examples: pills, rope, extension cords, firearm)  · Get professional help from the community (Mental Health, Substance Abuse, psychological counseling)  · Do not be alone:Call your Safe Contact- someone whom you trust who will be there for you.  · Call your local CRISIS HOTLINE 471-4678 or 351-291-0148  · Call your local Children's Mobile Crisis Response Team [place] (417) 063-4376 or www.Dpivision  · Call the toll free National Suicide Prevention Hotlines   · National Suicide Prevention Lifeline 010-870-QDHK (7115)  · National Hope Line Network 800-SUICIDE (402-0082)    DISCHARGE SURVEY:  Thank you for choosing [facility].  We hope we provided you  with very good care.  You may be receiving a survey in the mail.  Please fill it out.  Your opinion is valuable to us.    ADDITIONAL EDUCATIONAL MATERIALS GIVEN TO PATIENT:        My signature on this form indicates that:  1.  I have reviewed and understand the above information  2.  My questions regarding this information have been answered to my satisfaction.  3.  I have formulated a plan with my discharge nurse to obtain my prescribed medication for home.

## 2019-04-28 NOTE — PROGRESS NOTES
Patient education and discharge instructions reviewed with patient by Celeste Ortez RN with stated understanding by patient.  Patient states all questions have been answered and denies any problems.  Patient discharged to home in stable condition with all belongings.

## 2019-05-02 ENCOUNTER — HOSPITAL ENCOUNTER (EMERGENCY)
Facility: MEDICAL CENTER | Age: 34
End: 2019-05-02
Attending: EMERGENCY MEDICINE
Payer: COMMERCIAL

## 2019-05-02 ENCOUNTER — APPOINTMENT (OUTPATIENT)
Dept: RADIOLOGY | Facility: MEDICAL CENTER | Age: 34
End: 2019-05-02
Attending: EMERGENCY MEDICINE
Payer: COMMERCIAL

## 2019-05-02 VITALS
SYSTOLIC BLOOD PRESSURE: 131 MMHG | BODY MASS INDEX: 28.05 KG/M2 | HEART RATE: 83 BPM | DIASTOLIC BLOOD PRESSURE: 78 MMHG | OXYGEN SATURATION: 98 % | HEIGHT: 60 IN | TEMPERATURE: 98.6 F | WEIGHT: 142.86 LBS | RESPIRATION RATE: 18 BRPM

## 2019-05-02 DIAGNOSIS — R50.9 FEVER, UNSPECIFIED FEVER CAUSE: ICD-10-CM

## 2019-05-02 DIAGNOSIS — J06.9 UPPER RESPIRATORY TRACT INFECTION, UNSPECIFIED TYPE: ICD-10-CM

## 2019-05-02 LAB
ALBUMIN SERPL BCP-MCNC: 3.7 G/DL (ref 3.2–4.9)
ALBUMIN/GLOB SERPL: 1.2 G/DL
ALP SERPL-CCNC: 155 U/L (ref 30–99)
ALT SERPL-CCNC: 41 U/L (ref 2–50)
ANION GAP SERPL CALC-SCNC: 9 MMOL/L (ref 0–11.9)
APPEARANCE UR: CLEAR
AST SERPL-CCNC: 21 U/L (ref 12–45)
BACTERIA #/AREA URNS HPF: ABNORMAL /HPF
BASOPHILS # BLD AUTO: 0.4 % (ref 0–1.8)
BASOPHILS # BLD: 0.05 K/UL (ref 0–0.12)
BILIRUB SERPL-MCNC: 0.3 MG/DL (ref 0.1–1.5)
BILIRUB UR QL STRIP.AUTO: NEGATIVE
BUN SERPL-MCNC: 12 MG/DL (ref 8–22)
CALCIUM SERPL-MCNC: 8.7 MG/DL (ref 8.5–10.5)
CHLORIDE SERPL-SCNC: 107 MMOL/L (ref 96–112)
CO2 SERPL-SCNC: 22 MMOL/L (ref 20–33)
COLOR UR: YELLOW
CREAT SERPL-MCNC: 0.61 MG/DL (ref 0.5–1.4)
EOSINOPHIL # BLD AUTO: 0.07 K/UL (ref 0–0.51)
EOSINOPHIL NFR BLD: 0.6 % (ref 0–6.9)
EPI CELLS #/AREA URNS HPF: NEGATIVE /HPF
ERYTHROCYTE [DISTWIDTH] IN BLOOD BY AUTOMATED COUNT: 44.6 FL (ref 35.9–50)
FLUAV RNA SPEC QL NAA+PROBE: NEGATIVE
FLUBV RNA SPEC QL NAA+PROBE: NEGATIVE
GLOBULIN SER CALC-MCNC: 3 G/DL (ref 1.9–3.5)
GLUCOSE SERPL-MCNC: 112 MG/DL (ref 65–99)
GLUCOSE UR STRIP.AUTO-MCNC: NEGATIVE MG/DL
HCT VFR BLD AUTO: 35.5 % (ref 37–47)
HGB BLD-MCNC: 11.6 G/DL (ref 12–16)
HYALINE CASTS #/AREA URNS LPF: ABNORMAL /LPF
IMM GRANULOCYTES # BLD AUTO: 0.07 K/UL (ref 0–0.11)
IMM GRANULOCYTES NFR BLD AUTO: 0.6 % (ref 0–0.9)
KETONES UR STRIP.AUTO-MCNC: NEGATIVE MG/DL
LACTATE BLD-SCNC: 1.6 MMOL/L (ref 0.5–2)
LEUKOCYTE ESTERASE UR QL STRIP.AUTO: ABNORMAL
LYMPHOCYTES # BLD AUTO: 1.63 K/UL (ref 1–4.8)
LYMPHOCYTES NFR BLD: 14.5 % (ref 22–41)
MCH RBC QN AUTO: 29.6 PG (ref 27–33)
MCHC RBC AUTO-ENTMCNC: 32.7 G/DL (ref 33.6–35)
MCV RBC AUTO: 90.6 FL (ref 81.4–97.8)
MICRO URNS: ABNORMAL
MONOCYTES # BLD AUTO: 0.56 K/UL (ref 0–0.85)
MONOCYTES NFR BLD AUTO: 5 % (ref 0–13.4)
NEUTROPHILS # BLD AUTO: 8.84 K/UL (ref 2–7.15)
NEUTROPHILS NFR BLD: 78.9 % (ref 44–72)
NITRITE UR QL STRIP.AUTO: NEGATIVE
NRBC # BLD AUTO: 0 K/UL
NRBC BLD-RTO: 0 /100 WBC
PH UR STRIP.AUTO: 6 [PH]
PLATELET # BLD AUTO: 387 K/UL (ref 164–446)
PMV BLD AUTO: 8.7 FL (ref 9–12.9)
POTASSIUM SERPL-SCNC: 3.9 MMOL/L (ref 3.6–5.5)
PROT SERPL-MCNC: 6.7 G/DL (ref 6–8.2)
PROT UR QL STRIP: NEGATIVE MG/DL
RBC # BLD AUTO: 3.92 M/UL (ref 4.2–5.4)
RBC # URNS HPF: ABNORMAL /HPF
RBC UR QL AUTO: ABNORMAL
SODIUM SERPL-SCNC: 138 MMOL/L (ref 135–145)
SP GR UR STRIP.AUTO: 1.02
UROBILINOGEN UR STRIP.AUTO-MCNC: 0.2 MG/DL
WBC # BLD AUTO: 11.2 K/UL (ref 4.8–10.8)
WBC #/AREA URNS HPF: ABNORMAL /HPF

## 2019-05-02 PROCEDURE — 80053 COMPREHEN METABOLIC PANEL: CPT

## 2019-05-02 PROCEDURE — 87086 URINE CULTURE/COLONY COUNT: CPT

## 2019-05-02 PROCEDURE — 71045 X-RAY EXAM CHEST 1 VIEW: CPT

## 2019-05-02 PROCEDURE — 87040 BLOOD CULTURE FOR BACTERIA: CPT

## 2019-05-02 PROCEDURE — 99214 OFFICE O/P EST MOD 30 MIN: CPT | Performed by: OBSTETRICS & GYNECOLOGY

## 2019-05-02 PROCEDURE — 87502 INFLUENZA DNA AMP PROBE: CPT

## 2019-05-02 PROCEDURE — 76856 US EXAM PELVIC COMPLETE: CPT

## 2019-05-02 PROCEDURE — 81001 URINALYSIS AUTO W/SCOPE: CPT

## 2019-05-02 PROCEDURE — 85025 COMPLETE CBC W/AUTO DIFF WBC: CPT

## 2019-05-02 PROCEDURE — 99284 EMERGENCY DEPT VISIT MOD MDM: CPT

## 2019-05-02 PROCEDURE — 83605 ASSAY OF LACTIC ACID: CPT

## 2019-05-02 RX ORDER — AMOXICILLIN AND CLAVULANATE POTASSIUM 875; 125 MG/1; MG/1
1 TABLET, FILM COATED ORAL 2 TIMES DAILY
Qty: 20 TAB | Refills: 0 | Status: SHIPPED | OUTPATIENT
Start: 2019-05-02 | End: 2019-05-12

## 2019-05-02 ASSESSMENT — LIFESTYLE VARIABLES: DO YOU DRINK ALCOHOL: NO

## 2019-05-02 ASSESSMENT — ENCOUNTER SYMPTOMS
FEVER: 1
CHILLS: 1

## 2019-05-03 ENCOUNTER — GYNECOLOGY VISIT (OUTPATIENT)
Dept: OBGYN | Facility: CLINIC | Age: 34
End: 2019-05-03
Payer: COMMERCIAL

## 2019-05-03 VITALS — BODY MASS INDEX: 27.34 KG/M2 | SYSTOLIC BLOOD PRESSURE: 122 MMHG | DIASTOLIC BLOOD PRESSURE: 82 MMHG | WEIGHT: 140 LBS

## 2019-05-03 PROCEDURE — 99212 OFFICE O/P EST SF 10 MIN: CPT | Performed by: OBSTETRICS & GYNECOLOGY

## 2019-05-03 NOTE — PROGRESS NOTES
Chief Complaint   Patient presents with   • Gynecologic Exam       History of present illness: 33 y.o.  postpartum day 7 from successful  who presents with abdominal pain deep in the abdomen and also inside the vagina at the site of her stitches. Patient points to the area underneath umbilicus for the abdominal pain and states it is very bad and limits her ability to walk. Pt also states she started having a cough and stuffy nose after visiting the ED.   Ultrasound scan in the ER showed no retained products of conception.    Review of systems:  Pertinent positives documented in HPI and all other systems reviewed & are negative  Constitutional: negative for chills, fevers and malaise However reports fever and chills yesterday in the ED.  Respiratory: positive for cough, negative for SOB  Cardiovascular: negative  Gastrointestinal: negative except for constipation  Genitourinary:negative except for slight burning at the site of stiches with urination  Integument/breast: negative except for engorgement  Hematologic/lymphatic: negative  Musculoskeletal:negative  Neurological: negative  Behavioral/Psych: negative  Endocrine: negative  Allergic/Immunologic: negative    POBHx:   OB History    Para Term  AB Living   3 3 2 1 0 2   SAB TAB Ectopic Molar Multiple Live Births   0 0 0   0 2      # Outcome Date GA Lbr Tommy/2nd Weight Sex Delivery Anes PTL Lv   3 Term 19 39w0d / 00:39 3.225 kg (7 lb 1.8 oz) M Vag-Spont None N JOSIAH      Complications: Precipitate labor   2 Term 17 39w1d  3.145 kg (6 lb 14.9 oz) M CS-LTranv  N JOSIAH   1  16 24w0d    Vag-Spont  N FD      Complications: Chromosomal abnormality      Birth Comments: Pt had cytotec          All PMH, PSH, allergies, social history and FH reviewed and updated today:  Past Medical History:   Diagnosis Date   • H/O fetal demise, not currently pregnant 8/10/2016   • Pregnant    • Psychiatric problem     depression       Past  Surgical History:   Procedure Laterality Date   • PRIMARY C SECTION  8/8/2017    Procedure: PRIMARY C SECTION;  Surgeon: Kevin Blount M.D.;  Location: LABOR AND DELIVERY;  Service:    • OTHER ORTHOPEDIC SURGERY  2006    Wrist for carpal tunnel left   • TENDON REPAIR  2006    left wrist   • OTHER Left 2001    Ear surgery   • EAR MIDDLE EXPLORATION  2001       Allergies: No Known Allergies    Social History     Social History   • Marital status: Single     Spouse name: N/A   • Number of children: N/A   • Years of education: N/A     Occupational History   • Not on file.     Social History Main Topics   • Smoking status: Never Smoker   • Smokeless tobacco: Never Used   • Alcohol use No   • Drug use: No   • Sexual activity: Not Currently     Partners: Male     Birth control/ protection: Pill      Comment: Unplanned pregnancy     Other Topics Concern   • Not on file     Social History Narrative    ** Merged History Encounter **            History reviewed. No pertinent family history.    Physical exam:  /82   Wt 63.5 kg (140 lb)  Temp 98.9 today.     General:appears stated age, is in no apparent distress, is well developed and well nourished  Head: normocephalic, non-tender  Neck: neck is supple, no jugular venous distension  CV : regular rate and rhythm, no peripheral edema  Lungs: Normal respiratory effort. Clear to auscultation bilaterally  Abdomen: Bowel sounds positive, nondistended, soft, nontender x4, no rebound or guarding. No organomegaly. No masses.  Female GYN: normal female external genitalia, sutures are in place and intact. Trace brown/dark discharge in the vault. normal cervix, normal uterus, size and consistency. Slight CMT on palpation of fundus.  Skin: No rashes, or ulcers or lesions seen  Psychiatric: Patient shows appropriate affect, is alert and oriented x3, intact judgment and insight.    WBC 11.2   4.8 - 10.8 K/uL Final   RBC 3.92   4.20 - 5.40 M/uL Final   Hemoglobin 11.6   12.0 - 16.0  g/dL Final   Hematocrit 35.5   37.0 - 47.0 % Final   MCV 90.6  81.4 - 97.8 fL Final   MCH 29.6  27.0 - 33.0 pg Final   MCHC 32.7   33.6 - 35.0 g/dL Final   RDW 44.6  35.9 - 50.0 fL Final   Platelet Count 387  164 - 446 K/uL Final   MPV 8.7   9.0 - 12.9 fL Final   Neutrophils-Polys 78.90   44.00 - 72.00 % Final   Lymphocytes 14.50   22.00 - 41.00 % Final   Monocytes 5.00  0.00 - 13.40 % Final   Eosinophils 0.60  0.00 - 6.90 % Final   Basophils 0.40  0.00 - 1.80 % Final   Immature Granulocytes 0.60  0.00 - 0.90 % Final   Nucleated RBC 0.00  /100 WBC Final   Neutrophils (Absolute) 8.84   2.00 - 7.15 K/uL Final   Comment:   Includes immature neutrophils, if present.   Lymphs (Absolute) 1.63  1.00 - 4.80 K/uL Final   Monos (Absolute) 0.56  0.00 - 0.85 K/uL Final   Eos (Absolute) 0.07  0.00 - 0.51 K/uL Final   Baso (Absolute) 0.05  0.00 - 0.12 K/uL Final   Immature Granulocytes (abs) 0.07  0.00 - 0.11 K/uL Final   NRBC (Absolute) 0.00  K/uL Final       5/2/2019 8:36 PM    HISTORY/REASON FOR EXAM: Pelvic pain, status post recent vaginal delivery.    TECHNIQUE/EXAM DESCRIPTION:  Transabdominal pelvic ultrasound.    COMPARISON:   None    FINDINGS:  Transabdominal scanning was performed.    The uterus measures 6.21 cm x 15.82 cm x 10.63 cm.  The uterus is within normal limits.    The endometrial echo complex measures 0.49 cm. Air is present in the endometrial cavity. No definite evidence of retained products of conception however.      Low resistive waveforms are confirmed within the ovaries.  The right ovary measures 2.48 cm x 2.49 cm x 1.17 cm.    The left ovary measures 2.03 cm x 1.31 cm x 1.29 cm.      There is no free fluid seen.     Impression       1.  Enlarged uterus, consistent with recent postpartum status. No definite evidence of retained products of conception.  2.  Normal appearance of the ovaries.  3.  No free fluid.       Assessment  1. Endometritis following delivery     And possible URI.     Plan  - Discussed  with patient that as she is currently afebrile to continue the antibiotics for presumed endometritis.  Advised she take Tylenol for fevers however if she has intractable nausea vomiting, heavy bleeding soaking more than 1 pad per hour, or intractable pain then she is to report to the ER for further evaluation.  -May take over-the-counter Robitussin or Claritin or Benadryl as needed.  - Follow up next week.

## 2019-05-03 NOTE — DISCHARGE INSTRUCTIONS
We will cover you for possible infection, is very important that you follow-up with your obstetrician.  If your symptoms worsen please return.

## 2019-05-03 NOTE — ED TRIAGE NOTES
Chief Complaint   Patient presents with   • Chills   • Vaginal Pain     vaginal tear from giving birth last friday   • Vaginal Discharge     Pt ambulated to triage with above complaints. Pt said that she has been having fever/chills since yesterday. She recently gave birth and had vaginal tear which was sutured. She noticed discharge with foul smell. nad.  Sepsis score:3  Protocol ordered

## 2019-05-03 NOTE — ED NOTES
Agree with triage note, pt ambulatory to yellow 57 with a steady gait. Pt changed into gown and chart up for ERP.

## 2019-05-03 NOTE — CONSULTS
OB/GYN Consult    CC/reason for consult: fever postpartum  Requesting physician: Alfa Recinos M.D.     HPI: Buffy Lund is a 33 y.o.  w PPD 6 s/p  19 without complication, 2nd degree perineal laceration repaired at delivery.  Discharged home PPD 1.  Pt had very late prenatal care, initiated at 37wks.    Pt reports yesterday she started having fevers/chills.  Reports fever at home 101-103.  Has been having some increased lower abdominal pain since yesterday as well, midline, most of the time although does get better/worse at time unrelated to anything in particular.  Denies breast pain or redness, is pumping primarily for baby.  Does feel like her milk has come in.   Reports some increased in her VB yesterday, had some clots up to golfball sized for a short time then decreased again.     Does endorse cough and sore throat for the past few days.  No sick contacts.  Denies diarrhea, + constipation.  Has been taking ibuprofen for her lower abdominal pain.    ROS:  constitutional: + fevers/chills  Resp: denies shortness of breath, +cough  GI: + lower abd pain + constipation, as above  : + vaginal bleeding, denies dysuria, no foul smelling discharge   10 system ROS performed and negative except for as above    OB History    Para Term  AB Living   3 3 2 1 0 2   SAB TAB Ectopic Molar Multiple Live Births   0 0 0   0 2      # Outcome Date GA Lbr Tommy/2nd Weight Sex Delivery Anes PTL Lv   3 Term 19 39w0d / 00:39 3.225 kg (7 lb 1.8 oz) M Vag-Spont None N JOSIAH      Complications: Precipitate labor   2 Term 17 39w1d  3.145 kg (6 lb 14.9 oz) M CS-LTranv  N JOSIAH   1  16 24w0d    Vag-Spont  N FD      Complications: Chromosomal abnormality      Birth Comments: Pt had cytotec          GYN Hx:   Paps normal  No infection    PMHx: denies    Past Surgical History:   Procedure Laterality Date   • PRIMARY C SECTION  2017    Procedure: PRIMARY C SECTION;  Surgeon: Kevin  ALANA Blount M.D.;  Location: LABOR AND DELIVERY;  Service:    • OTHER ORTHOPEDIC SURGERY  2006    Wrist for carpal tunnel left   • TENDON REPAIR  2006    left wrist   • OTHER Left 2001    Ear surgery   • EAR MIDDLE EXPLORATION  2001       Medications:   Current Outpatient Prescriptions Ordered in [facility]   Medication Sig Dispense Refill   • ibuprofen (MOTRIN) 800 MG Tab Take 1 Tab by mouth every 8 hours as needed (For cramping after delivery; do not give if patient is receiving ketorolac (Toradol)). 30 Tab 1   • Prenatal MV-Min-Fe Fum-FA-DHA (PRENATAL 1 PO) Take  by mouth.         Allergies: Patient has no known allergies.    Social History       Social History Main Topics   • Smoking status: Never Smoker   • Smokeless tobacco: Never Used   • Alcohol use No   • Drug use: No       FamHx: noncontributory    Physical Exam:  /78   Pulse 77   Temp 37.1 °C (98.7 °F) (Temporal)   Resp 18   Ht 1.524 m (5')   Wt 64.8 kg (142 lb 13.7 oz)   LMP 07/27/2018   SpO2 97%   BMI 27.90 kg/m²   gen: AAO, NAD, mood and affect appropriate  CV: RRR; no LE edema  resp: ctab  Breast: symmetric, no skin changes, no masses, nontender, mild engorgement noted bilaterally  abd: soft, mildly tender to palpation over midline lower abdomen   NT, ND, no masses,   : NEFG, normal urethral meatus, perineum approximated, healing well, normal vagina and cervix (moderately well visualized), light red lochia noted.  Uterus 12wk sized, anteverted, mildly tender to palpation, no adnexal masses/tenderness  Skin: warm/dry, no lesions    Results for KISHAN CARIAS (MRN [id number]) as of 5/2/2019 22:19   Ref. Range 5/2/2019 18:45   WBC Latest Ref Range: 4.8 - 10.8 K/uL 11.2 (H)   RBC Latest Ref Range: 4.20 - 5.40 M/uL 3.92 (L)   Hemoglobin Latest Ref Range: 12.0 - 16.0 g/dL 11.6 (L)   Hematocrit Latest Ref Range: 37.0 - 47.0 % 35.5 (L)   MCV Latest Ref Range: 81.4 - 97.8 fL 90.6   MCH Latest Ref Range: 27.0 - 33.0 pg 29.6   MCHC Latest Ref Range:  33.6 - 35.0 g/dL 32.7 (L)   RDW Latest Ref Range: 35.9 - 50.0 fL 44.6   Platelet Count Latest Ref Range: 164 - 446 K/uL 387   MPV Latest Ref Range: 9.0 - 12.9 fL 8.7 (L)   Neutrophils-Polys Latest Ref Range: 44.00 - 72.00 % 78.90 (H)   Neutrophils (Absolute) Latest Ref Range: 2.00 - 7.15 K/uL 8.84 (H)   Lymphocytes Latest Ref Range: 22.00 - 41.00 % 14.50 (L)   Lymphs (Absolute) Latest Ref Range: 1.00 - 4.80 K/uL 1.63   Monocytes Latest Ref Range: 0.00 - 13.40 % 5.00   Monos (Absolute) Latest Ref Range: 0.00 - 0.85 K/uL 0.56   Eosinophils Latest Ref Range: 0.00 - 6.90 % 0.60   Eos (Absolute) Latest Ref Range: 0.00 - 0.51 K/uL 0.07   Basophils Latest Ref Range: 0.00 - 1.80 % 0.40   Baso (Absolute) Latest Ref Range: 0.00 - 0.12 K/uL 0.05   Immature Granulocytes Latest Ref Range: 0.00 - 0.90 % 0.60   Immature Granulocytes (abs) Latest Ref Range: 0.00 - 0.11 K/uL 0.07   Nucleated RBC Latest Units: /100 WBC 0.00   NRBC (Absolute) Latest Units: K/uL 0.00     Results for KISHAN CARIAS (MRN [id number]) as of 5/2/2019 22:19   Ref. Range 5/2/2019 18:30   Color Unknown Yellow   Character Unknown Clear   Specific Gravity Latest Ref Range: <1.035  1.017   Ph Latest Ref Range: 5.0 - 8.0  6.0   Glucose Latest Ref Range: Negative mg/dL Negative   Ketones Latest Ref Range: Negative mg/dL Negative   Bilirubin Latest Ref Range: Negative  Negative   Occult Blood Latest Ref Range: Negative  Large (A)   Protein Latest Ref Range: Negative mg/dL Negative   Nitrite Latest Ref Range: Negative  Negative   Leukocyte Esterase Latest Ref Range: Negative  Moderate (A)   Urobilinogen, Urine Latest Ref Range: Negative  0.2   Micro Urine Req Unknown Microscopic   WBC Latest Units: /hpf 20-50 (A)   RBC Latest Units: /hpf 10-20 (A)   Epithelial Cells Latest Units: /hpf Negative   Bacteria Latest Ref Range: None /hpf Few (A)   Hyaline Cast Latest Units: /lpf 0-2       TAUS:   FINDINGS:  Transabdominal scanning was performed.    The uterus measures  6.21 cm x 15.82 cm x 10.63 cm.  The uterus is within normal limits.    The endometrial echo complex measures 0.49 cm. Air is present in the endometrial cavity. No definite evidence of retained products of conception however.      Low resistive waveforms are confirmed within the ovaries.  The right ovary measures 2.48 cm x 2.49 cm x 1.17 cm.    The left ovary measures 2.03 cm x 1.31 cm x 1.29 cm.      There is no free fluid seen.     Impression       1.  Enlarged uterus, consistent with recent postpartum status. No definite evidence of retained products of conception.  2.  Normal appearance of the ovaries.  3.  No free fluid.         A/P: 33 y.o.  with URI, possible early endometritis  - pt clinically well, afebrile with normal VS here in ED.  Does report cough/URI symptoms as well as increasing lower abdominal pain.  Uterus mildly tender, not as pronounced as would expect for florid endometritis causing fevers however given exam will cover for early endometritis with augmentin x1 week.   - UA with leukocytes likely contaminated by lochia, no nitrites   - will have pt f/u at [facility] tomorrow to ensure continued improvement. Pt instructed to expect call first thing in AM to inform what time to come to [facility]     Discussed strict precautions with pt to return if with fever/chills despite abx,increasing abdominal pain, other concerns; discussed that often pts with endometritis need admission with IV abx. Pt currently well and prefers to go home with close f/u.        Alexandra Goode MD  [facility], Women's Health

## 2019-05-03 NOTE — NON-PROVIDER
Pt here for ED f/u per Dr. Oseguera  Pt states pain inside vagina,pt breast and bottle feeding   Good#[phone]  Pharmacy verified

## 2019-05-03 NOTE — ED NOTES
Patient discharged with instructions for URI with prescriptions x1 signs and symptoms explained to patient for reasons to return to emergency department, Patient is understanding and has no further questions. Pt ambulatory to the lobby with a steady gait.

## 2019-05-03 NOTE — ED PROVIDER NOTES
ED Provider Note    CHIEF COMPLAINT  Chief Complaint   Patient presents with   • Chills   • Vaginal Pain     vaginal tear from giving birth last friday   • Vaginal Discharge       HPI  Buffy Lund is a 33 y.o. Female  here with fever.  Patient reports fever for the last 2 days.  She reports that she gave birth on .  She denies any major vaginal discharge or bleeding that she would expect is outside of normal although she is having a small amount of bleeding or discharge following pregnancy.  This is her first vaginal birth.  She reports mild associated abdominal pain but this is not nagging in quality.  It is suprapubic.  She reports associated cough which is nonproductive.  Patient also reports nasal congestion.    REVIEW OF SYSTEMS  Review of Systems   Constitutional: Positive for chills, fever and malaise/fatigue.   Cardiovascular: Negative for chest pain.   Genitourinary: Negative for dysuria, frequency and urgency.       See HPI for further details. All other systems are negative.     PAST MEDICAL HISTORY   has a past medical history of H/O fetal demise, not currently pregnant (8/10/2016); Pregnant; and Psychiatric problem.    SOCIAL HISTORY  Social History     Social History Main Topics   • Smoking status: Never Smoker   • Smokeless tobacco: Never Used   • Alcohol use No   • Drug use: No   • Sexual activity: Not Currently     Partners: Male     Birth control/ protection: Pill      Comment: Unplanned pregnancy       SURGICAL HISTORY   has a past surgical history that includes other orthopedic surgery (); other (Left, ); ear middle exploration (); tendon repair (); and primary c section (2017).    CURRENT MEDICATIONS  Home Medications     Reviewed by Chelsea Perez R.N. (Registered Nurse) on 19 at 1753  Med List Status: Complete   Medication Last Dose Status   ibuprofen (MOTRIN) 800 MG Tab 2019 Active   Prenatal MV-Min-Fe Fum-FA-DHA (PRENATAL 1 PO) 2019  Active                ALLERGIES  No Known Allergies    PHYSICAL EXAM  Physical Exam   Constitutional: She is oriented to person, place, and time. She appears well-developed and well-nourished.   HENT:   Head: Normocephalic and atraumatic.   Eyes: Conjunctivae are normal.   Neck: Normal range of motion. Neck supple.   Cardiovascular: Normal rate and regular rhythm.    Pulmonary/Chest: Effort normal and breath sounds normal.   Abdominal: Soft. Bowel sounds are normal. She exhibits no distension. There is no tenderness. There is no rebound.   Genitourinary:   Genitourinary Comments: External vaginal exam is unremarkable, no major discharge, suprapubic with minimal tenderness to palpation   Neurological: She is alert and oriented to person, place, and time.   Skin: Skin is warm and dry. No rash noted.   Psychiatric: She has a normal mood and affect. Her behavior is normal.         DIAGNOSTIC STUDIES / PROCEDURES    LABS  Results for orders placed or performed during the hospital encounter of 05/02/19   Lactic acid (lactate)   Result Value Ref Range    Lactic Acid 1.6 0.5 - 2.0 mmol/L   CBC WITH DIFFERENTIAL   Result Value Ref Range    WBC 11.2 (H) 4.8 - 10.8 K/uL    RBC 3.92 (L) 4.20 - 5.40 M/uL    Hemoglobin 11.6 (L) 12.0 - 16.0 g/dL    Hematocrit 35.5 (L) 37.0 - 47.0 %    MCV 90.6 81.4 - 97.8 fL    MCH 29.6 27.0 - 33.0 pg    MCHC 32.7 (L) 33.6 - 35.0 g/dL    RDW 44.6 35.9 - 50.0 fL    Platelet Count 387 164 - 446 K/uL    MPV 8.7 (L) 9.0 - 12.9 fL    Neutrophils-Polys 78.90 (H) 44.00 - 72.00 %    Lymphocytes 14.50 (L) 22.00 - 41.00 %    Monocytes 5.00 0.00 - 13.40 %    Eosinophils 0.60 0.00 - 6.90 %    Basophils 0.40 0.00 - 1.80 %    Immature Granulocytes 0.60 0.00 - 0.90 %    Nucleated RBC 0.00 /100 WBC    Neutrophils (Absolute) 8.84 (H) 2.00 - 7.15 K/uL    Lymphs (Absolute) 1.63 1.00 - 4.80 K/uL    Monos (Absolute) 0.56 0.00 - 0.85 K/uL    Eos (Absolute) 0.07 0.00 - 0.51 K/uL    Baso (Absolute) 0.05 0.00 - 0.12 K/uL     Immature Granulocytes (abs) 0.07 0.00 - 0.11 K/uL    NRBC (Absolute) 0.00 K/uL   COMP METABOLIC PANEL   Result Value Ref Range    Sodium 138 135 - 145 mmol/L    Potassium 3.9 3.6 - 5.5 mmol/L    Chloride 107 96 - 112 mmol/L    Co2 22 20 - 33 mmol/L    Anion Gap 9.0 0.0 - 11.9    Glucose 112 (H) 65 - 99 mg/dL    Bun 12 8 - 22 mg/dL    Creatinine 0.61 0.50 - 1.40 mg/dL    Calcium 8.7 8.5 - 10.5 mg/dL    AST(SGOT) 21 12 - 45 U/L    ALT(SGPT) 41 2 - 50 U/L    Alkaline Phosphatase 155 (H) 30 - 99 U/L    Total Bilirubin 0.3 0.1 - 1.5 mg/dL    Albumin 3.7 3.2 - 4.9 g/dL    Total Protein 6.7 6.0 - 8.2 g/dL    Globulin 3.0 1.9 - 3.5 g/dL    A-G Ratio 1.2 g/dL   URINALYSIS   Result Value Ref Range    Color Yellow     Character Clear     Specific Gravity 1.017 <1.035    Ph 6.0 5.0 - 8.0    Glucose Negative Negative mg/dL    Ketones Negative Negative mg/dL    Protein Negative Negative mg/dL    Bilirubin Negative Negative    Urobilinogen, Urine 0.2 Negative    Nitrite Negative Negative    Leukocyte Esterase Moderate (A) Negative    Occult Blood Large (A) Negative    Micro Urine Req Microscopic    URINE MICROSCOPIC (W/UA)   Result Value Ref Range    WBC 20-50 (A) /hpf    RBC 10-20 (A) /hpf    Bacteria Few (A) None /hpf    Epithelial Cells Negative /hpf    Hyaline Cast 0-2 /lpf   ESTIMATED GFR   Result Value Ref Range    GFR If African American >60 >60 mL/min/1.73 m 2    GFR If Non African American >60 >60 mL/min/1.73 m 2   Influenza A/B By PCR (Adult - Flu Only)   Result Value Ref Range    Influenza virus A RNA Negative Negative    Influenza virus B, PCR Negative Negative       RADIOLOGY  US-PELVIC TRANSABDOMINAL ONLY   Final Result      1.  Enlarged uterus, consistent with recent postpartum status. No definite evidence of retained products of conception.   2.  Normal appearance of the ovaries.   3.  No free fluid.      DX-CHEST-PORTABLE (1 VIEW)   Final Result      No acute cardiopulmonary abnormality.          COURSE &  MEDICAL DECISION MAKING  Pertinent Labs & Imaging studies reviewed. (See chart for details)  Patient with subjective fever following unremarkable delivery.  Exam does not appear entirely consistent with endometritis.  Check basic labs.  Patient's abdominal exam is otherwise unremarkable acute surgical pathology is unlikely.  I discussed the case with gynecology would like an ultrasound performed for possible retained products.   Ultrasound fails to reveal any retained POC.  I have discussed the case with gynecology he can come with evaluate the patient, they believe it is unlikely the patient has a meningitis but will cover to be safe with Augmentin.  will see patient in their office tomorrow.  Patient with upper respiratory symptoms, symptoms may simply be secondary to a viral URI.  The patient will not drink alcohol nor drive with prescribed medications. The patient will return for worsening symptoms and is stable at the time of discharge. The patient verbalizes understanding and will comply.    FINAL IMPRESSION  1.   1. Fever, unspecified fever cause    2. Upper respiratory tract infection, unspecified type               Electronically signed by: Alfa Recinos, 5/2/2019 8:07 PM

## 2019-05-04 LAB
BACTERIA UR CULT: NORMAL
SIGNIFICANT IND 70042: NORMAL
SITE SITE: NORMAL
SOURCE SOURCE: NORMAL

## 2019-05-07 ENCOUNTER — GYNECOLOGY VISIT (OUTPATIENT)
Dept: OBGYN | Facility: CLINIC | Age: 34
End: 2019-05-07
Payer: COMMERCIAL

## 2019-05-07 VITALS
WEIGHT: 141 LBS | SYSTOLIC BLOOD PRESSURE: 120 MMHG | DIASTOLIC BLOOD PRESSURE: 60 MMHG | HEIGHT: 60 IN | BODY MASS INDEX: 27.68 KG/M2

## 2019-05-07 LAB
BACTERIA BLD CULT: NORMAL
BACTERIA BLD CULT: NORMAL
SIGNIFICANT IND 70042: NORMAL
SIGNIFICANT IND 70042: NORMAL
SITE SITE: NORMAL
SITE SITE: NORMAL
SOURCE SOURCE: NORMAL
SOURCE SOURCE: NORMAL

## 2019-05-07 PROCEDURE — 99024 POSTOP FOLLOW-UP VISIT: CPT | Performed by: OBSTETRICS & GYNECOLOGY

## 2019-05-07 NOTE — NON-PROVIDER
Follow up appt. patient still on antibiotics. Doing better. No  complaints today.  Denies any bleeding

## 2019-05-07 NOTE — ADDENDUM NOTE
Encounter addended by: Jovana Aguero M.D. on: 5/7/2019  4:40 PM<BR>    Actions taken: Sign clinical note

## 2019-05-07 NOTE — PROGRESS NOTES
Chief Complaint   Patient presents with   • Follow-Up       History of present illness: 33 y.o presents 1 week after getting abx for an endometritis postpartum. She states she has been feeling much better since taking the abx and denies further fevers or chills.   Abdomen feels better and does not hurt to the touch at this time.    Review of systems:  Pertinent positives documented in HPI and all other systems reviewed & are negative    Constitutional: negative  Respiratory: negative  Cardiovascular: negative  Gastrointestinal: negative  Genitourinary:negative  Integument/breast: negative  Hematologic/lymphatic: negative  Musculoskeletal:negative  Neurological: negative  Behavioral/Psych: negative  Endocrine: negative  Allergic/Immunologic: negative    POBHx:   OB History    Para Term  AB Living   3 3 2 1 0 2   SAB TAB Ectopic Molar Multiple Live Births   0 0 0   0 2      # Outcome Date GA Lbr Tommy/2nd Weight Sex Delivery Anes PTL Lv   3 Term 19 39w0d / 00:39 3.225 kg (7 lb 1.8 oz) M Vag-Spont None N JOSIAH      Complications: Precipitate labor   2 Term 17 39w1d  3.145 kg (6 lb 14.9 oz) M CS-LTranv  N JOSIAH   1  16 24w0d    Vag-Spont  N FD      Complications: Chromosomal abnormality      Birth Comments: Pt had cytotec          All PMH, PSH, allergies, social history and FH reviewed and updated today:  Past Medical History:   Diagnosis Date   • H/O fetal demise, not currently pregnant 8/10/2016   • Pregnant    • Psychiatric problem     depression       Past Surgical History:   Procedure Laterality Date   • PRIMARY C SECTION  2017    Procedure: PRIMARY C SECTION;  Surgeon: Kevin Blount M.D.;  Location: LABOR AND DELIVERY;  Service:    • OTHER ORTHOPEDIC SURGERY  2006    Wrist for carpal tunnel left   • TENDON REPAIR  2006    left wrist   • OTHER Left     Ear surgery   • EAR MIDDLE EXPLORATION         Allergies: No Known Allergies    Social History     Social History   •  Marital status: Single     Spouse name: N/A   • Number of children: N/A   • Years of education: N/A     Occupational History   • Not on file.     Social History Main Topics   • Smoking status: Never Smoker   • Smokeless tobacco: Never Used   • Alcohol use No   • Drug use: No   • Sexual activity: Not Currently     Partners: Male     Birth control/ protection: Pill      Comment: Unplanned pregnancy     Other Topics Concern   • Not on file     Social History Narrative    ** Merged History Encounter **            No family history on file.    Physical exam:  /60 (BP Location: Right arm, Patient Position: Sitting)   Ht 1.524 m (5')   Wt 64 kg (141 lb)     General:appears stated age, is in no apparent distress, is well developed and well nourished  Head: normocephalic, non-tender  Neck: neck is supple, no jugular venous distension, no thyromegaly  CV : regular rate and rhythm, no peripheral edema  Lungs: Normal respiratory effort. Clear to auscultation bilaterally  Abdomen: Bowel sounds positive, nondistended, soft, nontender x4, no rebound or guarding. No organomegaly. No masses. Previous fundal tenderness is resolved .   Skin: No rashes, or ulcers or lesions seen  Psychiatric: Patient shows appropriate affect, is alert and oriented x3, intact judgment and insight.      Assessment  1. Postpartum endometritis         Plan  - 33 y.o.  at POD#11 from repeat  section with postpartum endometritis, currently resolving on abx.   - Discussed patient to finish current course of abx as directed.   - Probiotics for itchiness. May also use OTC monistat prn.   - Follow up 1 year.

## 2019-05-08 ENCOUNTER — TELEPHONE (OUTPATIENT)
Dept: OBGYN | Facility: CLINIC | Age: 34
End: 2019-05-08

## 2019-05-08 NOTE — TELEPHONE ENCOUNTER
Called patient to let her know her [employer] forms have been scanned and are ready to be picked up. Documents scanned into patient's chart.

## 2019-05-09 NOTE — ADDENDUM NOTE
Encounter addended by: Kevin Blount M.D. on: 5/8/2019  6:27 PM<BR>    Actions taken: Sign clinical note

## 2019-06-07 ENCOUNTER — POST PARTUM (OUTPATIENT)
Dept: OBGYN | Facility: CLINIC | Age: 34
End: 2019-06-07
Payer: COMMERCIAL

## 2019-06-07 VITALS — SYSTOLIC BLOOD PRESSURE: 104 MMHG | DIASTOLIC BLOOD PRESSURE: 70 MMHG | BODY MASS INDEX: 28.12 KG/M2 | WEIGHT: 144 LBS

## 2019-06-07 PROBLEM — O09.93 HIGH-RISK PREGNANCY SUPERVISION, THIRD TRIMESTER: Status: RESOLVED | Noted: 2019-04-15 | Resolved: 2019-06-07

## 2019-06-07 PROBLEM — O09.33 LATE PRENATAL CARE AFFECTING PREGNANCY IN THIRD TRIMESTER: Status: RESOLVED | Noted: 2019-04-15 | Resolved: 2019-06-07

## 2019-06-07 PROCEDURE — 0503F POSTPARTUM CARE VISIT: CPT | Performed by: NURSE PRACTITIONER

## 2019-06-07 ASSESSMENT — EDINBURGH POSTNATAL DEPRESSION SCALE (EPDS)
THE THOUGHT OF HARMING MYSELF HAS OCCURRED TO ME: NEVER
TOTAL SCORE: 0
I HAVE BEEN SO UNHAPPY THAT I HAVE HAD DIFFICULTY SLEEPING: NOT AT ALL
I HAVE LOOKED FORWARD WITH ENJOYMENT TO THINGS: AS MUCH AS I EVER DID
I HAVE BEEN ANXIOUS OR WORRIED FOR NO GOOD REASON: NO, NOT AT ALL
I HAVE FELT SAD OR MISERABLE: NO, NOT AT ALL
I HAVE BEEN ABLE TO LAUGH AND SEE THE FUNNY SIDE OF THINGS: AS MUCH AS I ALWAYS COULD
I HAVE BLAMED MYSELF UNNECESSARILY WHEN THINGS WENT WRONG: NO, NEVER
THINGS HAVE BEEN GETTING ON TOP OF ME: NO, I HAVE BEEN COPING AS WELL AS EVER
I HAVE FELT SCARED OR PANICKY FOR NO GOOD REASON: NO, NOT AT ALL
I HAVE BEEN SO UNHAPPY THAT I HAVE BEEN CRYING: NO, NEVER

## 2019-06-07 ASSESSMENT — ENCOUNTER SYMPTOMS
GASTROINTESTINAL NEGATIVE: 1
MUSCULOSKELETAL NEGATIVE: 1
CARDIOVASCULAR NEGATIVE: 1
NEUROLOGICAL NEGATIVE: 1
CONSTITUTIONAL NEGATIVE: 1
RESPIRATORY NEGATIVE: 1
EYES NEGATIVE: 1
PSYCHIATRIC NEGATIVE: 1

## 2019-06-07 NOTE — PROGRESS NOTES
Subjective:    Buffy Lund is a 33 y.o. female who presents for her postpartum exam 6 weeks following successful . Her prenatal course was complicated only by her hx of  section. She denies dysuria, vaginal bleeding, odor, itching or breast problems. She is breast and bottle feeding. She desires an BTL for her birth control method. Reports no sex prior to this appointment. Partner is in [place]. She plans to return to [place] but not for a while. Eating a regular diet without difficulty. Bowel movement are Normal.  The patient is not having any pain. Stopped bleeding about 4 weeks ago without return of regular menses. Patient Denies Incisional pain, drainage or redness. Patient denies any s/sx of postpartum depression. Screening score is 0.  Approximately 1-2 weeks after delivery, she noted some pain and discharge, went to ER and followed up in clinic. She was diagnosed with PP endometritis. Took the course of antibiotics prescribed and is feeling much better.    Problem List     Patient Active Problem List    Diagnosis Date Noted   • Postpartum endometritis 2019   • Vaginal delivery 2019   • High-risk pregnancy supervision, third trimester 04/15/2019   • Late prenatal care affecting pregnancy in third trimester 04/15/2019   • Hx of  section 04/15/2019   • History of depression 2017   • 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, antepartum 02/15/2017   • H/O fetal demise 08/10/2016       Objective    See PE  Lab: H&H at d/c: 11.6/35.5  /70   Wt 65.3 kg (144 lb)   LMP 2018   BMI 28.12 kg/m²     Assessment:    1. PP care of lactating women   2. Exam WNL   3. Pap WNL on 2018  4. Desires contraception- BTL    Plan:    1. Breastfeeding support   2. Continue PNV   3. Contraceptive counseling - follow up w health dept,  [facility], or [facility] for contraceptive changes, BTL consult, or other women's health needs  4.  Encouraged condom use for STI and pregnancy protection  5. Discussed diet, exercise and resumption of sexual activity   6. Preconception guidance for next pregnancy if applicable. Discussed pregnancy spacing and c/s risk factors. Folic acid for all women of childbearing age.     HPI    Review of Systems   Constitutional: Negative.    HENT: Negative.    Eyes: Negative.    Respiratory: Negative.    Cardiovascular: Negative.    Gastrointestinal: Negative.    Genitourinary: Negative.    Musculoskeletal: Negative.    Skin: Negative.    Neurological: Negative.    Endo/Heme/Allergies: Negative.    Psychiatric/Behavioral: Negative.    All other systems reviewed and are negative.         Objective:     /70   Wt 65.3 kg (144 lb)   LMP 2018   BMI 28.12 kg/m²      Physical Exam   Constitutional: She is oriented to person, place, and time. She appears well-developed and well-nourished.   HENT:   Head: Normocephalic.   Nose: Nose normal.   Eyes: Conjunctivae are normal.   Neck: Normal range of motion.   Cardiovascular: Normal rate, regular rhythm and normal heart sounds.    Pulmonary/Chest: Effort normal and breath sounds normal.   Abdominal: Soft.   Genitourinary: Vagina normal and uterus normal.   Musculoskeletal: Normal range of motion.   Neurological: She is alert and oriented to person, place, and time.   Skin: Skin is warm and dry.   Psychiatric: She has a normal mood and affect. Her behavior is normal. Judgment and thought content normal.   Nursing note and vitals reviewed.       Assessment/Plan:     1. Postpartum care and examination of lactating mother   19  - REFERRAL TO GYNECOLOGY

## 2019-06-07 NOTE — PROGRESS NOTES
Pt here today for postpartum exam.  Delivery type: 4/26/19 Vaginal  Currently : breast and bottle feeding  Desired BCM: wants to discuss BTL. Meanwhile pt. Is not sexually active.   LMP: not applicable at this time   Last pap: April 2019  Phone #[phone]   Pt states that she feels a pinching and throbbing sensation in vaginal area.

## 2019-06-10 ENCOUNTER — GYNECOLOGY VISIT (OUTPATIENT)
Dept: OBGYN | Facility: CLINIC | Age: 34
End: 2019-06-10
Payer: COMMERCIAL

## 2019-06-10 VITALS — BODY MASS INDEX: 28.32 KG/M2 | SYSTOLIC BLOOD PRESSURE: 118 MMHG | WEIGHT: 145 LBS | DIASTOLIC BLOOD PRESSURE: 76 MMHG

## 2019-06-10 DIAGNOSIS — Z30.2 ENCOUNTER FOR STERILIZATION: ICD-10-CM

## 2019-06-10 PROCEDURE — 99213 OFFICE O/P EST LOW 20 MIN: CPT | Mod: 24 | Performed by: OBSTETRICS & GYNECOLOGY

## 2019-06-10 NOTE — PROGRESS NOTES
Chief Complaint   Patient presents with   • Gynecologic Exam   Chief complaint: Contraception    History of present illness: 33 y.o.  presents with above chief complaint. Pt is interested in birth control, specifically sterilization.  She is currently using nothing    PGYNHx: Last pap 2019, no hx STDs    Review of systems:  Pertinent positives documented in HPI and all other systems reviewed & are negative    All PMH, PSH, allergies, social history and FH reviewed and updated today:  Past Medical History:   Diagnosis Date   • H/O fetal demise, not currently pregnant 8/10/2016   • Pregnant    • Psychiatric problem     depression       Past Surgical History:   Procedure Laterality Date   • PRIMARY C SECTION  2017    Procedure: PRIMARY C SECTION;  Surgeon: Kevin Blount M.D.;  Location: LABOR AND DELIVERY;  Service:    • OTHER ORTHOPEDIC SURGERY      Wrist for carpal tunnel left   • TENDON REPAIR      left wrist   • OTHER Left     Ear surgery   • EAR MIDDLE EXPLORATION         Allergies: No Known Allergies    Social History     Social History   • Marital status: Single     Spouse name: N/A   • Number of children: N/A   • Years of education: N/A     Occupational History   • Not on file.     Social History Main Topics   • Smoking status: Never Smoker   • Smokeless tobacco: Never Used   • Alcohol use No   • Drug use: No   • Sexual activity: Not Currently     Partners: Male     Birth control/ protection: Pill      Comment: Unplanned pregnancy     Other Topics Concern   • Not on file     Social History Narrative    ** Merged History Encounter **            No family history on file.    Physical exam:  /76   Wt 65.8 kg (145 lb)     GENERAL APPEARANCE: healthy, alert, no distress  NEURO Awake, alert and oriented x 3, Normal gait, no sensory deficits  PSYCHIATRIC: Patient shows appropriate affect, is alert and oriented x3, intact judgment and insight.    Assessment/Plan:  1. Encounter  for sterilization         Counseling/coordination of care of birth control options including medical and surgical methods. Discussed in detail risk and benefits of OCP, NuvaRing, Patch, Depo, IUDs, Nexplanon, sterilization as well as the normal side effects of each. Questions answered. Patient desires sterilization for birth control and request placed. Pt is to use condoms or pelvic rest until surgeries performed    Discussed with the patient the pros and cons of Filshie clip use versus salpingectomy.    Patient would like salpingectomy instead of a Filshie clip application.    All questions answered

## 2019-09-24 ENCOUNTER — GYNECOLOGY VISIT (OUTPATIENT)
Dept: OBGYN | Facility: CLINIC | Age: 34
End: 2019-09-24
Payer: COMMERCIAL

## 2019-09-24 VITALS — BODY MASS INDEX: 30.08 KG/M2 | WEIGHT: 154 LBS | DIASTOLIC BLOOD PRESSURE: 68 MMHG | SYSTOLIC BLOOD PRESSURE: 116 MMHG

## 2019-09-24 DIAGNOSIS — Z30.09 STERILIZATION CONSULT: ICD-10-CM

## 2019-09-24 NOTE — PROGRESS NOTES
Chief Complaint   Patient presents with   • Gynecologic Exam     BTL pre op    Chief complaint: Preoperative visit prior to sterilization    History of present illness:   34 y.o.  presents with above chief complaint.  Patient reports she is done with childbearing.  Desires no further children in the future whatsoever.  ROS: Pertinent positives documented in HPI and all other systems reviewed & are negative    POBHx:  3 para 2-0-1-2.   x1,  x1    PGYNHx: None last pap unsure    All PMH, PSH, meds, allergies, social history and FH reviewed and updated today:  Past Medical History:   Diagnosis Date   • H/O fetal demise, not currently pregnant 8/10/2016   • Pregnant    • Psychiatric problem     depression       Past Surgical History:   Procedure Laterality Date   • PRIMARY C SECTION  2017    Procedure: PRIMARY C SECTION;  Surgeon: Kevin Blount M.D.;  Location: LABOR AND DELIVERY;  Service:    • OTHER ORTHOPEDIC SURGERY      Wrist for carpal tunnel left   • TENDON REPAIR      left wrist   • OTHER Left     Ear surgery   • EAR MIDDLE EXPLORATION         Allergies: No Known Allergies    Social History     Socioeconomic History   • Marital status: Single     Spouse name: Not on file   • Number of children: Not on file   • Years of education: Not on file   • Highest education level: Not on file   Occupational History   • Not on file   Social Needs   • Financial resource strain: Not on file   • Food insecurity:     Worry: Not on file     Inability: Not on file   • Transportation needs:     Medical: Not on file     Non-medical: Not on file   Tobacco Use   • Smoking status: Never Smoker   • Smokeless tobacco: Never Used   Substance and Sexual Activity   • Alcohol use: No   • Drug use: No   • Sexual activity: Not Currently     Partners: Male     Birth control/protection: Pill     Comment: Unplanned pregnancy   Lifestyle   • Physical activity:     Days per week: Not on file      Minutes per session: Not on file   • Stress: Not on file   Relationships   • Social connections:     Talks on phone: Not on file     Gets together: Not on file     Attends [employer] service: Not on file     Active member of club or organization: Not on file     Attends meetings of clubs or organizations: Not on file     Relationship status: Not on file   • Intimate partner violence:     Fear of current or ex partner: Not on file     Emotionally abused: Not on file     Physically abused: Not on file     Forced sexual activity: Not on file   Other Topics Concern   • Not on file   Social History Narrative    ** Merged History Encounter **            History reviewed. No pertinent family history.    Physical exam:  /68   Wt 69.9 kg (154 lb)     GENERAL APPEARANCE: healthy, alert, no distress  NECK nontender, no masses, thyromegaly or nodules  HEART RRR with normal S1 and S2 ,no murmurs, no gallops, no peripheral edema  LUNG clear to auscultation, normal respiratory effort  ABDOMEN Abdomen soft, non-tender. BS normal. No masses,  No organomegaly  FEMALE GYN: Deferred   EXTREMITIES:negative clubbing, cyanosis, edema    NEURO Awake, alert and oriented x 3, Normal gait, no sensory deficits  SKIN No rashes, or ulcers or lesions seen  PSYCHIATRIC: Patient shows appropriate affect, is alert and oriented x3, intact judgment and insight.      Assessment:  1. Sterilization consult         Plan:    Patient is absolutely sure she does not wish for any more children.  Would like to be sterilized    Discussed with the patient the risks of laparoscopic salpingectomy. The risks include pain, infection, bleeding, scarring, pelvic pain, pelvic scarring, pain with intercourse, failure of sterilization with increased risk of ectopic pregnancy, damage to other organs in the area of operation. Specifically organs that can be damaged are bowel, bladder, ureters. I also discussed with the patient the risk of wound infection and wound  breakdown. We discussed that these risks are greater in people that have diabetes or obesity. I also discussed the risk of emergency blood transfusion during procedure as well as emergency hysterectomy during procedure.    Patient reminded to be n.p.o. after midnight prior to procedure.  Present to labor delivery 2 hours before hand    Patient had the opportunity to ask questions regarding procedures. All questions answered to the patient's satisfaction.

## 2019-09-25 ENCOUNTER — ANESTHESIA (OUTPATIENT)
Dept: SURGERY | Facility: MEDICAL CENTER | Age: 34
End: 2019-09-25
Payer: COMMERCIAL

## 2019-09-25 ENCOUNTER — HOSPITAL ENCOUNTER (OUTPATIENT)
Facility: MEDICAL CENTER | Age: 34
End: 2019-09-25
Attending: OBSTETRICS & GYNECOLOGY | Admitting: OBSTETRICS & GYNECOLOGY
Payer: COMMERCIAL

## 2019-09-25 ENCOUNTER — ANESTHESIA EVENT (OUTPATIENT)
Dept: SURGERY | Facility: MEDICAL CENTER | Age: 34
End: 2019-09-25
Payer: COMMERCIAL

## 2019-09-25 VITALS
HEIGHT: 60 IN | WEIGHT: 152.56 LBS | TEMPERATURE: 97.5 F | DIASTOLIC BLOOD PRESSURE: 51 MMHG | HEART RATE: 58 BPM | BODY MASS INDEX: 29.95 KG/M2 | SYSTOLIC BLOOD PRESSURE: 90 MMHG | OXYGEN SATURATION: 97 % | RESPIRATION RATE: 16 BRPM

## 2019-09-25 DIAGNOSIS — Z30.2 ENCOUNTER FOR STERILIZATION: ICD-10-CM

## 2019-09-25 LAB
BUN BLD-MCNC: 10 MG/DL (ref 8–22)
CA-I BLD ISE-SCNC: 1.18 MMOL/L (ref 1.1–1.3)
CHLORIDE BLD-SCNC: 104 MMOL/L (ref 96–112)
CO2 BLD-SCNC: 24 MMOL/L (ref 20–33)
CREAT BLD-MCNC: 0.6 MG/DL (ref 0.5–1.4)
ERYTHROCYTE [DISTWIDTH] IN BLOOD BY AUTOMATED COUNT: 43.4 FL (ref 35.9–50)
GLUCOSE BLD-MCNC: 90 MG/DL (ref 65–99)
HCT VFR BLD AUTO: 40.6 % (ref 37–47)
HCT VFR BLD CALC: 39 % (ref 37–47)
HGB BLD-MCNC: 13.3 G/DL (ref 12–16)
HGB BLD-MCNC: 13.6 G/DL (ref 12–16)
MCH RBC QN AUTO: 31.3 PG (ref 27–33)
MCHC RBC AUTO-ENTMCNC: 33.5 G/DL (ref 33.6–35)
MCV RBC AUTO: 93.3 FL (ref 81.4–97.8)
PATHOLOGY CONSULT NOTE: NORMAL
PLATELET # BLD AUTO: 249 K/UL (ref 164–446)
PMV BLD AUTO: 9.4 FL (ref 9–12.9)
POTASSIUM BLD-SCNC: 3.7 MMOL/L (ref 3.6–5.5)
RBC # BLD AUTO: 4.35 M/UL (ref 4.2–5.4)
SODIUM BLD-SCNC: 139 MMOL/L (ref 135–145)
WBC # BLD AUTO: 6.6 K/UL (ref 4.8–10.8)

## 2019-09-25 PROCEDURE — 160002 HCHG RECOVERY MINUTES (STAT): Performed by: OBSTETRICS & GYNECOLOGY

## 2019-09-25 PROCEDURE — 501581 HCHG TROCAR: Performed by: OBSTETRICS & GYNECOLOGY

## 2019-09-25 PROCEDURE — 700111 HCHG RX REV CODE 636 W/ 250 OVERRIDE (IP): Performed by: ANESTHESIOLOGY

## 2019-09-25 PROCEDURE — 58661 LAPAROSCOPY REMOVE ADNEXA: CPT | Mod: 80,50 | Performed by: OBSTETRICS & GYNECOLOGY

## 2019-09-25 PROCEDURE — 700105 HCHG RX REV CODE 258: Performed by: OBSTETRICS & GYNECOLOGY

## 2019-09-25 PROCEDURE — 160046 HCHG PACU - 1ST 60 MINS PHASE II: Performed by: OBSTETRICS & GYNECOLOGY

## 2019-09-25 PROCEDURE — 84702 CHORIONIC GONADOTROPIN TEST: CPT

## 2019-09-25 PROCEDURE — 160009 HCHG ANES TIME/MIN: Performed by: OBSTETRICS & GYNECOLOGY

## 2019-09-25 PROCEDURE — 160029 HCHG SURGERY MINUTES - 1ST 30 MINS LEVEL 4: Performed by: OBSTETRICS & GYNECOLOGY

## 2019-09-25 PROCEDURE — A9270 NON-COVERED ITEM OR SERVICE: HCPCS | Performed by: ANESTHESIOLOGY

## 2019-09-25 PROCEDURE — 700101 HCHG RX REV CODE 250: Performed by: ANESTHESIOLOGY

## 2019-09-25 PROCEDURE — 700101 HCHG RX REV CODE 250: Performed by: OBSTETRICS & GYNECOLOGY

## 2019-09-25 PROCEDURE — 160041 HCHG SURGERY MINUTES - EA ADDL 1 MIN LEVEL 4: Performed by: OBSTETRICS & GYNECOLOGY

## 2019-09-25 PROCEDURE — 160035 HCHG PACU - 1ST 60 MINS PHASE I: Performed by: OBSTETRICS & GYNECOLOGY

## 2019-09-25 PROCEDURE — 85014 HEMATOCRIT: CPT

## 2019-09-25 PROCEDURE — 501838 HCHG SUTURE GENERAL: Performed by: OBSTETRICS & GYNECOLOGY

## 2019-09-25 PROCEDURE — 500002 HCHG ADHESIVE, DERMABOND: Performed by: OBSTETRICS & GYNECOLOGY

## 2019-09-25 PROCEDURE — 160048 HCHG OR STATISTICAL LEVEL 1-5: Performed by: OBSTETRICS & GYNECOLOGY

## 2019-09-25 PROCEDURE — 500868 HCHG NEEDLE, SURGI(VARES): Performed by: OBSTETRICS & GYNECOLOGY

## 2019-09-25 PROCEDURE — 500886 HCHG PACK, LAPAROSCOPY: Performed by: OBSTETRICS & GYNECOLOGY

## 2019-09-25 PROCEDURE — 58661 LAPAROSCOPY REMOVE ADNEXA: CPT | Mod: 50 | Performed by: OBSTETRICS & GYNECOLOGY

## 2019-09-25 PROCEDURE — 80047 BASIC METABLC PNL IONIZED CA: CPT

## 2019-09-25 PROCEDURE — 85027 COMPLETE CBC AUTOMATED: CPT

## 2019-09-25 PROCEDURE — 160025 RECOVERY II MINUTES (STATS): Performed by: OBSTETRICS & GYNECOLOGY

## 2019-09-25 PROCEDURE — 88302 TISSUE EXAM BY PATHOLOGIST: CPT

## 2019-09-25 PROCEDURE — 700102 HCHG RX REV CODE 250 W/ 637 OVERRIDE(OP): Performed by: ANESTHESIOLOGY

## 2019-09-25 PROCEDURE — 502703 HCHG DEVICE, LIGASURE V SEALER: Performed by: OBSTETRICS & GYNECOLOGY

## 2019-09-25 RX ORDER — ROCURONIUM BROMIDE 10 MG/ML
INJECTION, SOLUTION INTRAVENOUS PRN
Status: DISCONTINUED | OUTPATIENT
Start: 2019-09-25 | End: 2019-09-25 | Stop reason: SURG

## 2019-09-25 RX ORDER — HALOPERIDOL 5 MG/ML
1 INJECTION INTRAMUSCULAR
Status: DISCONTINUED | OUTPATIENT
Start: 2019-09-25 | End: 2019-09-25 | Stop reason: HOSPADM

## 2019-09-25 RX ORDER — BUPIVACAINE HYDROCHLORIDE 2.5 MG/ML
INJECTION, SOLUTION EPIDURAL; INFILTRATION; INTRACAUDAL
Status: DISCONTINUED
Start: 2019-09-25 | End: 2019-09-25 | Stop reason: HOSPADM

## 2019-09-25 RX ORDER — HYDROMORPHONE HYDROCHLORIDE 1 MG/ML
0.2 INJECTION, SOLUTION INTRAMUSCULAR; INTRAVENOUS; SUBCUTANEOUS
Status: DISCONTINUED | OUTPATIENT
Start: 2019-09-25 | End: 2019-09-25 | Stop reason: HOSPADM

## 2019-09-25 RX ORDER — BUPIVACAINE HYDROCHLORIDE AND EPINEPHRINE 2.5; 5 MG/ML; UG/ML
INJECTION, SOLUTION EPIDURAL; INFILTRATION; INTRACAUDAL; PERINEURAL
Status: DISCONTINUED | OUTPATIENT
Start: 2019-09-25 | End: 2019-09-25 | Stop reason: HOSPADM

## 2019-09-25 RX ORDER — HYDRALAZINE HYDROCHLORIDE 20 MG/ML
5 INJECTION INTRAMUSCULAR; INTRAVENOUS
Status: DISCONTINUED | OUTPATIENT
Start: 2019-09-25 | End: 2019-09-25 | Stop reason: HOSPADM

## 2019-09-25 RX ORDER — OXYCODONE HYDROCHLORIDE AND ACETAMINOPHEN 5; 325 MG/1; MG/1
1 TABLET ORAL
Status: COMPLETED | OUTPATIENT
Start: 2019-09-25 | End: 2019-09-25

## 2019-09-25 RX ORDER — SUCCINYLCHOLINE CHLORIDE 20 MG/ML
INJECTION INTRAMUSCULAR; INTRAVENOUS PRN
Status: DISCONTINUED | OUTPATIENT
Start: 2019-09-25 | End: 2019-09-25 | Stop reason: SURG

## 2019-09-25 RX ORDER — OXYCODONE HYDROCHLORIDE AND ACETAMINOPHEN 5; 325 MG/1; MG/1
2 TABLET ORAL
Status: COMPLETED | OUTPATIENT
Start: 2019-09-25 | End: 2019-09-25

## 2019-09-25 RX ORDER — MIDAZOLAM HYDROCHLORIDE 1 MG/ML
1 INJECTION INTRAMUSCULAR; INTRAVENOUS
Status: DISCONTINUED | OUTPATIENT
Start: 2019-09-25 | End: 2019-09-25 | Stop reason: HOSPADM

## 2019-09-25 RX ORDER — ONDANSETRON 2 MG/ML
4 INJECTION INTRAMUSCULAR; INTRAVENOUS
Status: DISCONTINUED | OUTPATIENT
Start: 2019-09-25 | End: 2019-09-25 | Stop reason: HOSPADM

## 2019-09-25 RX ORDER — HYDROMORPHONE HYDROCHLORIDE 1 MG/ML
0.1 INJECTION, SOLUTION INTRAMUSCULAR; INTRAVENOUS; SUBCUTANEOUS
Status: DISCONTINUED | OUTPATIENT
Start: 2019-09-25 | End: 2019-09-25 | Stop reason: HOSPADM

## 2019-09-25 RX ORDER — ONDANSETRON 2 MG/ML
INJECTION INTRAMUSCULAR; INTRAVENOUS PRN
Status: DISCONTINUED | OUTPATIENT
Start: 2019-09-25 | End: 2019-09-25 | Stop reason: SURG

## 2019-09-25 RX ORDER — DIPHENHYDRAMINE HYDROCHLORIDE 50 MG/ML
12.5 INJECTION INTRAMUSCULAR; INTRAVENOUS
Status: DISCONTINUED | OUTPATIENT
Start: 2019-09-25 | End: 2019-09-25 | Stop reason: HOSPADM

## 2019-09-25 RX ORDER — MEPERIDINE HYDROCHLORIDE 25 MG/ML
12.5 INJECTION INTRAMUSCULAR; INTRAVENOUS; SUBCUTANEOUS
Status: DISCONTINUED | OUTPATIENT
Start: 2019-09-25 | End: 2019-09-25 | Stop reason: HOSPADM

## 2019-09-25 RX ORDER — HYDROMORPHONE HYDROCHLORIDE 1 MG/ML
0.4 INJECTION, SOLUTION INTRAMUSCULAR; INTRAVENOUS; SUBCUTANEOUS
Status: DISCONTINUED | OUTPATIENT
Start: 2019-09-25 | End: 2019-09-25 | Stop reason: HOSPADM

## 2019-09-25 RX ORDER — LABETALOL HYDROCHLORIDE 5 MG/ML
5 INJECTION, SOLUTION INTRAVENOUS
Status: DISCONTINUED | OUTPATIENT
Start: 2019-09-25 | End: 2019-09-25 | Stop reason: HOSPADM

## 2019-09-25 RX ORDER — SODIUM CHLORIDE, SODIUM LACTATE, POTASSIUM CHLORIDE, CALCIUM CHLORIDE 600; 310; 30; 20 MG/100ML; MG/100ML; MG/100ML; MG/100ML
INJECTION, SOLUTION INTRAVENOUS CONTINUOUS
Status: DISCONTINUED | OUTPATIENT
Start: 2019-09-25 | End: 2019-09-25 | Stop reason: HOSPADM

## 2019-09-25 RX ORDER — LIDOCAINE HYDROCHLORIDE 20 MG/ML
INJECTION, SOLUTION EPIDURAL; INFILTRATION; INTRACAUDAL; PERINEURAL PRN
Status: DISCONTINUED | OUTPATIENT
Start: 2019-09-25 | End: 2019-09-25 | Stop reason: SURG

## 2019-09-25 RX ORDER — MIDAZOLAM HYDROCHLORIDE 1 MG/ML
INJECTION INTRAMUSCULAR; INTRAVENOUS PRN
Status: DISCONTINUED | OUTPATIENT
Start: 2019-09-25 | End: 2019-09-25 | Stop reason: SURG

## 2019-09-25 RX ORDER — HYDROCODONE BITARTRATE AND ACETAMINOPHEN 5; 325 MG/1; MG/1
1-2 TABLET ORAL EVERY 4 HOURS PRN
Qty: 20 TAB | Refills: 0 | Status: SHIPPED | OUTPATIENT
Start: 2019-09-25 | End: 2019-10-02

## 2019-09-25 RX ORDER — METOPROLOL TARTRATE 1 MG/ML
1 INJECTION, SOLUTION INTRAVENOUS
Status: DISCONTINUED | OUTPATIENT
Start: 2019-09-25 | End: 2019-09-25 | Stop reason: HOSPADM

## 2019-09-25 RX ORDER — EPINEPHRINE 1 MG/ML(1)
AMPUL (ML) INJECTION
Status: DISCONTINUED
Start: 2019-09-25 | End: 2019-09-25 | Stop reason: HOSPADM

## 2019-09-25 RX ADMIN — PROPOFOL 200 MG: 10 INJECTION, EMULSION INTRAVENOUS at 14:31

## 2019-09-25 RX ADMIN — SODIUM CHLORIDE, POTASSIUM CHLORIDE, SODIUM LACTATE AND CALCIUM CHLORIDE: 600; 310; 30; 20 INJECTION, SOLUTION INTRAVENOUS at 14:00

## 2019-09-25 RX ADMIN — ROCURONIUM BROMIDE 10 MG: 10 INJECTION, SOLUTION INTRAVENOUS at 14:31

## 2019-09-25 RX ADMIN — MIDAZOLAM 2 MG: 1 INJECTION INTRAMUSCULAR; INTRAVENOUS at 14:31

## 2019-09-25 RX ADMIN — FENTANYL CITRATE 100 MCG: 50 INJECTION, SOLUTION INTRAMUSCULAR; INTRAVENOUS at 14:31

## 2019-09-25 RX ADMIN — ONDANSETRON 4 MG: 2 INJECTION INTRAMUSCULAR; INTRAVENOUS at 14:31

## 2019-09-25 RX ADMIN — LIDOCAINE HYDROCHLORIDE 40 MG: 20 INJECTION, SOLUTION EPIDURAL; INFILTRATION; INTRACAUDAL at 14:31

## 2019-09-25 RX ADMIN — SUCCINYLCHOLINE CHLORIDE 100 MG: 20 INJECTION, SOLUTION INTRAMUSCULAR; INTRAVENOUS at 14:31

## 2019-09-25 RX ADMIN — FENTANYL CITRATE 25 MCG: 50 INJECTION INTRAMUSCULAR; INTRAVENOUS at 15:26

## 2019-09-25 RX ADMIN — OXYCODONE HYDROCHLORIDE AND ACETAMINOPHEN 2 TABLET: 5; 325 TABLET ORAL at 15:34

## 2019-09-25 ASSESSMENT — PAIN SCALES - GENERAL: PAIN_LEVEL: 3

## 2019-09-25 NOTE — DISCHARGE INSTRUCTIONS
ACTIVITY: Rest and take it easy for the first 24 hours.  A responsible adult is recommended to remain with you during that time.  It is normal to feel sleepy.  We encourage you to not do anything that requires balance, judgment or coordination.    MILD FLU-LIKE SYMPTOMS ARE NORMAL. YOU MAY EXPERIENCE GENERALIZED MUSCLE ACHES, THROAT IRRITATION, HEADACHE AND/OR SOME NAUSEA.    FOR 24 HOURS DO NOT:  Drive, operate machinery or run household appliances.  Drink beer or alcoholic beverages.   Make important decisions or sign legal documents.    SPECIAL INSTRUCTIONS: see hand-out. Follow-up in 2 weeks.     Call or return to clinic if:     Temperature greater than 100.4   Heavy vaginal bleeding   Pain uncontrolled by pain medication   Or if you have any other questions or concerns      DIET: To avoid nausea, slowly advance diet as tolerated, avoiding spicy or greasy foods for the first day.  Add more substantial food to your diet according to your physician's instructions.  Babies can be fed formula or breast milk as soon as they are hungry.  INCREASE FLUIDS AND FIBER TO AVOID CONSTIPATION.    SURGICAL DRESSING/BATHING: keep steri strips on until they fall off. May shower tomorrow. No tub baths/hot tubs/swimming.     FOLLOW-UP APPOINTMENT:  A follow-up appointment should be arranged with your doctor ; call to schedule.    You should CALL YOUR PHYSICIAN if you develop:  Fever greater than 101 degrees F.  Pain not relieved by medication, or persistent nausea or vomiting.  Excessive bleeding (blood soaking through dressing) or unexpected drainage from the wound.  Extreme redness or swelling around the incision site, drainage of pus or foul smelling drainage.  Inability to urinate or empty your bladder within 8 hours.  Problems with breathing or chest pain.    You should call 911 if you develop problems with breathing or chest pain.  If you are unable to contact your doctor or surgical center, you should go to the nearest  emergency room or urgent care center.  Physician's telephone #: DR. Coy [phone]    If any questions arise, call your doctor.  If your doctor is not available, please feel free to call the Surgical Center at [phone].  The Center is open Monday through Friday from 7AM to 7PM.  You can also call the HEALTH HOTLINE open 24 hours/day, 7 days/week and speak to a nurse at [phone], or toll free at [phone].    A registered nurse may call you a few days after your surgery to see how you are doing after your procedure.    MEDICATIONS: Resume taking daily medication.  Take prescribed pain medication with food.  If no medication is prescribed, you may take non-aspirin pain medication if needed.  PAIN MEDICATION CAN BE VERY CONSTIPATING.  Take a stool softener or laxative such as senokot, pericolace, or milk of magnesia if needed.    Prescription given for norco.  Last pain medication given at 3:34 PM.    If your physician has prescribed pain medication that includes Acetaminophen (Tylenol), do not take additional Acetaminophen (Tylenol) while taking the prescribed medication.    Depression / Suicide Risk    As you are discharged from this [facility] facility, it is important to learn how to keep safe from harming yourself.    Recognize the warning signs:  · Abrupt changes in personality, positive or negative- including increase in energy   · Giving away possessions  · Change in eating patterns- significant weight changes-  positive or negative  · Change in sleeping patterns- unable to sleep or sleeping all the time   · Unwillingness or inability to communicate  · Depression  · Unusual sadness, discouragement and loneliness  · Talk of wanting to die  · Neglect of personal appearance   · Rebelliousness- reckless behavior  · Withdrawal from people/activities they love  · Confusion- inability to concentrate     If you or a loved one observes any of these behaviors or has concerns about self-harm,  here's what you can do:  · Talk about it- your feelings and reasons for harming yourself  · Remove any means that you might use to hurt yourself (examples: pills, rope, extension cords, firearm)  · Get professional help from the community (Mental Health, Substance Abuse, psychological counseling)  · Do not be alone:Call your Safe Contact- someone whom you trust who will be there for you.  · Call your local CRISIS HOTLINE 323-9211 or 818-173-3361  · Call your local Children's Mobile Crisis Response Team [place] (504) 278-4826 or www.[employer]  · Call the toll free National Suicide Prevention Hotlines   · National Suicide Prevention Lifeline 647-808-FGXL (5858)  · National Hope Line Network 800-SUICIDE (099-2860)

## 2019-09-25 NOTE — OR NURSING
1511 Patient arrived from OR on [facility]. Report received from anesthesia and RN. Oral airway in place. Will continue to monitor.   1515 Pt woke up, airway out.   1526 Pt states she has 5/10 pain, medicated with fentanyl IV.   1610 Pt up to bathroom, ambulated, voided. Minimal drainage on peripad. Pt meets criteria for phase 2. Ride called.   1700 Sister at bedside. Patient verbalized readiness to go home. Discharge instructions discussed with patient and sister, copy given. Patient verbalized understanding to instructions. Prescription given . Belongings with patient.   1706 Discharge criteria met. PIV out, Discharged ambulatory, gait steady.

## 2019-09-25 NOTE — ANESTHESIA PREPROCEDURE EVALUATION
Relevant Problems   NEURO   (+) H/O fetal demise   (+) History of depression      OB   (+) Hx of  section       Physical Exam    Airway   Mallampati: II  TM distance: >3 FB  Neck ROM: full       Cardiovascular - normal exam  Rhythm: regular  Rate: normal  (-) murmur     Dental - normal exam         Pulmonary - normal exam  Breath sounds clear to auscultation     Abdominal    Neurological - normal exam                 Anesthesia Plan    ASA 2       Plan - general       Airway plan will be ETT        Induction: intravenous    Postoperative Plan: Postoperative administration of opioids is intended.    Pertinent diagnostic labs and testing reviewed    Informed Consent:    Anesthetic plan and risks discussed with patient.    Use of blood products discussed with: patient whom consented to blood products.

## 2019-09-25 NOTE — ANESTHESIA POSTPROCEDURE EVALUATION
Patient: Buffy Lund    Procedure Summary     Date:  09/25/19 Room / Location:  [facility] ROOM 23 / SURGERY SAME DAY [facility]    Anesthesia Start:  1431 Anesthesia Stop:      Procedures:       PELVISCOPY (Abdomen)      SALPINGECTOMY (Bilateral Abdomen) Diagnosis:  (PERMANENT STERILIZATION)    Surgeon:  Ben Coy M.D. Responsible Provider:  Jaycob Mancia M.D.    Anesthesia Type:  general ASA Status:  2          Final Anesthesia Type: general  Last vitals  BP   Blood Pressure: 113/64    Temp   37.1 °C (98.7 °F)    Pulse   Pulse: 69   Resp   18    SpO2   99 %      Anesthesia Post Evaluation    Patient location during evaluation: PACU  Patient participation: complete - patient participated  Level of consciousness: awake and alert  Pain score: 3    Airway patency: patent  Anesthetic complications: no  Cardiovascular status: hemodynamically stable  Respiratory status: acceptable  Hydration status: euvolemic    PONV: none           Nurse Pain Score: 0 (NPRS)

## 2019-09-25 NOTE — OR SURGEON
Immediate Post OP Note    PreOp Diagnosis: Multiparity, desires sterilization    PostOp Diagnosis: Same    Procedure(s):  PELVISCOPY - Wound Class: Clean Contaminated  SALPINGECTOMY - Wound Class: Clean Contaminated    Surgeon(s):  JOSE ANTONIO Sepulveda D.O.    Anesthesiologist/Type of Anesthesia:  Anesthesiologist: Jaycob Mancia M.D./General    Surgical Staff:  Circulator: Charis Chavez R.N.  Scrub Person: Louise Oates    Specimens removed if any:  ID Type Source Tests Collected by Time Destination   A : BILATERAL FALLOPIAN TUBES  Tissue Abdominal PATHOLOGY SPECIMEN Ben Coy M.D. 9/25/2019  2:54 PM        Estimated Blood Loss: 2 mL    Findings: Normal tubes, uterus and ovaries    Complications: None        9/25/2019 3:31 PM Ben Coy M.D.

## 2019-09-25 NOTE — ANESTHESIA PROCEDURE NOTES
Airway  Date/Time: 9/25/2019 2:31 PM  Performed by: Jaycob Mancia M.D.  Authorized by: Jaycob Mancia M.D.     Location:  OR  Urgency:  Elective  Indications for Airway Management:  Anesthesia  Spontaneous Ventilation: absent    Sedation Level:  Deep  Preoxygenated: Yes    Patient Position:  Sniffing  Final Airway Type:  Endotracheal airway  Final Endotracheal Airway:  ETT  Cuffed: Yes    Technique Used for Successful ETT Placement:  Direct laryngoscopy  Insertion Site:  Oral  Blade Type:  Toan  Laryngoscope Blade/Videolaryngoscope Blade Size:  3  ETT Size (mm):  6.5  Measured from:  Teeth  ETT to Teeth (cm):  18  Placement Verified by: auscultation and capnometry    Cormack-Lehane Classification:  Grade I - full view of glottis  Number of Attempts at Approach:  1

## 2019-09-25 NOTE — ANESTHESIA TIME REPORT
Anesthesia Start and Stop Event Times     Date Time Event    9/25/2019 1340 Ready for Procedure     1431 Anesthesia Start     1513 Anesthesia Stop        Responsible Staff  09/25/19    Name Role Begin End    Jaycob Mancia M.D. Anesth 1431 1513        Preop Diagnosis (Free Text):  Pre-op Diagnosis     PERMANENT STERILIZATION        Preop Diagnosis (Codes):    Post op Diagnosis  Sterilization      Premium Reason  A. 3PM - 7AM    Comments:

## 2019-09-25 NOTE — ANESTHESIA QCDR
2019 [facility] Clinical Data Registry (for Quality Improvement)     Postoperative nausea/vomiting risk protocol (Adult = 18 yrs and Pediatric 3-17 yrs)- (430 and 463)  General inhalation anesthetic (NOT TIVA) with PONV risk factors: Yes  Provision of anti-emetic therapy with at least 2 different classes of agents: Yes   Patient DID NOT receive anti-emetic therapy and reason is documented in Medical Record:  N/A    Multimodal Pain Management- (AQI59)  Patient undergoing Elective Surgery (i.e. Outpatient, or ASC, or Prescheduled Surgery prior to Hospital Admission): Yes  Use of Multimodal Pain Management, two or more drugs and/or interventions, NOT including systemic opioids: Yes   Exception: Documented allergy to multiple classes of analgesics:  N/A    PACU assessment of acute postoperative pain prior to Anesthesia Care End- Applies to Patients Age = 18- (ABG7)  Initial PACU pain score is which of the following: < 7/10  Patient unable to report pain score: N/A    Post-anesthetic transfer of care checklist/protocol to PACU/ICU- (426 and 427)  Upon conclusion of case, patient transferred to which of the following locations: PACU/Non-ICU  Use of transfer checklist/protocol: Yes  Exclusion: Service Performed in Patient Hospital Room (and thus did not require transfer): N/A    PACU Reintubation- (AQI31)  General anesthesia requiring endotracheal intubation (ETT) along with subsequent extubation in OR or PACU: Yes  Required reintubation in the PACU: No   Extubation was a planned trial documented in the medical record prior to removal of the original airway device:  N/A    Unplanned admission to ICU related to anesthesia service up through end of PACU care- (MD51)  Unplanned admission to ICU (not initially anticipated at anesthesia start time): No

## 2019-09-26 LAB
B-HCG FREE SERPL-ACNC: <5 MIU/ML
IHCGL IHCGL: NEGATIVE MIU/ML

## 2019-10-18 ENCOUNTER — GYNECOLOGY VISIT (OUTPATIENT)
Dept: OBGYN | Facility: CLINIC | Age: 34
End: 2019-10-18
Payer: COMMERCIAL

## 2019-10-18 VITALS
DIASTOLIC BLOOD PRESSURE: 70 MMHG | SYSTOLIC BLOOD PRESSURE: 118 MMHG | WEIGHT: 156 LBS | BODY MASS INDEX: 30.63 KG/M2 | HEIGHT: 60 IN

## 2019-10-18 DIAGNOSIS — Z48.89 POSTOPERATIVE VISIT: ICD-10-CM

## 2019-10-18 PROCEDURE — 99024 POSTOP FOLLOW-UP VISIT: CPT | Performed by: OBSTETRICS & GYNECOLOGY

## 2019-10-18 NOTE — NON-PROVIDER
Patient here for post op visit.  Pt c/o feeling bloated. And hot flashes  Laparoscopic BTL on 9/25/19.  Phone number   Pharmacy verified  LMP=9/26/19

## 2019-10-31 PROBLEM — Z48.89 POSTOPERATIVE VISIT: Status: ACTIVE | Noted: 2019-10-31

## 2019-10-31 NOTE — PROGRESS NOTES
Chief complaint: Follow-up surgery    SUBJECTIVE:  Buffy Lund presents to the clinic 3 weeks following bilateral salpingectomy    Eating a regular diet without difficulty.   Bowel movement are Normal.    The patient is not having any pain. .   Patient Denies Incisional pain, drainage or redness    OBJECTIVE:  /70 (BP Location: Left arm, Patient Position: Sitting)   Ht 1.524 m (5')   Wt 70.8 kg (156 lb)   BMI 30.47 kg/m²   Current Outpatient Medications on File Prior to Visit   Medication Sig Dispense Refill   • Prenatal MV-Min-Fe Fum-FA-DHA (PRENATAL 1 PO) Take  by mouth.       No current facility-administered medications on file prior to visit.        Constitutional:  alert, healthy, no distress.  Abdomen:  soft, bowel sounds active, non-tender, non-distended.  Incision:  healing well, no drainage, no erythema, no hernia, no seroma, no swelling, no dehiscence, incision well approximated.    IMPRESSION: Doing well postoperatively.  Pt is to increase activities as tolerated.    Lab:   Recent Results (from the past 1008 hour(s))   CBC Without Differential    Collection Time: 09/25/19  1:50 PM   Result Value Ref Range    WBC 6.6 4.8 - 10.8 K/uL    RBC 4.35 4.20 - 5.40 M/uL    Hemoglobin 13.6 12.0 - 16.0 g/dL    Hematocrit 40.6 37.0 - 47.0 %    MCV 93.3 81.4 - 97.8 fL    MCH 31.3 27.0 - 33.0 pg    MCHC 33.5 (L) 33.6 - 35.0 g/dL    RDW 43.4 35.9 - 50.0 fL    Platelet Count 249 164 - 446 K/uL    MPV 9.4 9.0 - 12.9 fL   ISTAT BHCG    Collection Time: 09/25/19  1:54 PM   Result Value Ref Range    iStat bHCG Qual Negative mIU/mL    iStat bHCG Quantitative <5.0 mIU/mL   ISTAT CO2    Collection Time: 09/25/19  1:55 PM   Result Value Ref Range    Istat CO2 24 20 - 33 mmol/L   ISTAT GLUCOSE    Collection Time: 09/25/19  1:55 PM   Result Value Ref Range    Istat Glucose 90 65 - 99 mg/dL   ISTAT BUN    Collection Time: 09/25/19  1:55 PM   Result Value Ref Range    Istat Bun 10 8 - 22 mg/dL   ISTAT CREATININE     Collection Time: 09/25/19  1:55 PM   Result Value Ref Range    Istat Creatinine 0.6 0.5 - 1.4 mg/dL   ISTAT SODIUM    Collection Time: 09/25/19  1:55 PM   Result Value Ref Range    Istat Sodium 139 135 - 145 mmol/L   ISTAT POTASSIUM    Collection Time: 09/25/19  1:55 PM   Result Value Ref Range    Istat Potassium 3.7 3.6 - 5.5 mmol/L   ISTAT IONIZED CA    Collection Time: 09/25/19  1:55 PM   Result Value Ref Range    Istat Ionized Calcium 1.18 1.10 - 1.30 mmol/L   ISTAT CHLORIDE    Collection Time: 09/25/19  1:55 PM   Result Value Ref Range    Istat Chloride 104 96 - 112 mmol/L   ISTAT HEMATOCRIT AND HEMOGLOBIN    Collection Time: 09/25/19  1:55 PM   Result Value Ref Range    Istat Hematocrit 39 37 - 47 %    Istat Hemoglobin 13.3 12.0 - 16.0 g/dL   Histology Request    Collection Time: 09/25/19  3:39 PM   Result Value Ref Range    Pathology Request Sent to Histo        PLAN:  Continue any current medications.  (See Med List for details.)  Return to clinic: prn if symptoms worsen or fail to improve.    No signs of infection.  Patient recovering well.    Follow-up in 1 year for annual exam

## 2020-03-02 ENCOUNTER — HOSPITAL ENCOUNTER (EMERGENCY)
Facility: MEDICAL CENTER | Age: 35
End: 2020-03-02
Attending: EMERGENCY MEDICINE
Payer: COMMERCIAL

## 2020-03-02 VITALS
SYSTOLIC BLOOD PRESSURE: 118 MMHG | OXYGEN SATURATION: 98 % | WEIGHT: 156.97 LBS | HEART RATE: 85 BPM | DIASTOLIC BLOOD PRESSURE: 76 MMHG | BODY MASS INDEX: 30.82 KG/M2 | RESPIRATION RATE: 18 BRPM | TEMPERATURE: 98 F | HEIGHT: 60 IN

## 2020-03-02 DIAGNOSIS — H66.90 ACUTE OTITIS MEDIA, UNSPECIFIED OTITIS MEDIA TYPE: ICD-10-CM

## 2020-03-02 PROCEDURE — 99283 EMERGENCY DEPT VISIT LOW MDM: CPT

## 2020-03-02 RX ORDER — AMOXICILLIN AND CLAVULANATE POTASSIUM 875; 125 MG/1; MG/1
1 TABLET, FILM COATED ORAL 2 TIMES DAILY
Qty: 20 TAB | Refills: 0 | Status: SHIPPED | OUTPATIENT
Start: 2020-03-02 | End: 2020-03-12

## 2020-03-02 RX ORDER — BENZONATATE 100 MG/1
100 CAPSULE ORAL 3 TIMES DAILY PRN
Qty: 20 CAP | Refills: 0 | Status: SHIPPED | OUTPATIENT
Start: 2020-03-02 | End: 2020-08-11

## 2020-03-02 ASSESSMENT — FIBROSIS 4 INDEX: FIB4 SCORE: 0.47

## 2020-03-02 ASSESSMENT — LIFESTYLE VARIABLES
DOES PATIENT WANT TO STOP DRINKING: NO
DO YOU DRINK ALCOHOL: NO

## 2020-03-02 NOTE — DISCHARGE INSTRUCTIONS
Use Tylenol and Motrin as needed for fever or discomfort.  If you are not clearly better in 1 week call your doctor and arrange office follow-up and return here if you have new or worsening symptoms.

## 2020-03-02 NOTE — ED PROVIDER NOTES
ED Provider Note    CHIEF COMPLAINT  Chief Complaint   Patient presents with   • Cough   • Sore Throat   • Ear Pain       HPI  Buffy Lund is a 34 y.o. female who presents to the emergency department complaining that she has been ill for 2 weeks with cough cold congestion runny nose symptoms and last night she developed a new fever of 102 and for the last couple of days she has been feeling left ear pain.  The patient has a complex history regarding her left ear she suffered a ruptured tympanic membrane in the past followed by multiple infections and complications and actually had surgery to repair her tympanic membrane which failed and she has had a lot of problems with his ear.  She has not been having new pus or discharge from the ear this time.    REVIEW OF SYSTEMS no hemoptysis no shortness of breath no vomiting or diarrhea.  The patient has not been traveling and has no recognized ill contacts.    PAST MEDICAL HISTORY  Past Medical History:   Diagnosis Date   • H/O fetal demise, not currently pregnant 8/10/2016   • Pregnant    • Psychiatric problem     depression       FAMILY HISTORY  No family history on file.    SOCIAL HISTORY  Social History     Socioeconomic History   • Marital status: Single     Spouse name: Not on file   • Number of children: Not on file   • Years of education: Not on file   • Highest education level: Not on file   Occupational History   • Not on file   Social Needs   • Financial resource strain: Not on file   • Food insecurity     Worry: Not on file     Inability: Not on file   • Transportation needs     Medical: Not on file     Non-medical: Not on file   Tobacco Use   • Smoking status: Never Smoker   • Smokeless tobacco: Never Used   Substance and Sexual Activity   • Alcohol use: No   • Drug use: No   • Sexual activity: Not Currently     Partners: Male     Birth control/protection: Pill     Comment: Unplanned pregnancy   Lifestyle   • Physical activity     Days per week: Not  on file     Minutes per session: Not on file   • Stress: Not on file   Relationships   • Social connections     Talks on phone: Not on file     Gets together: Not on file     Attends Faith service: Not on file     Active member of club or organization: Not on file     Attends meetings of clubs or organizations: Not on file     Relationship status: Not on file   • Intimate partner violence     Fear of current or ex partner: Not on file     Emotionally abused: Not on file     Physically abused: Not on file     Forced sexual activity: Not on file   Other Topics Concern   • Not on file   Social History Narrative    ** Merged History Encounter **            SURGICAL HISTORY  Past Surgical History:   Procedure Laterality Date   • PB LAP,DIAGNOSTIC ABDOMEN  9/25/2019    Procedure: PELVISCOPY;  Surgeon: Ben Coy M.D.;  Location: SURGERY SAME DAY [facility] ORS;  Service: Obstetrics   • SALPINGECTOMY Bilateral 9/25/2019    Procedure: SALPINGECTOMY;  Surgeon: Ben Coy M.D.;  Location: SURGERY SAME DAY [facility] ORS;  Service: Obstetrics   • PRIMARY C SECTION  8/8/2017    Procedure: PRIMARY C SECTION;  Surgeon: Kevin Blount M.D.;  Location: LABOR AND DELIVERY;  Service:    • OTHER ORTHOPEDIC SURGERY  2006    Wrist for carpal tunnel left   • TENDON REPAIR  2006    left wrist   • OTHER Left 2001    Ear surgery   • EAR MIDDLE EXPLORATION  2001       CURRENT MEDICATIONS  Home Medications    **Home medications have not yet been reviewed for this encounter**         ALLERGIES  No Known Allergies    PHYSICAL EXAM  VITAL SIGNS: /78   Pulse 82   Temp 36.6 °C (97.8 °F) (Oral)   Resp 18   Ht 1.524 m (5')   Wt 71.2 kg (156 lb 15.5 oz)   SpO2 98%   BMI 30.66 kg/m²    Oxygen saturation is interpreted as adequate  Constitutional: Awake verbal nontoxic appearing  HENT: Mucous membranes are moist throat clear the left tympanic membrane is very distorted and clearly abnormal there may be an effusion  present but it is hard to differentiate between what is chronic deformity in this area versus acute there does not seem to be erythema or any discharge  Eyes: No erythema or discharge or jaundice  Neck: No meningeal findings  Cardiovascular: Regular rate and rhythm  Lungs: Clear and equal bilaterally she does have a dry cough  Skin: Warm and dry  Musculoskeletal: No acute bony deformity  Neurologic: Awake verbal and ambulatory    MEDICAL DECISION MAKING and DISPOSITION  The patient is generally well-appearing but describes a fever of 102 and clearly has respiratory symptoms and an abnormal left ear exam therefore I am going to start her on a course of Augmentin I have also written a prescription for Tessalon for cough.  I have recommended Tylenol and Motrin and I advised the patient to stay home for the next few days while she is ill.  If she has new or worsening symptoms she is to return here otherwise she is to follow-up with her primary care doctor if she is not obviously better in 1 week    IMPRESSION  1.  Left otitis media  2.  Upper respiratory tract infection         Electronically signed by: Gil Cohn M.D., 3/2/2020 10:28 AM

## 2020-03-29 ENCOUNTER — HOSPITAL ENCOUNTER (EMERGENCY)
Facility: MEDICAL CENTER | Age: 35
End: 2020-03-29
Attending: EMERGENCY MEDICINE
Payer: COMMERCIAL

## 2020-03-29 ENCOUNTER — APPOINTMENT (OUTPATIENT)
Dept: RADIOLOGY | Facility: MEDICAL CENTER | Age: 35
End: 2020-03-29
Attending: EMERGENCY MEDICINE
Payer: COMMERCIAL

## 2020-03-29 VITALS
TEMPERATURE: 98.1 F | SYSTOLIC BLOOD PRESSURE: 117 MMHG | WEIGHT: 155 LBS | OXYGEN SATURATION: 98 % | HEART RATE: 72 BPM | RESPIRATION RATE: 18 BRPM | DIASTOLIC BLOOD PRESSURE: 72 MMHG | HEIGHT: 60 IN | BODY MASS INDEX: 30.43 KG/M2

## 2020-03-29 DIAGNOSIS — J22 LOWER RESPIRATORY INFECTION: ICD-10-CM

## 2020-03-29 LAB — S PYO DNA SPEC NAA+PROBE: NOT DETECTED

## 2020-03-29 PROCEDURE — 99284 EMERGENCY DEPT VISIT MOD MDM: CPT

## 2020-03-29 PROCEDURE — 87651 STREP A DNA AMP PROBE: CPT

## 2020-03-29 PROCEDURE — 71045 X-RAY EXAM CHEST 1 VIEW: CPT

## 2020-03-29 PROCEDURE — 94760 N-INVAS EAR/PLS OXIMETRY 1: CPT

## 2020-03-29 RX ORDER — BENZONATATE 100 MG/1
100 CAPSULE ORAL 3 TIMES DAILY PRN
Qty: 21 CAP | Refills: 0 | Status: SHIPPED | OUTPATIENT
Start: 2020-03-29 | End: 2020-04-05

## 2020-03-29 RX ORDER — AZITHROMYCIN 250 MG/1
TABLET, FILM COATED ORAL
Qty: 6 TAB | Refills: 0 | Status: SHIPPED | OUTPATIENT
Start: 2020-03-29 | End: 2020-08-11

## 2020-03-29 RX ORDER — FEXOFENADINE HCL 180 MG/1
180 TABLET ORAL DAILY
Qty: 7 TAB | Refills: 0 | Status: SHIPPED | OUTPATIENT
Start: 2020-03-29 | End: 2020-04-05

## 2020-03-29 RX ORDER — AZITHROMYCIN 250 MG/1
500 TABLET, FILM COATED ORAL ONCE
Status: DISCONTINUED | OUTPATIENT
Start: 2020-03-29 | End: 2020-03-29

## 2020-03-29 ASSESSMENT — FIBROSIS 4 INDEX: FIB4 SCORE: 0.47

## 2020-03-29 NOTE — ED TRIAGE NOTES
"Buffy Lund 34 y.o. female ambulatory to [facility] 10 for     Chief Complaint   Patient presents with   • Cough     for more than 1 month   • Sore Throat     \"itchy neck and tonsils and swollen glands\"     Pt reports was seen here approx 3 weeks ago for the same and was given cough medicine and ABX.  Pt reports cough medicine helped but antibiotics did not.  She did take the full course.      /62   Pulse 84   Temp 36.2 °C (97.1 °F) (Oral)   Resp 16   Ht 1.524 m (5')   Wt 70.3 kg (155 lb)   LMP 03/15/2020 (Approximate)   SpO2 97%   BMI 30.27 kg/m²     "

## 2020-03-29 NOTE — ED PROVIDER NOTES
"ED Provider Note  CHIEF COMPLAINT  Chief Complaint   Patient presents with   • Cough     for more than 1 month   • Sore Throat     \"itchy neck and tonsils and swollen glands\"       HPI  Buffy Lund is a 34 y.o. female who presents to the ER with complaint of persistent cough over the last 1 month.  She also complains of sore throat, pain with swallowing, some swollen glands, and some itchiness that goes from her throat up into her ears.  She says she was here 3 weeks ago and was placed on amoxicillin.  She did not get any improvement with the amoxicillin.  She was also given Tessalon.  She states no improvement with Tessalon either.  She says she \"gets this every year\". She thinks she has seasonal allergies.  She reports that every year the symptoms last for 2 to 3 months at a time.  She says she was tested for allergies in [place] and was \"allergic to everything.\"  She is not taking any Zyrtec, Claritin or Allegra.  She says the only thing that worked for her in the past when she gets the symptoms is \"a liquid cough syrup.\"  No fevers or chills.  She does not feel short of breath.  She has pain in her chest when she coughs, but if she is not coughing she does not have any chest pain.  Over the last couple days she has been coughing up a greenish-brown phlegm.  Prior to that the cough was dry.  She gets a headache when she coughs.  Otherwise no headache.  No stiff neck.  No rash.  No hemoptysis. No nausea, vomiting or diarrhea.  No pain or swelling in her legs.  She did not get her flu shot this year.  She said that a few of her family members, including her sister, were ill with URI symptoms on and off over the last couple weeks.  She said that her sister went to the doctor and was tested for strep and flu.  Both of those tests were negative.  She has not been exposed to anybody who was tested positive for coronavirus and she has not traveled outside of the [facility] in the last 2 months.  She says that she " is currently not working.  She is undergoing self isolation for the last several weeks as was directed by the governor.      REVIEW OF SYSTEMS  See HPI for further details.  Positive for sore throat, cough productive of a greenish-brown phlegm, itchiness in her throat and ears, pain with swallowing, chest pain only with cough.  Negative for fevers, chills, stiff neck, rash, nausea, vomiting, diarrhea, hemoptysis, pain/swelling in legs.  All other systems are negative.    PAST MEDICAL HISTORY  Past Medical History:   Diagnosis Date   • H/O fetal demise, not currently pregnant 8/10/2016   • Pregnant    • Psychiatric problem     depression     FAMILY HISTORY  None noted    SOCIAL HISTORY  Social History     Socioeconomic History   • Marital status: Single   Tobacco Use   • Smoking status: Never Smoker   • Smokeless tobacco: Never Used   Substance and Sexual Activity   • Alcohol use: No   • Drug use: No   • Sexual activity: Not Currently     Partners: Male     Birth control/protection: Pill     Comment: Unplanned pregnancy     SURGICAL HISTORY  Past Surgical History:   Procedure Laterality Date   • PB LAP,DIAGNOSTIC ABDOMEN  9/25/2019    Procedure: PELVISCOPY;  Surgeon: Ben Coy M.D.;  Location: SURGERY SAME DAY [facility] ORS;  Service: Obstetrics   • SALPINGECTOMY Bilateral 9/25/2019    Procedure: SALPINGECTOMY;  Surgeon: Ben Coy M.D.;  Location: SURGERY SAME DAY [facility] ORS;  Service: Obstetrics   • PRIMARY C SECTION  8/8/2017    Procedure: PRIMARY C SECTION;  Surgeon: Kevin Blount M.D.;  Location: LABOR AND DELIVERY;  Service:    • OTHER ORTHOPEDIC SURGERY  2006    Wrist for carpal tunnel left   • TENDON REPAIR  2006    left wrist   • OTHER Left 2001    Ear surgery   • EAR MIDDLE EXPLORATION  2001     CURRENT MEDICATIONS  Home Medications     Reviewed by Jaquleine Lei R.N. (Registered Nurse) on 03/29/20 at 1005  Med List Status: Partial   Medication Last Dose Status   benzonatate  (TESSALON) 100 MG Cap out Active   Prenatal MV-Min-Fe Fum-FA-DHA (PRENATAL 1 PO) 3/29/2020 Active                ALLERGIES  No Known Allergies    PHYSICAL EXAM  VITAL SIGNS: /62   Pulse 84   Temp 36.2 °C (97.1 °F) (Oral)   Resp 16   Ht 1.524 m (5')   Wt 70.3 kg (155 lb)   LMP 03/15/2020 (Approximate)   SpO2 97%   BMI 30.27 kg/m²    Constitutional: Well developed, well nourished; No acute distress; Non-toxic appearance.   HENT: Normocephalic, atraumatic; Bilateral external ears normal; Oropharynx with moist mucous membranes; there is erythema in the posterior pharynx.  No exudates.  TMs are dull bilaterally.  Eyes: PERRL, EOMI, Conjunctiva normal. No discharge.   Neck:  Supple, nontender midline; No stridor; No nuchal rigidity.   Lymphatic: Slightly enlarged anterior cervical lymphadenopathy noted.   Cardiovascular: Regular rate and rhythm without murmurs, rubs, or gallop.   Thorax & Lungs: No respiratory distress, breath sounds clear to auscultation bilaterally without wheezing, rales or rhonchi. Nontender chest wall. No crepitus or subcutaneous air  Abdomen: Soft, nontender, bowel sounds normal. No obvious masses; No pulsatile masses; no rebound, guarding, or peritoneal signs.   Skin: Good color; warm and dry without rash or petechia.  Back: Nontender, No CVA tenderness.   Extremities: Distal radial, dorsalis pedis, posterior tibial pulses are equal bilaterally; No edema; Nontender calves or saphenous, No cyanosis, No clubbing.   Musculoskeletal: Good range of motion in all major joints. No tenderness to palpation or major deformities noted.   Neurologic: Alert & oriented x 4, clear speech      RADIOLOGY/PROCEDURES  DX-CHEST-PORTABLE (1 VIEW)   Final Result      No acute cardiopulmonary abnormality.          COURSE & MEDICAL DECISION MAKING  Pertinent Labs & Imaging studies reviewed. (See chart for details)    10:15 AM Patient seen and evaluated in respiratory tent. Patient with normal vital signs  with no hypoxia. Ordered x-ray of chest and rapid strep test.     11:53 AM X-ray of chest negative for pneumonia. Lab called regarding rapid strep test who states that results will be available in 9 minutes.    12:02 PM Rapid strep test negative.    12:10 PM Patient reevaluated at bedside and updated on imaging and lab results. She will be discharged with prescription for Azithromycin, Tessalon and Allegra. She is instructed to ensure she maintains adequate hydration and gets lots of rests. I have recommended that she is seen by her PCP at [facility] in the next two days or return to ED for any shortness of breath, cough, fever, worsening sore throat, shaking chills, difficulty swallowing fluids or saliva, or for any concerns. Patient understands that she may have a viral illness that may be COVID-19. There is no testing available for COVID-19 in the outpatient setting. She is instructed to remain in home quarantine until she is 7 days from symptoms onset, symptoms are improving and she has been fever free for at least 72 hours. She is given the number to health department to try to obtain testing. Patient understands and is agreeable to discharge plan.     Patient presents to the ER complaining of a persistent cough over the last 4 weeks.  She was seen here on March 2 and was sent home on Augmentin.  She says the Augmentin really did not help.  Additionally, the Tessalon Perles she was given really did not help either.  She continues to cough.  However, over the last couple of days, she has been coughing up some greenish-brown phlegm.  She does not feel short of breath.  She has some anterior chest pain only when she coughs.  Otherwise no chest pain.  No fevers.  No myalgias.  She complains of a sore throat.  She says the coughing makes a sore throat worse.  She also has an itching sensation in her throat and ears.  She has had some nasal congestion.  She says that she gets these symptoms every year  "around the same time.  They may last for several months at a time.  She says that when she was in [place] they tested her for allergies and she was \"allergic to everything.  I suspect her symptoms are likely allergy related.  I will send her home on Allegra.  However, since her sputum has changed in color and is now green with little tinges of brown in it, I will go ahead and give her a prescription for Zithromax so we cover for atypicals.  Additionally, I will give her a few more Tessalon Perles and told her that she can take over-the-counter Robitussin if she would like.  The patient is otherwise well-appearing.  Vital signs are normal and stable.  She is not hypoxic.  She is not tachycardic.  No hemoptysis.  She says she gets the same thing every year around the same time.  No shortness of breath or chest pain other than when she coughs.  No pain or swelling in her legs.  No hemoptysis.  At this time no concern for pulmonary embolism.  Chest x-ray is negative for pneumonia.  Rapid strep was negative.  She will be discharged home.  Since she is coughing she has been given instructions on self isolation/self quarantine.  She understands that at this time we cannot check for COVID-19 as we are short on testing kits and are not currently testing discharge patients.  However, she can always call the health department with questions if she is concerned.  Otherwise she has been given strict return precautions and discharge instructions and she understands if she gets worse in any way she must return to the ER immediately.    The patient will return for new or worsening symptoms and is stable at the time of discharge.    The patient is referred to a primary physician for blood pressure management, diabetic screening, and for all other preventative health concerns.    Given the coronavirus pandemic, I was wearing a 95 mask, eye protection, and gloves.    DISPOSITION:  Patient will be discharged home in stable " condition.    FOLLOW UP:  [address] [id number] [phone]  Schedule an appointment as soon as possible for a visit in 2 days  If symptoms worsen return to ER    OUTPATIENT MEDICATIONS:  New Prescriptions    AZITHROMYCIN (ZITHROMAX) 250 MG TAB    Take two tabs by mouth on day one, then one tab by mouth daily on days 2-5.    BENZONATATE (TESSALON) 100 MG CAP    Take 1 Cap by mouth 3 times a day as needed for Cough for up to 7 days.    FEXOFENADINE (ALLEGRA) 180 MG TABLET    Take 1 Tab by mouth every day for 7 days.     FINAL IMPRESSION  1. Lower respiratory infection      This dictation has been created using voice recognition software. The accuracy of the dictation is limited by the abilities of the software. I expect there may be some errors of grammar and possibly content. I made every attempt to manually correct the errors within my dictation. However, errors related to voice recognition software may still exist and should be interpreted within the appropriate context.     Zoe BLAIR (Neha), am scribing for, and in the presence of, Radha Rouse M.D..     Electronically signed by: Zoe Liu (Neha), 3/29/2020     Radha BLAIR M.D. personally performed the services described in this documentation, as scribed by Zoe Liu in my presence, and it is both accurate and complete.

## 2020-03-29 NOTE — ED NOTES
Discharge instructions given, pt verbalized understanding.  Prescription instructions given, pt verbalized understanding.  Pt given information for Health Department.  A&ox4.  VSS.  Ambulates out of ER.

## 2020-03-29 NOTE — DISCHARGE INSTRUCTIONS
You likely have a viral illness and may have COVID-19. At this point we do not have testing available in the outpatient setting here in the emergency department for COVID-19. We also have limited testing for flu and other viral illnesses due to national shortages.  Therefore, COVID-19 is not ruled out and you will need to remain in home quarantine until all three of the following are true:  You are 7 days from symptom onset  your symptoms are improving   you have been fever free for at least 72hours.  Testing is only occurring through the [facility] district at this point; you may call them at [phone].  After a phone triage process you may or may not be tested.  If you develop significant shortness of breath, meaning that it is difficult for you to walk even short distances without having to stop and catch your breath, or you become severely dizzy and this is persistent then please return to the emergency department.     Also return to the ER for any worsening cough, shortness of breath, chest pain, coughing of rust colored phlegm or blood, fevers over 100.4, shaking chills, nausea, vomiting, diarrhea, worsening sore throat, difficulty swallowing fluids or saliva, or for any concerns.    Drink plenty of fluids and get lots of rest!    Follow-up with the [facility] clinic within the next 1 to 2 days.  Please call for appointment.

## 2020-08-01 ENCOUNTER — APPOINTMENT (OUTPATIENT)
Dept: RADIOLOGY | Facility: MEDICAL CENTER | Age: 35
End: 2020-08-01
Attending: EMERGENCY MEDICINE
Payer: COMMERCIAL

## 2020-08-01 ENCOUNTER — HOSPITAL ENCOUNTER (EMERGENCY)
Facility: MEDICAL CENTER | Age: 35
End: 2020-08-02
Attending: EMERGENCY MEDICINE
Payer: COMMERCIAL

## 2020-08-01 DIAGNOSIS — K80.20 SYMPTOMATIC CHOLELITHIASIS: ICD-10-CM

## 2020-08-01 DIAGNOSIS — F32.A DEPRESSION, UNSPECIFIED DEPRESSION TYPE: ICD-10-CM

## 2020-08-01 DIAGNOSIS — R42 POSTURAL DIZZINESS WITH PRESYNCOPE: ICD-10-CM

## 2020-08-01 DIAGNOSIS — R55 POSTURAL DIZZINESS WITH PRESYNCOPE: ICD-10-CM

## 2020-08-01 LAB
ALBUMIN SERPL BCP-MCNC: 4.2 G/DL (ref 3.2–4.9)
ALBUMIN/GLOB SERPL: 1.8 G/DL
ALP SERPL-CCNC: 60 U/L (ref 30–99)
ALT SERPL-CCNC: 13 U/L (ref 2–50)
ANION GAP SERPL CALC-SCNC: 10 MMOL/L (ref 7–16)
APPEARANCE UR: CLEAR
AST SERPL-CCNC: 10 U/L (ref 12–45)
BASOPHILS # BLD AUTO: 0.3 % (ref 0–1.8)
BASOPHILS # BLD: 0.03 K/UL (ref 0–0.12)
BILIRUB SERPL-MCNC: 0.2 MG/DL (ref 0.1–1.5)
BILIRUB UR QL STRIP.AUTO: NEGATIVE
BUN SERPL-MCNC: 15 MG/DL (ref 8–22)
CALCIUM SERPL-MCNC: 8.8 MG/DL (ref 8.5–10.5)
CHLORIDE SERPL-SCNC: 104 MMOL/L (ref 96–112)
CO2 SERPL-SCNC: 22 MMOL/L (ref 20–33)
COLOR UR: YELLOW
CREAT SERPL-MCNC: 0.72 MG/DL (ref 0.5–1.4)
EKG IMPRESSION: NORMAL
EOSINOPHIL # BLD AUTO: 0.09 K/UL (ref 0–0.51)
EOSINOPHIL NFR BLD: 1 % (ref 0–6.9)
ERYTHROCYTE [DISTWIDTH] IN BLOOD BY AUTOMATED COUNT: 43.2 FL (ref 35.9–50)
GLOBULIN SER CALC-MCNC: 2.3 G/DL (ref 1.9–3.5)
GLUCOSE SERPL-MCNC: 102 MG/DL (ref 65–99)
GLUCOSE UR STRIP.AUTO-MCNC: NEGATIVE MG/DL
HCG UR QL: NEGATIVE
HCT VFR BLD AUTO: 39.1 % (ref 37–47)
HGB BLD-MCNC: 12.8 G/DL (ref 12–16)
IMM GRANULOCYTES # BLD AUTO: 0.01 K/UL (ref 0–0.11)
IMM GRANULOCYTES NFR BLD AUTO: 0.1 % (ref 0–0.9)
KETONES UR STRIP.AUTO-MCNC: NEGATIVE MG/DL
LEUKOCYTE ESTERASE UR QL STRIP.AUTO: NEGATIVE
LIPASE SERPL-CCNC: 33 U/L (ref 11–82)
LYMPHOCYTES # BLD AUTO: 3.13 K/UL (ref 1–4.8)
LYMPHOCYTES NFR BLD: 34.2 % (ref 22–41)
MCH RBC QN AUTO: 31 PG (ref 27–33)
MCHC RBC AUTO-ENTMCNC: 32.7 G/DL (ref 33.6–35)
MCV RBC AUTO: 94.7 FL (ref 81.4–97.8)
MICRO URNS: NORMAL
MONOCYTES # BLD AUTO: 0.56 K/UL (ref 0–0.85)
MONOCYTES NFR BLD AUTO: 6.1 % (ref 0–13.4)
NEUTROPHILS # BLD AUTO: 5.34 K/UL (ref 2–7.15)
NEUTROPHILS NFR BLD: 58.3 % (ref 44–72)
NITRITE UR QL STRIP.AUTO: NEGATIVE
NRBC # BLD AUTO: 0 K/UL
NRBC BLD-RTO: 0 /100 WBC
PH UR STRIP.AUTO: 6 [PH] (ref 5–8)
PLATELET # BLD AUTO: 285 K/UL (ref 164–446)
PMV BLD AUTO: 9.2 FL (ref 9–12.9)
POC BREATHALIZER: 0 PERCENT (ref 0–0.01)
POTASSIUM SERPL-SCNC: 3.8 MMOL/L (ref 3.6–5.5)
PROT SERPL-MCNC: 6.5 G/DL (ref 6–8.2)
PROT UR QL STRIP: NEGATIVE MG/DL
RBC # BLD AUTO: 4.13 M/UL (ref 4.2–5.4)
RBC UR QL AUTO: NEGATIVE
SODIUM SERPL-SCNC: 136 MMOL/L (ref 135–145)
SP GR UR STRIP.AUTO: 1.02
UROBILINOGEN UR STRIP.AUTO-MCNC: 0.2 MG/DL
WBC # BLD AUTO: 9.2 K/UL (ref 4.8–10.8)

## 2020-08-01 PROCEDURE — 90791 PSYCH DIAGNOSTIC EVALUATION: CPT

## 2020-08-01 PROCEDURE — 83690 ASSAY OF LIPASE: CPT

## 2020-08-01 PROCEDURE — 36415 COLL VENOUS BLD VENIPUNCTURE: CPT

## 2020-08-01 PROCEDURE — 80053 COMPREHEN METABOLIC PANEL: CPT

## 2020-08-01 PROCEDURE — 85025 COMPLETE CBC W/AUTO DIFF WBC: CPT

## 2020-08-01 PROCEDURE — 93005 ELECTROCARDIOGRAM TRACING: CPT | Performed by: EMERGENCY MEDICINE

## 2020-08-01 PROCEDURE — 76705 ECHO EXAM OF ABDOMEN: CPT

## 2020-08-01 PROCEDURE — A9270 NON-COVERED ITEM OR SERVICE: HCPCS | Performed by: EMERGENCY MEDICINE

## 2020-08-01 PROCEDURE — 302970 POC BREATHALIZER: Performed by: EMERGENCY MEDICINE

## 2020-08-01 PROCEDURE — 99285 EMERGENCY DEPT VISIT HI MDM: CPT

## 2020-08-01 PROCEDURE — 81003 URINALYSIS AUTO W/O SCOPE: CPT

## 2020-08-01 PROCEDURE — 81025 URINE PREGNANCY TEST: CPT

## 2020-08-01 PROCEDURE — 700102 HCHG RX REV CODE 250 W/ 637 OVERRIDE(OP): Performed by: EMERGENCY MEDICINE

## 2020-08-01 RX ADMIN — LIDOCAINE HYDROCHLORIDE 30 ML: 20 SOLUTION OROPHARYNGEAL at 22:14

## 2020-08-01 ASSESSMENT — PAIN DESCRIPTION - DESCRIPTORS: DESCRIPTORS: SHARP;SHOOTING

## 2020-08-01 ASSESSMENT — FIBROSIS 4 INDEX: FIB4 SCORE: 0.47

## 2020-08-02 VITALS
OXYGEN SATURATION: 98 % | SYSTOLIC BLOOD PRESSURE: 102 MMHG | WEIGHT: 158.73 LBS | HEART RATE: 74 BPM | BODY MASS INDEX: 31.16 KG/M2 | HEIGHT: 60 IN | DIASTOLIC BLOOD PRESSURE: 53 MMHG | TEMPERATURE: 96.8 F | RESPIRATION RATE: 21 BRPM

## 2020-08-02 ASSESSMENT — LIFESTYLE VARIABLES
DO YOU DRINK ALCOHOL: YES
TOTAL SCORE: 0
HAVE YOU EVER FELT YOU SHOULD CUT DOWN ON YOUR DRINKING: NO
EVER HAD A DRINK FIRST THING IN THE MORNING TO STEADY YOUR NERVES TO GET RID OF A HANGOVER: NO
EVER FELT BAD OR GUILTY ABOUT YOUR DRINKING: NO
TOTAL SCORE: 0
HAVE PEOPLE ANNOYED YOU BY CRITICIZING YOUR DRINKING: NO
TOTAL SCORE: 0
CONSUMPTION TOTAL: INCOMPLETE

## 2020-08-02 NOTE — ED TRIAGE NOTES
"Buffy Lund  34 y.o. female  Chief Complaint   Patient presents with   • RUQ Pain     \"The last few days it's been hurting and today I felt weak.\" Pt reports RUQ pain radiating to r. flank. Pt denies painful urination. Pt denies fevers. Pt denies N/VD. Pt denies pregnancy and reports hx of tubal ligation.   • Depressed     Pt reports 11 years ago her brother passed away and after his death she attempted to end her life by overdosing on pills. Pt denies any active thoughts of SI but states \"sometimes I have passing thoughts about ending my life, because I feel guilty for his death but then I look at my children and know I'd never hurt myself.\" Pt denies any plan or intent. Pt is low SI risk and is requesting to be started on an antidepressant.       Pt ambulatory to triage with steady gait for above complaint.   Pt is alert and oriented, speaking in full sentences, follows commands and responds appropriately to questions. Resp are even and unlabored. No behavioral indicators of pain.   Pt placed in lobby with her SO. Pt educated on triage process. Pt encouraged to alert staff for any changes.    "

## 2020-08-02 NOTE — CONSULTS
"[facility]   TRIAGE ASSESSMENT    Name: Buffy Lund  MRN: [id number]  : 1985  Age: 34 y.o.  Date of assessment: 2020  PCP: Pcp Pt States None  Persons in attendance: Patient    CHIEF COMPLAINT/PRESENTING ISSUE (as stated by Buffy Lund): The patient presents as a 34 year-old female, arriving to the ER reporting RUQ pain, alongside symptoms of depression. Upon speaking to this writer the patient reports she experiences these symptoms \"on and off, usually when its the anniversary of the death of my brother\". The patient denies SI, but does admit to occasional passive thoughts, quickly stating \"I would never hurt myself though; I love my life, just miss him is all\". The patient reports she is not seeing any current mental health providers, although after discussing the matter with this writer she was open to receiving resources, which this writer provided to her. She notes no previous instances of self-harm or suicide attempts, and has never been inpatient at a psychiatric facility. Patient is calm and cooperative, answering questions appropriately with both good insight and judgment . The patient denies AH/VH and reports no other concerning mental health symptoms; patient is agreeable to plan to follow-up with an outpatient mental health provider should she choose to in the future.   Chief Complaint   Patient presents with   • RUQ Pain     \"The last few days it's been hurting and today I felt weak.\" Pt reports RUQ pain radiating to r. flank. Pt denies painful urination. Pt denies fevers. Pt denies N/VD. Pt denies pregnancy and reports hx of tubal ligation.   • Depressed     Pt reports 11 years ago her brother passed away and after his death she attempted to end her life by overdosing on pills. Pt denies any active thoughts of SI but states \"sometimes I have passing thoughts about ending my life, because I feel guilty for his death but then I look at my children and know I'd never " "hurt myself.\" Pt denies any plan or intent. Pt is low SI risk and is requesting to be started on an antidepressant.        CURRENT LIVING SITUATION/SOCIAL SUPPORT: The patient has a boyfriend whom she notes is a close and strong support in her life. She notes a moderate support network consisting of both family and friends.     BEHAVIORAL HEALTH TREATMENT HISTORY  Does patient/parent report a history of prior behavioral health treatment for patient?   Yes:    Dates Level of Care Facilty/Provider Diagnosis/Problem Medications   [id number] OP [place] PCP provider (Pt unable to recall name) Major Depressive DO/AMERICA None currently, formerly on anti-depressant       SAFETY ASSESSMENT - SELF  Does patient acknowledge current or past symptoms of dangerousness to self? no  Does parent/significant other report patient has current or past symptoms of dangerousness to self? N\A  Does presenting problem suggest symptoms of dangerousness to self? No    SAFETY ASSESSMENT - OTHERS  Does patient acknowledge current or past symptoms of aggressive behavior or risk to others? no  Does parent/significant other report patient has current or past symptoms of aggressive behavior or risk to others?  N\A  Does presenting problem suggest symptoms of dangerousness to others? No    Crisis Safety Plan completed and copy given to patient? N\A    ABUSE/NEGLECT SCREENING  Does patient report feeling “unsafe” in his/her home, or afraid of anyone?  no  Does patient report any history of physical, sexual, or emotional abuse?  no  Does parent or significant other report any of the above? N\A  Is there evidence of neglect by self?  no  Is there evidence of neglect by a caregiver? no  Does the patient/parent report any history of CPS/APS/police involvement related to suspected abuse/neglect or domestic violence? no  Based on the information provided during the current assessment, is a mandated report of suspected abuse/neglect being made?  " "No    SUBSTANCE USE SCREENING  Yes:  Vinnie all substances used in the past 30 days:      Last Use Amount   []   Alcohol     []   Marijuana     []   Heroin     []   Prescription Opioids  (used without prescription, for    recreation, or in excess of prescribed amount)     []   Other Prescription  (used without prescription, for    recreation, or in excess of prescribed amount)     []   Cocaine      []   Methamphetamine     []   \"\" drugs (ectasy, MDMA)     []   Other substances        UDS results: Pending  Breathalyzer results: 0.00    What consequences does the patient associate with any of the above substance use and or addictive behaviors? None    Risk factors for detox (check all that apply):  []  Seizures   []  Diaphoretic (sweating)   []  Tremors   []  Hallucinations   []  Increased blood pressure   []  Decreased blood pressure   []  Other   []  None      [] Patient education on risk factors for detoxification and instructed to return to ER as needed.      MENTAL STATUS   Participation: Active verbal participation, Attentive, Engaged and Open to feedback  Grooming: Good  Orientation: Alert and Fully Oriented  Behavior: Calm  Eye contact: Good  Mood: Euthymic  Affect: Full range and Congruent with content  Thought process: Logical and Goal-directed  Thought content: Within normal limits  Speech: Rate within normal limits and Volume within normal limits  Perception: Within normal limits  Memory:  No gross evidence of memory deficits  Insight: Good  Judgment:  Good  Other:    Collateral information:   Source:  [] Significant other present in person:   [] Significant other by telephone  [] [facility]   [x] [facility] Nursing Staff  [x] [facility]  [] Other:        CLINICAL IMPRESSIONS:  Primary: Major Depressive DO  Secondary:  Generalized Anxiety DO       IDENTIFIED NEEDS/PLAN:  [Trigger DISPOSITION list for any items marked]    []  Imminent safety risk - self [] Imminent safety risk - others "   []  Acute substance withdrawal []  Psychosis/Impaired reality testing   [x]  Mood/anxiety []  Substance use/Addictive behavior   []  Maladaptive behaviro []  Parent/child conflict   []  Family/Couples conflict []  Biomedical   []  Housing []  Financial   []   Legal  Occupational/Educational   []  Domestic violence []  Other:     Disposition: Consulted with ERP; the patient will be safe to discharge home after all medical interventions have concluded; patient denies SI/HI and notes no distress or concerning mental health symptoms; she was provided with resources by this writer for follow-up, which she accepted. Patient agreeable to plan and will be safe to discharge shortly.    Does patient express agreement with the above plan? yes    Referral appointment(s) scheduled? N\A    Alert team only:   I have discussed findings and recommendations with Dr. Fall who is in agreement with these recommendations.       GLORIA Ventura  8/1/2020

## 2020-08-02 NOTE — DISCHARGE INSTRUCTIONS
You were seen in the emergency department for abdominal pain, feeling like you might pass out, as well as depression.  Please follow-up with the resources provided for your mental health regarding the depression.    Your labs were all reassuring.  Your EKG is also reassuring.  At this time, the episodes of feeling like he might pass out do not appear to represent a dangerous cause.  The exact cause of this is not clear and should be followed up with your regular doctor.  If you do not have a regular doctor, please contact the referral line to establish care with a primary care physician.    Your ultrasound of the gallbladder shows stones in the gallbladder, which is likely the cause for your pain.  At this time it does not appear to be dangerous, as there is no signs of inflammation or infection.  You are being referred to general surgery.  Please contact their office Monday to schedule an appointment for follow-up for possible surgery to remove your gallbladder.    Please return to the emergency department or seek medical attention if you develop:  Loss of consciousness, vomiting, severe abdominal pain, fevers, thoughts of harming yourself or other people, or any other new or concerning findings    ================================  Coronavirus Information    Your visit did NOT appear to relate to coronavirus, but if you or your family develop symptoms that concern you for coronavirus (please see CDC website for symptoms), please contact the [facility] hotline (or your local health department)  or your healthcare provider before going to a medical facility:    [facility]  24hr hotline: [phone]    Information is available from the Centers for Disease Control and Prevention  www.CDC.gov    and     [facility]  [email or web address]    If you are severely ill or having a hard time breathing, please immediatly seek medical care. Notify the 911  Dispatcher or Emergency Department Triage about your symptoms.

## 2020-08-02 NOTE — ED PROVIDER NOTES
"ED Provider Note    Scribed for Sg Fall M.D. by Awais Nation. 8/1/2020,  9:47 PM.    Means of Arrival: Walk In  History obtained from: Patient  History limited by: None    CHIEF COMPLAINT  Chief Complaint   Patient presents with   • RUQ Pain     \"The last few days it's been hurting and today I felt weak.\" Pt reports RUQ pain radiating to r. flank. Pt denies painful urination. Pt denies fevers. Pt denies N/VD. Pt denies pregnancy and reports hx of tubal ligation.   • Depressed     Pt reports 11 years ago her brother passed away and after his death she attempted to end her life by overdosing on pills. Pt denies any active thoughts of SI but states \"sometimes I have passing thoughts about ending my life, because I feel guilty for his death but then I look at my children and know I'd never hurt myself.\" Pt denies any plan or intent. Pt is low SI risk and is requesting to be started on an antidepressant.       HPI  Buffy Lund is a 34 y.o. female who presents to the Emergency Department for evaluation of abdominal pain and near syncope. Patient states that for the last few days she has been having right upper abdominal pain which is now starting to radiate around to her right flank. She has not had any associated dysuria, hematuria, nausea, vomiting or diarrhea with this pain. She rates her pain at a 4-5/10, and no specific factors are identified which exacerbates or alleviates her pain. Patient denies having had any abdominal surgery. Otherwise she also reports having episodes of vision changes and lightheadedness followed by near syncpope. She reports having had five of these episodes today however has never fully lost consciousness when they occur. No specific factors are identified which causes these episodes to occur. Patient otherwise denies having any fevers, chills, cough, chest pain, shortness of breath, back pain, leg swelling, numbness or weakness.    Patient otherwise notes that she has been " feeling somewhat depressed, however denies any suicidal or homicidal ideations. She has already been evaluated by life skills prior to interview.    REVIEW OF SYSTEMS  CONSTITUTIONAL: Negative: Fevers, chills  CARDIOVASCULAR: Negative: Chest pain, leg swelling  RESPIRATORY: Negative: Shortness of breath, Cough,  GASTROINTESTINAL: Positive: Abdominal pain; Negative: Nausea, vomiting, diarrhea  GENITOURINARY: Positive: Right flank pain; Negative: Dysuria, hematuria  MUSCULOSKELETAL: Negative: Back pain  NEUROLOGIC: Positive: Lightheadedness, Vision changes, Near sycope; Negative: Syncope, Numbness, weakness  PSYCHIATRIC: Positive: Depression; Negative: Suicidal ideations, homicidal ideations    See HPI for further details.   All other systems are negative.     PAST MEDICAL HISTORY  Past Medical History:   Diagnosis Date   • H/O fetal demise, not currently pregnant 8/10/2016   • Pregnant    • Psychiatric problem     depression       FAMILY HISTORY  History reviewed. No pertinent family history.    SOCIAL HISTORY   reports that she has never smoked. She has never used smokeless tobacco. She reports that she does not drink alcohol or use drugs.    SURGICAL HISTORY  Past Surgical History:   Procedure Laterality Date   • PB LAP,DIAGNOSTIC ABDOMEN  9/25/2019    Procedure: PELVISCOPY;  Surgeon: Ben Coy M.D.;  Location: SURGERY SAME DAY [facility];  Service: Obstetrics   • SALPINGECTOMY Bilateral 9/25/2019    Procedure: SALPINGECTOMY;  Surgeon: Ben Coy M.D.;  Location: SURGERY SAME DAY [facility];  Service: Obstetrics   • PRIMARY C SECTION  8/8/2017    Procedure: PRIMARY C SECTION;  Surgeon: Kevin Blount M.D.;  Location: LABOR AND DELIVERY;  Service:    • OTHER ORTHOPEDIC SURGERY  2006    Wrist for carpal tunnel left   • TENDON REPAIR  2006    left wrist   • OTHER Left 2001    Ear surgery   • EAR MIDDLE EXPLORATION  2001       CURRENT MEDICATIONS  Home Medications    **Home medications have not  yet been reviewed for this encounter**         ALLERGIES  No Known Allergies    PHYSICAL EXAM  VITAL SIGNS: /70   Pulse 66   Temp 36 °C (96.8 °F) (Temporal)   Resp 17   Ht 1.524 m (5')   Wt 72 kg (158 lb 11.7 oz)   SpO2 98%   BMI 31.00 kg/m²    Gen: Alert, no acute distress  HEENT: ATNC  Eyes: PERRL, EOMI, normal conjunctiva.   Neck: trachea midline  Resp: no respiratory distress  CV: No JVD  Abd: non-distended  Ext: No deformities  Psych: normal mood  Neuro: speech fluent     DIAGNOSTIC STUDIES / PROCEDURES     EKG  Results for orders placed or performed during the hospital encounter of 20   EKG (Now)   Result Value Ref Range    Report       [facility] Emergency Dept.    Test Date:  2020  Pt Name:    KISHAN KASPER           Department: ER  MRN:        [id number]                      Room:       [facility]  Gender:     Female                       Technician: [id number]  :        1985                   Requested By:MARYJANE FALL  Order #:    [id number]                    Reading MD: Maryjane Fall    Measurements  Intervals                                Axis  Rate:       59                           P:          40  CO:         132                          QRS:        31  QRSD:       90                           T:          8  QT:         404  QTc:        401    Interpretive Statements  SINUS BRADYCARDIA  No previous ECG available for comparison  Electronically Signed On 2020 23:58:19 PDT by Maryjane Fall          LABS  Labs Reviewed   CBC WITH DIFFERENTIAL - Abnormal; Notable for the following components:       Result Value    RBC 4.13 (*)     MCHC 32.7 (*)     All other components within normal limits   COMP METABOLIC PANEL - Abnormal; Notable for the following components:    Glucose 102 (*)     AST(SGOT) 10 (*)     All other components within normal limits   POC BREATHALIZER - Normal   LIPASE   URINALYSIS,CULTURE IF INDICATED   HCG QUALITATIVE UR   ESTIMATED GFR     All  labs reviewed by me.    RADIOLOGY  US-RUQ   Final Result      1. Cholelithiasis. Contracted gallbladder and mild wall thickening could relate to underdistention.        The radiologist’s interpretation of all radiology studies have been reviewed by me.    COURSE & MEDICAL DECISION MAKING  Pertinent Labs & Imaging studies reviewed. (See chart for details)    9:47 PM Patient seen and examined at bedside. Ordered for labs and imaging to evaluate. Patient will be treated with GI Cocktail 30 ml for her symptoms. Discussed with the patient that I am ordering for labs and imaging to further evaluate, and they will be informed of the results when they return. I will also plan to treat her with a GI Cocktail to try and improve her GI symptoms. She understands and agrees to the plan of care.    11:04 PM Patient was reevaluated at bedside who reports no improvement after GI cocktail. Still awaiting results of urine.    11:55 PM - Upon reassessment, patient is resting comfortably with normal vital signs. No new complaints at this time. Discussed results with patient and/or family as well as the importance of primary care follow up. Patient understands plan of care, strict return precautions for new or changing symptoms and agrees to discharge.      Medical Decision Making:  Patient presents with right upper quadrant abdominal pain, no evidence of ascending cholangitis.  Low suspicion for surgical abdomen.  Her ultrasound does show contracted gallbladder with gallstones consistent with symptomatic cholelithiasis.  No evidence of urinary tract infection or ureterolithiasis.  Low suspicion for pulmonary etiology for the patient's pain given her focal tenderness.  She does endorse depression but denies suicidal or homicidal ideation.  The patient was evaluated prior to my meeting the patient by life skills, who provided the patient with resources.  I discussed the case with life skills nurse, and we both agree the patient appears  to be safe for outpatient therapy.  Patient will be referred to general surgery for elective cholecystectomy, advised to return to the emergency department for any new or concerning findings.  For the patient's presyncope, she has no murmur to suggest valvular etiology.  No high risk historical features, no evidence of high risk findings on her EKG.  At this point, unclear etiology for this.  She is advised to drink plenty of fluids and stay hydrated.  She reports she does have a regular doctor and was advised to follow-up with them regarding this.    The patient will return for new or worsening symptoms and is stable at the time of discharge.    DISPOSITION:  Patient will be discharged home in stable condition.    FOLLOW UP:  Your regular doctor.  To establish a primary care provider within our system, please call [phone]          Marshal Lemons M.D.  [address]  [place] [address]  [phone]    Schedule an appointment as soon as possible for a visit       [facility], Emergency Dept  [address] [facility] [address] [phone]    If symptoms worsen    FINAL IMPRESSION  1. Symptomatic cholelithiasis    2. Postural dizziness with presyncope    3. Depression, unspecified depression type           I, Awais Nation (Scribe), am scribing for, and in the presence of, Sg Fall M.D..    Electronically signed by: Awais Nation (Scribe), 8/1/2020    ISg M.D. personally performed the services described in this documentation, as scribed by Awais Nation in my presence, and it is both accurate and complete. C.    The note accurately reflects work and decisions made by me.  Sg Fall M.D.  8/2/2020  2:50 AM

## 2020-08-11 ENCOUNTER — OFFICE VISIT (OUTPATIENT)
Dept: ADMISSIONS | Facility: MEDICAL CENTER | Age: 35
End: 2020-08-11
Attending: SURGERY
Payer: COMMERCIAL

## 2020-08-11 DIAGNOSIS — Z01.812 PRE-OPERATIVE LABORATORY EXAMINATION: ICD-10-CM

## 2020-08-11 LAB — COVID ORDER STATUS COVID19: NORMAL

## 2020-08-11 PROCEDURE — U0003 INFECTIOUS AGENT DETECTION BY NUCLEIC ACID (DNA OR RNA); SEVERE ACUTE RESPIRATORY SYNDROME CORONAVIRUS 2 (SARS-COV-2) (CORONAVIRUS DISEASE [COVID-19]), AMPLIFIED PROBE TECHNIQUE, MAKING USE OF HIGH THROUGHPUT TECHNOLOGIES AS DESCRIBED BY CMS-2020-01-R: HCPCS

## 2020-08-11 ASSESSMENT — FIBROSIS 4 INDEX: FIB4 SCORE: 0.35

## 2020-08-12 LAB
SARS-COV-2 RNA RESP QL NAA+PROBE: NOTDETECTED
SPECIMEN SOURCE: NORMAL

## 2020-08-13 NOTE — OR NURSING
COVID-19 Pre-surgery screenin. Do you have an undiagnosed respiratory illness or symptoms such as coughing or sneezing? No  a. Onset of Sx No  b. Acute vs. chronic respiratory illness No    2. Do you have an unexplained fever greater than 100.4 degrees Fahrenheit or 38 degrees Celsius?     No     3. Have you had direct exposure to a patient who tested positive for Covid-19?    No     4. Have you had any loss of your sense of taste or smell? Have you had N/V or sore throat? No    Patient has been informed of visitor policy and asked to wear a mask upon entering the hospital   Yes

## 2020-08-14 ENCOUNTER — HOSPITAL ENCOUNTER (OUTPATIENT)
Facility: MEDICAL CENTER | Age: 35
End: 2020-08-14
Attending: SURGERY | Admitting: SURGERY
Payer: COMMERCIAL

## 2020-08-14 ENCOUNTER — ANESTHESIA (OUTPATIENT)
Dept: SURGERY | Facility: MEDICAL CENTER | Age: 35
End: 2020-08-14
Payer: COMMERCIAL

## 2020-08-14 ENCOUNTER — ANESTHESIA EVENT (OUTPATIENT)
Dept: SURGERY | Facility: MEDICAL CENTER | Age: 35
End: 2020-08-14
Payer: COMMERCIAL

## 2020-08-14 VITALS
SYSTOLIC BLOOD PRESSURE: 101 MMHG | BODY MASS INDEX: 30.69 KG/M2 | RESPIRATION RATE: 14 BRPM | OXYGEN SATURATION: 99 % | HEART RATE: 57 BPM | HEIGHT: 60 IN | DIASTOLIC BLOOD PRESSURE: 46 MMHG | TEMPERATURE: 96.9 F | WEIGHT: 156.31 LBS

## 2020-08-14 DIAGNOSIS — G89.18 POST-OP PAIN: ICD-10-CM

## 2020-08-14 LAB
HCG UR QL: NEGATIVE
PATHOLOGY CONSULT NOTE: NORMAL

## 2020-08-14 PROCEDURE — 160025 RECOVERY II MINUTES (STATS): Performed by: SURGERY

## 2020-08-14 PROCEDURE — 160009 HCHG ANES TIME/MIN: Performed by: SURGERY

## 2020-08-14 PROCEDURE — A9270 NON-COVERED ITEM OR SERVICE: HCPCS | Performed by: ANESTHESIOLOGY

## 2020-08-14 PROCEDURE — 160002 HCHG RECOVERY MINUTES (STAT): Performed by: SURGERY

## 2020-08-14 PROCEDURE — 160036 HCHG PACU - EA ADDL 30 MINS PHASE I: Performed by: SURGERY

## 2020-08-14 PROCEDURE — 160046 HCHG PACU - 1ST 60 MINS PHASE II: Performed by: SURGERY

## 2020-08-14 PROCEDURE — A9270 NON-COVERED ITEM OR SERVICE: HCPCS | Performed by: SURGERY

## 2020-08-14 PROCEDURE — 700104 HCHG RX REV CODE 254: Performed by: SURGERY

## 2020-08-14 PROCEDURE — 160031 HCHG SURGERY MINUTES - 1ST 30 MINS LEVEL 5: Performed by: SURGERY

## 2020-08-14 PROCEDURE — 81025 URINE PREGNANCY TEST: CPT

## 2020-08-14 PROCEDURE — 700111 HCHG RX REV CODE 636 W/ 250 OVERRIDE (IP): Performed by: ANESTHESIOLOGY

## 2020-08-14 PROCEDURE — 700101 HCHG RX REV CODE 250: Performed by: SURGERY

## 2020-08-14 PROCEDURE — 502571 HCHG PACK, LAP CHOLE: Performed by: SURGERY

## 2020-08-14 PROCEDURE — 160042 HCHG SURGERY MINUTES - EA ADDL 1 MIN LEVEL 5: Performed by: SURGERY

## 2020-08-14 PROCEDURE — 502714 HCHG ROBOTIC SURGERY SERVICES: Performed by: SURGERY

## 2020-08-14 PROCEDURE — 502240 HCHG MISC OR SUPPLY RC 0272: Performed by: SURGERY

## 2020-08-14 PROCEDURE — 500868 HCHG NEEDLE, SURGI(VARES): Performed by: SURGERY

## 2020-08-14 PROCEDURE — 160048 HCHG OR STATISTICAL LEVEL 1-5: Performed by: SURGERY

## 2020-08-14 PROCEDURE — 700101 HCHG RX REV CODE 250: Performed by: ANESTHESIOLOGY

## 2020-08-14 PROCEDURE — 700105 HCHG RX REV CODE 258: Performed by: SURGERY

## 2020-08-14 PROCEDURE — 700102 HCHG RX REV CODE 250 W/ 637 OVERRIDE(OP): Performed by: SURGERY

## 2020-08-14 PROCEDURE — 160035 HCHG PACU - 1ST 60 MINS PHASE I: Performed by: SURGERY

## 2020-08-14 PROCEDURE — 160047 HCHG PACU  - EA ADDL 30 MINS PHASE II: Performed by: SURGERY

## 2020-08-14 PROCEDURE — 501838 HCHG SUTURE GENERAL: Performed by: SURGERY

## 2020-08-14 PROCEDURE — 700102 HCHG RX REV CODE 250 W/ 637 OVERRIDE(OP): Performed by: ANESTHESIOLOGY

## 2020-08-14 PROCEDURE — 88304 TISSUE EXAM BY PATHOLOGIST: CPT

## 2020-08-14 RX ORDER — CEFAZOLIN SODIUM 1 G/3ML
INJECTION, POWDER, FOR SOLUTION INTRAMUSCULAR; INTRAVENOUS PRN
Status: DISCONTINUED | OUTPATIENT
Start: 2020-08-14 | End: 2020-08-14 | Stop reason: SURG

## 2020-08-14 RX ORDER — HYDRALAZINE HYDROCHLORIDE 20 MG/ML
5 INJECTION INTRAMUSCULAR; INTRAVENOUS
Status: DISCONTINUED | OUTPATIENT
Start: 2020-08-14 | End: 2020-08-14 | Stop reason: HOSPADM

## 2020-08-14 RX ORDER — SODIUM CHLORIDE, SODIUM LACTATE, POTASSIUM CHLORIDE, CALCIUM CHLORIDE 600; 310; 30; 20 MG/100ML; MG/100ML; MG/100ML; MG/100ML
INJECTION, SOLUTION INTRAVENOUS CONTINUOUS
Status: DISCONTINUED | OUTPATIENT
Start: 2020-08-14 | End: 2020-08-14 | Stop reason: HOSPADM

## 2020-08-14 RX ORDER — OXYCODONE HCL 5 MG/5 ML
10 SOLUTION, ORAL ORAL
Status: COMPLETED | OUTPATIENT
Start: 2020-08-14 | End: 2020-08-14

## 2020-08-14 RX ORDER — ROCURONIUM BROMIDE 10 MG/ML
INJECTION, SOLUTION INTRAVENOUS PRN
Status: DISCONTINUED | OUTPATIENT
Start: 2020-08-14 | End: 2020-08-14 | Stop reason: SURG

## 2020-08-14 RX ORDER — LABETALOL HYDROCHLORIDE 5 MG/ML
5 INJECTION, SOLUTION INTRAVENOUS
Status: DISCONTINUED | OUTPATIENT
Start: 2020-08-14 | End: 2020-08-14 | Stop reason: HOSPADM

## 2020-08-14 RX ORDER — HYDROMORPHONE HYDROCHLORIDE 1 MG/ML
0.2 INJECTION, SOLUTION INTRAMUSCULAR; INTRAVENOUS; SUBCUTANEOUS
Status: DISCONTINUED | OUTPATIENT
Start: 2020-08-14 | End: 2020-08-14 | Stop reason: HOSPADM

## 2020-08-14 RX ORDER — BUPIVACAINE HYDROCHLORIDE AND EPINEPHRINE 5; 5 MG/ML; UG/ML
INJECTION, SOLUTION EPIDURAL; INTRACAUDAL; PERINEURAL
Status: DISCONTINUED | OUTPATIENT
Start: 2020-08-14 | End: 2020-08-14 | Stop reason: HOSPADM

## 2020-08-14 RX ORDER — DEXAMETHASONE SODIUM PHOSPHATE 4 MG/ML
INJECTION, SOLUTION INTRA-ARTICULAR; INTRALESIONAL; INTRAMUSCULAR; INTRAVENOUS; SOFT TISSUE PRN
Status: DISCONTINUED | OUTPATIENT
Start: 2020-08-14 | End: 2020-08-14 | Stop reason: SURG

## 2020-08-14 RX ORDER — MEPERIDINE HYDROCHLORIDE 25 MG/ML
6.25 INJECTION INTRAMUSCULAR; INTRAVENOUS; SUBCUTANEOUS
Status: DISCONTINUED | OUTPATIENT
Start: 2020-08-14 | End: 2020-08-14 | Stop reason: HOSPADM

## 2020-08-14 RX ORDER — INDOCYANINE GREEN AND WATER 25 MG
7.5 KIT INJECTION ONCE
Status: COMPLETED | OUTPATIENT
Start: 2020-08-14 | End: 2020-08-14

## 2020-08-14 RX ORDER — HYDROMORPHONE HYDROCHLORIDE 1 MG/ML
0.4 INJECTION, SOLUTION INTRAMUSCULAR; INTRAVENOUS; SUBCUTANEOUS
Status: DISCONTINUED | OUTPATIENT
Start: 2020-08-14 | End: 2020-08-14 | Stop reason: HOSPADM

## 2020-08-14 RX ORDER — CELECOXIB 200 MG/1
200 CAPSULE ORAL ONCE
Status: COMPLETED | OUTPATIENT
Start: 2020-08-14 | End: 2020-08-14

## 2020-08-14 RX ORDER — ONDANSETRON 2 MG/ML
INJECTION INTRAMUSCULAR; INTRAVENOUS PRN
Status: DISCONTINUED | OUTPATIENT
Start: 2020-08-14 | End: 2020-08-14 | Stop reason: SURG

## 2020-08-14 RX ORDER — DIPHENHYDRAMINE HYDROCHLORIDE 50 MG/ML
12.5 INJECTION INTRAMUSCULAR; INTRAVENOUS
Status: DISCONTINUED | OUTPATIENT
Start: 2020-08-14 | End: 2020-08-14 | Stop reason: HOSPADM

## 2020-08-14 RX ORDER — ACETAMINOPHEN 500 MG
1000 TABLET ORAL ONCE
Status: COMPLETED | OUTPATIENT
Start: 2020-08-14 | End: 2020-08-14

## 2020-08-14 RX ORDER — OXYCODONE HCL 5 MG/5 ML
5 SOLUTION, ORAL ORAL
Status: COMPLETED | OUTPATIENT
Start: 2020-08-14 | End: 2020-08-14

## 2020-08-14 RX ORDER — HYDROMORPHONE HYDROCHLORIDE 1 MG/ML
0.1 INJECTION, SOLUTION INTRAMUSCULAR; INTRAVENOUS; SUBCUTANEOUS
Status: DISCONTINUED | OUTPATIENT
Start: 2020-08-14 | End: 2020-08-14 | Stop reason: HOSPADM

## 2020-08-14 RX ORDER — HALOPERIDOL 5 MG/ML
1 INJECTION INTRAMUSCULAR
Status: DISCONTINUED | OUTPATIENT
Start: 2020-08-14 | End: 2020-08-14 | Stop reason: HOSPADM

## 2020-08-14 RX ORDER — OXYCODONE HYDROCHLORIDE 5 MG/1
5 TABLET ORAL EVERY 4 HOURS PRN
Qty: 15 TAB | Refills: 0 | Status: SHIPPED | OUTPATIENT
Start: 2020-08-14 | End: 2020-08-18

## 2020-08-14 RX ORDER — ONDANSETRON 2 MG/ML
4 INJECTION INTRAMUSCULAR; INTRAVENOUS
Status: COMPLETED | OUTPATIENT
Start: 2020-08-14 | End: 2020-08-14

## 2020-08-14 RX ORDER — LIDOCAINE HYDROCHLORIDE 20 MG/ML
INJECTION, SOLUTION EPIDURAL; INFILTRATION; INTRACAUDAL; PERINEURAL PRN
Status: DISCONTINUED | OUTPATIENT
Start: 2020-08-14 | End: 2020-08-14 | Stop reason: SURG

## 2020-08-14 RX ADMIN — SUGAMMADEX 200 MG: 100 INJECTION, SOLUTION INTRAVENOUS at 10:23

## 2020-08-14 RX ADMIN — ONDANSETRON 4 MG: 2 INJECTION INTRAMUSCULAR; INTRAVENOUS at 11:03

## 2020-08-14 RX ADMIN — PROPOFOL 200 MG: 10 INJECTION, EMULSION INTRAVENOUS at 09:45

## 2020-08-14 RX ADMIN — ACETAMINOPHEN 1000 MG: 500 TABLET ORAL at 09:32

## 2020-08-14 RX ADMIN — ROCURONIUM BROMIDE 50 MG: 10 INJECTION, SOLUTION INTRAVENOUS at 09:45

## 2020-08-14 RX ADMIN — ROCURONIUM BROMIDE 20 MG: 10 INJECTION, SOLUTION INTRAVENOUS at 10:15

## 2020-08-14 RX ADMIN — DEXAMETHASONE SODIUM PHOSPHATE 8 MG: 4 INJECTION, SOLUTION INTRA-ARTICULAR; INTRALESIONAL; INTRAMUSCULAR; INTRAVENOUS; SOFT TISSUE at 09:47

## 2020-08-14 RX ADMIN — ONDANSETRON 4 MG: 2 INJECTION INTRAMUSCULAR; INTRAVENOUS at 09:47

## 2020-08-14 RX ADMIN — INDOCYANINE GREEN AND WATER 7.5 MG: KIT at 09:32

## 2020-08-14 RX ADMIN — FENTANYL CITRATE 50 MCG: 50 INJECTION, SOLUTION INTRAMUSCULAR; INTRAVENOUS at 09:47

## 2020-08-14 RX ADMIN — POVIDONE-IODINE 15 ML: 10 SOLUTION TOPICAL at 09:32

## 2020-08-14 RX ADMIN — LIDOCAINE HYDROCHLORIDE 0.5 ML: 10 INJECTION, SOLUTION EPIDURAL; INFILTRATION; INTRACAUDAL at 09:25

## 2020-08-14 RX ADMIN — SODIUM CHLORIDE, POTASSIUM CHLORIDE, SODIUM LACTATE AND CALCIUM CHLORIDE: 600; 310; 30; 20 INJECTION, SOLUTION INTRAVENOUS at 09:33

## 2020-08-14 RX ADMIN — CEFAZOLIN 2 G: 330 INJECTION, POWDER, FOR SOLUTION INTRAMUSCULAR; INTRAVENOUS at 09:45

## 2020-08-14 RX ADMIN — FENTANYL CITRATE 50 MCG: 50 INJECTION, SOLUTION INTRAMUSCULAR; INTRAVENOUS at 10:20

## 2020-08-14 RX ADMIN — FENTANYL CITRATE 25 MCG: 50 INJECTION INTRAMUSCULAR; INTRAVENOUS at 11:13

## 2020-08-14 RX ADMIN — CELECOXIB 200 MG: 200 CAPSULE ORAL at 09:32

## 2020-08-14 RX ADMIN — FENTANYL CITRATE 50 MCG: 50 INJECTION, SOLUTION INTRAMUSCULAR; INTRAVENOUS at 10:31

## 2020-08-14 RX ADMIN — FENTANYL CITRATE 50 MCG: 50 INJECTION INTRAMUSCULAR; INTRAVENOUS at 10:59

## 2020-08-14 RX ADMIN — OXYCODONE HYDROCHLORIDE 10 MG: 5 SOLUTION ORAL at 11:07

## 2020-08-14 RX ADMIN — FENTANYL CITRATE 25 MCG: 50 INJECTION INTRAMUSCULAR; INTRAVENOUS at 12:07

## 2020-08-14 RX ADMIN — FENTANYL CITRATE 50 MCG: 50 INJECTION, SOLUTION INTRAMUSCULAR; INTRAVENOUS at 10:00

## 2020-08-14 RX ADMIN — LIDOCAINE HYDROCHLORIDE 100 MG: 20 INJECTION, SOLUTION EPIDURAL; INFILTRATION; INTRACAUDAL at 09:45

## 2020-08-14 RX ADMIN — FENTANYL CITRATE 50 MCG: 50 INJECTION, SOLUTION INTRAMUSCULAR; INTRAVENOUS at 10:07

## 2020-08-14 ASSESSMENT — PAIN SCALES - GENERAL: PAIN_LEVEL: 3

## 2020-08-14 ASSESSMENT — FIBROSIS 4 INDEX: FIB4 SCORE: 0.35

## 2020-08-14 NOTE — DISCHARGE INSTRUCTIONS
ACTIVITY: Rest and take it easy for the first 24 hours.  A responsible adult is recommended to remain with you during that time.  It is normal to feel sleepy.  We encourage you to not do anything that requires balance, judgment or coordination.    MILD FLU-LIKE SYMPTOMS ARE NORMAL. YOU MAY EXPERIENCE GENERALIZED MUSCLE ACHES, THROAT IRRITATION, HEADACHE AND/OR SOME NAUSEA.    FOR 24 HOURS DO NOT:  Drive, operate machinery or run household appliances.  Drink beer or alcoholic beverages.   Make important decisions or sign legal documents.    SPECIAL INSTRUCTIONS:     1.   The pain medication is extremely constipating.    Take a stool softener and drink lots of water.  It also can be addicting.  Please try to transition to an over the counter pain remedy as soon as possible.     2.   Alternating ice and heat for 30 minutes can greatly reduce your pain.     3.   It's ok to shower on the day after your surgery.   Do not scrub the incisions.     4.   After laparoscopy, it's common to have shoulder pain or pain when you take a deep breath.   If the pain radiates down your arm, call or present to the ER.     5.   Please call for redness or drainage from the wound sites that persists.     6.   Feel free to call with questions at [phone].   If you have paperwork that needs filling out, please contact us at this number.     7.   Follow up with me in 1-2 weeks for your postoperative exam.     8.   Activity restrictions vary according to the surgery.   If it hurts, don't do it.   You may begin light exercise at 7-10 days.     Marshal Lemons MD FACS   [facility]    DIET: To avoid nausea, slowly advance diet as tolerated, avoiding spicy or greasy foods for the first day.  Add more substantial food to your diet according to your physician's instructions.  Babies can be fed formula or breast milk as soon as they are hungry.  INCREASE FLUIDS AND FIBER TO AVOID CONSTIPATION.    FOLLOW-UP APPOINTMENT:  A follow-up  appointment should be arranged with your doctor in *1-2 weeks*; call to schedule.    You should CALL YOUR PHYSICIAN if you develop:  Fever greater than 101 degrees F.  Pain not relieved by medication, or persistent nausea or vomiting.  Excessive bleeding (blood soaking through dressing) or unexpected drainage from the wound.  Extreme redness or swelling around the incision site, drainage of pus or foul smelling drainage.  Inability to urinate or empty your bladder within 8 hours.  Problems with breathing or chest pain.    You should call 911 if you develop problems with breathing or chest pain.  If you are unable to contact your doctor or surgical center, you should go to the nearest emergency room or urgent care center.  Physician's telephone #: *Dr. Lemons [phone]*    If any questions arise, call your doctor.  If your doctor is not available, please feel free to call the Surgical Center at [phone].  The Center is open Monday through Friday from 7AM to 7PM.  You can also call the [facility] HOTLINE open 24 hours/day, 7 days/week and speak to a nurse at [phone], or toll free at [phone].    A registered nurse may call you a few days after your surgery to see how you are doing after your procedure.    MEDICATIONS: Resume taking daily medication.  Take prescribed pain medication with food.  If no medication is prescribed, you may take non-aspirin pain medication if needed.  PAIN MEDICATION CAN BE VERY CONSTIPATING.  Take a stool softener or laxative such as senokot, pericolace, or milk of magnesia if needed.    Prescription given for *Roxicodone*.  Last pain medication given at *11:07am*.    If your physician has prescribed pain medication that includes Acetaminophen (Tylenol), do not take additional Acetaminophen (Tylenol) while taking the prescribed medication.    Depression / Suicide Risk    As you are discharged from this [facility] facility, it is important to learn how to keep safe from  harming yourself.    Recognize the warning signs:  · Abrupt changes in personality, positive or negative- including increase in energy   · Giving away possessions  · Change in eating patterns- significant weight changes-  positive or negative  · Change in sleeping patterns- unable to sleep or sleeping all the time   · Unwillingness or inability to communicate  · Depression  · Unusual sadness, discouragement and loneliness  · Talk of wanting to die  · Neglect of personal appearance   · Rebelliousness- reckless behavior  · Withdrawal from people/activities they love  · Confusion- inability to concentrate     If you or a loved one observes any of these behaviors or has concerns about self-harm, here's what you can do:  · Talk about it- your feelings and reasons for harming yourself  · Remove any means that you might use to hurt yourself (examples: pills, rope, extension cords, firearm)  · Get professional help from the community (Mental Health, Substance Abuse, psychological counseling)  · Do not be alone:Call your Safe Contact- someone whom you trust who will be there for you.  · Call your local CRISIS HOTLINE 184-1590 or 600-207-8136  · Call your local Children's Mobile Crisis Response Team [place] (330) 439-1240 or www.Joome  · Call the toll free National Suicide Prevention Hotlines   · National Suicide Prevention Lifeline 425-050-CUCX (1793)  · National Hope Line Network 800-SUICIDE (707-7131)

## 2020-08-14 NOTE — OR NURSING
1145 - pt to stage II discharge. Pt up to chair with SBA. Emil CDI.     1205 - discharge instructions reviewed with pt. Pt states she is having increased pain; medication given, see MAR.    1210 - pt's ride called and states she will be here in about 15 min.    1230 - pt resting in chair; BP decreased - denies dizziness. IV fluids restarted. Pt denies needs at this time. Call light in reach.    1240 - pt's BP back to baseline. Pt verbalizes readiness to get dressed. Aunt at bedside helping pt to dress. No needs at this time.    1249 - pt taken to car via wheelchair by CNA.

## 2020-08-14 NOTE — ANESTHESIA TIME REPORT
Anesthesia Start and Stop Event Times     Date Time Event    8/14/2020 0939 Ready for Procedure     0940 Anesthesia Start     1041 Anesthesia Stop        Responsible Staff  08/14/20    Name Role Begin End    Anatoliy De Leon M.D. Anesth 0940 1041        Preop Diagnosis (Free Text):  Pre-op Diagnosis     CHOLELITHIASIS WITH CHRONIC CHOLECYSTITIS WITHOUT OBSTRUCTION        Preop Diagnosis (Codes):    Post op Diagnosis  Cholelithiasis with chronic cholecystitis without biliary obstruction      Premium Reason  Non-Premium    Comments:

## 2020-08-14 NOTE — ANESTHESIA PREPROCEDURE EVALUATION
Relevant Problems   ANESTHESIA (within normal limits)      PULMONARY (within normal limits)      NEURO   (+) H/O fetal demise   (+) History of depression      CARDIAC (within normal limits)      GI (within normal limits)       (within normal limits)      ENDO (within normal limits)      OB   (+) Hx of  section       Physical Exam    Airway   Mallampati: II  TM distance: >3 FB  Neck ROM: full       Cardiovascular - normal exam  Rhythm: regular  Rate: normal  (-) murmur     Dental - normal exam           Pulmonary - normal exam  Breath sounds clear to auscultation     Abdominal    Neurological - normal exam                 Anesthesia Plan    ASA 2       Plan - general       Airway plan will be ETT        Induction: intravenous    Postoperative Plan: Postoperative administration of opioids is intended.    Pertinent diagnostic labs and testing reviewed    Informed Consent:    Anesthetic plan and risks discussed with patient.    Use of blood products discussed with: patient whom consented to blood products.

## 2020-08-14 NOTE — OR NURSING
Awake, alert and sipping water.  Vitals stable.  On monitors with alarms audible.    Called Aunt (Adriane) to update.  She is waiting in the surgery lobby and will be driving her home at [place].

## 2020-08-14 NOTE — ANESTHESIA PROCEDURE NOTES
Airway    Date/Time: 8/14/2020 9:45 AM  Performed by: Anatoliy De Leon M.D.  Authorized by: Anatoliy De Leon M.D.     Location:  OR  Urgency:  Elective  Indications for Airway Management:  Anesthesia      Spontaneous Ventilation: absent    Sedation Level:  Deep  Preoxygenated: Yes    Patient Position:  Sniffing  Final Airway Type:  Endotracheal airway  Final Endotracheal Airway:  ETT  Cuffed: Yes    Technique Used for Successful ETT Placement:  Direct laryngoscopy    Insertion Site:  Oral  Blade Type:  Toan  Laryngoscope Blade/Videolaryngoscope Blade Size:  3  ETT Size (mm):  7.0  Measured from:  Teeth  ETT to Teeth (cm):  22  Placement Verified by: auscultation and capnometry    Cormack-Lehane Classification:  Grade I - full view of glottis  Number of Attempts at Approach:  1

## 2020-08-14 NOTE — ANESTHESIA POSTPROCEDURE EVALUATION
Patient: Buffy Lund    Procedure Summary     Date: 08/14/20 Room / Location: [place] [address] / SURGERY [facility]    Anesthesia Start: 0940 Anesthesia Stop: 1041    Procedure: CHOLECYSTECTOMY, ROBOT-ASSISTED, USING DA STEPH XI (N/A Abdomen) Diagnosis: (CHOLELITHIASIS WITH CHRONIC CHOLECYSTITIS WITHOUT OBSTRUCTION)    Surgeon: Marshal Lemons M.D. Responsible Provider: Anatoliy De Leon M.D.    Anesthesia Type: general ASA Status: 2          Final Anesthesia Type: general  Last vitals  BP   Blood Pressure: 101/46    Temp   36.1 °C (96.9 °F)    Pulse   Pulse: (!) 57   Resp   14    SpO2   99 %      Anesthesia Post Evaluation    Patient location during evaluation: PACU  Patient participation: complete - patient participated  Level of consciousness: awake and alert  Pain score: 3    Airway patency: patent  Anesthetic complications: no  Cardiovascular status: hemodynamically stable  Respiratory status: acceptable  Hydration status: euvolemic    PONV: none           Nurse Pain Score: 3 (NPRS)

## 2020-08-14 NOTE — OP REPORT
Post OP Note    PreOp Diagnosis: Symptomatic cholelithiasis    PostOp Diagnosis: Same with chronic cholecystitis    Procedure(s):  CHOLECYSTECTOMY, ROBOT-ASSISTED, USING DA STEPH XI - Wound Class: Clean Contaminated    Surgeon(s):  JOSE ANTONIO Macias M.D.    Anesthesiologist/Type of Anesthesia:  Anesthesiologist: Anatoliy De Leon M.D./General    Surgical Staff:  Circulator: Ly Reyna R.N.  Scrub Person: Hari RDZ Ean    Specimens removed if any:  ID Type Source Tests Collected by Time Destination   A : gallbladder Tissue Gallbladder PATHOLOGY SPECIMEN Marshal Lemons M.D. 8/14/2020 10:06 AM        Estimated Blood Loss: 10 cc    Findings: Adhesions to the gallbladder.  Fatty liver    Complications: No complications were noted      Indications: Patient is a 34-year-old female who presented with symptomatic cholelithiasis.  The patient was counseled extensively as of the risk first benefits of surgery and agreed to proceed fully informed.    Description:      The patient was prepped and draped in the standard sterile surgical fashion after induction of general anesthesia. An appropriate timeout was performed and antibiotics delivered. A left upper quadrant Veress insertion was performed and the abdomen was insufflated to 15 mmHg CO2. A robotic trochar was applied. 3 additional trochars were applied under direct visualization. The robot was then docked and I took my position at the console.    The gallbladder was located in its right upper quadrant position. It was retracted superiorly and laterally.  She had a fatty liver.  The contents of the hepatocystic triangle were dissected clearly free. I did use a fire fly to reinforce the ductal positions which were confirmed. Once the critical view of safety was obtained, the cystic duct was doubly clipped on the common duct side and singly clipped on the gallbladder side. It was then sectioned. The cystic artery was dealt in a similar  fashion. Electrocautery was used to remove the gallbladder from its attachments to the liver bed.     The right upper quadrant was copiously irrigated until clear. The clips were in place without biliary spillage at the end of the case. Hemostasis was meticulously achieved.  The specimen via an Endo Catch device.  Monocryl was used for skin edges. Dermabond was applied as a dressing. The patient was then extubated, to recovery in satisfactory condition.    Disposition:    To the PACU for recovery. Patient will be discharged today when meets criteria.          8/14/2020 10:29 AM Marshal Lemons M.D.

## 2021-06-20 NOTE — OP REPORT
Called and left VM message for Cindy.  Insurance coverage is for generic AirDuo.  Rx for new inhaler sent to patient's pharmacy.   DATE OF SERVICE: September 25, 2019     PREOPERATIVE DIAGNOSES:  1.  Multiparity  2.  Desires sterilization     POSTOPERATIVE DIAGNOSES:  1.   same    PROCEDURE PERFORMED: Laparoscopic bilateral salpingectomy.     SURGEON:  Ben Coy MD     ASSISTANT: Trudy Loco MD     ANESTHESIA:  GETA     ANESTHESIOLOGIST:  Jaycob Mancia MD     SPECIMEN: Bilateral fallopian tubes     ESTIMATED BLOOD LOSS: 5 mL mL     FINDINGS: Normal uterus, tubes and ovaries     COMPLICATIONS:  None.     PROCEDURE:  After appropriate consents were obtained, the patient was taken to the operating room where general endotracheal anesthesia was applied without complications.  The patient was then prepped and draped in the usual sterile manner.  A kroner uterine manipulator was placed in the uterus without complication.  The umbilical skin was then infiltrated with 1% lidocaine and a small incision made.  The fascia was then grasped with a [place] clamp and a Veress needle introduced into the abdomen.  Saline drop test was positive.  Abdomen is then insufflated to 15 mmHg.  A 5 mm trocar was introduced into the abdomen under direct visualization.  Evaluation of entry site showed no injury.  Additional 2 ports were placed, both 5 mm in size one placed in the left lower quadrant-year-old in the right lower quadrant.  Both placed under direct visualization after the skin was infiltrated with 1% lidocaine.  Evaluation of the pelvis showed a normal uterus, tubes and ovaries.  The left fallopian tube was then elevated and using a LigaSure device the mesosalpinx was cauterized and transected and to the level of the cornua.  The tube was then transected and the specimen removed through the laparoscopic port.  In a similar manner the right fallopian tube was grasped and elevated and cauterized along the mesosalpinx until the level of cornea was reached.  This tube was then transected and also removed to the laparoscopic port.  Evaluation of  surgical sites showed no evidence of any injury.  Ports were then removed and the skin was closed with 4-0 Monocryl and Dermabond.    Patient tolerated the procedure well and went to recovery room in stable condition.      ____________________________________    Ben Coy MD

## 2021-12-27 NOTE — PROGRESS NOTES
Pt here for OB F/V. Good fetal movement. Denies LOF, VB.  PANN 3/1/17  Labs not done- will perform today  Pap WNL  
S: Pt is  at 18w4d here for routine OB follow up.  Reports a dry nose.  Reports good FM.  Denies VB, LOF,  RUCs or vaginal DC.     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  Reviewed labs w pt. Encouraged pt to complete other labs asap.        2.  AFP wnl.        3.  D/w pt helps for dry nose.          4.  Questions answered.        5.  Encouraged adequate water intake.        6.  F/u 4 wks.        7.  FYI: none.      
musculoskeletal

## 2022-01-08 ENCOUNTER — OFFICE VISIT (OUTPATIENT)
Dept: URGENT CARE | Facility: CLINIC | Age: 37
End: 2022-01-08
Payer: COMMERCIAL

## 2022-01-08 ENCOUNTER — HOSPITAL ENCOUNTER (OUTPATIENT)
Dept: LAB | Facility: MEDICAL CENTER | Age: 37
End: 2022-01-08
Attending: PHYSICIAN ASSISTANT
Payer: COMMERCIAL

## 2022-01-08 ENCOUNTER — HOSPITAL ENCOUNTER (OUTPATIENT)
Facility: MEDICAL CENTER | Age: 37
End: 2022-01-08
Attending: PHYSICIAN ASSISTANT
Payer: COMMERCIAL

## 2022-01-08 VITALS
BODY MASS INDEX: 30.43 KG/M2 | RESPIRATION RATE: 20 BRPM | TEMPERATURE: 98.9 F | HEART RATE: 69 BPM | SYSTOLIC BLOOD PRESSURE: 118 MMHG | OXYGEN SATURATION: 96 % | HEIGHT: 60 IN | DIASTOLIC BLOOD PRESSURE: 60 MMHG | WEIGHT: 155 LBS

## 2022-01-08 DIAGNOSIS — N30.01 ACUTE CYSTITIS WITH HEMATURIA: ICD-10-CM

## 2022-01-08 DIAGNOSIS — N89.8 VAGINAL ITCHING: ICD-10-CM

## 2022-01-08 DIAGNOSIS — Z72.51 HIGH RISK HETEROSEXUAL BEHAVIOR: ICD-10-CM

## 2022-01-08 DIAGNOSIS — R30.0 DYSURIA: ICD-10-CM

## 2022-01-08 LAB
APPEARANCE UR: CLEAR
BILIRUB UR STRIP-MCNC: NEGATIVE MG/DL
COLOR UR AUTO: YELLOW
GLUCOSE UR STRIP.AUTO-MCNC: NEGATIVE MG/DL
HIV 1+2 AB+HIV1 P24 AG SERPL QL IA: NORMAL
INT CON NEG: NEGATIVE
INT CON POS: POSITIVE
KETONES UR STRIP.AUTO-MCNC: NEGATIVE MG/DL
LEUKOCYTE ESTERASE UR QL STRIP.AUTO: NORMAL
NITRITE UR QL STRIP.AUTO: NEGATIVE
PH UR STRIP.AUTO: 5.5 [PH] (ref 5–8)
POC URINE PREGNANCY TEST: NEGATIVE
PROT UR QL STRIP: NEGATIVE MG/DL
RBC UR QL AUTO: NORMAL
SP GR UR STRIP.AUTO: 1.02
TREPONEMA PALLIDUM IGG+IGM AB [PRESENCE] IN SERUM OR PLASMA BY IMMUNOASSAY: NORMAL
UROBILINOGEN UR STRIP-MCNC: 0.2 MG/DL

## 2022-01-08 PROCEDURE — 87660 TRICHOMONAS VAGIN DIR PROBE: CPT

## 2022-01-08 PROCEDURE — 36415 COLL VENOUS BLD VENIPUNCTURE: CPT

## 2022-01-08 PROCEDURE — 87086 URINE CULTURE/COLONY COUNT: CPT

## 2022-01-08 PROCEDURE — 87510 GARDNER VAG DNA DIR PROBE: CPT

## 2022-01-08 PROCEDURE — 87591 N.GONORRHOEAE DNA AMP PROB: CPT

## 2022-01-08 PROCEDURE — 87491 CHLMYD TRACH DNA AMP PROBE: CPT

## 2022-01-08 PROCEDURE — 99214 OFFICE O/P EST MOD 30 MIN: CPT | Performed by: PHYSICIAN ASSISTANT

## 2022-01-08 PROCEDURE — 81002 URINALYSIS NONAUTO W/O SCOPE: CPT | Performed by: PHYSICIAN ASSISTANT

## 2022-01-08 PROCEDURE — 81025 URINE PREGNANCY TEST: CPT | Performed by: PHYSICIAN ASSISTANT

## 2022-01-08 PROCEDURE — 87389 HIV-1 AG W/HIV-1&-2 AB AG IA: CPT

## 2022-01-08 PROCEDURE — 86780 TREPONEMA PALLIDUM: CPT

## 2022-01-08 PROCEDURE — 87480 CANDIDA DNA DIR PROBE: CPT

## 2022-01-08 RX ORDER — DOXYCYCLINE HYCLATE 100 MG
100 TABLET ORAL 2 TIMES DAILY
Qty: 14 TABLET | Refills: 0 | Status: SHIPPED | OUTPATIENT
Start: 2022-01-08 | End: 2022-01-13

## 2022-01-08 ASSESSMENT — FIBROSIS 4 INDEX: FIB4 SCORE: 0.37

## 2022-01-08 NOTE — PROGRESS NOTES
Subjective:   Buffy Lund is a 36 y.o. female who presents for Vaginal Itching (breakthrough bleeding x 6 months, itching, burning x 2 weeks, painful intercourse)      HPI  Patient is a 36-year-old female who presents to clinic with complaints of intermittent hematuria onset 5 to 6 months ago.  She has normal menstrual periods but when she urinates she notices a pink tinge on the toilet paper.  She has some associated intermittent vaginal itching.  She is sexually active in a monogamous relationship with a new male.  Unprotected sexual intercourse.  She notes mild vaginal odor and mild intermittent dysuria. Denies any fever, chills, abdominal pain, nausea, vomiting, urinary frequency or urgency, vaginal discharge, vaginal lesions or blisters or redness.       Medications:    • This patient does not have an active medication from one of the medication groupers.    Allergies: Patient has no known allergies.    Problem List: Buffy Lund does not have any pertinent problems on file.    Surgical History:  Past Surgical History:   Procedure Laterality Date   • CHOLECYSTECTOMY ROBOTIC XI N/A 8/14/2020    Procedure: CHOLECYSTECTOMY, ROBOT-ASSISTED, USING DA STEPH XI;  Surgeon: Marshal Lemnos M.D.;  Location: SURGERY [facility];  Service: General   • PB LAP,DIAGNOSTIC ABDOMEN  9/25/2019    Procedure: PELVISCOPY;  Surgeon: Ben Coy M.D.;  Location: SURGERY SAME DAY [facility];  Service: Obstetrics   • SALPINGECTOMY Bilateral 9/25/2019    Procedure: SALPINGECTOMY;  Surgeon: Ben Coy M.D.;  Location: SURGERY SAME DAY [facility];  Service: Obstetrics   • PRIMARY C SECTION  8/8/2017    Procedure: PRIMARY C SECTION;  Surgeon: Kevin Blount M.D.;  Location: LABOR AND DELIVERY;  Service:    • OTHER ORTHOPEDIC SURGERY  2006    Wrist for carpal tunnel left   • TENDON REPAIR  2006    left wrist   • OTHER Left 2001    Ear surgery   • EAR MIDDLE EXPLORATION  2001       Past Social  Hx: Buffy Lund  reports that she has never smoked. She has never used smokeless tobacco. She reports that she does not drink alcohol and does not use drugs.     Past Family Hx:  Buffy Lund family history is not on file.     Problem list, medications, and allergies reviewed by myself today in Epic.     Objective:     /60 (BP Location: Left arm, Patient Position: Sitting, BP Cuff Size: Adult long)   Pulse 69   Temp 37.2 °C (98.9 °F) (Temporal)   Resp 20   Ht 1.524 m (5')   Wt 70.3 kg (155 lb)   SpO2 96%   BMI 30.27 kg/m²     Physical Exam  Vitals reviewed.   Constitutional:       General: She is not in acute distress.     Appearance: Normal appearance. She is not ill-appearing or toxic-appearing.   HENT:      Head: Normocephalic.   Eyes:      Conjunctiva/sclera: Conjunctivae normal.      Pupils: Pupils are equal, round, and reactive to light.   Cardiovascular:      Rate and Rhythm: Normal rate and regular rhythm.      Heart sounds: Normal heart sounds.   Pulmonary:      Effort: Pulmonary effort is normal. No respiratory distress.      Breath sounds: Normal breath sounds. No wheezing, rhonchi or rales.   Abdominal:      General: Abdomen is flat. Bowel sounds are normal. There is no distension.      Palpations: Abdomen is soft. There is no mass.      Tenderness: There is no abdominal tenderness. There is no right CVA tenderness, left CVA tenderness, guarding or rebound.   Genitourinary:     Comments: Deferred  Musculoskeletal:      Cervical back: Neck supple.   Skin:     General: Skin is warm and dry.   Neurological:      General: No focal deficit present.      Mental Status: She is alert and oriented to person, place, and time.   Psychiatric:         Mood and Affect: Mood normal.         Behavior: Behavior normal.       Results for orders placed or performed in visit on 01/08/22   POCT Pregnancy   Result Value Ref Range    POC Urine Pregnancy Test Negative Negative    Internal Control  Positive Positive     Internal Control Negative Negative        Diagnosis and associated orders:     1. Acute cystitis with hematuria  doxycycline (VIBRAMYCIN) 100 MG Tab    POCT Urinalysis    URINE CULTURE(NEW)   2. Vaginal itching  POCT Pregnancy    Chlamydia/GC PCR Urine Or Swab    VAGINAL PATHOGENS DNA PANEL   3. Dysuria  POCT Urinalysis    POCT Pregnancy    Chlamydia/GC PCR Urine Or Swab    VAGINAL PATHOGENS DNA PANEL   4. High risk heterosexual behavior  HIV AG/AB COMBO ASSAY SCREENING    CANCELED: RPR (SYPHILIS)        Comments/MDM:     Discussed with patient signs and symptoms are consistent with a simple urinary tract infection. They are overall very well-appearing with normal vital signs and benign examination findings.   Start doxycyline.   Evaluation above. Will call back/message on Flipboard for further instructions and appropriate treatment as necessary.   Increase fluid intake, AZO per manufacturers instructions for burning urination.  Urine culture: will call back only if positive and if necessary change in therapy.   Advised to return to the Urgent Care or follow up with their PCP if symptoms are not improving in 2-3 days or sooner if any worsening symptoms such as fever, chills, abdominal pain, back/flank pain, nausea, vomiting, or any other concerns.        I personally reviewed prior external notes and test results pertinent to today's visit. Red flags discussed. Supportive care, natural history, differential diagnoses, and indications for immediate follow-up discussed. Patient expresses understanding and agrees to plan. Patient denies any other questions or concerns.     Follow-up with the primary care physician for recheck, reevaluation, and consideration of further management.    Please note that this dictation was created using voice recognition software. I have made a reasonable attempt to correct obvious errors, but I expect that there are errors of grammar and possibly content that I did not  discover before finalizing the note.    This note was electronically signed by Sandeep Barnett PA-C

## 2022-01-09 LAB
CANDIDA DNA VAG QL PROBE+SIG AMP: NEGATIVE
G VAGINALIS DNA VAG QL PROBE+SIG AMP: POSITIVE
SPECIMEN SOURCE: NORMAL
T VAGINALIS DNA VAG QL PROBE+SIG AMP: NEGATIVE

## 2022-01-11 LAB
BACTERIA UR CULT: NORMAL
SIGNIFICANT IND 70042: NORMAL
SITE SITE: NORMAL
SOURCE SOURCE: NORMAL

## 2022-01-12 DIAGNOSIS — N76.0 GARDNERELLA VAGINALIS INFECTION: ICD-10-CM

## 2022-01-12 DIAGNOSIS — B96.89 GARDNERELLA VAGINALIS INFECTION: ICD-10-CM

## 2022-01-12 RX ORDER — METRONIDAZOLE 500 MG/1
500 TABLET ORAL 2 TIMES DAILY
Qty: 14 TABLET | Refills: 0 | Status: SHIPPED | OUTPATIENT
Start: 2022-01-12 | End: 2022-01-19

## 2022-01-13 ENCOUNTER — TELEPHONE (OUTPATIENT)
Dept: URGENT CARE | Facility: PHYSICIAN GROUP | Age: 37
End: 2022-01-13

## 2022-01-13 DIAGNOSIS — A74.9 CHLAMYDIA INFECTION: ICD-10-CM

## 2022-01-13 LAB
C TRACH DNA SPEC QL NAA+PROBE: POSITIVE
N GONORRHOEA DNA SPEC QL NAA+PROBE: NEGATIVE
SPEC CONTAINER SPEC: ABNORMAL
SPECIMEN SOURCE: ABNORMAL

## 2022-01-13 RX ORDER — DOXYCYCLINE HYCLATE 100 MG
100 TABLET ORAL 2 TIMES DAILY
Qty: 14 TABLET | Refills: 0 | Status: SHIPPED | OUTPATIENT
Start: 2022-01-13 | End: 2022-01-20

## 2022-12-15 ENCOUNTER — APPOINTMENT (OUTPATIENT)
Dept: RADIOLOGY | Facility: MEDICAL CENTER | Age: 37
End: 2022-12-15
Attending: ADVANCED PRACTICE MIDWIFE
Payer: COMMERCIAL

## 2022-12-21 ENCOUNTER — HOSPITAL ENCOUNTER (OUTPATIENT)
Dept: RADIOLOGY | Facility: MEDICAL CENTER | Age: 37
End: 2022-12-21
Attending: ADVANCED PRACTICE MIDWIFE
Payer: COMMERCIAL

## 2022-12-21 DIAGNOSIS — Z12.31 VISIT FOR SCREENING MAMMOGRAM: ICD-10-CM

## 2022-12-21 PROCEDURE — 77063 BREAST TOMOSYNTHESIS BI: CPT

## 2023-09-17 ENCOUNTER — APPOINTMENT (OUTPATIENT)
Dept: RADIOLOGY | Facility: MEDICAL CENTER | Age: 38
End: 2023-09-17
Attending: EMERGENCY MEDICINE
Payer: COMMERCIAL

## 2023-09-17 ENCOUNTER — HOSPITAL ENCOUNTER (EMERGENCY)
Facility: MEDICAL CENTER | Age: 38
End: 2023-09-17
Attending: EMERGENCY MEDICINE
Payer: COMMERCIAL

## 2023-09-17 VITALS
SYSTOLIC BLOOD PRESSURE: 118 MMHG | OXYGEN SATURATION: 99 % | HEIGHT: 60 IN | BODY MASS INDEX: 30.08 KG/M2 | TEMPERATURE: 97.8 F | HEART RATE: 63 BPM | WEIGHT: 153.22 LBS | RESPIRATION RATE: 16 BRPM | DIASTOLIC BLOOD PRESSURE: 65 MMHG

## 2023-09-17 DIAGNOSIS — S63.650A SPRAIN OF METACARPOPHALANGEAL (MCP) JOINT OF RIGHT INDEX FINGER, INITIAL ENCOUNTER: ICD-10-CM

## 2023-09-17 DIAGNOSIS — S80.11XA MULTIPLE LEG CONTUSIONS, RIGHT, INITIAL ENCOUNTER: ICD-10-CM

## 2023-09-17 PROCEDURE — 73130 X-RAY EXAM OF HAND: CPT | Mod: RT

## 2023-09-17 PROCEDURE — 99283 EMERGENCY DEPT VISIT LOW MDM: CPT

## 2023-09-17 PROCEDURE — 73590 X-RAY EXAM OF LOWER LEG: CPT | Mod: RT

## 2023-09-17 ASSESSMENT — LIFESTYLE VARIABLES: DO YOU DRINK ALCOHOL: NO

## 2023-09-18 NOTE — ED TRIAGE NOTES
Chief Complaint   Patient presents with    Digit Pain     R index finger pain after falling 9/13 while at work   Pt states it felt like her finger was bent backwards    Leg Pain     RLL pain, bruising, swelling, abrasion after falling at work on 9/13     /74   Pulse 67   Temp 36.1 °C (97 °F) (Temporal)   Resp 18   Ht 1.524 m (5')   Wt 69.5 kg (153 lb 3.5 oz)   LMP 09/10/2023 (Approximate)   SpO2 98%   BMI 29.92 kg/m²     Pt made aware of triage process, placed back into lobby, educated pt to tell staff of any worsening of symptoms

## 2023-09-18 NOTE — ED PROVIDER NOTES
ED PHYSICIAN NOTE    CHIEF COMPLAINT  Chief Complaint   Patient presents with    Digit Pain     R index finger pain after falling 9/13 while at work   Pt states it felt like her finger was bent backwards    Leg Pain     RLL pain, bruising, swelling, abrasion after falling at work on 9/13       EXTERNAL RECORDS REVIEWED  Outpatient Notes most recent primary care encounter with cough and reactive airway disease    HPI/ROS      Buffy Lund is a 37 y.o. female who presents with injury to the right hand as well as the right leg.  Patient was at work.  She was unloading.  She stepped in between the deck of 2 objects fell tried to catch herself with her right hand hyperextended her right index finger.  She has bruising and swelling pain of the right leg.  She still been ambulatory.  She denies numbness tingling weakness.    PAST MEDICAL HISTORY  Past Medical History:   Diagnosis Date    H/O fetal demise, not currently pregnant 8/10/2016    Psychiatric problem     depression       SOCIAL HISTORY  Social History     Tobacco Use    Smoking status: Never    Smokeless tobacco: Never   Vaping Use    Vaping Use: Never used   Substance Use Topics    Alcohol use: No    Drug use: No       CURRENT MEDICATIONS  Home Medications       Reviewed by Evan Simmons R.N. (Registered Nurse) on 09/17/23 at 1709  Med List Status: Not Addressed     Medication Last Dose Status        Patient Shon Taking any Medications                           ALLERGIES  No Known Allergies    PHYSICAL EXAM  VITAL SIGNS: /74   Pulse 67   Temp 36.1 °C (97 °F) (Temporal)   Resp 18   Ht 1.524 m (5')   Wt 69.5 kg (153 lb 3.5 oz)   LMP 09/10/2023 (Approximate)   SpO2 98%   BMI 29.92 kg/m²    Constitutional: Awake and alert  HENT: Normal inspection  Eyes: Normal inspection  Neck: Grossly normal range of motion.  Cardiovascular: Normal heart rate  Thorax & Lungs: No respiratory distress  Skin: Ecchymosis and abrasions of the right lower  extremity  Extremities: Ecchymosis of the right lower extremity as noted above.  There are some abrasions.  There is tenderness of the medial mid/proximal tibia.  No tenderness to the knee or ankle.  There is tenderness of the right first MCP.  Pain with range of motion.  No deformity.  Neurologic: Normal sensory and motor  Psychiatric: Normal for situation     DIAGNOSTIC STUDIES / PROCEDURES      RADIOLOGY  I have independently interpreted the diagnostic imaging associated with this visit and am waiting the final reading from the radiologist.   My preliminary interpretation is as follows: X-ray of the right hand and right tib-fib negative for fracture  Radiologist interpretation:   DX-HAND 3+ RIGHT   Final Result      No fracture or dislocation of RIGHT hand.      DX-TIBIA AND FIBULA RIGHT   Final Result      No evidence of fracture or dislocation.            COURSE & MEDICAL DECISION MAKING      INITIAL ASSESSMENT, COURSE AND PLAN  Care Narrative: Patient presents with right second MCP injury and multiple contusions on the right leg.  X-rays were ordered.  These returned negative.  2 support the second digit it will be angela taped to the third.  Have advised rest and ice the hand.  The right leg contusion should heal without intervention.  The patient will need to follow-up with occupational health for further guidance.  She was precautioned to return to the ER if she notices other area of injury or has concern.          ADDITIONAL PROBLEM LIST  Past Medical History:   Diagnosis Date    H/O fetal demise, not currently pregnant 8/10/2016    Psychiatric problem     depression       DISPOSITION AND DISCUSSIONS      Prescription drugs considered and/or prescribed: Considered opiate analgesic, but nonnarcotic analgesic would be most appropriate in the setting    FINAL IMPRESSION  1.  Right second MCP sprain  2.  Right leg contusions    This dictation was created using voice recognition software. The accuracy of the  dictation is limited to the abilities of the software. I expect there may be some errors of grammar and possibly content. The nursing notes were reviewed and certain aspects of this information were incorporated into this note.    Electronically signed by: Preston Amanda M.D., 9/17/2023

## 2023-09-23 ENCOUNTER — HOSPITAL ENCOUNTER (EMERGENCY)
Facility: MEDICAL CENTER | Age: 38
End: 2023-09-24
Attending: STUDENT IN AN ORGANIZED HEALTH CARE EDUCATION/TRAINING PROGRAM
Payer: COMMERCIAL

## 2023-09-23 DIAGNOSIS — J06.9 UPPER RESPIRATORY TRACT INFECTION, UNSPECIFIED TYPE: ICD-10-CM

## 2023-09-23 DIAGNOSIS — M79.10 MYALGIA: ICD-10-CM

## 2023-09-23 PROCEDURE — 36415 COLL VENOUS BLD VENIPUNCTURE: CPT

## 2023-09-23 PROCEDURE — 99284 EMERGENCY DEPT VISIT MOD MDM: CPT

## 2023-09-23 NOTE — Clinical Note
Buffy Lund was seen and treated in our emergency department on 9/23/2023.  She may return to work on 09/27/2023.       If you have any questions or concerns, please don't hesitate to call.      Michael Randolph M.D.

## 2023-09-24 VITALS
SYSTOLIC BLOOD PRESSURE: 114 MMHG | RESPIRATION RATE: 16 BRPM | TEMPERATURE: 100 F | HEART RATE: 85 BPM | BODY MASS INDEX: 29.82 KG/M2 | HEIGHT: 60 IN | WEIGHT: 151.9 LBS | OXYGEN SATURATION: 93 % | DIASTOLIC BLOOD PRESSURE: 57 MMHG

## 2023-09-24 LAB
ALBUMIN SERPL BCP-MCNC: 4.3 G/DL (ref 3.2–4.9)
ALBUMIN/GLOB SERPL: 1.7 G/DL
ALP SERPL-CCNC: 72 U/L (ref 30–99)
ALT SERPL-CCNC: 28 U/L (ref 2–50)
ANION GAP SERPL CALC-SCNC: 11 MMOL/L (ref 7–16)
APPEARANCE UR: CLEAR
AST SERPL-CCNC: 16 U/L (ref 12–45)
BACTERIA #/AREA URNS HPF: NEGATIVE /HPF
BASOPHILS # BLD AUTO: 0.3 % (ref 0–1.8)
BASOPHILS # BLD: 0.04 K/UL (ref 0–0.12)
BILIRUB SERPL-MCNC: 0.3 MG/DL (ref 0.1–1.5)
BILIRUB UR QL STRIP.AUTO: NEGATIVE
BUN SERPL-MCNC: 9 MG/DL (ref 8–22)
CALCIUM ALBUM COR SERPL-MCNC: 8.5 MG/DL (ref 8.5–10.5)
CALCIUM SERPL-MCNC: 8.7 MG/DL (ref 8.5–10.5)
CHLORIDE SERPL-SCNC: 101 MMOL/L (ref 96–112)
CK SERPL-CCNC: 106 U/L (ref 0–154)
CO2 SERPL-SCNC: 22 MMOL/L (ref 20–33)
COLOR UR: YELLOW
CREAT SERPL-MCNC: 0.76 MG/DL (ref 0.5–1.4)
EOSINOPHIL # BLD AUTO: 0.14 K/UL (ref 0–0.51)
EOSINOPHIL NFR BLD: 1.1 % (ref 0–6.9)
EPI CELLS #/AREA URNS HPF: NEGATIVE /HPF
ERYTHROCYTE [DISTWIDTH] IN BLOOD BY AUTOMATED COUNT: 43.1 FL (ref 35.9–50)
FLUAV RNA SPEC QL NAA+PROBE: NEGATIVE
FLUBV RNA SPEC QL NAA+PROBE: NEGATIVE
GFR SERPLBLD CREATININE-BSD FMLA CKD-EPI: 102 ML/MIN/1.73 M 2
GLOBULIN SER CALC-MCNC: 2.6 G/DL (ref 1.9–3.5)
GLUCOSE SERPL-MCNC: 122 MG/DL (ref 65–99)
GLUCOSE UR STRIP.AUTO-MCNC: NEGATIVE MG/DL
HCG UR QL: NEGATIVE
HCT VFR BLD AUTO: 37 % (ref 37–47)
HGB BLD-MCNC: 12.3 G/DL (ref 12–16)
HYALINE CASTS #/AREA URNS LPF: NORMAL /LPF
IMM GRANULOCYTES # BLD AUTO: 0.04 K/UL (ref 0–0.11)
IMM GRANULOCYTES NFR BLD AUTO: 0.3 % (ref 0–0.9)
KETONES UR STRIP.AUTO-MCNC: NEGATIVE MG/DL
LEUKOCYTE ESTERASE UR QL STRIP.AUTO: ABNORMAL
LYMPHOCYTES # BLD AUTO: 1.97 K/UL (ref 1–4.8)
LYMPHOCYTES NFR BLD: 15.2 % (ref 22–41)
MCH RBC QN AUTO: 30.5 PG (ref 27–33)
MCHC RBC AUTO-ENTMCNC: 33.2 G/DL (ref 32.2–35.5)
MCV RBC AUTO: 91.8 FL (ref 81.4–97.8)
MICRO URNS: ABNORMAL
MONOCYTES # BLD AUTO: 0.91 K/UL (ref 0–0.85)
MONOCYTES NFR BLD AUTO: 7 % (ref 0–13.4)
NEUTROPHILS # BLD AUTO: 9.85 K/UL (ref 1.82–7.42)
NEUTROPHILS NFR BLD: 76.1 % (ref 44–72)
NITRITE UR QL STRIP.AUTO: NEGATIVE
NRBC # BLD AUTO: 0 K/UL
NRBC BLD-RTO: 0 /100 WBC (ref 0–0.2)
PH UR STRIP.AUTO: 6 [PH] (ref 5–8)
PLATELET # BLD AUTO: 305 K/UL (ref 164–446)
PMV BLD AUTO: 9.2 FL (ref 9–12.9)
POTASSIUM SERPL-SCNC: 4 MMOL/L (ref 3.6–5.5)
PROT SERPL-MCNC: 6.9 G/DL (ref 6–8.2)
PROT UR QL STRIP: NEGATIVE MG/DL
RBC # BLD AUTO: 4.03 M/UL (ref 4.2–5.4)
RBC # URNS HPF: NORMAL /HPF
RBC UR QL AUTO: NEGATIVE
RSV RNA SPEC QL NAA+PROBE: NEGATIVE
SARS-COV-2 RNA RESP QL NAA+PROBE: NOTDETECTED
SODIUM SERPL-SCNC: 134 MMOL/L (ref 135–145)
SP GR UR STRIP.AUTO: 1.01
SPECIMEN SOURCE: NORMAL
UROBILINOGEN UR STRIP.AUTO-MCNC: 0.2 MG/DL
WBC # BLD AUTO: 13 K/UL (ref 4.8–10.8)
WBC #/AREA URNS HPF: NORMAL /HPF

## 2023-09-24 PROCEDURE — A9270 NON-COVERED ITEM OR SERVICE: HCPCS | Mod: UD | Performed by: STUDENT IN AN ORGANIZED HEALTH CARE EDUCATION/TRAINING PROGRAM

## 2023-09-24 PROCEDURE — 81001 URINALYSIS AUTO W/SCOPE: CPT

## 2023-09-24 PROCEDURE — 82550 ASSAY OF CK (CPK): CPT

## 2023-09-24 PROCEDURE — 81025 URINE PREGNANCY TEST: CPT

## 2023-09-24 PROCEDURE — C9803 HOPD COVID-19 SPEC COLLECT: HCPCS | Performed by: STUDENT IN AN ORGANIZED HEALTH CARE EDUCATION/TRAINING PROGRAM

## 2023-09-24 PROCEDURE — 700105 HCHG RX REV CODE 258: Mod: UD | Performed by: STUDENT IN AN ORGANIZED HEALTH CARE EDUCATION/TRAINING PROGRAM

## 2023-09-24 PROCEDURE — 700102 HCHG RX REV CODE 250 W/ 637 OVERRIDE(OP): Mod: UD | Performed by: STUDENT IN AN ORGANIZED HEALTH CARE EDUCATION/TRAINING PROGRAM

## 2023-09-24 PROCEDURE — 0241U HCHG SARS-COV-2 COVID-19 NFCT DS RESP RNA 4 TRGT MIC: CPT

## 2023-09-24 PROCEDURE — 80053 COMPREHEN METABOLIC PANEL: CPT

## 2023-09-24 PROCEDURE — 85025 COMPLETE CBC W/AUTO DIFF WBC: CPT

## 2023-09-24 RX ORDER — ACETAMINOPHEN 325 MG/1
975 TABLET ORAL ONCE
Status: COMPLETED | OUTPATIENT
Start: 2023-09-24 | End: 2023-09-24

## 2023-09-24 RX ORDER — SODIUM CHLORIDE, SODIUM LACTATE, POTASSIUM CHLORIDE, CALCIUM CHLORIDE 600; 310; 30; 20 MG/100ML; MG/100ML; MG/100ML; MG/100ML
1000 INJECTION, SOLUTION INTRAVENOUS ONCE
Status: COMPLETED | OUTPATIENT
Start: 2023-09-24 | End: 2023-09-24

## 2023-09-24 RX ADMIN — ACETAMINOPHEN 975 MG: 325 TABLET, FILM COATED ORAL at 00:52

## 2023-09-24 RX ADMIN — SODIUM CHLORIDE, POTASSIUM CHLORIDE, SODIUM LACTATE AND CALCIUM CHLORIDE 1000 ML: 600; 310; 30; 20 INJECTION, SOLUTION INTRAVENOUS at 00:37

## 2023-09-24 NOTE — ED NOTES
Buffy Lund has been discharged from the Emergency Room.    IV discontinued and gauze bandage placed, pt in possession of belongings.    Discharge instructions, which include signs and symptoms to monitor patient for, as well as detailed information regarding Upper respiratory infection provided.  Patient verbalizes understanding of follow up care and medication management. All questions and concerns addressed at this time.     Patient provided with education on when to return to the ER and verbally understands with no concerns. Patient advised on setting up MyChart and information provided about patient survey.  Patient leaves ER in no apparent distress. This RN provided education regarding returning to the ER for any new concerns or changes in patient's condition.      /57   Pulse 85   Temp 37.8 °C (100 °F)   Resp 16   Ht 1.524 m (5')   Wt 68.9 kg (151 lb 14.4 oz)   LMP 09/10/2023 (Approximate)   SpO2 93%   BMI 29.67 kg/m²

## 2023-09-24 NOTE — ED TRIAGE NOTES
Chief Complaint   Patient presents with    Flu Like Symptoms     X 1 day: fevers (Tmax 101.8F at home), joint pain/body aches, and headache.        37 yo F to triage for above complaint.      Pt placed in lobby. Pt educated on triage process. Pt encouraged to alert staff for any changes.     Patient and staff wearing appropriate PPE    /70   Pulse 95   Temp (!) 38.3 °C (100.9 °F) (Oral)   Resp 18   Ht 1.524 m (5')   Wt 68.9 kg (151 lb 14.4 oz)   LMP 09/10/2023 (Approximate)   SpO2 98%   BMI 29.67 kg/m²

## 2023-09-24 NOTE — ED PROVIDER NOTES
ED Provider Note    CHIEF COMPLAINT  Chief Complaint   Patient presents with    Flu Like Symptoms     X 1 day: fevers (Tmax 101.8F at home), joint pain/body aches, and headache.        EXTERNAL RECORDS REVIEWED  Outpatient Notes 12/19/2022 encounter for cough and reactive airway disease.  Patient with viral illness at that time    HPI/ROS  LIMITATION TO HISTORY   Select: : None  OUTSIDE HISTORIAN(S):  Family  provides additional history at bedside    Buffy Lund is a 38 y.o. female who presents with 1 day subjective fevers, body aches and headache.  Patient also reporting goosebumps and sensation of chills.  Patient would also like evaluation of her left breast as yesterday she accidentally hit the left breast while moving a box.  She reports pain and swelling of the breast today.  She denies neck stiffness, vomiting, abdominal pain      PAST MEDICAL HISTORY   has a past medical history of H/O fetal demise, not currently pregnant (8/10/2016) and Psychiatric problem.    SURGICAL HISTORY   has a past surgical history that includes other orthopedic surgery (2006); other (Left, 2001); ear middle exploration (2001); tendon repair (2006); primary c section (8/8/2017); lap,diagnostic abdomen (9/25/2019); salpingectomy (Bilateral, 9/25/2019); and cholecystectomy robotic xi (N/A, 8/14/2020).    FAMILY HISTORY  No family history on file.    SOCIAL HISTORY  Social History     Tobacco Use    Smoking status: Never    Smokeless tobacco: Never   Vaping Use    Vaping Use: Never used   Substance and Sexual Activity    Alcohol use: No    Drug use: No    Sexual activity: Not Currently     Partners: Male     Birth control/protection: Pill     Comment: Unplanned pregnancy       CURRENT MEDICATIONS  Home Medications       Reviewed by Radha Villafana RYASEMIN (Registered Nurse) on 09/23/23 at 2246  Med List Status: Not Addressed     Medication Last Dose Status        Patient Shon Taking any Medications                            ALLERGIES  No Known Allergies    PHYSICAL EXAM  VITAL SIGNS: /57   Pulse 85   Temp 37.8 °C (100 °F)   Resp 16   Ht 1.524 m (5')   Wt 68.9 kg (151 lb 14.4 oz)   LMP 09/10/2023 (Approximate)   SpO2 93%   BMI 29.67 kg/m²    Physical Exam  Vitals and nursing note reviewed.   Constitutional:       Appearance: She is well-developed. She is not ill-appearing, toxic-appearing or diaphoretic.   HENT:      Head: Normocephalic.   Eyes:      Extraocular Movements: Extraocular movements intact.      Pupils: Pupils are equal, round, and reactive to light.   Cardiovascular:      Rate and Rhythm: Normal rate and regular rhythm.   Pulmonary:      Effort: Pulmonary effort is normal.      Breath sounds: Normal breath sounds.   Abdominal:      General: There is no distension.      Palpations: Abdomen is soft. There is no mass.      Tenderness: There is no abdominal tenderness. There is no guarding.   Musculoskeletal:         General: Normal range of motion.      Cervical back: Normal range of motion and neck supple. No rigidity or tenderness.   Skin:     General: Skin is warm.   Neurological:      General: No focal deficit present.      Mental Status: She is alert and oriented to person, place, and time.         DIAGNOSTIC STUDIES / PROCEDURES      LABS  Labs Reviewed   URINALYSIS - Abnormal; Notable for the following components:       Result Value    Leukocyte Esterase Trace (*)     All other components within normal limits    Narrative:     Release to patient->Immediate   CBC WITH DIFFERENTIAL - Abnormal; Notable for the following components:    WBC 13.0 (*)     RBC 4.03 (*)     Neutrophils-Polys 76.10 (*)     Lymphocytes 15.20 (*)     Neutrophils (Absolute) 9.85 (*)     Monos (Absolute) 0.91 (*)     All other components within normal limits   COMP METABOLIC PANEL - Abnormal; Notable for the following components:    Sodium 134 (*)     Glucose 122 (*)     All other components within normal limits   COV-2, FLU A/B,  AND RSV BY PCR (CEPHEID)    Narrative:     Release to patient->Immediate   CREATINE KINASE   HCG QUALITATIVE UR    Narrative:     Release to patient->Immediate   URINE MICROSCOPIC (W/UA)    Narrative:     Release to patient->Immediate   ESTIMATED GFR          COURSE & MEDICAL DECISION MAKING    ED Observation Status? No; Patient does not meet criteria for ED Observation.     INITIAL ASSESSMENT, COURSE AND PLAN  Care Narrative: 38-year-old female presents with 1 day of symptoms consistent with viral illness.  On exam she is well-appearing, febrile and in no acute distress.  Remainder physical exam is benign.  She also endorses concern about her left breast after trauma to the area yesterday.  The left breast appears the same size as the right with no signs of bruising, induration, warmth.  There is mild tenderness to palpation.  Differential diagnosis includes COVID, viral illness, rhabdomyolysis, dehydration, electrolyte abnormality, UTI    We will obtain labs and treat patient with antipyretics and reassess    Patient reassessed and feeling much better.  Discussed reassuring work-up and negative COVID test likely viral process causing the symptoms.  Discussed supportive care and no indication for antibiotics at this time.  Advised patient follow-up with PCP in the coming week and return if significant worsening  HYDRATION: Based on the patient's presentation of Dehydration the patient was given IV fluids. IV Hydration was used because oral hydration was not adequate alone. Upon recheck following hydration, the patient was improved.      ADDITIONAL PROBLEM LIST  Depression -stable    DISPOSITION AND DISCUSSIONS  I have discussed management of the patient with the following physicians and ZULAY's:      Discussion of management with other QHP or appropriate source(s): None     Escalation of care considered, and ultimately not performed:diagnostic imaging and acute inpatient care management, however at this time, the  patient is most appropriate for outpatient management    Barriers to care at this time, including but not limited to: .     Decision tools and prescription drugs considered including, but not limited to: Antibiotics not indicated given symptoms are likely viral in nature and would not benefit from antibiotic therapy .    FINAL DIAGNOSIS  1. Upper respiratory tract infection, unspecified type    2. Myalgia           Electronically signed by: Michael Randolph M.D., 9/24/2023 12:51 AM

## 2023-09-27 ENCOUNTER — OFFICE VISIT (OUTPATIENT)
Dept: URGENT CARE | Facility: CLINIC | Age: 38
End: 2023-09-27
Payer: COMMERCIAL

## 2023-09-27 VITALS
HEIGHT: 60 IN | HEART RATE: 68 BPM | WEIGHT: 152 LBS | TEMPERATURE: 97.3 F | OXYGEN SATURATION: 99 % | DIASTOLIC BLOOD PRESSURE: 70 MMHG | SYSTOLIC BLOOD PRESSURE: 112 MMHG | BODY MASS INDEX: 29.84 KG/M2 | RESPIRATION RATE: 15 BRPM

## 2023-09-27 DIAGNOSIS — N63.0 BREAST SWELLING: ICD-10-CM

## 2023-09-27 DIAGNOSIS — L53.9 BREAST ERYTHEMA: ICD-10-CM

## 2023-09-27 PROCEDURE — 99214 OFFICE O/P EST MOD 30 MIN: CPT | Performed by: PHYSICIAN ASSISTANT

## 2023-09-27 PROCEDURE — 3074F SYST BP LT 130 MM HG: CPT | Performed by: PHYSICIAN ASSISTANT

## 2023-09-27 PROCEDURE — 3078F DIAST BP <80 MM HG: CPT | Performed by: PHYSICIAN ASSISTANT

## 2023-09-27 RX ORDER — AMOXICILLIN AND CLAVULANATE POTASSIUM 875; 125 MG/1; MG/1
1 TABLET, FILM COATED ORAL 2 TIMES DAILY
Qty: 14 TABLET | Refills: 0 | Status: SHIPPED | OUTPATIENT
Start: 2023-09-27 | End: 2023-10-04

## 2023-09-27 ASSESSMENT — FIBROSIS 4 INDEX: FIB4 SCORE: 0.4

## 2023-09-27 NOTE — LETTER
September 27, 2023         Patient: Buffy Lund   YOB: 1985   Date of Visit: 9/27/2023           To Whom it May Concern:    Buffy Lund was seen in my clinic on 9/27/2023. Please excuse any absences from work this week due to acute illness.      If you have any questions or concerns, please don't hesitate to call.        Sincerely,           Jeison Ennis P.A.-C.  Electronically Signed

## 2023-09-28 ASSESSMENT — ENCOUNTER SYMPTOMS
CONSTITUTIONAL NEGATIVE: 1
GASTROINTESTINAL NEGATIVE: 1
RESPIRATORY NEGATIVE: 1
NEUROLOGICAL NEGATIVE: 1
CARDIOVASCULAR NEGATIVE: 1

## 2023-09-29 NOTE — PROGRESS NOTES
Subjective     Buffy Lund is a very pleasant 38 y.o. female who presents with Breast Pain (Pt states it happened on Friday she hit herself with a box. Did not happen at work.Pt states there is now a lump and not getting any better..)            HPI  Patient injured her left breast on the corner of a box Friday.  She had significant pain and swelling with some bruising.  However today she has noticed redness and warmth.  There is no nipple discharge noted.  Patient without fever, chills, body aches, diarrhea.  No pertinent past breast history.      PMH:  has a past medical history of H/O fetal demise, not currently pregnant (8/10/2016) and Psychiatric problem.    She has no past medical history of Addisons disease (HCC), Adrenal disorder (HCC), Allergy, Anemia, Anxiety, Blood transfusion without reported diagnosis, Clotting disorder ([facility]), COPD (chronic obstructive pulmonary disease) ([facility]), Cushings syndrome ([facility]), Depression, Diabetic neuropathy ([facility]), GERD (gastroesophageal reflux disease), Goiter, Head ache, HIV (human immunodeficiency virus infection) ([facility]), Hyperlipidemia, IBD (inflammatory bowel disease), Meningitis, Migraine, Muscle disorder, Osteoporosis, Parathyroid disorder (HCC), Pituitary disease (HCC), Sickle cell disease (HCC), Substance abuse (HCC), or Urinary tract infection.  MEDS:   Current Outpatient Medications:     amoxicillin-clavulanate (AUGMENTIN) 875-125 MG Tab, Take 1 Tablet by mouth 2 times a day for 7 days., Disp: 14 Tablet, Rfl: 0  ALLERGIES: No Known Allergies  SURGHX:   Past Surgical History:   Procedure Laterality Date    CHOLECYSTECTOMY ROBOTIC XI N/A 8/14/2020    Procedure: CHOLECYSTECTOMY, ROBOT-ASSISTED, USING DA STEPH XI;  Surgeon: Marshal Lemons M.D.;  Location: SURGERY [facility];  Service: General    [place] LAP,DIAGNOSTIC ABDOMEN  9/25/2019    Procedure: PELVISCOPY;  Surgeon: Ben Coy M.D.;  Location: SURGERY SAME DAY [facility];  Service: Obstetrics     SALPINGECTOMY Bilateral 9/25/2019    Procedure: SALPINGECTOMY;  Surgeon: Ben Coy M.D.;  Location: SURGERY SAME DAY [facility];  Service: Obstetrics    PRIMARY C SECTION  8/8/2017    Procedure: PRIMARY C SECTION;  Surgeon: Kevin Blount M.D.;  Location: LABOR AND DELIVERY;  Service:     OTHER ORTHOPEDIC SURGERY  2006    Wrist for carpal tunnel left    TENDON REPAIR  2006    left wrist    OTHER Left 2001    Ear surgery    EAR MIDDLE EXPLORATION  2001     SOCHX:  reports that she has never smoked. She has never used smokeless tobacco. She reports that she does not drink alcohol and does not use drugs.  FH: family history is not on file.        Review of Systems   Constitutional: Negative.    Respiratory: Negative.     Cardiovascular: Negative.    Gastrointestinal: Negative.    Genitourinary:         Left breast injury pain swelling and erythema.   Neurological: Negative.        Medications, Allergies, and current problem list reviewed today in Epic           Objective     /70 (BP Location: Right arm, Patient Position: Sitting, BP Cuff Size: Adult long)   Pulse 68   Temp 36.3 °C (97.3 °F) (Temporal)   Resp 15   Ht 1.524 m (5')   Wt 68.9 kg (152 lb)   LMP 09/10/2023 (Approximate)   SpO2 99%   BMI 29.69 kg/m²      Physical Exam  Vitals and nursing note reviewed. Exam conducted with a chaperone present.   Constitutional:       General: She is not in acute distress.     Appearance: Normal appearance. She is well-developed. She is not ill-appearing, toxic-appearing or diaphoretic.   HENT:      Head: Normocephalic and atraumatic.      Right Ear: Hearing and external ear normal.      Left Ear: Hearing and external ear normal.      Nose: Nose normal.   Eyes:      General:         Right eye: No discharge.         Left eye: No discharge.      Conjunctiva/sclera: Conjunctivae normal.   Cardiovascular:      Rate and Rhythm: Normal rate and regular rhythm.      Heart sounds: Normal heart sounds.    Pulmonary:      Effort: Pulmonary effort is normal. No respiratory distress.      Breath sounds: Normal breath sounds. No wheezing.   Chest:   Breasts:     Right: Normal.      Left: Swelling, skin change (Erythema and warmth) and tenderness present. No bleeding, inverted nipple, mass or nipple discharge.       Musculoskeletal:      Cervical back: Normal range of motion and neck supple.   Lymphadenopathy:      Upper Body:      Right upper body: No axillary adenopathy.      Left upper body: No axillary adenopathy.   Skin:     General: Skin is warm and dry.   Neurological:      General: No focal deficit present.      Mental Status: She is alert and oriented to person, place, and time.   Psychiatric:         Mood and Affect: Mood normal.         Behavior: Behavior normal.         Thought Content: Thought content normal.         Judgment: Judgment normal.                             Assessment & Plan     Patient presenting with left breast pain after injuring her breast on a box on Friday.  There was initially pain and swelling and slight bruising.  However swelling has worsened and now she is having redness and warmth.  There is no nipple discharge.  She denies systemic symptoms including fever, chills, body aches or diarrhea.  No pertinent past breast history.  Vitals normal.  Exam shows diffuse swelling and tenderness of the left breast with overlying erythema and warmth.  No dimpling, nipple change or nipple discharge.  No obvious mass.  No adenopathy noted.  Symptoms are consistent with left breast trauma given swelling and tenderness.  However, she may be having a reactive response to injury and may have developed a clogged duct or infection.  She will use warm compresses, OTC meds and conservative measures as well as Augmentin to cover for infection.  If pain and swelling of the breast continue she will need breast imaging    1. Breast swelling  amoxicillin-clavulanate (AUGMENTIN) 875-125 MG Tab      2. Breast  erythema  amoxicillin-clavulanate (AUGMENTIN) 875-125 MG Tab          I personally reviewed prior external notes and test results pertinent to today's visit. Return to clinic or go to ED if symptoms worsen or persist. Red flag symptoms and indications for ED discussed at length. Patient/Parent/Guardian voices understanding.  AVS with post-visit instructions provided or given verbally.  Follow-up with your primary care provider in 3-5 days. All side effects and potential interactions of prescribed medication discussed including allergic response, GI upset, tendon injury, rash, sedation, OCP effectiveness, etc.    Please note that this dictation was created using voice recognition software. I have made every reasonable attempt to correct obvious errors, but I expect that there are errors of grammar and possibly content that I did not discover before finalizing the note.

## 2023-10-12 ENCOUNTER — OFFICE VISIT (OUTPATIENT)
Dept: URGENT CARE | Facility: PHYSICIAN GROUP | Age: 38
End: 2023-10-12
Payer: COMMERCIAL

## 2023-10-12 VITALS
RESPIRATION RATE: 16 BRPM | DIASTOLIC BLOOD PRESSURE: 62 MMHG | TEMPERATURE: 98 F | OXYGEN SATURATION: 99 % | WEIGHT: 152.23 LBS | BODY MASS INDEX: 29.89 KG/M2 | SYSTOLIC BLOOD PRESSURE: 128 MMHG | HEART RATE: 68 BPM | HEIGHT: 60 IN

## 2023-10-12 DIAGNOSIS — N61.0 MASTITIS, LEFT, ACUTE: ICD-10-CM

## 2023-10-12 PROCEDURE — 99214 OFFICE O/P EST MOD 30 MIN: CPT | Mod: 25 | Performed by: NURSE PRACTITIONER

## 2023-10-12 PROCEDURE — 3074F SYST BP LT 130 MM HG: CPT | Performed by: NURSE PRACTITIONER

## 2023-10-12 PROCEDURE — 3078F DIAST BP <80 MM HG: CPT | Performed by: NURSE PRACTITIONER

## 2023-10-12 RX ORDER — SULFAMETHOXAZOLE AND TRIMETHOPRIM 800; 160 MG/1; MG/1
1 TABLET ORAL 2 TIMES DAILY
Qty: 20 TABLET | Refills: 0 | Status: SHIPPED | OUTPATIENT
Start: 2023-10-12 | End: 2023-10-22

## 2023-10-12 RX ORDER — CEPHALEXIN 500 MG/1
500 CAPSULE ORAL 4 TIMES DAILY
Qty: 40 CAPSULE | Refills: 0 | Status: SHIPPED | OUTPATIENT
Start: 2023-10-12 | End: 2023-10-22

## 2023-10-12 ASSESSMENT — ENCOUNTER SYMPTOMS
GASTROINTESTINAL NEGATIVE: 1
RESPIRATORY NEGATIVE: 1
FEVER: 1
EYES NEGATIVE: 1
NEUROLOGICAL NEGATIVE: 1
MYALGIAS: 1
CARDIOVASCULAR NEGATIVE: 1
CHILLS: 0

## 2023-10-12 ASSESSMENT — FIBROSIS 4 INDEX: FIB4 SCORE: 0.4

## 2023-10-13 NOTE — PROGRESS NOTES
Subjective:   Buffy Lund is a 38 y.o. female who presents for Breast Pain (Ongoing LT breast pain. Presented to ED 9/17 and UC 9/23 for breast swelling and erythema. Saw PCP 9/27 and was given Rx to reduce inflammation. Had recent injury 9/22 when she bumped a box into her LT breast. 10 pain scale, ongoing erythema and swelling. )      Patient presents for evaluation of ongoing left breast erythema, edema, pain, and warmth which began 9/17/2023.  Patient reports that just prior to her first emergency room visit on the 17th, she had been carrying an approximately 20 pound box and bumped into a wall and the sharp corner of the box pushed into the lateral aspect of her left breast.  She did notice onset of pain immediately, but this dissipated over a couple of days.  She then developed fever and body aches and presented to the ER.  At that time she was felt to have a viral illness, and given Tylenol and told to take ibuprofen at home.  Her breast pain worsened, and fever worsened, so she presented to urgent care 9/23.  She was noted to have a left breast infection and placed on Augmentin.  Patient reports that her symptoms did improve, and she was seen by her PCP on 927, who gave her some kind of prescription to reduce inflammation.  Patient states her breast was red and painful at that time, but the medication did help with her symptoms and she felt the infection was resolving.  Unfortunately, 2 days ago she picked up her young son and felt immediate pain and warmth.  Since that time she has had worsening pain, 10/10, redness, warmth and swelling.  She presents for evaluation of this today.  Patient states she gets low-grade fevers intermittently since onset.  She denies any drainage from the breast, denies nipple discharge.  Patient reports she was recently diagnosed with lupus, and is pending rheumatology consult.        Review of Systems   Constitutional:  Positive for fever and malaise/fatigue. Negative  for chills.   HENT: Negative.     Eyes: Negative.    Respiratory: Negative.     Cardiovascular: Negative.    Gastrointestinal: Negative.    Genitourinary:         Left breast redness, pain and swelling   Musculoskeletal:  Positive for myalgias.   Skin: Negative.    Neurological: Negative.        Medications, Allergies, and current problem list reviewed today in Epic.     Objective:     /62 (BP Location: Right arm, Patient Position: Sitting, BP Cuff Size: Adult)   Pulse 68   Temp 36.7 °C (98 °F) (Temporal)   Resp 16   Ht 1.524 m (5')   Wt 69 kg (152 lb 3.6 oz)   SpO2 99%     Physical Exam  Vitals reviewed.   Constitutional:       General: She is not in acute distress.     Appearance: Normal appearance. She is not ill-appearing.   HENT:      Head: Normocephalic and atraumatic.      Nose: Nose normal.      Mouth/Throat:      Mouth: Mucous membranes are moist.      Pharynx: Oropharynx is clear.   Eyes:      Extraocular Movements: Extraocular movements intact.      Conjunctiva/sclera: Conjunctivae normal.      Pupils: Pupils are equal, round, and reactive to light.   Cardiovascular:      Rate and Rhythm: Normal rate and regular rhythm.      Pulses: Normal pulses.      Heart sounds: Normal heart sounds.   Pulmonary:      Effort: Pulmonary effort is normal.      Breath sounds: Normal breath sounds.   Chest:   Breasts:     Left: Swelling, skin change and tenderness present. No bleeding, inverted nipple, mass or nipple discharge.          Comments: There is erythema, edema, and warmth to the left breast, starting just to the lateral aspect of the nipple at about the 3 o'clock position.  This extends down and encompasses the bottom half of her breast to the medial aspect up to approximately 9:00.  This is exquisitely tender to palpation.  There is marked induration to almost the entire area.  No axillary lymphadenopathy.  No nipple discharge, no drainage.  Abdominal:      General: Abdomen is flat.      Palpations:  Abdomen is soft.   Musculoskeletal:         General: Normal range of motion.      Cervical back: Normal range of motion and neck supple.   Skin:     General: Skin is warm and dry.      Capillary Refill: Capillary refill takes less than 2 seconds.   Neurological:      General: No focal deficit present.      Mental Status: She is alert.   Psychiatric:         Mood and Affect: Mood normal.         Behavior: Behavior normal.         Assessment/Plan:     Diagnosis and associated orders:     1. Mastitis, left, acute  MA DIAGNOSTIC MAMMO LEFT W/CAD    US-BREAST LIMITED-LEFT    cefTRIAXone (Rocephin) 500 mg in lidocaine (Xylocaine) 1 % 2 mL for IM use    cefTRIAXone (Rocephin) 500 mg in lidocaine (Xylocaine) 1 % 2 mL for IM use    cephALEXin (KEFLEX) 500 MG Cap    sulfamethoxazole-trimethoprim (BACTRIM DS) 800-160 MG tablet         Comments/MDM:     Discussed with patient that I do feel she has mastitis to her left breast, but I am very concerned she has an underlying abscess.  Due to the length of time she has had symptoms of infection, treatment failure with Augmentin, large area of induration, and the mechanism of injury which may have started this process, I do feel patient needs stat diagnostic mammogram as well as diagnostic ultrasound.  Breast center was called today, but unfortunately due to the late hour they will have to work on scheduling her hopefully tomorrow.  In the interim, she was given 1 g of Rocephin, and started on Keflex and Bactrim therapy.  Patient was given strict ER precautions if she notices any worsening of the redness or induration, develops a high fever, or develops red streaking she is to go directly to the ER for further evaluation.  Patient verbalizes understanding and is in agreement with the treatment plan.         Differential diagnosis, natural history, supportive care, and indications for immediate follow-up discussed.    Advised the patient to follow-up with the primary care physician  for recheck, reevaluation, and consideration of further management.    My total time spent caring for the patient on the day of the encounter was 35 minutes. This does not include time spent on separately billable procedures/tests.    Please note that this dictation was created using voice recognition software. I have made a reasonable attempt to correct obvious errors, but I expect that there are errors of grammar and possibly content that I did not discover before finalizing the note.    This note was electronically signed by ARTIE Dickey

## 2023-10-16 ENCOUNTER — HOSPITAL ENCOUNTER (OUTPATIENT)
Dept: RADIOLOGY | Facility: MEDICAL CENTER | Age: 38
End: 2023-10-16
Attending: NURSE PRACTITIONER
Payer: COMMERCIAL

## 2023-10-16 DIAGNOSIS — N61.0 MASTITIS, LEFT, ACUTE: ICD-10-CM

## 2023-10-16 PROCEDURE — 76642 ULTRASOUND BREAST LIMITED: CPT | Mod: LT

## 2023-10-16 PROCEDURE — G0279 TOMOSYNTHESIS, MAMMO: HCPCS

## 2023-10-24 ENCOUNTER — OCCUPATIONAL MEDICINE (OUTPATIENT)
Dept: URGENT CARE | Facility: CLINIC | Age: 38
End: 2023-10-24
Payer: COMMERCIAL

## 2023-10-24 ENCOUNTER — HOSPITAL ENCOUNTER (EMERGENCY)
Facility: MEDICAL CENTER | Age: 38
End: 2023-10-24
Attending: EMERGENCY MEDICINE
Payer: COMMERCIAL

## 2023-10-24 VITALS
RESPIRATION RATE: 16 BRPM | HEIGHT: 60 IN | TEMPERATURE: 97.9 F | BODY MASS INDEX: 29.86 KG/M2 | WEIGHT: 152.12 LBS | HEART RATE: 80 BPM | OXYGEN SATURATION: 99 % | DIASTOLIC BLOOD PRESSURE: 81 MMHG | SYSTOLIC BLOOD PRESSURE: 121 MMHG

## 2023-10-24 VITALS
OXYGEN SATURATION: 98 % | RESPIRATION RATE: 16 BRPM | DIASTOLIC BLOOD PRESSURE: 74 MMHG | SYSTOLIC BLOOD PRESSURE: 130 MMHG | HEART RATE: 77 BPM | TEMPERATURE: 97.3 F

## 2023-10-24 DIAGNOSIS — L53.9 BREAST ERYTHEMA: ICD-10-CM

## 2023-10-24 DIAGNOSIS — N61.0 MASTITIS, LEFT, ACUTE: ICD-10-CM

## 2023-10-24 DIAGNOSIS — N64.4 BREAST PAIN: Primary | ICD-10-CM

## 2023-10-24 DIAGNOSIS — N63.0 BREAST SWELLING: ICD-10-CM

## 2023-10-24 PROCEDURE — 3075F SYST BP GE 130 - 139MM HG: CPT

## 2023-10-24 PROCEDURE — 99282 EMERGENCY DEPT VISIT SF MDM: CPT

## 2023-10-24 PROCEDURE — 99215 OFFICE O/P EST HI 40 MIN: CPT

## 2023-10-24 PROCEDURE — 3078F DIAST BP <80 MM HG: CPT

## 2023-10-24 RX ORDER — OXYCODONE HYDROCHLORIDE 5 MG/1
5 TABLET ORAL EVERY 4 HOURS PRN
Qty: 30 TABLET | Refills: 0 | Status: ON HOLD | OUTPATIENT
Start: 2023-10-24 | End: 2023-10-30 | Stop reason: CLARIF

## 2023-10-24 RX ORDER — CEPHALEXIN 250 MG/1
250 CAPSULE ORAL 4 TIMES DAILY
Status: ON HOLD | COMMUNITY
End: 2023-10-30

## 2023-10-24 RX ORDER — POLYETHYLENE GLYCOL 3350 17 G/17G
17 POWDER, FOR SOLUTION ORAL DAILY
Qty: 10 EACH | Refills: 0 | Status: ON HOLD | OUTPATIENT
Start: 2023-10-24 | End: 2023-10-30

## 2023-10-24 RX ORDER — IBUPROFEN 800 MG/1
800 TABLET ORAL EVERY 8 HOURS PRN
Qty: 9 TABLET | Refills: 0 | Status: SHIPPED | OUTPATIENT
Start: 2023-10-24 | End: 2023-10-27

## 2023-10-24 RX ORDER — ACETAMINOPHEN 500 MG
1000 TABLET ORAL EVERY 6 HOURS PRN
Qty: 20 TABLET | Refills: 0 | Status: SHIPPED | OUTPATIENT
Start: 2023-10-24 | End: 2023-10-28

## 2023-10-24 RX ORDER — ONDANSETRON 4 MG/1
4 TABLET, ORALLY DISINTEGRATING ORAL EVERY 6 HOURS PRN
Qty: 10 TABLET | Refills: 0 | Status: ON HOLD | OUTPATIENT
Start: 2023-10-24 | End: 2023-10-30 | Stop reason: CLARIF

## 2023-10-24 ASSESSMENT — FIBROSIS 4 INDEX: FIB4 SCORE: 0.4

## 2023-10-24 NOTE — LETTER
[facility] [address] [place] [address]  Phone:  [phone] - Fax:  [phone]   [facility] Progress Report and Disability Certification  Date of Service: 10/24/2023   No Show:  No  Date / Time of Next Visit: 10/31/2023   Claim Information   Patient Name: Buffy Lund  Claim Number:     Employer:    Date of Injury: 9/22/2023     Insurer / TPA: [employer]  ID / SSN:     Occupation: [employer]  Diagnosis: Diagnoses of Mastitis, left, acute, Breast swelling, and Breast erythema were pertinent to this visit.    Medical Information   Related to Industrial Injury? Yes    Subjective Complaints:  FU 4: Patient was hit in the left breast by a box and noted pain.  She has been seen total of 4 times and continues to have discomfort.  Pain rated at a 10-10   Objective Findings: Patient has a noted red swollen left breast especially to her left lower quadrant near her nipple.  She denies any discharge.  Patient has had three separate courses of antibiotics with no relief.  Her breast is warm and hard to touch, painful.     Pre-Existing Condition(s):     Assessment:   Condition Worsened    Status: Additional Care Required  Permanent Disability:No    Plan:   Comments:sent to ER for consideration of labs and IV antibiotics    Diagnostics:      Comments:       Disability Information   Status: Released to Restricted Duty    From:  10/24/2023  Through: 10/31/2023 Restrictions are: Temporary   Physical Restrictions   Sitting:    Standing:    Stooping:    Bending:      Squatting:    Walking:    Climbing:    Pushing:      Pulling:    Other:    Reaching Above Shoulder (L):   Reaching Above Shoulder (R):       Reaching Below Shoulder (L):    Reaching Below Shoulder (R):      Not to exceed Weight Limits   Carrying(hrs):   Weight Limit(lb): < or = to 10 pounds Lifting(hrs):   Weight  Limit(lb): < or = to 10 pounds   Comments: Referral to occupational medicine   Patients  breast is quite concerning, warm, redness, swollen, she has been on three separate antibiotics with little relief at this point.  Imaging has been complete as well.  I sent her to the ER for labs and consideration for IV antibiotics.  Please refer to the emergency room and occupational medicine for restrictions as I am unclear as to the plan of care.      Repetitive Actions   Hands: i.e. Fine Manipulations from Grasping:     Feet: i.e. Operating Foot Controls:     Driving / Operate Machinery:     Health Care Provider’s Original or Electronic Signature  BRIAN Law Health Care Provider’s Original or Electronic Signature    Roby Bean DO MPH     Clinic Name / Location: [address]  [place] [address] Clinic Phone Number: Dept: [phone]   Appointment Time: 5:15 Pm Visit Start Time: 6:13 PM   Check-In Time:  5:35 Pm Visit Discharge Time: 5:43 Pm    Original-Treating Physician or Chiropractor    Page 2-Insurer/TPA    Page 3-Employer    Page 4-Employee

## 2023-10-24 NOTE — LETTER
EMPLOYEE’S CLAIM FOR COMPENSATION/ REPORT OF INITIAL TREATMENT  FORM C-4  PLEASE TYPE OR PRINT    EMPLOYEE’S CLAIM - PROVIDE ALL INFORMATION REQUESTED   First Name                    TIMOTHY Baer Last Name  Carlos Lund Birthdate                    1985                Sex  []M  []F Claim Number (Insurer’s Use Only)     Home Address  [address] Age  38 y.o. 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 (if applicable)  I was lifting boxes    How did this injury or occupational disease occur? (Be specific and answer in detail. Use additional sheet if necessary)  I was lifting boxes and when I bolted I collided with the person next to me and the corner of the box hit my left breast   If you believe that you have an occupational disease, when did you first have knowledge of the disability and its relationship to your employment?  n/a Witnesses to the Accident (if applicable)  Georgia Cano      Nature of Injury or Occupational Disease  Defer  Part(s) of Body Injured or Affected  Chest N/A N/A    I CERTIFY THAT THE ABOVE IS  TRUE AND CORRECT TO T HE BEST OF MY KNOWLEDGE AND THAT I HAVE PROVIDED THIS INFORMATION IN ORDER TO OBTAIN THE BENEFITS OF [place], OR [place]).  I HEREBY AUTHORIZE ANY PHYSICIAN, CHIROPRACTOR, SURGEON, PRACTITIONER OR ANY OTHER PERSON, ANY HOSPITAL, INCLUDING [facility] OR [facility], ANY  MEDICAL SERVICE ORGANIZATION, ANY INSURANCE COMPANY, OR OTHER INSTITUTION OR ORGANIZATION TO RELEASE TO EACH OTHER, ANY MEDICAL OR OTHER INFORMATION, INCLUDING BENEFITS PAID OR PAYABLE, PERTINENT TO THIS INJURY OR DISEASE, EXCEPT INFORMATION RELATIVE TO DIAGNOSIS, TREATMENT AND/OR COUNSELING FOR AIDS, PSYCHOLOGICAL CONDITIONS, ALCOHOL OR CONTROLLED SUBSTANCES, FOR WHICH I MUST GIVE SPECIFIC AUTHORIZATION.  A PHOTOSTAT OF THIS AUTHORIZATION SHALL BE VALID AS THE ORIGINAL.     Date 10/24/2023    [facility] Employee’s Original or  *Electronic Signature   THIS REPORT MUST BE COMPLETED AND MAILED WITHIN 3 WORKING DAYS OF TREATMENT   Place  [facility]    Name of [facility]   Date 10/24/2023 Diagnosis and Description of Injury or Occupational Disease  (N61.0) Mastitis, left, acute  (N63.0) Breast swelling  (L53.9) Breast erythema  Diagnoses of Mastitis, left, acute, Breast swelling, and Breast erythema were pertinent to this visit. Is there evidence that the injured employee was under the influence of alcohol and/or another controlled substance at the time of accident?  []No  [] Yes (if yes, please explain)   Hour 6:13 PM      Treatment:      Have you advised the patient to remain off work five days or more?   [] Yes Indicate dates: From   To    []No If no, is the injured employee capable of: [] full duty [] modified duty                                                                   If modified duty, specify any limitations / restrictions:                                                                                                                                                                                                                                                                                                                                                                                                                   X-Ray Findings:      From information given by the employee, together with medical evidence, can you directly connect this injury or occupational disease as job incurred?  []Yes   [] No      Is additional medical care by a physician indicated? []Yes [] No       Do you know of any previous injury or disease contributing to this condition or occupational disease? []Yes [] No (Explain if yes)                              Date  10/24/2023 Print Health Care Provider’s Name  BRIAN Law I certify that the employer’s copy of  this form was delivered to the employer on:   Address  [address] INSURER'S [address] ONLY                       [place] Zip  [address] Provider’s Tax ID Number  [id number]   Telephone  Dept: [phone]    Health Care Provider’s Original or Electronic Signature  e-POOL [place] Degree (MD,DO, DC,PA-C,APRN)  APRN  Choose (if applicable)      ORIGINAL - TREATING HEALTHCARE PROVIDER PAGE 2 - INSURER/TPA PAGE 3 - EMPLOYER PAGE 4 - EMPLOYEE             Form C-4 (rev.08/23)        BRIEF DESCRIPTION OF RIGHTS AND BENEFITS  (Pursuant to [place] 616[place])    Notice of Injury or Occupational Disease (Incident Report Form C-1): If an injury or occupational disease (OD) arises out of and in the course of employment, you must provide written notice to your employer as soon as practicable, but no later than 7 days after the accident or OD. Your employer shall maintain a sufficient supply of the required forms.    Claim for Compensation (Form C-4): If medical treatment is sought, the form C-4 is available at the place of  "initial treatment. A completed \"Claim for Compensation\" (Form C-4) must be filed within 90 days after an accident or OD. The treating physician or chiropractor must, within 3 working days after treatment, complete and mail to the employer, the employer's insurer and third-party , the Claim for Compensation.    Medical Treatment: If you require medical treatment for your on-the-job injury or OD, you may be required to select a physician or chiropractor from a list provided by your workers’ compensation insurer, if it has contracted with an Organization for Managed Care (MCO) or Preferred Provider Organization (PPO) or providers of health care. If your employer has not entered into a contract with an MCO or PPO, you may select a physician or chiropractor from the Panel of Physicians and Chiropractors. Any medical costs related to your industrial injury or OD will be paid by your insurer.    Temporary Total Disability (TTD): If your doctor has certified that you are unable to work for a period of at least 5 consecutive days, or 5 cumulative days in a 20-day period, or places restrictions on you that your employer does not accommodate, you may be entitled to TTD compensation.    Temporary Partial Disability (TPD): If the wage you receive upon reemployment is less than the compensation for TTD to which you are entitled, the insurer may be required to pay you TPD compensation to make up the difference. TPD can only be paid for a maximum of 24 months.    Permanent Partial Disability (PPD): When your medical condition is stable and there is an indication of a PPD as a result of your injury or OD, within 30 days, your insurer must arrange for an evaluation by a rating physician or chiropractor to determine the degree of your PPD. The amount of your PPD award depends on the date of injury, the results of the PPD evaluation, your age and wage.    Permanent Total Disability (PTD): If you are medically certified by " a treating physician or chiropractor as permanently and totally disabled and have been granted a PTD status by your insurer, you are entitled to receive monthly benefits not to exceed 66 2/3% of your average monthly wage. The amount of your PTD payments is subject to reduction if you previously received a lump-sum PPD award.    Vocational Rehabilitation Services: You may be eligible for vocational rehabilitation services if you are unable to return to the job due to a permanent physical impairment or permanent restrictions as a result of your injury or occupational disease.    Transportation and Per Christine Reimbursement: You may be eligible for travel expenses and per christine associated with medical treatment.    Reopening: You may be able to reopen your claim if your condition worsens after claim closure.     Appeal Process: If you disagree with a written determination issued by the insurer or the insurer does not respond to your request, you may appeal to the Department of Administration, , by following the instructions contained in your determination letter. You must appeal the determination within 70 days from the date of the determination letter at [address], [address], [place] [address], or [address], [place] [address]. If you disagree with the  decision, you may appeal to the Department of Administration, . You must file your appeal within 30 days from the date of the  decision letter at [address], [address], [place] [address], or [address], [place] [address]. If you disagree with a decision of an , you may file a petition for judicial review with the District Court. You must do so within 30 days of the Appeal Officer’s decision. You may be represented by an  at your own expense or you may contact the [facility] for possible  representation.    [place]  for Injured Workers (NAIW): If you disagree with a  decision, you may request that NAIW represent you without charge at an  Hearing. For information regarding denial of benefits, you may contact the NAIW at: [address], [address], [place] [address], [phone], or [address], [address], [place] [address], [phone]    To File a Complaint with the Division: If you wish to file a complaint with the  of the Division of Industrial Relations (DIR),  please contact the Workers’ Compensation Section, [address], [address], [place], [place] [address], telephone ([phone]) [phone], or [address], [address], [place], [place] [address], telephone ([phone].    For assistance with Workers’ Compensation Issues: You may contact the [facility] Office for Consumer Health Assistance, [address], [address], [place], [place] [address], Toll Free 1-333.551.9241, Web site: http://Critical access hospital.[place].gov/Programs/IMTIAZ E-mail: imtiaz@[facility].nv.[facility]              __________________________________________________________________                                    _________________            Employee Name / Signature                                                                                                                            Date                                                                                                                                                                                                                              D-2 (rev. 10/20)

## 2023-10-24 NOTE — LETTER
[facility] [place] [id number] [facility] [address] [place] [address]  Phone:  [phone] - Fax:  [phone]   [facility] Progress Report and Disability Certification  Date of Service: 10/24/2023   No Show:  No  Date / Time of Next Visit: 10/31/2023   Claim Information   Patient Name: Buffy Lund  Claim Number:     Employer:   [employer] Date of Injury: 2023     Insurer / TPA: [employer]  ID / SSN:     Occupation: Build [employer]  Diagnosis: Diagnoses of Mastitis, left, acute, Breast swelling, and Breast erythema were pertinent to this visit.    Medical Information   Related to Industrial Injury? Yes    Subjective Complaints:  FU 4: Patient was hit in the left breast by a box and noted pain.  She has been seen total of 4 times and continues to have discomfort.  Pain rated at a 10-10   Objective Findings: Patient has a noted red swollen left breast especially to her left lower quadrant near her nipple.  She denies any discharge.  Patient has had three separate courses of antibiotics with no relief.  Her breast is warm and hard to touch, painful.     Pre-Existing Condition(s):     Assessment:   Condition Worsened    Status: Additional Care Required  Permanent Disability:No    Plan:   Comments:sent to ER for consideration of labs and IV antibiotics    Diagnostics:      Comments:       Disability Information   Status: Released to Restricted Duty    From:  10/24/2023  Through: 10/31/2023 Restrictions are: Temporary   Physical Restrictions   Sittin hrs/day Standin hrs/day Stoopin hrs/day Bendin hrs/day   Squattin hrs/day Walkin hrs/day Climbin hrs/day Pushin hrs/day   Pullin hrs/day Other:  0 hrs/day Reaching Above Shoulder (L): 0 hrs/day Reaching Above Shoulder (R): 0 hrs/day     Reaching Below Shoulder (L):  0 hrs/day Reaching Below Shoulder (R):  0 hrs/day   Not to exceed Weight Limits   Carrying(hrs):   Weight Limit(lb):    Lifting(hrs):   Weight  Limit(lb):     Comments: Referral to occupational medicine   Patients breast is quite concerning, warm, redness, swollen, she has been on three separate antibiotics with little relief at this point.  Imaging has been complete as well.  I sent her to the ER for labs and consideration for IV antibiotics.      Repetitive Actions   Hands: i.e. Fine Manipulations from Graspin hrs/day   Feet: i.e. Operating Foot Controls: 0 hrs/day   Driving / Operate Machinery: 0 hrs/day   Health Care Provider’s Original or Electronic Signature  NATALIE Law. Health Care Provider’s Original or Electronic Signature    Roby Bean DO MPH     Clinic Name / Location: [place] [address]  [place] [address] Clinic Phone Number: Dept: [phone]   Appointment Time: 5:15 Pm Visit Start Time: 6:13 PM   Check-In Time:  5:35 Pm Visit Discharge Time:  6:41 PM    Original-Treating Physician or Chiropractor    Page 2-Insurer/TPA    Page 3-Employer    Page 4-Employee

## 2023-10-24 NOTE — LETTER
EMPLOYEE’S CLAIM FOR COMPENSATION/ REPORT OF INITIAL TREATMENT  FORM C-4  PLEASE TYPE OR PRINT    EMPLOYEE’S CLAIM - PROVIDE ALL INFORMATION REQUESTED   First Name                    TIMOTHY Baer Last Name  Carlos Lund Birthdate                    1985                Sex  []M  []F Claim Number (Insurer’s Use Only)     Home Address  [address] Age  38 y.o. Height  *** Weight  *** Social Security Number     [facility] Zip  [address] Telephone  [phone] (home) [phone] (work)   Mailing Address  [address] 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 (if applicable)  I was lifting boxes    How did this injury or occupational disease occur? (Be specific and answer in detail. Use additional sheet if necessary)  I was lifting boxes and when I bolted I collided with the person next to me and the corner of the box hit my left breast   If you believe that you have an occupational disease, when did you first have knowledge of the disability and its relationship to your employment?  n/a Witnesses to the Accident (if applicable)  Georgia Cano      Nature of Injury or Occupational Disease  Workers' Compensation  Part(s) of Body Injured or Affected  Chest N/A N/A     I CERTIFY THAT THE ABOVE IS TRUE AND CORRECT TO T HE BEST OF MY KNOWLEDGE AND THAT I HAVE PROVIDED THIS INFORMATION IN ORDER TO OBTAIN THE BENEFITS OF [place], OR [place]).  I HEREBY AUTHORIZE ANY PHYSICIAN, CHIROPRACTOR, SURGEON, PRACTITIONER OR ANY OTHER PERSON, ANY HOSPITAL, INCLUDING [facility] OR [facility], ANY  MEDICAL SERVICE ORGANIZATION, ANY INSURANCE COMPANY, OR OTHER INSTITUTION OR ORGANIZATION TO RELEASE TO EACH OTHER, ANY MEDICAL OR OTHER INFORMATION, INCLUDING BENEFITS PAID OR PAYABLE, PERTINENT TO THIS INJURY OR DISEASE, EXCEPT INFORMATION RELATIVE TO DIAGNOSIS, TREATMENT AND/OR COUNSELING FOR AIDS, PSYCHOLOGICAL CONDITIONS, ALCOHOL OR CONTROLLED SUBSTANCES, FOR WHICH I MUST GIVE SPECIFIC AUTHORIZATION.  A PHOTOSTAT OF THIS AUTHORIZATION SHALL BE VALID AS THE ORIGINAL.     Date   Place Employee’s Original or  *Electronic Signature   THIS REPORT MUST BE COMPLETED AND MAILED WITHIN 3 WORKING DAYS OF TREATMENT   Place  [facility]    Name of Facility  [facility]   Date 10/24/2023 Diagnosis and Description of Injury or Occupational Disease  (N61.0) Mastitis, left, acute  (N63.0) Breast swelling  (L53.9) Breast erythema  Diagnoses of Mastitis, left, acute, Breast swelling, and Breast erythema were pertinent to this visit. Is there evidence that the injured employee was under the influence of alcohol and/or another controlled substance at the time of accident?  []No  [] Yes (if yes, please explain)   Hour 6:13 PM  No   Treatment: Sent to the emergency room for further evaluation     Have you advised the patient to remain off work five days or more?   [] Yes Indicate dates: From   To    []No If no, is the injured employee capable of: [] full duty [] modified duty                                                             No     If modified duty, specify any limitations /  restrictions:  Patient sent to the emergency room and advised to follow up with occupational medicine                                                                                                                                                                                                                                                                                                                                                                                                                X-Ray Findings:      From information given by the employee, together with medical evidence, can you directly connect this injury or occupational disease as job incurred?  []Yes   [] No Yes    Is additional medical care by a physician indicated? []Yes [] No  Yes    Do you know of any previous injury or disease contributing to this condition or occupational disease? []Yes [] No (Explain if yes)                          No   Date  11/14/2023 Print Health Care Provider’s Name  BRIAN Law I certify that the employer’s copy of  this form was delivered to the employer on:   Address  [address] [address] INSURER'S USE ONLY                       [facility]  [address] Provider’s Tax ID Number  [id number]   Telephone  Dept: [phone]    Health Care Provider’s Original or Electronic Signature  e-[employer] [place] Degree (MD,DO, DC,PA-C,APRN)  {Provider Degrees:60834}  Choose (if applicable)      ORIGINAL - TREATING HEALTHCARE PROVIDER PAGE 2 - INSURER/TPA PAGE 3 - EMPLOYER PAGE 4 - EMPLOYEE             Form C-4 (rev.08/23)        BRIEF DESCRIPTION OF RIGHTS AND BENEFITS  (Pursuant to [place] 616[place].050)    Notice of Injury or Occupational Disease (Incident Report Form C-1): If an injury or occupational disease (OD) arises out of and in the course of employment, you must provide written notice to your employer as soon as practicable, but no later than 7 days after the accident or OD. Your employer  "shall maintain a sufficient supply of the required forms.    Claim for Compensation (Form C-4): If medical treatment is sought, the form C-4 is available at the place of initial treatment. A completed \"Claim for Compensation\" (Form C-4) must be filed within 90 days after an accident or OD. The treating physician or chiropractor must, within 3 working days after treatment, complete and mail to the employer, the employer's insurer and third-party , the Claim for Compensation.    Medical Treatment: If you require medical treatment for your on-the-job injury or OD, you may be required to select a physician or chiropractor from a list provided by your workers’ compensation insurer, if it has contracted with an Organization for Managed Care (MCO) or Preferred Provider Organization (PPO) or providers of health care. If your employer has not entered into a contract with an MCO or PPO, you may select a physician or chiropractor from the Panel of Physicians and Chiropractors. Any medical costs related to your industrial injury or OD will be paid by your insurer.    Temporary Total Disability (TTD): If your doctor has certified that you are unable to work for a period of at least 5 consecutive days, or 5 cumulative days in a 20-day period, or places restrictions on you that your employer does not accommodate, you may be entitled to TTD compensation.    Temporary Partial Disability (TPD): If the wage you receive upon reemployment is less than the compensation for TTD to which you are entitled, the insurer may be required to pay you TPD compensation to make up the difference. TPD can only be paid for a maximum of 24 months.    Permanent Partial Disability (PPD): When your medical condition is stable and there is an indication of a PPD as a result of your injury or OD, within 30 days, your insurer must arrange for an evaluation by a rating physician or chiropractor to determine the degree of your PPD. The amount of " your PPD award depends on the date of injury, the results of the PPD evaluation, your age and wage.    Permanent Total Disability (PTD): If you are medically certified by a treating physician or chiropractor as permanently and totally disabled and have been granted a PTD status by your insurer, you are entitled to receive monthly benefits not to exceed 66 2/3% of your average monthly wage. The amount of your PTD payments is subject to reduction if you previously received a lump-sum PPD award.    Vocational Rehabilitation Services: You may be eligible for vocational rehabilitation services if you are unable to return to the job due to a permanent physical impairment or permanent restrictions as a result of your injury or occupational disease.    Transportation and Per Christine Reimbursement: You may be eligible for travel expenses and per christine associated with medical treatment.    Reopening: You may be able to reopen your claim if your condition worsens after claim closure.     Appeal Process: If you disagree with a written determination issued by the insurer or the insurer does not respond to your request, you may appeal to the Department of Administration, , by following the instructions contained in your determination letter. You must appeal the determination within 70 days from the date of the determination letter at [address], [address], [place], [place] [address], or [address], [place] [address]. If you disagree with the  decision, you may appeal to the Department of Administration, . You must file your appeal within 30 days from the date of the  decision letter at [address], [address], [place], [place] [address], or [address] [facility] [address], [place] [address]. If you disagree with a decision of an , you may file a petition for judicial review with the District Court. You must  do so within 30 days of the Appeal Officer’s decision. You may be represented by an  at your own expense or you may contact the [facility] for possible representation.    [place]  for Injured Workers (NAIW): If you disagree with a  decision, you may request that NAIW represent you without charge at an  Hearing. For information regarding denial of benefits, you may contact the [facility] at: [address], [address], [place] [address], [phone], or [address], [address], [place] [address], [phone]    To File a Complaint with the Division: If you wish to file a complaint with the  of the Division of Industrial Relations (DIR),  please contact the Workers’ Compensation Section, [address], [address], [place], [place] [address], telephone [phone], or [address], [place], [place] [address], telephone [phone].    For assistance with Workers’ Compensation Issues: You may contact the [facility] Office for Consumer Health Assistance, [address], [place], [place] [address], Toll Free 1-909.372.9326, Web site: http://[facility].[place].gov/Programs/IMTIAZ E-mail: [email or web address]              __________________________________________________________________                                    _________________            Employee Name / Signature                                                                                                                            Date                                                                                                                                                                                                                              D-2 (rev. 10/20)

## 2023-10-25 ENCOUNTER — HOSPITAL ENCOUNTER (OUTPATIENT)
Dept: RADIOLOGY | Facility: MEDICAL CENTER | Age: 38
End: 2023-10-25
Attending: NURSE PRACTITIONER
Payer: COMMERCIAL

## 2023-10-25 ENCOUNTER — TELEPHONE (OUTPATIENT)
Dept: URGENT CARE | Facility: CLINIC | Age: 38
End: 2023-10-25

## 2023-10-25 DIAGNOSIS — R92.8 ABNORMAL FINDINGS ON DIAGNOSTIC IMAGING OF BREAST: ICD-10-CM

## 2023-10-25 LAB — PATHOLOGY CONSULT NOTE: NORMAL

## 2023-10-25 PROCEDURE — 38505 NEEDLE BIOPSY LYMPH NODES: CPT

## 2023-10-25 PROCEDURE — 88342 IMHCHEM/IMCYTCHM 1ST ANTB: CPT

## 2023-10-25 PROCEDURE — 88305 TISSUE EXAM BY PATHOLOGIST: CPT | Mod: 59

## 2023-10-25 PROCEDURE — 88341 IMHCHEM/IMCYTCHM EA ADD ANTB: CPT

## 2023-10-25 PROCEDURE — A4648 IMPLANTABLE TISSUE MARKER: HCPCS

## 2023-10-25 PROCEDURE — 88312 SPECIAL STAINS GROUP 1: CPT

## 2023-10-25 NOTE — ED PROVIDER NOTES
ED Provider Note    Scribed for Mark Altamirano by Case Vargas. 10/24/2023  7:27 PM    Primary care provider: Eric Ortiz P.A.-C.  Means of arrival: Walk-in  History obtained from: Patient  History limited by: None    CHIEF COMPLAINT  Chief Complaint   Patient presents with    Breast Pain     Pt reports L breast pain and swelling with redness x 1 month. Pt was seen here a month ago with no remedy. Pt went to  today and was sent to ED.       EXTERNAL RECORDS REVIEWED  Outpatient Notes patient had ultrasound done which was reviewed showing concerns for abnormal tissue findings and recommended biopsy as well as a lymph node in left axilla with thickened cortex    HPI/ROS  LIMITATION TO HISTORY   Select: : None    HPI  Buffy Lund is a 38 y.o. female who presents to the Emergency Department for left breast pain onset one month. The patient reports also experiencing left breast swelling and erythema. She states she prompted to the ED for the pain and swelling one month ago but her symptoms were not resolved. She also reports taking 3 rounds of antibiotics to no alleviation of her symptoms. Patient had an ultrasound performed on the 16th of October and was told that there was concern for cancer in her breast. She states she has an appointment for a biopsy tomorrow. Patient went to Urgent Care today and was prompted to come to the ED for further evaluation. She also reports prompting to the ED for management of her pain. She was taking Motrin to no alleviation. Patient denies weight loss and fever. No alleviating or exacerbating factors noted. Patient has no known allergies. Patient's PCP is aware of the plan for biopsy of her breast. She denies any alcohol, tobacco, or drug use. She also denies any family history of cancer. Patient's last mammogram was one year ago.    Ultrasound breast imaging from the 16th of this month:   IMPRESSION:  1.  There is new increased parenchymal density as well as  extensive skin thickening in the left breast on mammogram with heterogeneous tissue and increased vascularity on ultrasound in a large area centered around the 3:00 position. Ultrasound-guided   biopsy is recommended.  2.  Lymph node in left axilla with thickened cortex. Ultrasound-guided biopsy is recommended.    REVIEW OF SYSTEMS  As above, all other systems reviewed and are negative.   See HPI for further details.     PAST MEDICAL HISTORY   has a past medical history of H/O fetal demise, not currently pregnant (8/10/2016) and Psychiatric problem.  SURGICAL HISTORY   has a past surgical history that includes other orthopedic surgery (2006); other (Left, 2001); ear middle exploration (2001); tendon repair (2006); primary c section (8/8/2017); lap,diagnostic abdomen (9/25/2019); salpingectomy (Bilateral, 9/25/2019); and cholecystectomy robotic xi (N/A, 8/14/2020).  SOCIAL HISTORY  Social History     Tobacco Use    Smoking status: Never    Smokeless tobacco: Never   Vaping Use    Vaping Use: Never used   Substance Use Topics    Alcohol use: No    Drug use: No      Social History     Substance and Sexual Activity   Drug Use No     FAMILY HISTORY  No family history noted.  CURRENT MEDICATIONS  Home Medications       Reviewed by Amina Hobbs R.N. (Registered Nurse) on 10/24/23 at 1908  Med List Status: Not Addressed     Medication Last Dose Status   cephALEXin (KEFLEX) 250 MG Cap  Active                  ALLERGIES  No Known Allergies    PHYSICAL EXAM    VITAL SIGNS:   Vitals:    10/24/23 1900 10/24/23 1904   BP: 116/78    Pulse: 78    Resp: 16    Temp: 36.8 °C (98.3 °F)    TempSrc: Temporal    SpO2: 99%    Weight:  69 kg (152 lb 1.9 oz)   Height:  1.524 m (5')       Vitals: My interpretation: normotensive, not tachycardic, afebrile, not hypoxic    Reinterpretation of vitals: Unchanged unremarkable    PE:   Gen: sitting comfortably, speaking clearly, appears in no acute distress   ENT: Mucous membranes moist,  posterior pharynx clear, uvula midline, nares patent bilaterally   Neck: Supple, FROM  Pulmonary: Lungs are clear to auscultation bilaterally. No tachypnea  CV:  RRR, no murmur appreciated, pulses 2+ in both upper and lower extremities  Abdomen: soft, NT/ND; no rebound/guarding  : no CVA or suprapubic tenderness   Neuro: A&Ox4 (person, place, time, situation), speech fluent, gait steady, no focal deficits appreciated  Skin: No rash or lesions.  No pallor or jaundice.  No cyanosis.  Warm and dry.   Left breast is firm throughout the entirety of the breast concerning for underlying malignancy, no fluctuation in her abscess palpated, there is a small area of erythema to the left breast as well which is present, no nipple discharge    COURSE & MEDICAL DECISION MAKING  Nursing notes, VS, PMSFHx, labs, imaging, EKG reviewed in chart.    ED Observation Status? No; Patient does not meet criteria for ED Observation.     Ddx: Malignancy, mastitis, cellulitis, abscess    MDM: 7:27 PM Buffy Lund is a 38 y.o. female who presented with left breast pain.  Is been ongoing since September.  She has had 3 rounds of antibiotics and this is her fifth visit for continued breast pain.  No improvement antibiotics.  Sent here from urgent care for further evaluation.  Unfortunately patient did have an ultrasound done showing findings on the 16th of this month on chart review concerning for possible malignancy with mammogram showing heterogeneous tissue and increased vascularity on ultrasound as well as a lymph node that is abnormal in the left axilla.  All these findings are extremely concerning for malignancy unfortunately.  Patient still having uncontrolled pain.  I do not think a fourth round of antibiotics is the answer, considering she had no improvement prior.  Her exam is very highly concerning for malignancy as her breast is completely firm throughout the entirety of the breast.  I discussed with her her ultrasound  results that were very suggestive of cancer considering lymph node involvement.  Vital signs normal, physical exam otherwise benign.  We will send her with oxycodone, Tylenol, Motrin, Zofran and MiraLAX and have her follow-up with her biopsy is scheduled for tomorrow.  She is amatory, well-appearing and in no acute distress.  Patient verbalized understanding plan and return precautions.      ADDITIONAL PROBLEM LIST AND DISPOSITION    I have discussed management of the patient with the following physicians and ZULAY's:  None    Discussion of management with other [facility] or appropriate source(s): None     Escalation of care considered, and ultimately not performed:acute inpatient care management, however at this time, the patient is most appropriate for outpatient management    Barriers to care at this time, including but not limited to:  None .     Decision tools and prescription drugs considered including, but not limited to:  Pain medications see above .    FINAL IMPRESSION  1. Breast pain Acute      Case BLAIR (Scribe), am scribing for, and in the presence of, Mark Altamirano.    Electronically signed by: Case Vargas (Neha), 10/24/2023    IMark personally performed the services described in this documentation, as scribed by Case Vargas in my presence, and it is both accurate and complete.    The note accurately reflects work and decisions made by me.  Mark Altamirano  10/24/2023  8:06 PM     Principal Discharge DX:	Hypokalemia  Secondary Diagnosis:	Anxiety attack  Secondary Diagnosis:	Near syncope

## 2023-10-25 NOTE — ED NOTES
Rick ambulated with steady gait to room, gowned, and placed on monitors (BP, Cardiac, Pulse Ox). Patient is low fall risk. Standard fall risk precautions in place including bed in lowest position, side rails up, call light within reach, and area free of clutter. Patient stable on RA. Chart up for ERP.

## 2023-10-25 NOTE — PROGRESS NOTES
Subjective:     Buffy Lund is a 38 y.o. female who presents for Breast Problem (WC NEW DOI 9/22/23/Hit L breast with a box, already seen for this 3 times with no relief )      FU 4: Patient was hit in the left breast by a box and noted pain.  She has been seen total of 4 times and continues to have discomfort.  Pain rated at a 10-10    PMH:   No pertinent past medical history to this problem  MEDS:  Medications were reviewed in EMR  ALLERGIES:  Allergies were reviewed in EMR  SOCHX:  Works as a    FH:   No pertinent family history to this problem       Objective:     /74   Pulse 77   Temp 36.3 °C (97.3 °F)   Resp 16   SpO2 98%     Patient has a noted red swollen left breast especially to her left lower quadrant near her nipple.  She denies any discharge.  Patient has had three separate courses of antibiotics with no relief.  Her breast is warm and hard to touch, painful.      Assessment/Plan:       1. Mastitis, left, acute  - Referral to Occupational Medicine    2. Breast swelling  - Referral to Occupational Medicine    3. Breast erythema  - Referral to Occupational Medicine    Other orders  - cephALEXin (KEFLEX) 250 MG Cap; Take 250 mg by mouth 4 times a day.    Released to Restricted Duty FROM 10/24/2023 TO 10/31/2023  Referral to occupational medicine   Patients breast is quite concerning, warm, redness, swollen, she has been on three separate antibiotics with little relief at this point.  Imaging has been complete as well.  I sent her to the ER for labs and consideration for IV antibiotics.         Differential diagnosis, natural history, supportive care, and indications for immediate follow-up discussed.

## 2023-10-25 NOTE — ED TRIAGE NOTES
Chief Complaint   Patient presents with    Breast Pain     Pt reports L breast pain and swelling with redness x 1 month. Pt was seen here a month ago with no remedy. Pt went to  today and was sent to ED.       Pt to triage with steady gait for above complaint. Presents with L breast pain, pt finished last cortez of abx today at 1300    Pt back to lobby, educated on triage process and encourage to alert staff of any changes.     Vitals:    10/24/23 1900   BP: 116/78   Pulse: 78   Resp: 16   Temp: 36.8 °C (98.3 °F)   SpO2: 99%

## 2023-10-25 NOTE — DISCHARGE INSTRUCTIONS
As we discussed, follow-up with your biopsy tomorrow.  I do not think IV antibiotics is a solution to your breast pain.  I am very concerned for possible malignancy.  Considering he took 3 rounds antibiotics and had no improvement.  I prescribed you pain medication for oxycodone which is a narcotic, do not take alcohol, operate machinery after taking this medication.  I prescribed you Zofran to help with any symptoms of nausea.  You can also take Tylenol and Motrin at the same time to help with pain.  Take MiraLAX prevent constipation.  Please follow-up with your primary team for follow-up on the biopsy results.  If you have any concerns or questions come back to the ED.  Thank you for coming in today.

## 2023-10-25 NOTE — ED NOTES
Pt discharged, all appropriate hospital equipment removed (IV, monitor, pulse ox, etc.). Pt left unit via walking with stable gait to ED lobby for transport home with family. Personal belongings with pt when leaving unit. Pt given discharge instructions prior to leaving unit including where to  prescriptions and when to follow-up; verbalizes understanding. Pt informed to return to ED if symptoms worsen/return or altered status develop. Copy of discharge instructions signed and turned into DC basket and copy sent with pt.

## 2023-10-26 ENCOUNTER — TELEPHONE (OUTPATIENT)
Dept: RADIOLOGY | Facility: MEDICAL CENTER | Age: 38
End: 2023-10-26
Payer: COMMERCIAL

## 2023-10-26 NOTE — TELEPHONE ENCOUNTER
10/26/23 [facility] TRANSFERRED PT TO RAD TO GO OVER BX RESULTS    10/26/23 [facility] LM FOR PT TO CALL BACK TO SPEAK WITH RAD TO GO OVER BX RESULTS.

## 2023-10-29 ENCOUNTER — HOSPITAL ENCOUNTER (EMERGENCY)
Facility: MEDICAL CENTER | Age: 38
End: 2023-10-29
Attending: STUDENT IN AN ORGANIZED HEALTH CARE EDUCATION/TRAINING PROGRAM
Payer: COMMERCIAL

## 2023-10-29 ENCOUNTER — APPOINTMENT (OUTPATIENT)
Dept: RADIOLOGY | Facility: MEDICAL CENTER | Age: 38
End: 2023-10-29
Attending: STUDENT IN AN ORGANIZED HEALTH CARE EDUCATION/TRAINING PROGRAM
Payer: COMMERCIAL

## 2023-10-29 VITALS
HEART RATE: 71 BPM | SYSTOLIC BLOOD PRESSURE: 92 MMHG | OXYGEN SATURATION: 92 % | RESPIRATION RATE: 16 BRPM | BODY MASS INDEX: 29.35 KG/M2 | DIASTOLIC BLOOD PRESSURE: 55 MMHG | HEIGHT: 60 IN | WEIGHT: 149.47 LBS | TEMPERATURE: 96.9 F

## 2023-10-29 DIAGNOSIS — N61.0 BREAST INFECTION: ICD-10-CM

## 2023-10-29 LAB
ALBUMIN SERPL BCP-MCNC: 4.4 G/DL (ref 3.2–4.9)
ALBUMIN/GLOB SERPL: 1.4 G/DL
ALP SERPL-CCNC: 88 U/L (ref 30–99)
ALT SERPL-CCNC: 11 U/L (ref 2–50)
ANION GAP SERPL CALC-SCNC: 12 MMOL/L (ref 7–16)
AST SERPL-CCNC: 16 U/L (ref 12–45)
BASOPHILS # BLD AUTO: 0.3 % (ref 0–1.8)
BASOPHILS # BLD: 0.03 K/UL (ref 0–0.12)
BILIRUB SERPL-MCNC: 0.2 MG/DL (ref 0.1–1.5)
BUN SERPL-MCNC: 14 MG/DL (ref 8–22)
CALCIUM ALBUM COR SERPL-MCNC: 8.7 MG/DL (ref 8.5–10.5)
CALCIUM SERPL-MCNC: 9 MG/DL (ref 8.5–10.5)
CHLORIDE SERPL-SCNC: 102 MMOL/L (ref 96–112)
CO2 SERPL-SCNC: 24 MMOL/L (ref 20–33)
CREAT SERPL-MCNC: 0.63 MG/DL (ref 0.5–1.4)
EOSINOPHIL # BLD AUTO: 0.13 K/UL (ref 0–0.51)
EOSINOPHIL NFR BLD: 1.2 % (ref 0–6.9)
ERYTHROCYTE [DISTWIDTH] IN BLOOD BY AUTOMATED COUNT: 40.7 FL (ref 35.9–50)
GFR SERPLBLD CREATININE-BSD FMLA CKD-EPI: 115 ML/MIN/1.73 M 2
GLOBULIN SER CALC-MCNC: 3.1 G/DL (ref 1.9–3.5)
GLUCOSE SERPL-MCNC: 109 MG/DL (ref 65–99)
HCT VFR BLD AUTO: 39 % (ref 37–47)
HGB BLD-MCNC: 13.2 G/DL (ref 12–16)
IMM GRANULOCYTES # BLD AUTO: 0.04 K/UL (ref 0–0.11)
IMM GRANULOCYTES NFR BLD AUTO: 0.4 % (ref 0–0.9)
LYMPHOCYTES # BLD AUTO: 2.23 K/UL (ref 1–4.8)
LYMPHOCYTES NFR BLD: 20.7 % (ref 22–41)
MCH RBC QN AUTO: 31.4 PG (ref 27–33)
MCHC RBC AUTO-ENTMCNC: 33.8 G/DL (ref 32.2–35.5)
MCV RBC AUTO: 92.9 FL (ref 81.4–97.8)
MONOCYTES # BLD AUTO: 0.57 K/UL (ref 0–0.85)
MONOCYTES NFR BLD AUTO: 5.3 % (ref 0–13.4)
NEUTROPHILS # BLD AUTO: 7.77 K/UL (ref 1.82–7.42)
NEUTROPHILS NFR BLD: 72.1 % (ref 44–72)
NRBC # BLD AUTO: 0 K/UL
NRBC BLD-RTO: 0 /100 WBC (ref 0–0.2)
PLATELET # BLD AUTO: 418 K/UL (ref 164–446)
PMV BLD AUTO: 8.9 FL (ref 9–12.9)
POTASSIUM SERPL-SCNC: 3.9 MMOL/L (ref 3.6–5.5)
PROT SERPL-MCNC: 7.5 G/DL (ref 6–8.2)
RBC # BLD AUTO: 4.2 M/UL (ref 4.2–5.4)
SODIUM SERPL-SCNC: 138 MMOL/L (ref 135–145)
WBC # BLD AUTO: 10.8 K/UL (ref 4.8–10.8)

## 2023-10-29 PROCEDURE — A9270 NON-COVERED ITEM OR SERVICE: HCPCS | Performed by: STUDENT IN AN ORGANIZED HEALTH CARE EDUCATION/TRAINING PROGRAM

## 2023-10-29 PROCEDURE — 36415 COLL VENOUS BLD VENIPUNCTURE: CPT

## 2023-10-29 PROCEDURE — 700102 HCHG RX REV CODE 250 W/ 637 OVERRIDE(OP): Performed by: STUDENT IN AN ORGANIZED HEALTH CARE EDUCATION/TRAINING PROGRAM

## 2023-10-29 PROCEDURE — 80053 COMPREHEN METABOLIC PANEL: CPT

## 2023-10-29 PROCEDURE — 76604 US EXAM CHEST: CPT

## 2023-10-29 PROCEDURE — 99284 EMERGENCY DEPT VISIT MOD MDM: CPT

## 2023-10-29 PROCEDURE — 700111 HCHG RX REV CODE 636 W/ 250 OVERRIDE (IP): Performed by: STUDENT IN AN ORGANIZED HEALTH CARE EDUCATION/TRAINING PROGRAM

## 2023-10-29 PROCEDURE — 96374 THER/PROPH/DIAG INJ IV PUSH: CPT

## 2023-10-29 PROCEDURE — 85025 COMPLETE CBC W/AUTO DIFF WBC: CPT

## 2023-10-29 RX ORDER — MORPHINE SULFATE 4 MG/ML
4 INJECTION INTRAVENOUS ONCE
Status: DISCONTINUED | OUTPATIENT
Start: 2023-10-29 | End: 2023-10-29 | Stop reason: HOSPADM

## 2023-10-29 RX ORDER — KETOROLAC TROMETHAMINE 30 MG/ML
15 INJECTION, SOLUTION INTRAMUSCULAR; INTRAVENOUS ONCE
Status: COMPLETED | OUTPATIENT
Start: 2023-10-29 | End: 2023-10-29

## 2023-10-29 RX ORDER — SULFAMETHOXAZOLE AND TRIMETHOPRIM 800; 160 MG/1; MG/1
1 TABLET ORAL 2 TIMES DAILY
Qty: 20 TABLET | Refills: 0 | Status: ON HOLD | OUTPATIENT
Start: 2023-10-29 | End: 2023-10-31

## 2023-10-29 RX ORDER — CLINDAMYCIN HYDROCHLORIDE 150 MG/1
450 CAPSULE ORAL ONCE
Status: DISCONTINUED | OUTPATIENT
Start: 2023-10-29 | End: 2023-10-29

## 2023-10-29 RX ORDER — SULFAMETHOXAZOLE AND TRIMETHOPRIM 800; 160 MG/1; MG/1
1 TABLET ORAL ONCE
Status: COMPLETED | OUTPATIENT
Start: 2023-10-29 | End: 2023-10-29

## 2023-10-29 RX ORDER — ACETAMINOPHEN 500 MG
1000 TABLET ORAL ONCE
Status: COMPLETED | OUTPATIENT
Start: 2023-10-29 | End: 2023-10-29

## 2023-10-29 RX ORDER — ONDANSETRON 2 MG/ML
4 INJECTION INTRAMUSCULAR; INTRAVENOUS ONCE
Status: DISCONTINUED | OUTPATIENT
Start: 2023-10-29 | End: 2023-10-29 | Stop reason: HOSPADM

## 2023-10-29 RX ADMIN — KETOROLAC TROMETHAMINE 15 MG: 30 INJECTION, SOLUTION INTRAMUSCULAR; INTRAVENOUS at 02:47

## 2023-10-29 RX ADMIN — SULFAMETHOXAZOLE AND TRIMETHOPRIM 1 TABLET: 800; 160 TABLET ORAL at 03:58

## 2023-10-29 RX ADMIN — ACETAMINOPHEN 1000 MG: 500 TABLET, FILM COATED ORAL at 01:28

## 2023-10-29 ASSESSMENT — FIBROSIS 4 INDEX: FIB4 SCORE: 0.4

## 2023-10-29 ASSESSMENT — PAIN DESCRIPTION - PAIN TYPE: TYPE: ACUTE PAIN

## 2023-10-29 NOTE — ED TRIAGE NOTES
Chief Complaint   Patient presents with    Breast Pain     Pain to left breast; rates pain at a 10 of 10. Describes pain as burning and itching. Inferior aspect is tender to touch.  Hx of chronic mastitis. Pt states this is her mastitis pain.  Had biopsy on Wednesday of this week; results in MyChart.  Pt endorses redness and swelling. Pt states redness and swelling started before biopsy.

## 2023-10-29 NOTE — LETTER
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    Medical Information   Related to Industrial Injury?   ***   Subjective Complaints:      Objective Findings:     Pre-Existing Condition(s):     Assessment:        Status:    Permanent Disability:     Plan:      Diagnostics:      Comments:       Disability Information   Status:      From:     Through:   Restrictions are:     Physical Restrictions   Sitting:    Standing:    Stooping:    Bending:      Squatting:    Walking:    Climbing:    Pushing:      Pulling:    Other:    Reaching Above Shoulder (L):   Reaching Above Shoulder (R):       Reaching Below Shoulder (L):    Reaching Below Shoulder (R):      Not to exceed Weight Limits   Carrying(hrs):   Weight Limit(lb):   Lifting(hrs):   Weight  Limit(lb):     Comments:      Repetitive Actions   Hands: i.e. Fine Manipulations from Grasping:     Feet: i.e. Operating Foot Controls:     Driving / Operate Machinery:     Physician Name: Jonn Liu Physician Signature: JONN Sofia M.D. e-Signature:  , Medical Director   Clinic Name / Location: [facility], EMERGENCY DEPT  [address] [place] [address] [phone]     Clinic Phone Number: Dept: [phone]   Appointment Time:  Visit Start Time:    Check-In Time:  12:09 AM Visit Discharge Time:    Original-Treating Physician or Chiropractor    Page 2-Insurer/TPA    Page 3-Employer    Page 4-Employee

## 2023-10-29 NOTE — TELEPHONE ENCOUNTER
Patient was quite ill that day and I sent her to the emergency room for further evaluation, she ultimately should not be working until seen by occupational med again and I am unable to completely remove her from work but I can state 0 hours.  She has been seen twice in the ER.  Please schedule an appointment with this patient for occupational medicine asap.  Thanks

## 2023-10-29 NOTE — DISCHARGE INSTRUCTIONS
You were seen in the emergency room for evaluation of breast pain.  The ultrasound of your breast shows inflammatory/developing breast abscess.  I discussed with the general surgeon on-call and we offered admission to the hospital or follow-up in clinic this week.  Please take antibiotics as prescribed.  Please call the clinic on Monday at [phone] to schedule follow-up with a breast surgeon.  Please return the emergency room if you are having worsening pain, worsening redness, fever, chills, vomiting or any other concerns.

## 2023-10-29 NOTE — ED NOTES
Break RN: Pt medicated per MAR, education provided.  Pt verbalized understanding.  Pt denies needs at this time.  Family at bedside, call light in reach.

## 2023-10-29 NOTE — ED NOTES
Pt discharged . GCS 15. IV discontinued and gauze placed, pt in possession of belongings. Pt provided discharge education and information pertaining to medications and follow up appointments. Pt received copy of discharge instructions and verbalized understanding.     Vitals:    10/29/23 0404   BP: 92/55   Pulse: 71   Resp: 16   Temp: 36.1 °C (96.9 °F)   SpO2: 92%

## 2023-10-29 NOTE — ED PROVIDER NOTES
ED Provider Note    CHIEF COMPLAINT  Chief Complaint   Patient presents with    Breast Pain     Pain to left breast; rates pain at a 10 of 10. Describes pain as burning and itching. Inferior aspect is tender to touch.  Hx of chronic mastitis. Pt states this is her mastitis pain.  Had biopsy on Wednesday of this week; results in MyChart.  Pt endorses redness and swelling. Pt states redness and swelling started before biopsy.       EXTERNAL RECORDS REVIEWED  Outpatient Notes patient originally seen in urgent care 9/27/2023 for left breast pain after injuring her breast on a box.  She had a breast biopsy done on October 25, 2023 and she is found to have acute on chronic mastitis with dilated ducts, left axillary node biopsy showed reactive lymphoid elements, negative for carcinoma    HPI/ROS  LIMITATION TO HISTORY   Select: : None  OUTSIDE HISTORIAN(S):  None    Buffy Lund is a 38 y.o. female who presents for evaluation of left breast pain.  She states that she has been dealing with this pain for the past month.  She had a biopsy for the pain done on October 25.  She states that since the biopsy she has been having increased pain.  She states that the redness and swelling is the same as previous.  She is been taking ibuprofen for pain.  She has not had any fever, chills, nausea, vomiting, nipple discharge, nipple changes.  She has no chronic medical problems.  She is not breast-feeding and has not for several years as her children are 4 and 6.  She states that the breast biopsy results showed chronic mastitis and were negative for malignancy.  She just finished a course of antibiotics 4 days ago.    PAST MEDICAL HISTORY   has a past medical history of H/O fetal demise, not currently pregnant (8/10/2016) and Psychiatric problem.    SURGICAL HISTORY   has a past surgical history that includes other orthopedic surgery (2006); other (Left, 2001); ear middle exploration (2001); tendon repair (2006); primary c  section (8/8/2017); lap,diagnostic abdomen (9/25/2019); salpingectomy (Bilateral, 9/25/2019); and cholecystectomy robotic xi (N/A, 8/14/2020).    FAMILY HISTORY  No family history on file.    SOCIAL HISTORY  Social History     Tobacco Use    Smoking status: Never    Smokeless tobacco: Never   Vaping Use    Vaping Use: Never used   Substance and Sexual Activity    Alcohol use: No    Drug use: No    Sexual activity: Not Currently     Partners: Male     Birth control/protection: Pill     Comment: Unplanned pregnancy       CURRENT MEDICATIONS  Home Medications    **Home medications have not yet been reviewed for this encounter**         ALLERGIES  No Known Allergies    PHYSICAL EXAM  VITAL SIGNS: BP (!) 133/91   Pulse 92   Temp 36.1 °C (96.9 °F) (Temporal)   Resp 18   Ht 1.524 m (5')   Wt 67.8 kg (149 lb 7.6 oz)   LMP 10/01/2023 (Approximate)   SpO2 97%   BMI 29.19 kg/m²      Constitutional: Well developed, Well nourished, No acute respiratory distress, Non-toxic appearance.   HENT: Normocephalic, Atraumatic, Bilateral external ears normal, Oropharynx clear, mucous membranes are moist.  Eyes: Conjunctiva normal, No discharge. No icterus.  Neck: Normal range of motion. Supple.  Breast: Right breast normal, left breast with no nipple discharge, to the inferior lateral aspect of the left breast near the nipple there is a 3 cm x 3 cm area of induration and erythema, no fluctuant aspects, increased warmth to touch, no streaking of erythema  Cardiovascular: Normal heart rate, Normal rhythm, No murmurs, No rubs, No gallops.   Thorax & Lungs: Clear to auscultation bilaterally, No respiratory distress, No wheezing.  Abdomen: Soft nontender normal bowel sounds no rebound masses or peritoneal signs  Skin: Warm, Dry, No erythema, No rash.   Extremities: Intact distal pulses, No edema, No tenderness  Musculoskeletal: Good range of motion in all major joints. Normal gait.  Neurologic: Alert & oriented x 3. No focal deficits  noted.   Psych: Alert normal affect       DIAGNOSTIC STUDIES / PROCEDURES    LABS  Results for orders placed or performed during the hospital encounter of 10/29/23   CBC WITH DIFFERENTIAL   Result Value Ref Range    WBC 10.8 4.8 - 10.8 K/uL    RBC 4.20 4.20 - 5.40 M/uL    Hemoglobin 13.2 12.0 - 16.0 g/dL    Hematocrit 39.0 37.0 - 47.0 %    MCV 92.9 81.4 - 97.8 fL    MCH 31.4 27.0 - 33.0 pg    MCHC 33.8 32.2 - 35.5 g/dL    RDW 40.7 35.9 - 50.0 fL    Platelet Count 418 164 - 446 K/uL    MPV 8.9 (L) 9.0 - 12.9 fL    Neutrophils-Polys 72.10 (H) 44.00 - 72.00 %    Lymphocytes 20.70 (L) 22.00 - 41.00 %    Monocytes 5.30 0.00 - 13.40 %    Eosinophils 1.20 0.00 - 6.90 %    Basophils 0.30 0.00 - 1.80 %    Immature Granulocytes 0.40 0.00 - 0.90 %    Nucleated RBC 0.00 0.00 - 0.20 /100 WBC    Neutrophils (Absolute) 7.77 (H) 1.82 - 7.42 K/uL    Lymphs (Absolute) 2.23 1.00 - 4.80 K/uL    Monos (Absolute) 0.57 0.00 - 0.85 K/uL    Eos (Absolute) 0.13 0.00 - 0.51 K/uL    Baso (Absolute) 0.03 0.00 - 0.12 K/uL    Immature Granulocytes (abs) 0.04 0.00 - 0.11 K/uL    NRBC (Absolute) 0.00 K/uL   COMP METABOLIC PANEL   Result Value Ref Range    Sodium 138 135 - 145 mmol/L    Potassium 3.9 3.6 - 5.5 mmol/L    Chloride 102 96 - 112 mmol/L    Co2 24 20 - 33 mmol/L    Anion Gap 12.0 7.0 - 16.0    Glucose 109 (H) 65 - 99 mg/dL    Bun 14 8 - 22 mg/dL    Creatinine 0.63 0.50 - 1.40 mg/dL    Calcium 9.0 8.5 - 10.5 mg/dL    Correct Calcium 8.7 8.5 - 10.5 mg/dL    AST(SGOT) 16 12 - 45 U/L    ALT(SGPT) 11 2 - 50 U/L    Alkaline Phosphatase 88 30 - 99 U/L    Total Bilirubin 0.2 0.1 - 1.5 mg/dL    Albumin 4.4 3.2 - 4.9 g/dL    Total Protein 7.5 6.0 - 8.2 g/dL    Globulin 3.1 1.9 - 3.5 g/dL    A-G Ratio 1.4 g/dL   ESTIMATED GFR   Result Value Ref Range    GFR (CKD-EPI) 115 >60 mL/min/1.73 m 2         RADIOLOGY  I have independently interpreted the diagnostic imaging associated with this visit and am waiting the final reading from the radiologist.   My  preliminary interpretation is as follows: No obvious hypoechoic area concerning for abscess  Radiologist interpretation:   US-CHEST   Final Result      Complex irregular hypoechoic area inferior LEFT breast measuring 3.5 cm with peripheral hyperemia, likely inflammatory phlegmon/developing abscess.      Please note this study does not serve as evaluation for cancer.            COURSE & MEDICAL DECISION MAKING    ED Observation Status? No; Patient does not meet criteria for ED Observation.     INITIAL ASSESSMENT, COURSE AND PLAN  Care Narrative: This is a 38-year-old female who presents for evaluation of left breast pain.  Pain has been ongoing for the past month.  She had a recent biopsy of the area which showed acute on chronic mastitis without evidence of malignancy and the axillary lymph node biopsy also showed no evidence of malignancy.  She has no systemic signs of illness and her vital signs are normal.  She denies any fever or chills.  Labs are obtained and there is no leukocytosis.  Labs are otherwise reassuring.  On physical exam there is some area of induration to the left inferior lateral breast.  Ultrasound of the area was obtained and does show inflammatory phlegmon/developing abscess.  I discussed with general surgeon on-call, Dr. Amaro, who states that patient may trial course of antibiotics and follow-up in clinic next week or offered to admit the patient to the hospital  for observation.    The patient was reevaluated.  She was resting comfortably.  Her vital signs remained normal.  Discussed her results as well as my discussion with general surgeon on-call.  The patient wishes to trial another course of antibiotics and will follow-up with clinic.  She does not want to stay in the hospital.  We will treat her with a 10-day course of Bactrim.  She is given first dose of antibiotics here.  A referral to surgery was placed and she was given the phone number for [facility] who she will call on  Monday to schedule follow-up with the breast surgeon.  She is advised to return to the emergency room if she develops fever, worsening symptoms despite being on the antibiotics, worsening pain or any other concerns.  Patient is agreeable to discharge plan with no further questions.            DISPOSITION AND DISCUSSIONS  I have discussed management of the patient with the following physicians and ZULAY's:    General surgery, Dr. Amaro    Discussion of management with other [facility] or appropriate source(s):  None      Escalation of care considered, and ultimately not performed:acute inpatient care management, however at this time, the patient is most appropriate for outpatient management given normal vital signs, no leukocytosis, patient preference    Barriers to care at this time, including but not limited to:  None .     Decision tools and prescription drugs considered including, but not limited to:  None .    Patient discharged home in stable condition with instructions to follow-up with surgery on Monday.  Strict ER return precaution were discussed.  Patient is agreeable to discharge plan with no further questions.    FINAL DIAGNOSIS  1. Breast infection         Electronically signed by: Zoe Liu M.D., 10/29/2023 12:42 AM

## 2023-10-29 NOTE — LETTER
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    Medical Information   Related to Industrial Injury?   ***   Subjective Complaints:  Breast pain   Objective Findings: Swelling to left inferior breast, increased warmth to touch   Pre-Existing Condition(s):     Assessment:   Condition Worsened    Status: Additional Care Required  Permanent Disability:     Plan: ConsultationMedication  Comments:Follow up with general surgery, PO antibiotics    Diagnostics: Other (see comment)    Comments:  Ultrasound of breath     Disability Information   Status: Released to Full Duty    From:     Through:   Restrictions are:     Physical Restrictions   Sitting:    Standing:    Stooping:    Bending:      Squatting:    Walking:    Climbing:    Pushing:      Pulling:    Other:    Reaching Above Shoulder (L):   Reaching Above Shoulder (R):       Reaching Below Shoulder (L):    Reaching Below Shoulder (R):      Not to exceed Weight Limits   Carrying(hrs):   Weight Limit(lb):   Lifting(hrs):   Weight  Limit(lb):     Comments:      Repetitive Actions   Hands: i.e. Fine Manipulations from Grasping:     Feet: i.e. Operating Foot Controls:     Driving / Operate Machinery:     Physician Name: Jonn Liu Physician Signature: JONN Sofia M.D. e-Signature:  , Medical Director   Clinic Name / Location: [facility], EMERGENCY DEPT  [address] [facility]  [address]  [phone]     Clinic Phone Number: Dept: [phone]   Appointment Time:  Visit Start Time:    Check-In Time:  12:09 AM Visit Discharge Time:    Original-Treating Physician or Chiropractor    Page 2-Insurer/TPA    Page 3-Employer    Page 4-Employee

## 2023-10-29 NOTE — ED NOTES
Pt ambulated to yellow 59 from lobby with steady gait.  On monitor, call light in reach. Chart up for ERP.

## 2023-10-30 ENCOUNTER — ANESTHESIA (OUTPATIENT)
Dept: SURGERY | Facility: MEDICAL CENTER | Age: 38
End: 2023-10-30
Payer: COMMERCIAL

## 2023-10-30 ENCOUNTER — ANESTHESIA EVENT (OUTPATIENT)
Dept: SURGERY | Facility: MEDICAL CENTER | Age: 38
End: 2023-10-30
Payer: COMMERCIAL

## 2023-10-30 ENCOUNTER — HOSPITAL ENCOUNTER (OUTPATIENT)
Facility: MEDICAL CENTER | Age: 38
End: 2023-10-31
Attending: STUDENT IN AN ORGANIZED HEALTH CARE EDUCATION/TRAINING PROGRAM | Admitting: STUDENT IN AN ORGANIZED HEALTH CARE EDUCATION/TRAINING PROGRAM
Payer: COMMERCIAL

## 2023-10-30 ENCOUNTER — APPOINTMENT (OUTPATIENT)
Dept: RADIOLOGY | Facility: MEDICAL CENTER | Age: 38
End: 2023-10-30
Attending: NURSE PRACTITIONER
Payer: COMMERCIAL

## 2023-10-30 DIAGNOSIS — N61.0 MASTITIS: ICD-10-CM

## 2023-10-30 LAB
ALBUMIN SERPL BCP-MCNC: 4 G/DL (ref 3.2–4.9)
ALBUMIN/GLOB SERPL: 1.3 G/DL
ALP SERPL-CCNC: 85 U/L (ref 30–99)
ALT SERPL-CCNC: 10 U/L (ref 2–50)
ANION GAP SERPL CALC-SCNC: 11 MMOL/L (ref 7–16)
AST SERPL-CCNC: 17 U/L (ref 12–45)
BASOPHILS # BLD AUTO: 0.6 % (ref 0–1.8)
BASOPHILS # BLD: 0.06 K/UL (ref 0–0.12)
BILIRUB SERPL-MCNC: 0.2 MG/DL (ref 0.1–1.5)
BUN SERPL-MCNC: 13 MG/DL (ref 8–22)
CALCIUM ALBUM COR SERPL-MCNC: 8.8 MG/DL (ref 8.5–10.5)
CALCIUM SERPL-MCNC: 8.8 MG/DL (ref 8.5–10.5)
CHLORIDE SERPL-SCNC: 103 MMOL/L (ref 96–112)
CO2 SERPL-SCNC: 20 MMOL/L (ref 20–33)
CREAT SERPL-MCNC: 0.75 MG/DL (ref 0.5–1.4)
EOSINOPHIL # BLD AUTO: 0.21 K/UL (ref 0–0.51)
EOSINOPHIL NFR BLD: 1.9 % (ref 0–6.9)
ERYTHROCYTE [DISTWIDTH] IN BLOOD BY AUTOMATED COUNT: 42.9 FL (ref 35.9–50)
GFR SERPLBLD CREATININE-BSD FMLA CKD-EPI: 103 ML/MIN/1.73 M 2
GLOBULIN SER CALC-MCNC: 3.1 G/DL (ref 1.9–3.5)
GLUCOSE SERPL-MCNC: 107 MG/DL (ref 65–99)
GRAM STN SPEC: NORMAL
HCG UR QL: NEGATIVE
HCT VFR BLD AUTO: 37.6 % (ref 37–47)
HGB BLD-MCNC: 12.2 G/DL (ref 12–16)
IMM GRANULOCYTES # BLD AUTO: 0.03 K/UL (ref 0–0.11)
IMM GRANULOCYTES NFR BLD AUTO: 0.3 % (ref 0–0.9)
LACTATE SERPL-SCNC: 1 MMOL/L (ref 0.5–2)
LYMPHOCYTES # BLD AUTO: 2.01 K/UL (ref 1–4.8)
LYMPHOCYTES NFR BLD: 18.5 % (ref 22–41)
MCH RBC QN AUTO: 31.5 PG (ref 27–33)
MCHC RBC AUTO-ENTMCNC: 32.4 G/DL (ref 32.2–35.5)
MCV RBC AUTO: 97.2 FL (ref 81.4–97.8)
MONOCYTES # BLD AUTO: 0.72 K/UL (ref 0–0.85)
MONOCYTES NFR BLD AUTO: 6.6 % (ref 0–13.4)
NEUTROPHILS # BLD AUTO: 7.82 K/UL (ref 1.82–7.42)
NEUTROPHILS NFR BLD: 72.1 % (ref 44–72)
NRBC # BLD AUTO: 0 K/UL
NRBC BLD-RTO: 0 /100 WBC (ref 0–0.2)
PLATELET # BLD AUTO: 366 K/UL (ref 164–446)
PMV BLD AUTO: 9 FL (ref 9–12.9)
POTASSIUM SERPL-SCNC: 4.7 MMOL/L (ref 3.6–5.5)
PROT SERPL-MCNC: 7.1 G/DL (ref 6–8.2)
RBC # BLD AUTO: 3.87 M/UL (ref 4.2–5.4)
SIGNIFICANT IND 70042: NORMAL
SITE SITE: NORMAL
SODIUM SERPL-SCNC: 134 MMOL/L (ref 135–145)
SOURCE SOURCE: NORMAL
WBC # BLD AUTO: 10.9 K/UL (ref 4.8–10.8)

## 2023-10-30 PROCEDURE — 160009 HCHG ANES TIME/MIN: Performed by: SURGERY

## 2023-10-30 PROCEDURE — 160035 HCHG PACU - 1ST 60 MINS PHASE I: Performed by: SURGERY

## 2023-10-30 PROCEDURE — 83605 ASSAY OF LACTIC ACID: CPT

## 2023-10-30 PROCEDURE — 700111 HCHG RX REV CODE 636 W/ 250 OVERRIDE (IP): Mod: JZ | Performed by: ANESTHESIOLOGY

## 2023-10-30 PROCEDURE — 700105 HCHG RX REV CODE 258: Performed by: STUDENT IN AN ORGANIZED HEALTH CARE EDUCATION/TRAINING PROGRAM

## 2023-10-30 PROCEDURE — 96375 TX/PRO/DX INJ NEW DRUG ADDON: CPT

## 2023-10-30 PROCEDURE — 700105 HCHG RX REV CODE 258: Performed by: ANESTHESIOLOGY

## 2023-10-30 PROCEDURE — G0378 HOSPITAL OBSERVATION PER HR: HCPCS

## 2023-10-30 PROCEDURE — 87040 BLOOD CULTURE FOR BACTERIA: CPT | Mod: 91

## 2023-10-30 PROCEDURE — 80053 COMPREHEN METABOLIC PANEL: CPT

## 2023-10-30 PROCEDURE — 96366 THER/PROPH/DIAG IV INF ADDON: CPT

## 2023-10-30 PROCEDURE — 96374 THER/PROPH/DIAG INJ IV PUSH: CPT

## 2023-10-30 PROCEDURE — 19020 MASTOTOMY EXPL DRG ABSC DP: CPT | Performed by: SURGERY

## 2023-10-30 PROCEDURE — 99244 OFF/OP CNSLTJ NEW/EST MOD 40: CPT | Mod: 57 | Performed by: SURGERY

## 2023-10-30 PROCEDURE — 160002 HCHG RECOVERY MINUTES (STAT): Performed by: SURGERY

## 2023-10-30 PROCEDURE — 87070 CULTURE OTHR SPECIMN AEROBIC: CPT

## 2023-10-30 PROCEDURE — 700102 HCHG RX REV CODE 250 W/ 637 OVERRIDE(OP): Performed by: STUDENT IN AN ORGANIZED HEALTH CARE EDUCATION/TRAINING PROGRAM

## 2023-10-30 PROCEDURE — 87075 CULTR BACTERIA EXCEPT BLOOD: CPT

## 2023-10-30 PROCEDURE — 85025 COMPLETE CBC W/AUTO DIFF WBC: CPT

## 2023-10-30 PROCEDURE — 160027 HCHG SURGERY MINUTES - 1ST 30 MINS LEVEL 2: Performed by: SURGERY

## 2023-10-30 PROCEDURE — 700101 HCHG RX REV CODE 250: Performed by: ANESTHESIOLOGY

## 2023-10-30 PROCEDURE — A9270 NON-COVERED ITEM OR SERVICE: HCPCS | Performed by: STUDENT IN AN ORGANIZED HEALTH CARE EDUCATION/TRAINING PROGRAM

## 2023-10-30 PROCEDURE — 81025 URINE PREGNANCY TEST: CPT

## 2023-10-30 PROCEDURE — 700102 HCHG RX REV CODE 250 W/ 637 OVERRIDE(OP): Performed by: ANESTHESIOLOGY

## 2023-10-30 PROCEDURE — 99285 EMERGENCY DEPT VISIT HI MDM: CPT

## 2023-10-30 PROCEDURE — 160048 HCHG OR STATISTICAL LEVEL 1-5: Performed by: SURGERY

## 2023-10-30 PROCEDURE — 87205 SMEAR GRAM STAIN: CPT

## 2023-10-30 PROCEDURE — 700111 HCHG RX REV CODE 636 W/ 250 OVERRIDE (IP): Performed by: STUDENT IN AN ORGANIZED HEALTH CARE EDUCATION/TRAINING PROGRAM

## 2023-10-30 PROCEDURE — 36415 COLL VENOUS BLD VENIPUNCTURE: CPT

## 2023-10-30 PROCEDURE — 96365 THER/PROPH/DIAG IV INF INIT: CPT

## 2023-10-30 PROCEDURE — 96376 TX/PRO/DX INJ SAME DRUG ADON: CPT

## 2023-10-30 PROCEDURE — 87076 CULTURE ANAEROBE IDENT EACH: CPT

## 2023-10-30 PROCEDURE — A9270 NON-COVERED ITEM OR SERVICE: HCPCS | Performed by: ANESTHESIOLOGY

## 2023-10-30 PROCEDURE — 99222 1ST HOSP IP/OBS MODERATE 55: CPT | Performed by: STUDENT IN AN ORGANIZED HEALTH CARE EDUCATION/TRAINING PROGRAM

## 2023-10-30 PROCEDURE — 160038 HCHG SURGERY MINUTES - EA ADDL 1 MIN LEVEL 2: Performed by: SURGERY

## 2023-10-30 RX ORDER — HYDROMORPHONE HYDROCHLORIDE 1 MG/ML
0.4 INJECTION, SOLUTION INTRAMUSCULAR; INTRAVENOUS; SUBCUTANEOUS
Status: DISCONTINUED | OUTPATIENT
Start: 2023-10-30 | End: 2023-10-30 | Stop reason: HOSPADM

## 2023-10-30 RX ORDER — DIPHENHYDRAMINE HYDROCHLORIDE 50 MG/ML
12.5 INJECTION INTRAMUSCULAR; INTRAVENOUS
Status: DISCONTINUED | OUTPATIENT
Start: 2023-10-30 | End: 2023-10-30 | Stop reason: HOSPADM

## 2023-10-30 RX ORDER — HYDROMORPHONE HYDROCHLORIDE 1 MG/ML
0.1 INJECTION, SOLUTION INTRAMUSCULAR; INTRAVENOUS; SUBCUTANEOUS
Status: DISCONTINUED | OUTPATIENT
Start: 2023-10-30 | End: 2023-10-30 | Stop reason: HOSPADM

## 2023-10-30 RX ORDER — HYDRALAZINE HYDROCHLORIDE 20 MG/ML
5 INJECTION INTRAMUSCULAR; INTRAVENOUS
Status: DISCONTINUED | OUTPATIENT
Start: 2023-10-30 | End: 2023-10-30 | Stop reason: HOSPADM

## 2023-10-30 RX ORDER — MEPERIDINE HYDROCHLORIDE 25 MG/ML
12.5 INJECTION INTRAMUSCULAR; INTRAVENOUS; SUBCUTANEOUS
Status: DISCONTINUED | OUTPATIENT
Start: 2023-10-30 | End: 2023-10-30 | Stop reason: HOSPADM

## 2023-10-30 RX ORDER — IBUPROFEN 800 MG/1
800 TABLET ORAL EVERY 8 HOURS PRN
COMMUNITY

## 2023-10-30 RX ORDER — CEFAZOLIN 2 G/1
2 INJECTION, POWDER, FOR SOLUTION INTRAMUSCULAR; INTRAVENOUS EVERY 8 HOURS
Status: DISCONTINUED | OUTPATIENT
Start: 2023-10-30 | End: 2023-10-31 | Stop reason: HOSPADM

## 2023-10-30 RX ORDER — ONDANSETRON 2 MG/ML
4 INJECTION INTRAMUSCULAR; INTRAVENOUS
Status: DISCONTINUED | OUTPATIENT
Start: 2023-10-30 | End: 2023-10-30 | Stop reason: HOSPADM

## 2023-10-30 RX ORDER — DEXAMETHASONE SODIUM PHOSPHATE 4 MG/ML
INJECTION, SOLUTION INTRA-ARTICULAR; INTRALESIONAL; INTRAMUSCULAR; INTRAVENOUS; SOFT TISSUE PRN
Status: DISCONTINUED | OUTPATIENT
Start: 2023-10-30 | End: 2023-10-30 | Stop reason: SURG

## 2023-10-30 RX ORDER — OXYCODONE HCL 5 MG/5 ML
10 SOLUTION, ORAL ORAL
Status: COMPLETED | OUTPATIENT
Start: 2023-10-30 | End: 2023-10-30

## 2023-10-30 RX ORDER — SODIUM CHLORIDE, SODIUM LACTATE, POTASSIUM CHLORIDE, CALCIUM CHLORIDE 600; 310; 30; 20 MG/100ML; MG/100ML; MG/100ML; MG/100ML
INJECTION, SOLUTION INTRAVENOUS CONTINUOUS
Status: DISCONTINUED | OUTPATIENT
Start: 2023-10-30 | End: 2023-10-30 | Stop reason: HOSPADM

## 2023-10-30 RX ORDER — HALOPERIDOL 5 MG/ML
1 INJECTION INTRAMUSCULAR
Status: DISCONTINUED | OUTPATIENT
Start: 2023-10-30 | End: 2023-10-30 | Stop reason: HOSPADM

## 2023-10-30 RX ORDER — METOPROLOL TARTRATE 1 MG/ML
1 INJECTION, SOLUTION INTRAVENOUS
Status: DISCONTINUED | OUTPATIENT
Start: 2023-10-30 | End: 2023-10-30 | Stop reason: HOSPADM

## 2023-10-30 RX ORDER — SODIUM CHLORIDE, SODIUM LACTATE, POTASSIUM CHLORIDE, CALCIUM CHLORIDE 600; 310; 30; 20 MG/100ML; MG/100ML; MG/100ML; MG/100ML
INJECTION, SOLUTION INTRAVENOUS
Status: DISCONTINUED | OUTPATIENT
Start: 2023-10-30 | End: 2023-10-30 | Stop reason: SURG

## 2023-10-30 RX ORDER — EPHEDRINE SULFATE 50 MG/ML
5 INJECTION, SOLUTION INTRAVENOUS
Status: DISCONTINUED | OUTPATIENT
Start: 2023-10-30 | End: 2023-10-30 | Stop reason: HOSPADM

## 2023-10-30 RX ORDER — OXYCODONE HCL 5 MG/5 ML
5 SOLUTION, ORAL ORAL
Status: COMPLETED | OUTPATIENT
Start: 2023-10-30 | End: 2023-10-30

## 2023-10-30 RX ORDER — MORPHINE SULFATE 4 MG/ML
1 INJECTION INTRAVENOUS EVERY 4 HOURS PRN
Status: DISCONTINUED | OUTPATIENT
Start: 2023-10-30 | End: 2023-10-31 | Stop reason: HOSPADM

## 2023-10-30 RX ORDER — ENOXAPARIN SODIUM 100 MG/ML
40 INJECTION SUBCUTANEOUS DAILY
Status: DISCONTINUED | OUTPATIENT
Start: 2023-10-30 | End: 2023-10-31 | Stop reason: HOSPADM

## 2023-10-30 RX ORDER — ACETAMINOPHEN 325 MG/1
650 TABLET ORAL EVERY 6 HOURS PRN
Status: DISCONTINUED | OUTPATIENT
Start: 2023-10-30 | End: 2023-10-31 | Stop reason: HOSPADM

## 2023-10-30 RX ORDER — MIDAZOLAM HYDROCHLORIDE 1 MG/ML
1 INJECTION INTRAMUSCULAR; INTRAVENOUS
Status: DISCONTINUED | OUTPATIENT
Start: 2023-10-30 | End: 2023-10-30 | Stop reason: HOSPADM

## 2023-10-30 RX ORDER — CEFAZOLIN SODIUM 1 G/3ML
INJECTION, POWDER, FOR SOLUTION INTRAMUSCULAR; INTRAVENOUS PRN
Status: DISCONTINUED | OUTPATIENT
Start: 2023-10-30 | End: 2023-10-30 | Stop reason: SURG

## 2023-10-30 RX ORDER — HYDROMORPHONE HYDROCHLORIDE 1 MG/ML
0.2 INJECTION, SOLUTION INTRAMUSCULAR; INTRAVENOUS; SUBCUTANEOUS
Status: DISCONTINUED | OUTPATIENT
Start: 2023-10-30 | End: 2023-10-30 | Stop reason: HOSPADM

## 2023-10-30 RX ORDER — ACETAMINOPHEN 500 MG
1000 TABLET ORAL ONCE
Status: COMPLETED | OUTPATIENT
Start: 2023-10-30 | End: 2023-10-30

## 2023-10-30 RX ORDER — MIDAZOLAM HYDROCHLORIDE 1 MG/ML
INJECTION INTRAMUSCULAR; INTRAVENOUS PRN
Status: DISCONTINUED | OUTPATIENT
Start: 2023-10-30 | End: 2023-10-30 | Stop reason: SURG

## 2023-10-30 RX ORDER — LABETALOL HYDROCHLORIDE 5 MG/ML
5 INJECTION, SOLUTION INTRAVENOUS
Status: DISCONTINUED | OUTPATIENT
Start: 2023-10-30 | End: 2023-10-30 | Stop reason: HOSPADM

## 2023-10-30 RX ORDER — CEFAZOLIN 2 G/1
2 INJECTION, POWDER, FOR SOLUTION INTRAMUSCULAR; INTRAVENOUS ONCE
Status: COMPLETED | OUTPATIENT
Start: 2023-10-30 | End: 2023-10-30

## 2023-10-30 RX ORDER — ONDANSETRON 2 MG/ML
INJECTION INTRAMUSCULAR; INTRAVENOUS PRN
Status: DISCONTINUED | OUTPATIENT
Start: 2023-10-30 | End: 2023-10-30 | Stop reason: SURG

## 2023-10-30 RX ORDER — SODIUM CHLORIDE 9 MG/ML
INJECTION, SOLUTION INTRAVENOUS CONTINUOUS
Status: DISCONTINUED | OUTPATIENT
Start: 2023-10-30 | End: 2023-10-31 | Stop reason: HOSPADM

## 2023-10-30 RX ORDER — KETOROLAC TROMETHAMINE 30 MG/ML
INJECTION, SOLUTION INTRAMUSCULAR; INTRAVENOUS PRN
Status: DISCONTINUED | OUTPATIENT
Start: 2023-10-30 | End: 2023-10-30 | Stop reason: SURG

## 2023-10-30 RX ORDER — LIDOCAINE HYDROCHLORIDE 20 MG/ML
INJECTION, SOLUTION EPIDURAL; INFILTRATION; INTRACAUDAL; PERINEURAL PRN
Status: DISCONTINUED | OUTPATIENT
Start: 2023-10-30 | End: 2023-10-30 | Stop reason: SURG

## 2023-10-30 RX ADMIN — FENTANYL CITRATE 50 MCG: 50 INJECTION, SOLUTION INTRAMUSCULAR; INTRAVENOUS at 15:11

## 2023-10-30 RX ADMIN — FENTANYL CITRATE 100 MCG: 50 INJECTION, SOLUTION INTRAMUSCULAR; INTRAVENOUS at 14:56

## 2023-10-30 RX ADMIN — CEFAZOLIN 2 G: 2 INJECTION, POWDER, FOR SOLUTION INTRAMUSCULAR; INTRAVENOUS at 18:31

## 2023-10-30 RX ADMIN — ACETAMINOPHEN 650 MG: 325 TABLET, FILM COATED ORAL at 18:31

## 2023-10-30 RX ADMIN — CEFAZOLIN 2 G: 2 INJECTION, POWDER, FOR SOLUTION INTRAMUSCULAR; INTRAVENOUS at 09:39

## 2023-10-30 RX ADMIN — MIDAZOLAM 2 MG: 1 INJECTION, SOLUTION INTRAMUSCULAR; INTRAVENOUS at 14:53

## 2023-10-30 RX ADMIN — DEXAMETHASONE SODIUM PHOSPHATE 4 MG: 4 INJECTION INTRA-ARTICULAR; INTRALESIONAL; INTRAMUSCULAR; INTRAVENOUS; SOFT TISSUE at 14:56

## 2023-10-30 RX ADMIN — ONDANSETRON 4 MG: 2 INJECTION INTRAMUSCULAR; INTRAVENOUS at 15:14

## 2023-10-30 RX ADMIN — FENTANYL CITRATE 50 MCG: 50 INJECTION, SOLUTION INTRAMUSCULAR; INTRAVENOUS at 15:45

## 2023-10-30 RX ADMIN — FENTANYL CITRATE 25 MCG: 50 INJECTION, SOLUTION INTRAMUSCULAR; INTRAVENOUS at 16:00

## 2023-10-30 RX ADMIN — FENTANYL CITRATE 25 MCG: 50 INJECTION, SOLUTION INTRAMUSCULAR; INTRAVENOUS at 15:50

## 2023-10-30 RX ADMIN — LIDOCAINE HYDROCHLORIDE 100 MG: 20 INJECTION, SOLUTION EPIDURAL; INFILTRATION; INTRACAUDAL at 14:56

## 2023-10-30 RX ADMIN — PROPOFOL 150 MG: 10 INJECTION, EMULSION INTRAVENOUS at 14:56

## 2023-10-30 RX ADMIN — SODIUM CHLORIDE: 9 INJECTION, SOLUTION INTRAVENOUS at 03:17

## 2023-10-30 RX ADMIN — FENTANYL CITRATE 50 MCG: 50 INJECTION, SOLUTION INTRAMUSCULAR; INTRAVENOUS at 15:09

## 2023-10-30 RX ADMIN — SODIUM CHLORIDE, POTASSIUM CHLORIDE, SODIUM LACTATE AND CALCIUM CHLORIDE: 600; 310; 30; 20 INJECTION, SOLUTION INTRAVENOUS at 14:48

## 2023-10-30 RX ADMIN — ACETAMINOPHEN 1000 MG: 500 TABLET, FILM COATED ORAL at 14:07

## 2023-10-30 RX ADMIN — CEFAZOLIN 2 G: 1 INJECTION, POWDER, FOR SOLUTION INTRAMUSCULAR; INTRAVENOUS at 14:56

## 2023-10-30 RX ADMIN — VANCOMYCIN HYDROCHLORIDE 750 MG: 5 INJECTION, POWDER, LYOPHILIZED, FOR SOLUTION INTRAVENOUS at 17:01

## 2023-10-30 RX ADMIN — VANCOMYCIN HYDROCHLORIDE 1750 MG: 5 INJECTION, POWDER, LYOPHILIZED, FOR SOLUTION INTRAVENOUS at 03:46

## 2023-10-30 RX ADMIN — OXYCODONE HYDROCHLORIDE 10 MG: 5 SOLUTION ORAL at 15:45

## 2023-10-30 RX ADMIN — CEFAZOLIN 2 G: 2 INJECTION, POWDER, FOR SOLUTION INTRAMUSCULAR; INTRAVENOUS at 01:35

## 2023-10-30 RX ADMIN — KETOROLAC TROMETHAMINE 30 MG: 30 INJECTION, SOLUTION INTRAMUSCULAR; INTRAVENOUS at 15:14

## 2023-10-30 ASSESSMENT — PAIN DESCRIPTION - PAIN TYPE
TYPE: ACUTE PAIN
TYPE: ACUTE PAIN

## 2023-10-30 ASSESSMENT — FIBROSIS 4 INDEX
FIB4 SCORE: 0.46
FIB4 SCORE: 0.59

## 2023-10-30 ASSESSMENT — PATIENT HEALTH QUESTIONNAIRE - PHQ9
SUM OF ALL RESPONSES TO PHQ9 QUESTIONS 1 AND 2: 0
1. LITTLE INTEREST OR PLEASURE IN DOING THINGS: NOT AT ALL
SUM OF ALL RESPONSES TO PHQ9 QUESTIONS 1 AND 2: 0
1. LITTLE INTEREST OR PLEASURE IN DOING THINGS: NOT AT ALL
2. FEELING DOWN, DEPRESSED, IRRITABLE, OR HOPELESS: NOT AT ALL
2. FEELING DOWN, DEPRESSED, IRRITABLE, OR HOPELESS: NOT AT ALL

## 2023-10-30 ASSESSMENT — ENCOUNTER SYMPTOMS
PSYCHIATRIC NEGATIVE: 1
CHILLS: 0
FEVER: 0
MYALGIAS: 1
NEUROLOGICAL NEGATIVE: 1
CARDIOVASCULAR NEGATIVE: 1
MUSCULOSKELETAL NEGATIVE: 1
GASTROINTESTINAL NEGATIVE: 1
EYES NEGATIVE: 1
RESPIRATORY NEGATIVE: 1

## 2023-10-30 ASSESSMENT — LIFESTYLE VARIABLES
TOTAL SCORE: 0
TOTAL SCORE: 0
HAVE PEOPLE ANNOYED YOU BY CRITICIZING YOUR DRINKING: NO
ALCOHOL_USE: NO
EVER HAD A DRINK FIRST THING IN THE MORNING TO STEADY YOUR NERVES TO GET RID OF A HANGOVER: NO
CONSUMPTION TOTAL: NEGATIVE
AVERAGE NUMBER OF DAYS PER WEEK YOU HAVE A DRINK CONTAINING ALCOHOL: 0
HAVE YOU EVER FELT YOU SHOULD CUT DOWN ON YOUR DRINKING: NO
EVER FELT BAD OR GUILTY ABOUT YOUR DRINKING: NO
TOTAL SCORE: 0
HOW MANY TIMES IN THE PAST YEAR HAVE YOU HAD 5 OR MORE DRINKS IN A DAY: 0
ON A TYPICAL DAY WHEN YOU DRINK ALCOHOL HOW MANY DRINKS DO YOU HAVE: 0

## 2023-10-30 NOTE — CONSULTS
DATE OF CONSULTATION:  10/30/2023     REFERRING PHYSICIAN:   Mark Pimentel M.D.     CONSULTING PHYSICIAN:  Meaghan Foster M.D.     REASON FOR CONSULTATION:  I have been asked by  to see the patient in surgical consultation for evaluation of left breast mastitis.    HISTORY OF PRESENT ILLNESS: The patient is a 38 year-old  woman who presents to the Emergency Department with a 1-month history of  left breast mastitis. Had core biopsy on 10/25/23 showing acute on chronic mastitis.  Now more symptoms. US shows 3.5 cm area of phlegmon. Not currently breast feeding.    PAST MEDICAL HISTORY:  has a past medical history of H/O fetal demise, not currently pregnant (8/10/2016) and Psychiatric problem.    She has no past medical history of Addisons disease (HCC), Adrenal disorder (HCC), Allergy, Anemia, Anxiety, Blood transfusion without reported diagnosis, Clotting disorder (HCC), COPD (chronic obstructive pulmonary disease) ([facility]), Cushings syndrome ([facility]), Depression, Diabetic neuropathy (HCC), GERD (gastroesophageal reflux disease), Goiter, Head ache, HIV (human immunodeficiency virus infection) (HCC), Hyperlipidemia, IBD (inflammatory bowel disease), Meningitis, Migraine, Muscle disorder, Osteoporosis, Parathyroid disorder (HCC), Pituitary disease (HCC), Sickle cell disease (HCC), Substance abuse (HCC), or Urinary tract infection.    PAST SURGICAL HISTORY:  has a past surgical history that includes other orthopedic surgery (2006); other (Left, 2001); ear middle exploration (2001); tendon repair (2006); primary c section (8/8/2017); pr lap,diagnostic abdomen (9/25/2019); salpingectomy (Bilateral, 9/25/2019); and cholecystectomy robotic xi (N/A, 8/14/2020).    ALLERGIES: No Known Allergies    CURRENT MEDICATIONS:    Home Medications    **Home medications have not yet been reviewed for this encounter**         FAMILY HISTORY: family history is not on file.    SOCIAL HISTORY:  reports that she has never  smoked. She has never used smokeless tobacco. She reports that she does not drink alcohol and does not use drugs.    REVIEW OF SYSTEMS: Comprehensive review of systems is negative with the exception of the aforementioned HPI, PMH, and PSH bullets in accordance with CMS guidelines.    PHYSICAL EXAMINATION:    Physical Exam  Cardiovascular:      Rate and Rhythm: Normal rate.   Pulmonary:      Effort: Pulmonary effort is normal.   Abdominal:      Palpations: Abdomen is soft.   Musculoskeletal:         General: Normal range of motion.      Cervical back: Neck supple.   Skin:     General: Skin is warm.      Comments: Left breast with erythema and 1 cm area of fluctuance   Neurological:      General: No focal deficit present.      Mental Status: She is alert.         LABORATORY VALUES:   Recent Labs     10/29/23  0130 10/30/23  0032   WBC 10.8 10.9*   RBC 4.20 3.87*   HEMOGLOBIN 13.2 12.2   HEMATOCRIT 39.0 37.6   MCV 92.9 97.2   MCH 31.4 31.5   MCHC 33.8 32.4   RDW 40.7 42.9   PLATELETCT 418 366   MPV 8.9* 9.0     Recent Labs     10/29/23  0130 10/30/23  0032   SODIUM 138 134*   POTASSIUM 3.9 4.7   CHLORIDE 102 103   CO2 24 20   GLUCOSE 109* 107*   BUN 14 13   CREATININE 0.63 0.75   CALCIUM 9.0 8.8     Recent Labs     10/29/23  0130 10/30/23  0032   ASTSGOT 16 17   ALTSGPT 11 10   TBILIRUBIN 0.2 0.2   ALKPHOSPHAT 88 85   GLOBULIN 3.1 3.1            IMAGING:   No orders to display       ASSESSMENT AND PLAN:     * Mastitis- (present on admission)  Assessment & Plan  1 month hx of mastitis  Was ok until biopsy on Wednesday  US show 3.5 cm phlegmon  IV abx and serial exams  May need I&D        DISPOSITION: Medical evaluation and admission. The patient was admitted to the Medical Service prior to surgical consultation. [facility] Acute Care Surgery [facility] Service will follow.     ____________________________________     Meaghan Foster M.D.    DD: 10/30/2023  2:06 AM    AAST Grading System for EGS Conditions  ACS NSQIP Surgical Risk  Calculator

## 2023-10-30 NOTE — CARE PLAN
"The patient is Stable - Low risk of patient condition declining or worsening    Shift Goals  Clinical Goals: IV antibiotics;  Patient Goals: \"improvement in redness and swelling\"  Family Goals: OSIRIS;    Problem: Knowledge Deficit - Standard  Goal: Patient and family/care givers will demonstrate understanding of plan of care, disease process/condition, diagnostic tests and medications  Description: Target End Date: 10/31/23    1.  Patient oriented to unit, equipment, visitation policy and means for communicating concern  2.  Complete/review Learning Assessment  3.  Assess knowledge level of disease process/condition, treatment plan, diagnostic tests and medications  4.  Explain disease process/condition, treatment plan, diagnostic tests and medications  Outcome: Progressing     Problem: Pain - Standard  Goal: Alleviation of pain or a reduction in pain to the patient’s comfort goal  Description: Target End Date: 10/31/23    1.  Document pain using the appropriate pain scale per order or unit policy  2.  Educate and implement non-pharmacologic comfort measures (i.e. relaxation, distraction, cold/heat therapy, etc.)  3.  Pain management medications as ordered  4.  Reassess pain after pain med administration per policy  5.  If opiods administered assess patient's response to pain medication is appropriate per POSS sedation scale  Outcome: Progressing       "

## 2023-10-30 NOTE — ANESTHESIA PROCEDURE NOTES
Airway    Date/Time: 10/30/2023 2:57 PM    Performed by: Osmin Almazan M.D.  Authorized by: Osmin Almazan M.D.    Location:  OR  Urgency:  Elective  Indications for Airway Management:  Anesthesia      Spontaneous Ventilation: absent    Sedation Level:  Deep  Preoxygenated: Yes    Mask Difficulty Assessment:  1 - vent by mask  Final Airway Type:  Supraglottic airway  Final Supraglottic Airway:  Standard LMA    SGA Size:  4  Number of Attempts at Approach:  1

## 2023-10-30 NOTE — ANESTHESIA TIME REPORT
Anesthesia Start and Stop Event Times     Date Time Event    10/30/2023 1425 Ready for Procedure     1448 Anesthesia Start     1527 Anesthesia Stop        Responsible Staff  10/30/23    Name Role Begin End    Osmin Almazan M.D. Anesth 1448 1527        Overtime Reason:  no overtime (within assigned shift)    Comments:

## 2023-10-30 NOTE — H&P
Hospital Medicine History & Physical Note    Date of Service  10/30/2023    Primary Care Physician  Eric Ortiz P.A.-C.    Consultants  General Surgery    Dr. Foster    Code Status  Full Code    Chief Complaint  Chief Complaint   Patient presents with    Wound Infection     Pt hit L breast on a box corner approx 1 month ago. Has had recurring infection since - had a biopsy on Wednesday. Seen yesterday in ED for same, returned today as redness is spreading above the nipple line & pain increasing. Area outlined with marker in triage. No fevers at home.        History of Presenting Illness  Buffy Lund is a 38 y.o. female who presented 10/30/2023 with left breast pain.    Patient has been dealing with left breast pain for several weeks now.  She states that she began to have pain while she was at work and she was carrying boxes.  She scratched her left breast with a box and since then she has had progressively worsening pain.  She had a biopsy done on October 25.  Breast biopsy results showed chronic mastitis and negative for malignancy.  She is not breast-feeding.    Patient presents to ER 24 hours ago for left breast pain.  Ultrasound at the time showing complex irregular hypoechoic area at the inferior left breast measuring 2.5 cm with peripheral hyperemia, likely inflammatory phlegmon/developing abscess.  Patient was discharged with Bactrim.    However despite patient taking antibiotics, she began to have increasing pain and worsening erythema, prompting her to come back into ER for evaluation.        I discussed the plan of care with patient.    Review of Systems  Review of Systems   Constitutional:  Positive for malaise/fatigue.   HENT: Negative.     Eyes: Negative.    Respiratory: Negative.     Cardiovascular: Negative.    Gastrointestinal: Negative.    Genitourinary: Negative.    Musculoskeletal: Negative.    Skin: Negative.    Neurological: Negative.    Endo/Heme/Allergies: Negative.     Psychiatric/Behavioral: Negative.         Past Medical History   has a past medical history of H/O fetal demise, not currently pregnant (8/10/2016) and Psychiatric problem.    Surgical History   has a past surgical history that includes other orthopedic surgery (2006); other (Left, 2001); ear middle exploration (2001); tendon repair (2006); primary c section (8/8/2017); pr lap,diagnostic abdomen (9/25/2019); salpingectomy (Bilateral, 9/25/2019); and cholecystectomy robotic xi (N/A, 8/14/2020).     Family History  family history is not on file.   Family history reviewed with patient. There is no family history that is pertinent to the chief complaint.     Social History   reports that she has never smoked. She has never used smokeless tobacco. She reports that she does not drink alcohol and does not use drugs.    Allergies  No Known Allergies    Medications  Prior to Admission Medications   Prescriptions Last Dose Informant Patient Reported? Taking?   cephALEXin (KEFLEX) 250 MG Cap   Yes No   Sig: Take 250 mg by mouth 4 times a day.   ondansetron (ZOFRAN ODT) 4 MG TABLET DISPERSIBLE   No No   Sig: Take 1 Tablet by mouth every 6 hours as needed for Nausea/Vomiting.   oxyCODONE immediate-release (ROXICODONE) 5 MG Tab   No No   Sig: Take 1 Tablet by mouth every four hours as needed for Severe Pain for up to 12 days.   polyethylene glycol/lytes (MIRALAX) 17 g Pack   No No   Sig: Take 1 Packet by mouth every day for 10 days.   sulfamethoxazole-trimethoprim (BACTRIM DS) 800-160 MG tablet   No No   Sig: Take 1 Tablet by mouth 2 times a day.      Facility-Administered Medications: None       Physical Exam  Temp:  [36.1 °C (96.9 °F)-36.8 °C (98.2 °F)] 36.1 °C (97 °F)  Pulse:  [71-80] 75  Resp:  [16-18] 18  BP: ()/(55-70) 107/65  SpO2:  [92 %-98 %] 98 %  Blood Pressure: 107/65   Temperature: 36.1 °C (97 °F)   Pulse: 75   Respiration: 18   Pulse Oximetry: 98 %       Physical Exam  Constitutional:       Appearance:  "Normal appearance. She is normal weight.   HENT:      Head: Normocephalic.      Nose: Nose normal.      Mouth/Throat:      Mouth: Mucous membranes are moist.   Eyes:      Pupils: Pupils are equal, round, and reactive to light.   Cardiovascular:      Rate and Rhythm: Normal rate and regular rhythm.   Pulmonary:      Effort: Pulmonary effort is normal.      Breath sounds: Normal breath sounds.   Abdominal:      General: Abdomen is flat. Bowel sounds are normal.      Palpations: Abdomen is soft.   Musculoskeletal:      Cervical back: Neck supple.      Comments: Left breast, firm to touch, surrounded by area of erythema and granulation tissue.  Tender to touch.   Skin:     General: Skin is warm.   Neurological:      General: No focal deficit present.      Mental Status: She is alert. Mental status is at baseline.   Psychiatric:         Mood and Affect: Mood normal.         Behavior: Behavior normal.         Thought Content: Thought content normal.         Judgment: Judgment normal.         Laboratory:  Recent Labs     10/29/23  0130 10/30/23  0032   WBC 10.8 10.9*   RBC 4.20 3.87*   HEMOGLOBIN 13.2 12.2   HEMATOCRIT 39.0 37.6   MCV 92.9 97.2   MCH 31.4 31.5   MCHC 33.8 32.4   RDW 40.7 42.9   PLATELETCT 418 366   MPV 8.9* 9.0     Recent Labs     10/29/23  0130 10/30/23  0032   SODIUM 138 134*   POTASSIUM 3.9 4.7   CHLORIDE 102 103   CO2 24 20   GLUCOSE 109* 107*   BUN 14 13   CREATININE 0.63 0.75   CALCIUM 9.0 8.8     Recent Labs     10/29/23  0130 10/30/23  0032   ALTSGPT 11 10   ASTSGOT 16 17   ALKPHOSPHAT 88 85   TBILIRUBIN 0.2 0.2   GLUCOSE 109* 107*         No results for input(s): \"NTPROBNP\" in the last 72 hours.      No results for input(s): \"TROPONINT\" in the last 72 hours.    Imaging:  No orders to display       no X-Ray or EKG requiring interpretation    Assessment/Plan:  Justification for Admission Status  I anticipate this patient is appropriate for observation status at this time because mastitis    Patient " will need a Med/Surg bed on EMERGENCY service .  The need is secondary to mastitis.    * Mastitis- (present on admission)  Assessment & Plan  Patient presents for mastitis after she was scratched carrying a box about a month ago.  Ultrasound about 24 hours ago showing complex irregular hypoechoic area of the inferior left breast measuring 2.5 cm with peripheral hyperemia, likely inflammatory phlegmon/developing abscess.  Started on vancomycin and cefazolin.  Continue antibiotics.  General surgery consulted, follow note.            VTE prophylaxis: lovenox

## 2023-10-30 NOTE — LETTER
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    Medical Information   Related to Industrial Injury?   ***   Subjective Complaints:      Objective Findings:     Pre-Existing Condition(s):     Assessment:        Status:    Permanent Disability:     Plan:      Diagnostics:      Comments:       Disability Information   Status:      From:     Through:   Restrictions are:     Physical Restrictions   Sitting:    Standing:    Stooping:    Bending:      Squatting:    Walking:    Climbing:    Pushing:      Pulling:    Other:    Reaching Above Shoulder (L):   Reaching Above Shoulder (R):       Reaching Below Shoulder (L):    Reaching Below Shoulder (R):      Not to exceed Weight Limits   Carrying(hrs):   Weight Limit(lb):   Lifting(hrs):   Weight  Limit(lb):     Comments:      Repetitive Actions   Hands: i.e. Fine Manipulations from Grasping:     Feet: i.e. Operating Foot Controls:     Driving / Operate Machinery:     Physician Name: Zoe Liu Physician Signature:   e-Signature:  , Medical Director   Clinic Name / Location: [facility]  [facility]  [address] [place] [id number]  [phone]     Clinic Phone Number: Dept: [phone]   Appointment Time:  Visit Start Time:    Check-In Time:  12:10 AM Visit Discharge Time:    Original-Treating Physician or Chiropractor    Page 2-Insurer/TPA    Page 3-Employer    Page 4-Employee

## 2023-10-30 NOTE — PROGRESS NOTES
PREOPERATIVE NOTE: I have seen and examined the patient today.  Critical physical examination findings, laboratory indices, and radiographic studies reviewed.  I recommend left breast incision and drainage.    The operative strategy was explained and reviewed. Alternatives discussed. Potential complications including, but not limited to, infection, bleeding, damage to adjacent structures, and anesthetic complications were discussed. Questions were elicited and answered to the patient's satisfaction.  Operative consent signed.        ____________________________________     Kim Platt M.D.    DD: 10/30/2023  2:21 PM

## 2023-10-30 NOTE — LETTER
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    Medical Information   Related to Industrial Injury?   ***   Subjective Complaints:      Objective Findings:     Pre-Existing Condition(s):     Assessment:        Status:    Permanent Disability:     Plan:      Diagnostics:      Comments:       Disability Information   Status:      From:     Through:   Restrictions are:     Physical Restrictions   Sitting:    Standing:    Stooping:    Bending:      Squatting:    Walking:    Climbing:    Pushing:      Pulling:    Other:    Reaching Above Shoulder (L):   Reaching Above Shoulder (R):       Reaching Below Shoulder (L):    Reaching Below Shoulder (R):      Not to exceed Weight Limits   Carrying(hrs):   Weight Limit(lb):   Lifting(hrs):   Weight  Limit(lb):     Comments:      Repetitive Actions   Hands: i.e. Fine Manipulations from Grasping:     Feet: i.e. Operating Foot Controls:     Driving / Operate Machinery:     Physician Name: Zoe Liu Physician Signature:   e-Signature:  , Medical Director   Clinic Name / Location: [facility], EMERGENCY DEPT  [address] [place] [id number] [phone]     Clinic Phone Number: Dept: [phone]   Appointment Time:  Visit Start Time:    Check-In Time:  12:10 AM Visit Discharge Time:    Original-Treating Physician or Chiropractor    Page 2-Insurer/TPA    Page 3-Employer    Page 4-Employee

## 2023-10-30 NOTE — PROGRESS NOTES
Received call from microbiology regarding second set of blood cultures. Per micro, second set of blood cultures had label missing employee ID number and were wondering who had drawn cultures.   I unfortunately did not know who shashi the blood cultures and informed microbiologist of this. Attempted to see if ED RN who cared for patient was available however, RN was logged off. Called down to [facility] to try and get information however the nurse was also unsure of who shashi blood cultures as primary RN had went home.    Second set of blood cultures in process.

## 2023-10-30 NOTE — HOSPITAL COURSE
Ms. Buffy Lund is a 38 y.o. female with no significant past medical history, has had laparoscopic cholecystectomy in the past, fetal demise, who presented 10/30/2023 with left breast pain.     Patient has been dealing with left breast pain for several weeks now.  She states that she began to have pain while she was at work and she was carrying boxes.  She scratched her left breast with a box and since then she has had progressively worsening pain.  She had a biopsy done on October 25.  Breast biopsy results showed chronic mastitis and negative for malignancy.  She is not breast-feeding.     Patient presents to ER 24 hours ago for left breast pain. Ultrasound at the time showing complex irregular hypoechoic area at the inferior left breast measuring 2.5 cm with peripheral hyperemia, likely inflammatory phlegmon/developing abscess.  Patient was discharged with Bactrim.  Patient admitted to hospital medicine for management of care.    During his hospitalization, general surgery was consulted. She was taken to OR 10/30 for incision and drainage of left breast abscess with Dr. Platt. She tolerated procedure well and recovered in PACU without significant event.  Penrose drain remained in place after surgery. Observed patient overnight on IV antibiotics. In the morning, she was cleared to discharge home on oral antibiotics by surgery.     Discussed plan to DC with patient, she is agreeable to this. Will send her on 7 days oral Augmentin as suggested by surgery. She will follow with Dr. Foster in office in approximately 1 week for wound check and Penrose drain removal. Discussed indications to call MD office or return to ED, including fevers, increasing swelling, bleeding, or purulence from wound. Discussed pain control, she states pain is currently adequately controlled on acetaminophen and wishes to continue OTC pain control.

## 2023-10-30 NOTE — ED TRIAGE NOTES
Buffy Lund  38 y.o.  female  Chief Complaint   Patient presents with    Wound Infection     Pt hit L breast on a box corner approx 1 month ago. Has had recurring infection since - had a biopsy on Wednesday. Seen yesterday in ED for same, returned today as redness is spreading above the nipple line & pain increasing. Area outlined with marker in triage. No fevers at home.      Labs ordered. Patient educated on triage process, to alert staff if any changes in condition.

## 2023-10-30 NOTE — PROGRESS NOTES
"Pharmacy Vancomycin Kinetics Note for 10/30/2023     38 y.o. female on Vancomycin day # 1     Vancomycin Indication (AUC Dosing): Skin/skin structure infection    Provider specified end date: 11/03/23    Active Antibiotics (From admission, onward)      Ordered     Ordering Provider       Mon Oct 30, 2023  3:05 AM    10/30/23 0305  vancomycin (Vancocin) 750 mg in  mL IVPB  (vancomycin (VANCOCIN) IV (LD + Maintenance))  EVERY 12 HOURS         Mark Pimentel M.D.       Mon Oct 30, 2023  2:09 AM    10/30/23 0209  MD Alert...Vancomycin per Pharmacy  PHARMACY TO DOSE        Question:  Indication(s) for vancomycin?  Answer:  Skin and soft tissue infection    Mark Pimentel M.D.       Mon Oct 30, 2023  2:09 AM    10/30/23 0209  ceFAZolin (Ancef) injection 2 g  EVERY 8 HOURS         Mark Pimentel M.D.       Mon Oct 30, 2023  1:23 AM    10/30/23 0123  vancomycin (Vancocin) 1,750 mg in  mL IVPB  (vancomycin (VANCOCIN) IV (LD + Maintenance))  ONCE         Zoe Liu M.D.            Dosing Weight: 67 kg (147 lb 11.3 oz)      Admission History: Admitted on 10/30/2023 for Mastitis [N61.0]  Pertinent history: Pt is 39 yo female w/ mastitis started on cefazolin and vancomycin    Allergies:     Patient has no known allergies.     Pertinent cultures to date:     Results       Procedure Component Value Units Date/Time    Blood Culture [[id number]] Collected: 10/30/23 0116    Order Status: Sent Specimen: Blood from Peripheral Updated: 10/30/23 0246    Narrative:      2 of 2 blood culture x2  Sites order. Per Hospital Policy:  Only change Specimen Src: to \"Line\" if specified by physician  order.  Release to patient->Immediate    Blood Culture [[id number]] Collected: 10/30/23 0114    Order Status: Sent Specimen: Blood from Peripheral Updated: 10/30/23 0210    Narrative:      1 of 2 for Blood Culture x 2 sites order. Per Hospital  Policy: Only change Specimen Src: to \"Line\" if specified by  physician " "order.  Release to patient->Immediate            Labs:     Estimated Creatinine Clearance: 89.6 mL/min (by C-G formula based on SCr of 0.75 mg/dL).  Recent Labs     10/29/23  0130 10/30/23  003   WBC 10.8 10.9*   NEUTSPOLYS 72.10* 72.10*     Recent Labs     10/29/23  0130 10/30/23  0032   BUN 14 13   CREATININE 0.63 0.75   ALBUMIN 4.4 4.0     No intake or output data in the 24 hours ending 10/30/23 0305   /76   Pulse 61   Temp 36.5 °C (97.7 °F) (Temporal)   Resp 18   Ht 1.575 m (5' 2\")   Wt 67 kg (147 lb 11.3 oz)   SpO2 98%  Temp (24hrs), Av.3 °C (97.4 °F), Min:36.1 °C (97 °F), Max:36.5 °C (97.7 °F)      List concerns for Vancomycin clearance:     None    Pharmacokinetics:     AUC kinetics:   Ke (hr ^-1): 0.0788 hr^-1  Half life: 8.8 hr  Clearance: 3.432  Estimated TDD: 1716  Estimated Dose: 772  Estimated interval: 10.8    A/P:     -  Vancomycin dose: 750 mg IV q12h (0400, 1600)    -  Next vancomycin level(s): None ordered    -  Predicted vancomycin AUC from initial AUC test calculator: 437 mg·hr/L    -  Comments:  Pharmacy will monitor therapy and adjust as appropriate    Karen Dotson, PharmD    "

## 2023-10-30 NOTE — LETTER
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    Medical Information   Related to Industrial Injury? No ***   Subjective Complaints:  Left breast pain, erythema, tenderness to palpation, increased warmth to touch   Objective Findings: Inferolateral tenderness and increased warmth to touch, erythema, induration   Pre-Existing Condition(s):     Assessment:   Condition Worsened    Status: Additional Care Required  Permanent Disability:No    Plan:   Comments:admission    Diagnostics: Other (see comment)    Comments:  Labs, surgical consultation    Disability Information   Status:      From:     Through:   Restrictions are:     Physical Restrictions   Sitting:    Standing:    Stooping:    Bending:      Squatting:    Walking:    Climbing:    Pushing:      Pulling:    Other:    Reaching Above Shoulder (L):   Reaching Above Shoulder (R):       Reaching Below Shoulder (L):    Reaching Below Shoulder (R):      Not to exceed Weight Limits   Carrying(hrs):   Weight Limit(lb):   Lifting(hrs):   Weight  Limit(lb):     Comments:      Repetitive Actions   Hands: i.e. Fine Manipulations from Grasping:     Feet: i.e. Operating Foot Controls:     Driving / Operate Machinery:     Physician Name: Jonn Liu Physician Signature: JONN Sofia M.D. e-Signature:  , Medical Director   Clinic Name / Location: [facility]  [facility]  [address] [facility] [id number] [phone]     Clinic Phone Number: Dept:  [phone]   Appointment Time:  Visit Start Time:    Check-In Time:  12:10 AM Visit Discharge Time:    Original-Treating Physician or Chiropractor    Page 2-Insurer/TPA    Page 3-Employer    Page 4-Employee

## 2023-10-30 NOTE — OP REPORT
DATE OF OPERATION:  10/30/2023    PREOPERATIVE DIAGNOSIS:  Left breast abscess     POSTOPERATIVE DIAGNOSIS: same    PROCEDURE PERFORMED: Incision and Drainage Left breast Abscess    SURGEON:    Kim Platt M.D.    ASSISTANT:    Suki Raphael PA-C.    ANESTHESIOLOGIST:  Osmin Almazan M.D.    ANESTHESIA:   Laryngeal mask anesthesia.    ASA CLASSIFICATION:  II.    INDICATIONS: The patient is a 38 year-old woman with left breast abscess and mastitis. She is taken to the operating room for drainage of abscess.        FINDINGS: large left breast abscess with purulent drainage.     WOUND CLASSIFICATION:  Class IV, Dirty or Infected. Infection present at the time of surgery (PATOS).    SPECIMEN:     Abscess specimen.    ESTIMATED BLOOD LOSS:  15 mL..    PROCEDURE: Following informed consent consent, the patient was properly identified, taken to the operating room and placed in supine position where a laryngeal mask anesthesia was administered. Intravenous antibiotics were administered in the preoperative setting within the correct time interval. The patient voided prior to surgery. A urinary catheter was not placed. Sequential compression devices were employed. A safety retension strap was secured. Active patient warming devices were utilized. The operative site was widely prepped and draped into a sterile field.  A timeout was conducted with the full attention and participation of all operating room personnel.    An incision was made over the most fluctuant portion of the left breast.  A large amount of pus was drained.  Cultured were sent.  The breast was irrigated thoroughly.  A penrose drain was secured in place to keep the drainage. Antibiotics will be continued.    The patient tolerated the procedure well, and there were no apparent complications. All sponge, needle, and instrument counts were correct on 2 separate occasions. The patient was was awakened, extubated, and transferred to  to the post  anesthesia care unit (PACU) in satisfactory condition.       ____________________________________     Kim Platt M.D.    DD: 10/30/2023  3:30 PM

## 2023-10-30 NOTE — PROGRESS NOTES
[facility] Medicine Daily Progress Note    Date of Service  10/30/2023    Chief Complaint  Buffy Lund is a 38 y.o. female admitted 10/30/2023 with LEFT breast pain    Hospital Course  Ms. Buffy Lund is a 38 y.o. female with no significant past medical history, has had laparoscopic cholecystectomy in the past, fetal demise, who presented 10/30/2023 with left breast pain.     Patient has been dealing with left breast pain for several weeks now.  She states that she began to have pain while she was at work and she was carrying boxes.  She scratched her left breast with a box and since then she has had progressively worsening pain.  She had a biopsy done on October 25.  Breast biopsy results showed chronic mastitis and negative for malignancy.  She is not breast-feeding.     Patient presents to ER 24 hours ago for left breast pain. Ultrasound at the time showing complex irregular hypoechoic area at the inferior left breast measuring 2.5 cm with peripheral hyperemia, likely inflammatory phlegmon/developing abscess.  Patient was discharged with Bactrim.  Patient admitted to hospital medicine for management of care.    During his hospitalization, general surgery Dr. Foster was consulted.  Recommendations for possible biopsy, noted 3.5 cm phlegmon on ultrasound, continue IV antibiotics and serial exam along with I&D.      Interval Problem Update  -Patient seen and examined.  Noted left breast redness, swelling, tender to touch.  Patient noted this has progressively gotten worse since her accident at work where she hit the corner of the box with her breast.  Discussed with patient surgical intervention with Dr. Foster today.  Continue IV antibiotics as indicated.  Will likely transition to oral once culture and sensitivity from I&D is resulted.  -Plan of care: Continue n.p.o. status for anticipated left breast abscess I&D with Dr. Foster; continue IV antibiotics and wait for culture and sensitivity to  transition to oral antibiotics  -Disposition: Anticipated to stay until surgical intervention has been done and further recommendations from surgery has been made; likely to go home on oral antibiotics  -Lab work: Reviewed expected;   -VSS at this time    I have discussed this patient's plan of care and discharge plan at IDT rounds today with Case Management, Nursing, Nursing leadership, and other members of the IDT team.    Consultants/Specialty  general surgery - Dr. Foster    Code Status  Full Code    Disposition  The patient is not medically cleared for discharge to home or a post-acute facility.  Anticipate discharge to: home with close outpatient follow-up    I have placed the appropriate orders for post-discharge needs.    Review of Systems  Review of Systems   Constitutional:  Positive for malaise/fatigue. Negative for chills and fever.   HENT: Negative.     Eyes: Negative.    Respiratory: Negative.     Cardiovascular: Negative.    Gastrointestinal: Negative.    Genitourinary: Negative.    Musculoskeletal:  Positive for myalgias.   Skin: Negative.    Neurological: Negative.    Endo/Heme/Allergies: Negative.    Psychiatric/Behavioral: Negative.          Physical Exam  Temp:  [36.1 °C (97 °F)-37.3 °C (99.2 °F)] 37.3 °C (99.2 °F)  Pulse:  [61-83] 83  Resp:  [16-18] 16  BP: (107-131)/(65-90) 112/65  SpO2:  [95 %-98 %] 95 %    Physical Exam  Vitals and nursing note reviewed.   HENT:      Head: Normocephalic.      Nose: Nose normal.      Mouth/Throat:      Mouth: Mucous membranes are moist.      Pharynx: Oropharynx is clear.   Eyes:      Pupils: Pupils are equal, round, and reactive to light.   Cardiovascular:      Rate and Rhythm: Normal rate and regular rhythm.      Pulses: Normal pulses.      Heart sounds: Normal heart sounds.   Pulmonary:      Effort: Pulmonary effort is normal.      Breath sounds: Normal breath sounds.   Abdominal:      General: Bowel sounds are normal.      Palpations: Abdomen is soft.    Musculoskeletal:         General: Tenderness present. Normal range of motion.      Cervical back: Normal range of motion and neck supple.      Comments: LEFT Breast tenderness   Skin:     General: Skin is dry.      Capillary Refill: Capillary refill takes 2 to 3 seconds.      Findings: Erythema present.   Neurological:      Mental Status: She is alert. Mental status is at baseline.      Motor: Weakness present.         Fluids  No intake or output data in the 24 hours ending 10/30/23 0950    Laboratory  Recent Labs     10/29/23  0130 10/30/23  0032   WBC 10.8 10.9*   RBC 4.20 3.87*   HEMOGLOBIN 13.2 12.2   HEMATOCRIT 39.0 37.6   MCV 92.9 97.2   MCH 31.4 31.5   MCHC 33.8 32.4   RDW 40.7 42.9   PLATELETCT 418 366   MPV 8.9* 9.0     Recent Labs     10/29/23  0130 10/30/23  0032   SODIUM 138 134*   POTASSIUM 3.9 4.7   CHLORIDE 102 103   CO2 24 20   GLUCOSE 109* 107*   BUN 14 13   CREATININE 0.63 0.75   CALCIUM 9.0 8.8                   Imaging  IR-US GUIDED PIV   Final Result    Ultrasound-guided PERIPHERAL IV INSERTION performed by    qualified nursing staff as above.           Assessment/Plan  * Mastitis- (present on admission)  Assessment & Plan  -Patient presents for mastitis after she was scratched carrying a box about a month ago.   -Ultrasound about 24 hours ago showing complex irregular hypoechoic area of the inferior left breast measuring 2.5 cm with peripheral hyperemia, likely inflammatory phlegmon/developing abscess.    -Started on vancomycin and cefazolin.  Continue antibiotics.  - await culture and sensitivity from I&D - transition to oral abx  -General surgery consulted, follow note - Dr. Foster - I&D today 10/30          H/O fetal demise- (present on admission)  Assessment & Plan  - History of  - Patient reports having mastitis after pregnancy         VTE prophylaxis:    enoxaparin ppx      I have performed a physical exam and reviewed and updated ROS and Plan today (10/30/2023). In review of yesterday's  note (10/29/2023), there are no changes except as documented above.      Please note that this dictation was created using voice recognition software. I have made every reasonable attempt to correct obvious errors, but there may be errors of grammar and possibly content that I did not discover before finalizing the note.    Electronically signed by:  Dr. ZARIA Navarrete, DNP, APRN, FNP-C  Hospitalist Services  [facility]  [phone]  [email or web address]  10/30/23                 1021

## 2023-10-30 NOTE — DISCHARGE PLANNING
Care Transition Team Discharge Planning      Discharge Plan:  CM went to assess patient x 2. Staff at bedside, then she was down for OR for left breast abscess I&D. CM will follow for d/c needs.

## 2023-10-30 NOTE — ASSESSMENT & PLAN NOTE
-Patient presents for mastitis after she was scratched carrying a box about a month ago.   -Ultrasound about 24 hours ago showing complex irregular hypoechoic area of the inferior left breast measuring 2.5 cm with peripheral hyperemia, likely inflammatory phlegmon/developing abscess.    -Started on vancomycin and cefazolin.  Continue antibiotics.  - await culture and sensitivity from I&D - transition to oral abx  -General surgery consulted, follow note - Dr. Foster - I&D today 10/30

## 2023-10-30 NOTE — ANESTHESIA PREPROCEDURE EVALUATION
Case: [id number] Date/Time: 10/30/23 1541    Procedure: INCISION AND DRAINAGE (Left: Breast)    Location: [facility] OR 09 / SURGERY [facility]    Surgeons: Kim Platt M.D.          Relevant Problems   No relevant active problems       Physical Exam    Airway   Mallampati: II  TM distance: >3 FB  Neck ROM: full       Cardiovascular - normal exam  Rhythm: regular  Rate: normal  (-) murmur     Dental - normal exam           Pulmonary - normal exam  Breath sounds clear to auscultation     Abdominal    Neurological - normal exam                 Anesthesia Plan    ASA 2       Plan - general       Airway plan will be LMA          Induction: intravenous    Postoperative Plan: Postoperative administration of opioids is intended.    Pertinent diagnostic labs and testing reviewed    Informed Consent:    Anesthetic plan and risks discussed with patient.    Use of blood products discussed with: patient whom consented to blood products.

## 2023-10-30 NOTE — ASSESSMENT & PLAN NOTE
1 month hx of mastitis  Was ok until biopsy on Wednesday  US show 3.5 cm phlegmon  IV abx and serial exams  10/30 I&D left breast  10/31 Ok to go home on po abx (Augmentin). Follow up one week for penrose removal with Dr Foster.

## 2023-10-30 NOTE — PROGRESS NOTES
Patient to floor with transport in stable condition. VSS. Surgical dressings clean dry intact. Aox4 and on RA. Family updated. No further needs.

## 2023-10-30 NOTE — PROGRESS NOTES
Patient back in room. VSS, pain under control, dressing intact to left breast with surgical drain in place. Fresh ice water provided.

## 2023-10-30 NOTE — ED PROVIDER NOTES
ED Provider Note    CHIEF COMPLAINT  Chief Complaint   Patient presents with    Wound Infection     Pt hit L breast on a box corner approx 1 month ago. Has had recurring infection since - had a biopsy on Wednesday. Seen yesterday in ED for same, returned today as redness is spreading above the nipple line & pain increasing. Area outlined with marker in triage. No fevers at home.        EXTERNAL RECORDS REVIEWED  Other patient was seen in the emergency room yesterday for the same and was discharged home on a course of antibiotics    HPI/ROS  LIMITATION TO HISTORY   Select: : None  OUTSIDE HISTORIAN(S):  None    Buffy Lund is a 38 y.o. female who presents for left breast pain and swelling.  She has had this pain for the past month.  She recently had a biopsy of the area which showed acute on chronic mastitis.  She just finished a course of antibiotics last Wednesday.  She came to the emergency room yesterday and was actually evaluated by me.  Ultrasound was done and showed possible inflammatory phlegmon versus developing abscess.  She has not had any fever or chills.  It is discussed with surgery at that time who recommended outpatient follow-up versus offer admission.  At that time the patient wanted to trial outpatient follow-up with oral antibiotics.  Patient was discharged home on antibiotics and was taking antibiotics however the pain has been increasing despite that.  She still denies any fever or chills.  She states that the redness has been getting worse as well.  She continues to have no nipple discharge.    PAST MEDICAL HISTORY   has a past medical history of H/O fetal demise, not currently pregnant (8/10/2016) and Psychiatric problem.    SURGICAL HISTORY   has a past surgical history that includes other orthopedic surgery (2006); other (Left, 2001); ear middle exploration (2001); tendon repair (2006); primary c section (8/8/2017); lap,diagnostic abdomen (9/25/2019); salpingectomy (Bilateral,  "9/25/2019); and cholecystectomy robotic xi (N/A, 8/14/2020).    FAMILY HISTORY  No family history on file.    SOCIAL HISTORY  Social History     Tobacco Use    Smoking status: Never    Smokeless tobacco: Never   Vaping Use    Vaping Use: Never used   Substance and Sexual Activity    Alcohol use: No    Drug use: No    Sexual activity: Not Currently     Partners: Male     Birth control/protection: Pill     Comment: Unplanned pregnancy       CURRENT MEDICATIONS  Home Medications    **Home medications have not yet been reviewed for this encounter**         ALLERGIES  No Known Allergies    PHYSICAL EXAM  VITAL SIGNS: /65   Pulse 75   Temp 36.1 °C (97 °F) (Temporal)   Resp 18   Ht 1.575 m (5' 2\")   Wt 67.9 kg (149 lb 11.1 oz)   LMP 10/02/2023 (Approximate)   SpO2 98%   BMI 27.38 kg/m²      Constitutional: Well developed, Well nourished, No acute respiratory distress, Non-toxic appearance.   HENT: Normocephalic, Atraumatic, Bilateral external ears normal, Oropharynx clear, mucous membranes are moist.  Eyes: Conjunctiva normal, No discharge. No icterus.  Neck: Normal range of motion. Supple.  Breast: Left breast with 3-1/2 x 4 cm area of erythema to the inferior lateral portion extending across the nipple line without nipple discharge, induration present, increased warmth to touch present, Steri-Strips from previous biopsy intact  Lymphatic: No cervical lymphadenopathy noted.   Cardiovascular: Normal heart rate, Normal rhythm, No murmurs, No rubs, No gallops.   Thorax & Lungs: Clear to auscultation bilaterally, No respiratory distress, No wheezing.  Abdomen: Soft nontender normal bowel sounds no rebound masses or peritoneal signs  Skin: Warm, Dry, No erythema, No rash.   Extremities: Intact distal pulses, No edema, No tenderness  Musculoskeletal: Good range of motion in all major joints. Normal gait.  Neurologic: Alert & oriented x 3. No focal deficits noted.   Psych: Alert normal affect       DIAGNOSTIC " STUDIES / PROCEDURES    LABS  Results for orders placed or performed during the hospital encounter of 10/30/23   CBC WITH DIFFERENTIAL   Result Value Ref Range    WBC 10.9 (H) 4.8 - 10.8 K/uL    RBC 3.87 (L) 4.20 - 5.40 M/uL    Hemoglobin 12.2 12.0 - 16.0 g/dL    Hematocrit 37.6 37.0 - 47.0 %    MCV 97.2 81.4 - 97.8 fL    MCH 31.5 27.0 - 33.0 pg    MCHC 32.4 32.2 - 35.5 g/dL    RDW 42.9 35.9 - 50.0 fL    Platelet Count 366 164 - 446 K/uL    MPV 9.0 9.0 - 12.9 fL    Neutrophils-Polys 72.10 (H) 44.00 - 72.00 %    Lymphocytes 18.50 (L) 22.00 - 41.00 %    Monocytes 6.60 0.00 - 13.40 %    Eosinophils 1.90 0.00 - 6.90 %    Basophils 0.60 0.00 - 1.80 %    Immature Granulocytes 0.30 0.00 - 0.90 %    Nucleated RBC 0.00 0.00 - 0.20 /100 WBC    Neutrophils (Absolute) 7.82 (H) 1.82 - 7.42 K/uL    Lymphs (Absolute) 2.01 1.00 - 4.80 K/uL    Monos (Absolute) 0.72 0.00 - 0.85 K/uL    Eos (Absolute) 0.21 0.00 - 0.51 K/uL    Baso (Absolute) 0.06 0.00 - 0.12 K/uL    Immature Granulocytes (abs) 0.03 0.00 - 0.11 K/uL    NRBC (Absolute) 0.00 K/uL   CMP   Result Value Ref Range    Sodium 134 (L) 135 - 145 mmol/L    Potassium 4.7 3.6 - 5.5 mmol/L    Chloride 103 96 - 112 mmol/L    Co2 20 20 - 33 mmol/L    Anion Gap 11.0 7.0 - 16.0    Glucose 107 (H) 65 - 99 mg/dL    Bun 13 8 - 22 mg/dL    Creatinine 0.75 0.50 - 1.40 mg/dL    Calcium 8.8 8.5 - 10.5 mg/dL    Correct Calcium 8.8 8.5 - 10.5 mg/dL    AST(SGOT) 17 12 - 45 U/L    ALT(SGPT) 10 2 - 50 U/L    Alkaline Phosphatase 85 30 - 99 U/L    Total Bilirubin 0.2 0.1 - 1.5 mg/dL    Albumin 4.0 3.2 - 4.9 g/dL    Total Protein 7.1 6.0 - 8.2 g/dL    Globulin 3.1 1.9 - 3.5 g/dL    A-G Ratio 1.3 g/dL   Lactic Acid   Result Value Ref Range    Lactic Acid 1.0 0.5 - 2.0 mmol/L   ESTIMATED GFR   Result Value Ref Range    GFR (CKD-EPI) 103 >60 mL/min/1.73 m 2       COURSE & MEDICAL DECISION MAKING    ED Observation Status? No; Patient does not meet criteria for ED Observation.     INITIAL ASSESSMENT,  COURSE AND PLAN  Care Narrative: This is a 38-year-old female with history as noted above is presenting for worsening left breast pain and erythema.  She was seen here yesterday for same and was discharged home on antibiotics after discussing with general surgery who offered her admission however patient wanted to trial outpatient follow-up and p.o. antibiotics.  On arrival the patient is hemodynamically stable.  Erythema of the left breast has worsened on exam.  She is not septic appearing.  Labs now show that she has slight leukocytosis of 10,900 but otherwise labs are reassuring.  There is no lactic acidosis.  Blood cultures x2 were collected.  Ultrasound was just done a day ago and showed developing abscess.  I discussed with general surgery, Dr. Foster, who will see the patient but recommends admission to hospitalist.  I discussed with the hospitalist, Dr. Pimentel, who admit the patient to the hospital.  Patient was given vancomycin as well as Ancef.  Patient is agreeable to admission plan with no further questions.            DISPOSITION AND DISCUSSIONS  I have discussed management of the patient with the following physicians and ZULAY's:    General surgeon - Dr. Foster  Hospitalist - Dr. Pimentel    Patient admitted to hospitalist, Dr. Pimentel, in guarded condition    FINAL DIAGNOSIS  Left breast infection       Electronically signed by: Zoe Liu M.D., 10/30/2023 12:58 AM

## 2023-10-31 ENCOUNTER — PHARMACY VISIT (OUTPATIENT)
Dept: PHARMACY | Facility: MEDICAL CENTER | Age: 38
End: 2023-10-31
Payer: MEDICARE

## 2023-10-31 VITALS
HEART RATE: 48 BPM | RESPIRATION RATE: 18 BRPM | OXYGEN SATURATION: 95 % | DIASTOLIC BLOOD PRESSURE: 54 MMHG | WEIGHT: 147.71 LBS | HEIGHT: 62 IN | TEMPERATURE: 98.7 F | SYSTOLIC BLOOD PRESSURE: 97 MMHG | BODY MASS INDEX: 27.18 KG/M2

## 2023-10-31 LAB
ANION GAP SERPL CALC-SCNC: 11 MMOL/L (ref 7–16)
BUN SERPL-MCNC: 9 MG/DL (ref 8–22)
CALCIUM SERPL-MCNC: 7.9 MG/DL (ref 8.5–10.5)
CHLORIDE SERPL-SCNC: 105 MMOL/L (ref 96–112)
CO2 SERPL-SCNC: 19 MMOL/L (ref 20–33)
CREAT SERPL-MCNC: 0.66 MG/DL (ref 0.5–1.4)
ERYTHROCYTE [DISTWIDTH] IN BLOOD BY AUTOMATED COUNT: 40.7 FL (ref 35.9–50)
GFR SERPLBLD CREATININE-BSD FMLA CKD-EPI: 114 ML/MIN/1.73 M 2
GLUCOSE SERPL-MCNC: 148 MG/DL (ref 65–99)
HCT VFR BLD AUTO: 39 % (ref 37–47)
HGB BLD-MCNC: 12.6 G/DL (ref 12–16)
MCH RBC QN AUTO: 30.5 PG (ref 27–33)
MCHC RBC AUTO-ENTMCNC: 32.3 G/DL (ref 32.2–35.5)
MCV RBC AUTO: 94.4 FL (ref 81.4–97.8)
PLATELET # BLD AUTO: 262 K/UL (ref 164–446)
PMV BLD AUTO: 9.6 FL (ref 9–12.9)
POTASSIUM SERPL-SCNC: 4.3 MMOL/L (ref 3.6–5.5)
RBC # BLD AUTO: 4.13 M/UL (ref 4.2–5.4)
SODIUM SERPL-SCNC: 135 MMOL/L (ref 135–145)
WBC # BLD AUTO: 11.4 K/UL (ref 4.8–10.8)

## 2023-10-31 PROCEDURE — 80048 BASIC METABOLIC PNL TOTAL CA: CPT

## 2023-10-31 PROCEDURE — 96366 THER/PROPH/DIAG IV INF ADDON: CPT

## 2023-10-31 PROCEDURE — 700102 HCHG RX REV CODE 250 W/ 637 OVERRIDE(OP): Performed by: STUDENT IN AN ORGANIZED HEALTH CARE EDUCATION/TRAINING PROGRAM

## 2023-10-31 PROCEDURE — 99239 HOSP IP/OBS DSCHRG MGMT >30: CPT | Mod: GC | Performed by: INTERNAL MEDICINE

## 2023-10-31 PROCEDURE — 700105 HCHG RX REV CODE 258: Performed by: STUDENT IN AN ORGANIZED HEALTH CARE EDUCATION/TRAINING PROGRAM

## 2023-10-31 PROCEDURE — A9270 NON-COVERED ITEM OR SERVICE: HCPCS | Performed by: STUDENT IN AN ORGANIZED HEALTH CARE EDUCATION/TRAINING PROGRAM

## 2023-10-31 PROCEDURE — RXMED WILLOW AMBULATORY MEDICATION CHARGE

## 2023-10-31 PROCEDURE — 85027 COMPLETE CBC AUTOMATED: CPT

## 2023-10-31 PROCEDURE — 96376 TX/PRO/DX INJ SAME DRUG ADON: CPT

## 2023-10-31 PROCEDURE — G0378 HOSPITAL OBSERVATION PER HR: HCPCS

## 2023-10-31 PROCEDURE — 99024 POSTOP FOLLOW-UP VISIT: CPT | Performed by: SURGERY

## 2023-10-31 PROCEDURE — 700111 HCHG RX REV CODE 636 W/ 250 OVERRIDE (IP): Performed by: STUDENT IN AN ORGANIZED HEALTH CARE EDUCATION/TRAINING PROGRAM

## 2023-10-31 RX ORDER — ACETAMINOPHEN 325 MG/1
650 TABLET ORAL EVERY 6 HOURS PRN
Qty: 30 TABLET | Refills: 0 | COMMUNITY
Start: 2023-10-31

## 2023-10-31 RX ORDER — AMOXICILLIN AND CLAVULANATE POTASSIUM 875; 125 MG/1; MG/1
1 TABLET, FILM COATED ORAL 2 TIMES DAILY
Qty: 14 TABLET | Refills: 0 | Status: SHIPPED | OUTPATIENT
Start: 2023-10-31 | End: 2023-11-07

## 2023-10-31 RX ADMIN — ACETAMINOPHEN 650 MG: 325 TABLET, FILM COATED ORAL at 06:19

## 2023-10-31 RX ADMIN — CEFAZOLIN 2 G: 2 INJECTION, POWDER, FOR SOLUTION INTRAMUSCULAR; INTRAVENOUS at 01:36

## 2023-10-31 RX ADMIN — VANCOMYCIN HYDROCHLORIDE 750 MG: 5 INJECTION, POWDER, LYOPHILIZED, FOR SOLUTION INTRAVENOUS at 03:56

## 2023-10-31 ASSESSMENT — ENCOUNTER SYMPTOMS
SHORTNESS OF BREATH: 0
DIARRHEA: 0
ABDOMINAL PAIN: 0
CONSTIPATION: 0

## 2023-10-31 ASSESSMENT — PAIN DESCRIPTION - PAIN TYPE
TYPE: SURGICAL PAIN
TYPE: SURGICAL PAIN

## 2023-10-31 ASSESSMENT — PAIN SCALES - GENERAL: PAIN_LEVEL: 1

## 2023-10-31 NOTE — ANESTHESIA POSTPROCEDURE EVALUATION
Patient: Buffy Lund    Procedure Summary     Date: 10/30/23 Room / Location: [facility] 09 / SURGERY [facility]    Anesthesia Start: 1448 Anesthesia Stop: 1527    Procedure: INCISION AND DRAINAGE (Left: Breast) Diagnosis: (left breast abcess)    Surgeons: Kim Platt M.D. Responsible Provider: Osmin Almazan M.D.    Anesthesia Type: general ASA Status: 2          Final Anesthesia Type: general  Last vitals  BP   Blood Pressure: 97/54    Temp   37.1 °C (98.7 °F)    Pulse   (!) 48   Resp   18    SpO2   95 %      Anesthesia Post Evaluation    Patient location during evaluation: PACU  Patient participation: complete - patient participated  Level of consciousness: awake and alert  Pain score: 1    Airway patency: patent  Anesthetic complications: no  Cardiovascular status: hemodynamically stable  Respiratory status: acceptable  Hydration status: euvolemic    PONV: none          No notable events documented.     Nurse Pain Score: 1 (NPRS)

## 2023-10-31 NOTE — DISCHARGE INSTRUCTIONS
Take Augmentin twice a day for 7 days  Follow with Dr. Foster in clinic in 1 week for wound check and drain removal  Call doctor or return for fevers, increased drainage, uncontrolled pain

## 2023-10-31 NOTE — PROGRESS NOTES
Received report from day shift RN. Assumed patient care. Patient resting in bed, NAD, A&O x4. Surgical drain and incision dressing clean, dry, and intact. Patient states no pain. Pt ambulated to restroom with steady gait. POC discussed with patient, all questions addressed. Call light within reach.

## 2023-10-31 NOTE — DISCHARGE SUMMARY
Discharge Summary    CHIEF COMPLAINT ON ADMISSION  Chief Complaint   Patient presents with    Wound Infection     Pt hit L breast on a box corner approx 1 month ago. Has had recurring infection since - had a biopsy on Wednesday. Seen yesterday in ED for same, returned today as redness is spreading above the nipple line & pain increasing. Area outlined with marker in triage. No fevers at home.        Reason for Admission  Breast Pain      Admission Date  10/30/2023    CODE STATUS  Prior    HPI & HOSPITAL COURSE  Ms. Buffy Lund is a 38 y.o. female with no significant past medical history, has had laparoscopic cholecystectomy in the past, fetal demise, who presented 10/30/2023 with left breast pain.     Patient has been dealing with left breast pain for several weeks now.  She states that she began to have pain while she was at work and she was carrying boxes.  She scratched her left breast with a box and since then she has had progressively worsening pain.  She had a biopsy done on October 25.  Breast biopsy results showed chronic mastitis and negative for malignancy.  She is not breast-feeding.     Patient presents to ER 24 hours ago for left breast pain. Ultrasound at the time showing complex irregular hypoechoic area at the inferior left breast measuring 2.5 cm with peripheral hyperemia, likely inflammatory phlegmon/developing abscess.  Patient was discharged with Bactrim.  Patient admitted to hospital medicine for management of care.    During his hospitalization, general surgery was consulted. She was taken to OR 10/30 for incision and drainage of left breast abscess with Dr. Platt. She tolerated procedure well and recovered in PACU without significant event.  Penrose drain remained in place after surgery. Observed patient overnight on IV antibiotics. In the morning, she was cleared to discharge home on oral antibiotics by surgery.     Discussed plan to DC with patient, she is agreeable to this. Will  send her on 7 days oral Augmentin as suggested by surgery. She will follow with Dr. Foster in office in approximately 1 week for wound check and Penrose drain removal. Discussed indications to call MD office or return to ED, including fevers, increasing swelling, bleeding, or purulence from wound. Discussed pain control, she states pain is currently adequately controlled on acetaminophen and wishes to continue OTC pain control.       Therefore, she is discharged in good and stable condition to home with close outpatient follow-up.    Discharge Date  10/31/2023    FOLLOW UP ITEMS POST DISCHARGE  Take full course antibiotics, even if feeling better  Follow with surgery clinic in 1 week    DISCHARGE DIAGNOSES  Principal Problem:    Mastitis (POA: Yes)  Active Problems:    H/O fetal demise (POA: Yes)      Overview: 24wks - IUFD, chromosomal abnormalities, no testing done however       Twice weekly NSTs from 32 weeks  Resolved Problems:    * No resolved hospital problems. *      FOLLOW UP  No future appointments.  JESIKA FAIRBANKS MD  [address]  [place] [address]  [phone]  Call  Please call to make an appointment. You should be seen in one week., For wound re-check and drain removal    Eric Ortiz P.A.-C.  [address] [address] [phone]    Schedule an appointment as soon as possible for a visit in 1 week(s)        MEDICATIONS ON DISCHARGE     Medication List        START taking these medications        Instructions   acetaminophen 325 MG Tabs  Commonly known as: Tylenol   Take 2 Tablets by mouth every 6 hours as needed for Mild Pain.  Dose: 650 mg     amoxicillin-clavulanate 875-125 MG Tabs  Commonly known as: Augmentin   Take 1 Tablet by mouth 2 times a day for 7 days.  Dose: 1 Tablet            CONTINUE taking these medications        Instructions   ibuprofen 800 MG Tabs  Commonly known as: Motrin   Take 800 mg by mouth every 8 hours as needed for Inflammation.  Dose: 800 mg             STOP taking these medications      sulfamethoxazole-trimethoprim 800-160 MG tablet  Commonly known as: Bactrim DS              Allergies  No Known Allergies    DIET  No orders of the defined types were placed in this encounter.      ACTIVITY  As tolerated.  Weight bearing as tolerated    CONSULTATIONS  General surgery    PROCEDURES  10/30 : Left breast I&D with Dr. Platt    LABORATORY  Lab Results   Component Value Date    SODIUM 135 10/31/2023    POTASSIUM 4.3 10/31/2023    CHLORIDE 105 10/31/2023    CO2 19 (L) 10/31/2023    GLUCOSE 148 (H) 10/31/2023    BUN 9 10/31/2023    CREATININE 0.66 10/31/2023        Lab Results   Component Value Date    WBC 11.4 (H) 10/31/2023    HEMOGLOBIN 12.6 10/31/2023    HEMATOCRIT 39.0 10/31/2023    PLATELETCT 262 10/31/2023        Total time of the discharge process exceeds 20 minutes.

## 2023-10-31 NOTE — CARE PLAN
The patient is Stable - Low risk of patient condition declining or worsening    Shift Goals  Clinical Goals: IV antibiotics, IV fluids, pain control  Patient Goals: Comfort, rest  Family Goals: Rest    POC discussed with patient to continue with IV antibiotics, IV fluids, monitor incision site, and pain control. Educated patient on IS and ambulation post-op. Patient verbalized understanding of treatment plan and education.     Problem: Knowledge Deficit - Standard  Goal: Patient and family/care givers will demonstrate understanding of plan of care, disease process/condition, diagnostic tests and medications  Outcome: Progressing     Problem: Pain - Standard  Goal: Alleviation of pain or a reduction in pain to the patient’s comfort goal  Outcome: Progressing

## 2023-10-31 NOTE — PROGRESS NOTES
"  DATE: 10/31/2023    Post Operative Day  1 incision and drainage of  breast abscess .    INTERVAL EVENTS:  Pain much improved after I&D.  Penrose drain to remain in place. Cultures negative as of now.    PHYSICAL EXAMINATION:  Vital Signs: BP 97/54   Pulse (!) 48   Temp 37.1 °C (98.7 °F) (Temporal)   Resp 18   Ht 1.575 m (5' 2\")   Wt 67 kg (147 lb 11.3 oz)   SpO2 95%     Awake and alert.  Left breast induration and erythema improved.   Dressing is c/d/i    LABORATORY VALUES:  Recent Labs     10/29/23  0130 10/30/23  0032 10/31/23  0240   WBC 10.8 10.9* 11.4*   RBC 4.20 3.87* 4.13*   HEMOGLOBIN 13.2 12.2 12.6   HEMATOCRIT 39.0 37.6 39.0   MCV 92.9 97.2 94.4   MCH 31.4 31.5 30.5   MCHC 33.8 32.4 32.3   RDW 40.7 42.9 40.7   PLATELETCT 418 366 262   MPV 8.9* 9.0 9.6     Recent Labs     10/29/23  0130 10/30/23  0032 10/31/23  0240   SODIUM 138 134* 135   POTASSIUM 3.9 4.7 4.3   CHLORIDE 102 103 105   CO2 24 20 19*   GLUCOSE 109* 107* 148*   BUN 14 13 9   CREATININE 0.63 0.75 0.66   CALCIUM 9.0 8.8 7.9*     Recent Labs     10/29/23  0130 10/30/23  0032   ASTSGOT 16 17   ALTSGPT 11 10   TBILIRUBIN 0.2 0.2   ALKPHOSPHAT 88 85   GLOBULIN 3.1 3.1            IMAGING:  IR-US GUIDED PIV   Final Result    Ultrasound-guided PERIPHERAL IV INSERTION performed by    qualified nursing staff as above.          ASSESSMENT AND PLAN:  * Mastitis- (present on admission)  Assessment & Plan  1 month hx of mastitis  Was ok until biopsy on Wednesday  US show 3.5 cm phlegmon  IV abx and serial exams  10/30 I&D left breast  10/31 Ok to go home on po abx (Augmentin). Follow up one week for penrose removal with Dr Foster.            ____________________________________     Kim Platt M.D.    DD: 10/31/2023  7:08 AM    "

## 2023-10-31 NOTE — PROGRESS NOTES
Report received from night shift RN. Assume care. Pt. AAOx4 pt is bed,  Assessment completed. VSS. Denies l breast pain, able to wiggle toes and dorsi/plantar flex feet, good CMS and pulses to emmie LE, denies numbness and tingling. Dressing to L breast in place DCI, Pt was update for the care for the day. White board updated, All question answered. Pt has call light within reach,  bed is in the lowest position. Pt has no other needs at this time.  No changes from RN assessment last night

## 2023-10-31 NOTE — NON-PROVIDER
*MEDICAL STUDENT NOTE FOR LEARNING PURPOSES ONLY*       This note is intended for the purposes of medical student education and feedback only.   Please refer to the documentation by this patient's assigned medical practitioner for details of care and plans.    Reason for admission: Buffy Lund is a 38 year old Female who presented to the ER with L Breast Pain and was found to have an abscess along with preexisting mastitis. Incision and drainage of L Breast Abscess, located inferiorly to the areola was performed on 10/30/23, Penrose drain was placed.     HD/POD#: 1/1    SUBJECTIVE  The patient reports she is feeling better this morning. She is having some pain which is decreased by Tylenol, but not completely relieved, though she would not like narcotic analgesics. She is tolerating solid food and liquids and has been OOB and walking around the floor. She reports no difficulty with moving, urinating or bowel movements.     Review of Systems   Constitutional:  Negative for malaise/fatigue.   Respiratory:  Negative for shortness of breath.    Gastrointestinal:  Negative for abdominal pain, constipation and diarrhea.   Genitourinary:  Negative for dysuria.        OBJECTIVE   Vitals:    10/30/23 1630 10/30/23 1858 10/31/23 0326 10/31/23 0706   BP: 109/56 104/62 96/54 97/54   Pulse: 74 76 62 (!) 48   Resp: 15 16 16 18   Temp:  36.2 °C (97.2 °F) 36.3 °C (97.4 °F) 37.1 °C (98.7 °F)   TempSrc:  Temporal Temporal Temporal   SpO2: 93% 94% 95% 95%   Weight:       Height:            Physical Exam:  Physical Exam  Constitutional:       General: She is not in acute distress.     Appearance: Normal appearance. She is not ill-appearing.   HENT:      Head: Normocephalic and atraumatic.   Cardiovascular:      Rate and Rhythm: Normal rate and regular rhythm.      Heart sounds: No murmur heard.     No gallop.   Musculoskeletal:         General: No swelling or tenderness.      Right lower leg: No edema.      Left lower leg: No  edema.   Neurological:      Mental Status: She is alert.   Psychiatric:         Mood and Affect: Mood normal.     Chest:   Bandaged L Breast over incision and drain     Ins/Outs:    Intake/Output Summary (Last 24 hours) at 10/31/2023 0841  Last data filed at 10/30/2023 1901  Gross per 24 hour   Intake 715 ml   Output 620 ml   Net 95 ml     EBL:15mL     Lab Results:  Recent Labs     10/29/23  0130 10/30/23  0032 10/31/23  0240   WBC 10.8 10.9* 11.4*   RBC 4.20 3.87* 4.13*   HEMOGLOBIN 13.2 12.2 12.6   HEMATOCRIT 39.0 37.6 39.0   MCV 92.9 97.2 94.4   MCH 31.4 31.5 30.5   MCHC 33.8 32.4 32.3   RDW 40.7 42.9 40.7   PLATELETCT 418 366 262   MPV 8.9* 9.0 9.6     Recent Labs     10/29/23  0130 10/30/23  0032 10/31/23  0240   SODIUM 138 134* 135   POTASSIUM 3.9 4.7 4.3   CHLORIDE 102 103 105   CO2 24 20 19*   GLUCOSE 109* 107* 148*   BUN 14 13 9   CREATININE 0.63 0.75 0.66   CALCIUM 9.0 8.8 7.9*         Recent Labs     10/29/23  0130 10/30/23  0032   ASTSGOT 16 17   ALTSGPT 11 10   TBILIRUBIN 0.2 0.2   ALKPHOSPHAT 88 85   GLOBULIN 3.1 3.1        Imaging Results:  US-CHEST 10/29/23  FINDINGS:  Irregular complex hypoechoic structure in the inferior LEFT breast measuring 3.5 x 1.5 x 2.8 cm with peripheral hyperemia.     IMPRESSION:   Complex irregular hypoechoic area inferior LEFT breast measuring 3.5 cm with peripheral hyperemia, likely inflammatory phlegmon/developing abscess.       ASSESSMENT/PLAN    -S/P I&D for L Breast Abscess  Transition to oral Abx to facilitate d/c: amoxicillin-clavulanate 875-125 BID   Blood and anaerobic cultures pending   Penrose drain in place   Consider elevated blood sugar as a potential contributing factor, encourage establishing with a PCP for evaluation    -Mastitis   Present on admission   F/v outpatient    PROPHYLAXIS  DVT: walking  GI:none    Overseeing Licensed Provider: Suki Quintero, MS3  [facility] [employer]

## 2023-10-31 NOTE — PROGRESS NOTES
Pt was brought down to the discharge lounge. Pt IV was removed.  Pt understands to follow up in a week for penrose removal. Pt states having all belongings with her. Pt did not have any questions regarding discharge. Pt got med via meds to beds.

## 2023-11-01 ENCOUNTER — HOSPITAL ENCOUNTER (EMERGENCY)
Facility: MEDICAL CENTER | Age: 38
End: 2023-11-01
Attending: EMERGENCY MEDICINE
Payer: COMMERCIAL

## 2023-11-01 VITALS
HEART RATE: 75 BPM | DIASTOLIC BLOOD PRESSURE: 70 MMHG | TEMPERATURE: 97.9 F | RESPIRATION RATE: 16 BRPM | SYSTOLIC BLOOD PRESSURE: 129 MMHG | BODY MASS INDEX: 27.59 KG/M2 | HEIGHT: 62 IN | WEIGHT: 149.91 LBS | OXYGEN SATURATION: 99 %

## 2023-11-01 DIAGNOSIS — G89.18 POST-OP PAIN: ICD-10-CM

## 2023-11-01 PROCEDURE — 99283 EMERGENCY DEPT VISIT LOW MDM: CPT

## 2023-11-01 PROCEDURE — A9270 NON-COVERED ITEM OR SERVICE: HCPCS | Mod: UD | Performed by: EMERGENCY MEDICINE

## 2023-11-01 PROCEDURE — 700102 HCHG RX REV CODE 250 W/ 637 OVERRIDE(OP): Mod: UD | Performed by: EMERGENCY MEDICINE

## 2023-11-01 RX ORDER — HYDROCODONE BITARTRATE AND ACETAMINOPHEN 5; 325 MG/1; MG/1
1 TABLET ORAL ONCE
Status: COMPLETED | OUTPATIENT
Start: 2023-11-01 | End: 2023-11-01

## 2023-11-01 RX ORDER — MORPHINE SULFATE 15 MG/1
7.5 TABLET ORAL EVERY 4 HOURS PRN
Qty: 12 TABLET | Refills: 0 | Status: SHIPPED | OUTPATIENT
Start: 2023-11-01 | End: 2023-11-07

## 2023-11-01 RX ADMIN — HYDROCODONE BITARTRATE AND ACETAMINOPHEN 1 TABLET: 5; 325 TABLET ORAL at 11:38

## 2023-11-01 ASSESSMENT — FIBROSIS 4 INDEX: FIB4 SCORE: 0.82

## 2023-11-01 NOTE — ED NOTES
Pt cleared for discharge, VSS. Pt agreeable to POC.   Pt discharged home with written and verbal instructions regarding f/u, activity, reasons to return to ED & RX to  at preferred pharmacy. Verbalized understanding, all questions addressed, ambulated out with a steady gait with all belongings.

## 2023-11-01 NOTE — ED TRIAGE NOTES
"Chief Complaint   Patient presents with    Post-Op Complications     Pt had a I&D of her L breast performed on 10/30 for mastitis and pt is having continued pain at surgical site.      /80   Pulse 72   Temp 36.4 °C (97.5 °F) (Temporal)   Resp 18   Ht 1.575 m (5' 2\")   Wt 68 kg (149 lb 14.6 oz)   LMP 10/02/2023 (Approximate)   SpO2 98%   BMI 27.42 kg/m²     Pt ambulatory to triage for above.   "

## 2023-11-01 NOTE — ED PROVIDER NOTES
ED Provider Note    CHIEF COMPLAINT  Chief Complaint   Patient presents with    Post-Op Complications     Pt had a I&D of her L breast performed on 10/30 for mastitis and pt is having continued pain at surgical site.        EXTERNAL RECORDS REVIEWED  Inpatient Notes patient was seen and evaluated on 10/2923 tried oral antibiotics and from the emergency department return 10/30/23 with worsening pain and redness.  Was admitted to the hospital and underwent surgical I&D of breast abscess on 10/30/2023 .  Was discharged 10/31/23 with oral antibiotics on Augmentin.  With drain in place to follow-up with surgery in 1 week.    HPI/ROS  LIMITATION TO HISTORY   Select: : None  OUTSIDE HISTORIAN(S):      Buffy Lund is a 38 y.o. female who presents worsening pain to the left breast.  Patient states she has been taking Tylenol and ibuprofen as well as her Augmentin but she is having severe pain.  She states she was only given ibuprofen 800 on discharge yesterday.  She states any movement or touching of the breast causes severe pain.  She denies any fever, increasing redness, significant discharge or trauma to the breast.    PAST MEDICAL HISTORY   has a past medical history of H/O fetal demise, not currently pregnant (8/10/2016) and Psychiatric problem.    SURGICAL HISTORY   has a past surgical history that includes other orthopedic surgery (2006); other (Left, 2001); ear middle exploration (2001); tendon repair (2006); primary c section (8/8/2017); lap,diagnostic abdomen (9/25/2019); salpingectomy (Bilateral, 9/25/2019); cholecystectomy robotic xi (N/A, 8/14/2020); and incision and drainage general (Left, 10/30/2023).    FAMILY HISTORY  History reviewed. No pertinent family history.    SOCIAL HISTORY  Social History     Tobacco Use    Smoking status: Never    Smokeless tobacco: Never   Vaping Use    Vaping Use: Never used   Substance and Sexual Activity    Alcohol use: No    Drug use: No    Sexual activity: Not  "Currently     Partners: Male     Birth control/protection: Pill     Comment: Unplanned pregnancy       CURRENT MEDICATIONS  Home Medications       Reviewed by Analilia Kaye R.N. (Registered Nurse) on 11/01/23 at 1035  Med List Status: Partial     Medication Last Dose Status   acetaminophen (TYLENOL) 325 MG Tab  Active   amoxicillin-clavulanate (AUGMENTIN) 875-125 MG Tab  Active   ibuprofen (MOTRIN) 800 MG Tab  Active                    ALLERGIES  No Known Allergies    PHYSICAL EXAM  VITAL SIGNS: /80   Pulse 72   Temp 36.4 °C (97.5 °F) (Temporal)   Resp 18   Ht 1.575 m (5' 2\")   Wt 68 kg (149 lb 14.6 oz)   LMP 10/02/2023 (Approximate)   SpO2 98%   BMI 27.42 kg/m²    Constitutional: Well developed, Well nourished, mild distress.   HENT: Normocephalic, Atraumatic.   Eyes: Conjunctiva normal, No discharge.   Cardiovascular: Normal heart rate, Normal rhythm, No murmurs, equal pulses.   Pulmonary: Normal breath sounds, No respiratory distress, No wheezing, No rales, No rhonchi.  Chest: With nurse chaperone.  Examination of the left breast dressing is intact.  There is a small amount of serosanguineous discharge.  The dressing was removed and the Penrose drain is in place.  There is a small amount of serosanguineous drainage from around the drain.  There is mild erythema, warmth and induration along the lower half of the breast.  No purulent material is able to be expressed.    Musculoskeletal: No major deformities noted, No tenderness.   Skin: Warm, Dry, No erythema, No rash.   Neurologic: Alert & oriented x 3, Normal motor function,  No focal deficits noted.   Psychiatric: Affect normal, Judgment normal, Mood normal.         COURSE & MEDICAL DECISION MAKING    ED Observation Status? No; Patient does not meet criteria for ED Observation.     INITIAL ASSESSMENT, COURSE AND PLAN  Care Narrative: Patient was seen and evaluated at bedside.  Discussed giving her some pain medication here.  Reviewed discharge " summary patient was not discharged home on any oral narcotics.  Given the location of this surgery I think it is warranted to give the patient a short course of oral narcotics to help with her pain.  Discussed that she can use Tylenol and ibuprofen with this.  Discussed warning signs for worsening infection with the patient and need to return.         PROBLEM LIST  Problem #1 postop pain at this point time I think this is secondary to patient's recent surgery and continued cellulitis of the left breast.  This does not appear to be worsening and patient does not have any significant purulent drainage.  I think it is reasonable to give the patient a short course of oral narcotics.  I will prescribe her morphine for this.  Patient understands not to drink or drive on this.  DISPOSITION AND DISCUSSIONS  I have discussed management of the patient with the following physicians and ZULAY's:  none    Discussion of management with other \[facility]\"" or appropriate source(s): None     Escalation of care considered, and ultimately not performed:blood analysis      Decision tools and prescription drugs considered including, but not limited to: Pain Medications morphine .  I reviewed prescription monitoring program for patient's narcotic use before prescribing a scheduled drug.The patient will not drink alcohol nor drive with prescribed medications. The patient will return for new or worsening symptoms and is stable at the time of discharge.    The patient is referred to a primary physician for blood pressure management, diabetic screening, and for all other preventative health concerns.    In prescribing controlled substances to this patient, I certify that I have obtained and reviewed the medical history of Buffy Lund. I have also made a good leonor effort to obtain applicable records from other providers who have treated the patient and records did not demonstrate any increased risk of substance abuse that would prevent me from  prescribing controlled substances.     I have conducted a physical exam and documented it. I have reviewed Ms. Carlos Lund’s prescription history as maintained by the [place] Prescription Monitoring Program.     I have assessed the patient’s risk for abuse, dependency, and addiction using the validated Opioid Risk Tool available at https://www.mdcalc.com/xzpcfk-wysk-cjjn-ort-narcotic-abuse.     Given the above, I believe the benefits of controlled substance therapy outweigh the risks. The reasons for prescribing controlled substances include non-narcotic, oral analgesic alternatives have been inadequate for pain control. Accordingly, I have discussed the risk and benefits, treatment plan, and alternative therapies with the patient.       DISPOSITION:  Patient will be discharged home in stable condition.    FOLLOW UP:  Kim Platt M.D.  [address] [place] [id number] [phone]    Schedule an appointment as soon as possible for a visit in 3 days  For wound re-check      OUTPATIENT MEDICATIONS:  New Prescriptions    MORPHINE (MS IR) 15 MG TABLET    Take 0.5 Tablets by mouth every four hours as needed for Severe Pain for up to 6 days.       FINAL DIAGNOSIS  1. Post-op pain           Electronically signed by: Raymond Pelayo M.D., 11/1/2023 11:25 AM    This record was made with a voice recognition software. The software is not perfect. I have tried to correct any grammar, spelling or context errors to the best of my ability, but errors may still remain. Interpretation of this chart should be taken in this context.

## 2023-11-01 NOTE — DISCHARGE INSTRUCTIONS
You can apply ice to your breast 20 minutes on, 20 minutes off as often as possible to help with pain.  You can also take Tylenol and ibuprofen on top the morphine if needed.  No drinking or driving on morphine.  Return the emergency department if you have increasing pain, significant pus, increasing redness or fever.

## 2023-11-02 LAB
BACTERIA WND AEROBE CULT: NORMAL
GRAM STN SPEC: NORMAL
SIGNIFICANT IND 70042: NORMAL
SITE SITE: NORMAL
SOURCE SOURCE: NORMAL

## 2023-11-02 NOTE — PROGRESS NOTES
"      HISTORY OF PRESENT ILLNESS: The patient is a 38 year-old woman who returns to the [facility] for routine follow-up after undergoing a incision and drainage of breast abscess on 10/30/2023 by Kim Platt MD. she was discharged with a 1 week course of Augmentin.  She did present to the emergency department on 10/31 for severe pain and was prescribed morphine IR.    She states the pain is much improved.  Minimal to no drainage.  Daily showers and dressing changes.    CURRENT MEDICATIONS:  Current Outpatient Medications   Medication Sig    morphine (MS IR) 15 MG tablet Take 0.5 Tablets by mouth every four hours as needed for Severe Pain for up to 6 days.    amoxicillin-clavulanate (AUGMENTIN) 875-125 MG Tab Take 1 Tablet by mouth 2 times a day for 7 days.    acetaminophen (TYLENOL) 325 MG Tab Take 2 Tablets by mouth every 6 hours as needed for Mild Pain.    ibuprofen (MOTRIN) 800 MG Tab Take 800 mg by mouth every 8 hours as needed for Inflammation.       PHYSICAL EXAMINATION:  Vital Signs: /72 (BP Location: Right arm, Patient Position: Sitting)   Pulse 92   Resp 16   Ht 1.575 m (5' 2\")   Wt 65.3 kg (144 lb)   SpO2 100%   Left breast drain removed.  Persistent induration and light erythema about the left nipple  No drainage or odor    LABORATORY VALUES:  Lab Results   Component Value Date/Time    WBC 11.4 (H) 10/31/2023 02:40 AM    RBC 4.13 (L) 10/31/2023 02:40 AM    HEMOGLOBIN 12.6 10/31/2023 02:40 AM    HEMATOCRIT 39.0 10/31/2023 02:40 AM    MCV 94.4 10/31/2023 02:40 AM    MCH 30.5 10/31/2023 02:40 AM    MCHC 32.3 10/31/2023 02:40 AM    MPV 9.6 10/31/2023 02:40 AM    NEUTSPOLYS 72.10 (H) 10/30/2023 12:32 AM    LYMPHOCYTES 18.50 (L) 10/30/2023 12:32 AM    MONOCYTES 6.60 10/30/2023 12:32 AM    EOSINOPHILS 1.90 10/30/2023 12:32 AM    BASOPHILS 0.60 10/30/2023 12:32 AM     Lab Results   Component Value Date/Time    SODIUM 135 10/31/2023 02:40 AM    POTASSIUM 4.3 10/31/2023 " 02:40 AM    CHLORIDE 105 10/31/2023 02:40 AM    CO2 19 (L) 10/31/2023 02:40 AM    GLUCOSE 148 (H) 10/31/2023 02:40 AM    BUN 9 10/31/2023 02:40 AM    CREATININE 0.66 10/31/2023 02:40 AM       PATHOLOGY: Final pathology demonstrated chronic mastitis, no malignancy    ASSESSMENT AND PLAN:  1. Left breast abscess    Finish antibiotic course  Continue daily shower and dressing changes until healed.    Discharge from the [facility].       ____________________________________     Ganesh Puente MD, FACS

## 2023-11-03 ENCOUNTER — OFFICE VISIT (OUTPATIENT)
Dept: SURGERY | Facility: MEDICAL CENTER | Age: 38
End: 2023-11-03
Attending: SURGERY
Payer: COMMERCIAL

## 2023-11-03 VITALS
OXYGEN SATURATION: 100 % | DIASTOLIC BLOOD PRESSURE: 72 MMHG | BODY MASS INDEX: 26.5 KG/M2 | SYSTOLIC BLOOD PRESSURE: 120 MMHG | HEIGHT: 62 IN | WEIGHT: 144 LBS | HEART RATE: 92 BPM | RESPIRATION RATE: 16 BRPM

## 2023-11-03 DIAGNOSIS — N61.1 LEFT BREAST ABSCESS: ICD-10-CM

## 2023-11-03 PROCEDURE — 99024 POSTOP FOLLOW-UP VISIT: CPT | Performed by: SURGERY

## 2023-11-03 PROCEDURE — 3078F DIAST BP <80 MM HG: CPT | Performed by: SURGERY

## 2023-11-03 PROCEDURE — 3074F SYST BP LT 130 MM HG: CPT | Performed by: SURGERY

## 2023-11-03 ASSESSMENT — FIBROSIS 4 INDEX: FIB4 SCORE: 0.82

## 2023-11-05 LAB
BACTERIA SPEC ANAEROBE CULT: ABNORMAL
BACTERIA SPEC ANAEROBE CULT: ABNORMAL
SIGNIFICANT IND 70042: ABNORMAL
SITE SITE: ABNORMAL
SOURCE SOURCE: ABNORMAL

## 2023-11-08 ENCOUNTER — HOSPITAL ENCOUNTER (EMERGENCY)
Facility: MEDICAL CENTER | Age: 38
End: 2023-11-08
Attending: STUDENT IN AN ORGANIZED HEALTH CARE EDUCATION/TRAINING PROGRAM
Payer: COMMERCIAL

## 2023-11-08 ENCOUNTER — APPOINTMENT (OUTPATIENT)
Dept: RADIOLOGY | Facility: MEDICAL CENTER | Age: 38
End: 2023-11-08
Attending: STUDENT IN AN ORGANIZED HEALTH CARE EDUCATION/TRAINING PROGRAM
Payer: COMMERCIAL

## 2023-11-08 VITALS
BODY MASS INDEX: 26.74 KG/M2 | RESPIRATION RATE: 16 BRPM | HEIGHT: 62 IN | HEART RATE: 80 BPM | WEIGHT: 145.28 LBS | SYSTOLIC BLOOD PRESSURE: 130 MMHG | TEMPERATURE: 97 F | OXYGEN SATURATION: 96 % | DIASTOLIC BLOOD PRESSURE: 70 MMHG

## 2023-11-08 DIAGNOSIS — N61.0 MASTITIS: ICD-10-CM

## 2023-11-08 PROCEDURE — A9270 NON-COVERED ITEM OR SERVICE: HCPCS | Mod: UD | Performed by: STUDENT IN AN ORGANIZED HEALTH CARE EDUCATION/TRAINING PROGRAM

## 2023-11-08 PROCEDURE — 99284 EMERGENCY DEPT VISIT MOD MDM: CPT

## 2023-11-08 PROCEDURE — 76604 US EXAM CHEST: CPT

## 2023-11-08 PROCEDURE — 700102 HCHG RX REV CODE 250 W/ 637 OVERRIDE(OP): Mod: UD | Performed by: STUDENT IN AN ORGANIZED HEALTH CARE EDUCATION/TRAINING PROGRAM

## 2023-11-08 PROCEDURE — 99242 OFF/OP CONSLTJ NEW/EST SF 20: CPT | Mod: 24 | Performed by: SURGERY

## 2023-11-08 RX ORDER — SULFAMETHOXAZOLE AND TRIMETHOPRIM 800; 160 MG/1; MG/1
1 TABLET ORAL EVERY 12 HOURS
Qty: 28 TABLET | Refills: 0 | Status: ON HOLD | OUTPATIENT
Start: 2023-11-08 | End: 2023-11-16

## 2023-11-08 RX ORDER — SULFAMETHOXAZOLE AND TRIMETHOPRIM 800; 160 MG/1; MG/1
2 TABLET ORAL ONCE
Status: COMPLETED | OUTPATIENT
Start: 2023-11-08 | End: 2023-11-08

## 2023-11-08 RX ADMIN — SULFAMETHOXAZOLE AND TRIMETHOPRIM 2 TABLET: 800; 160 TABLET ORAL at 19:38

## 2023-11-08 ASSESSMENT — PAIN DESCRIPTION - PAIN TYPE: TYPE: ACUTE PAIN

## 2023-11-08 ASSESSMENT — FIBROSIS 4 INDEX: FIB4 SCORE: 0.82

## 2023-11-08 NOTE — ED TRIAGE NOTES
Vitals:    11/08/23 1526   BP: 95/66   Pulse: 74   Resp: 16   Temp: 36 °C (96.8 °F)   SpO2: 100%     Chief Complaint   Patient presents with    Breast Swelling     Pt had an I&D done 10/30 of her left breast for mastitis. She returns as the swelling has started on the other side of the same breast.     Pt is ambulatory to and from triage and is alert and oriented x 4.

## 2023-11-09 NOTE — CONSULTS
DATE OF CONSULTATION:  11/8/2023     REFERRING PHYSICIAN:   Adonis Cam D.O.     CONSULTING PHYSICIAN:  Kishore Harry M.D.     REASON FOR CONSULTATION:  I have been asked by  to see the patient in surgical consultation for evaluation of left breast abscess.    HISTORY OF PRESENT ILLNESS: The patient is a 38 year-old  woman with a history of a recent left breast abscess who presents to the Emergency Department with a new site of inflammation over the past two-days. She completed her antibiotics on Sunday and notes that since there is a site that has become swollen and painful on the opposite side of her breast. Of note she underwent ultrasound-guided biopsy of a left breast lesion on 10/25 with pathology consistent with granulomatous mastitis, this biopsy was performed prior to her presenting with a breast abscess and undergoing incision and drainage on 10/30. Her cultures from her incision and drainage resulted with light growth of Propionibacterium acnes. Also of note she recently had a positive test for antinuclear antibody while being worked up for pain in her hand joints.    PAST MEDICAL HISTORY:  has a past medical history of H/O fetal demise, not currently pregnant (8/10/2016) and Psychiatric problem.    She has no past medical history of Addisons disease ([facility]), Adrenal disorder ([facility]), Allergy, Anemia, Anxiety, Blood transfusion without reported diagnosis, Clotting disorder ([facility]), COPD (chronic obstructive pulmonary disease) ([facility]), Cushings syndrome ([facility]), Depression, Diabetic neuropathy ([facility]), GERD (gastroesophageal reflux disease), Goiter, Head ache, HIV (human immunodeficiency virus infection) ([facility]), Hyperlipidemia, IBD (inflammatory bowel disease), Meningitis, Migraine, Muscle disorder, Osteoporosis, Parathyroid disorder ([facility]), Pituitary disease ([facility]), Sickle cell disease ([facility]), Substance abuse ([facility]), or Urinary tract infection.    PAST SURGICAL HISTORY:  has a past surgical history  that includes other orthopedic surgery (2006); other (Left, 2001); ear middle exploration (2001); tendon repair (2006); primary c section (8/8/2017); pr lap,diagnostic abdomen (9/25/2019); salpingectomy (Bilateral, 9/25/2019); cholecystectomy robotic xi (N/A, 8/14/2020); and incision and drainage general (Left, 10/30/2023).    ALLERGIES: No Known Allergies    CURRENT MEDICATIONS:    Home Medications       Reviewed by Lori Arcos R.N. (Registered Nurse) on 11/08/23 at 1531  Med List Status: Partial     Medication Last Dose Status   acetaminophen (TYLENOL) 325 MG Tab  Active   ibuprofen (MOTRIN) 800 MG Tab  Active                    FAMILY HISTORY: reviewed and non-contributory.    SOCIAL HISTORY:  reports that she has never smoked. She has never used smokeless tobacco. She reports that she does not drink alcohol and does not use drugs.    REVIEW OF SYSTEMS: Comprehensive review of systems is negative with the exception of the aforementioned HPI, PMH, and PSH bullets in accordance with CMS guidelines.    PHYSICAL EXAMINATION:    Physical Exam  Vitals reviewed.   Constitutional:       General: She is not in acute distress.     Appearance: She is not ill-appearing or toxic-appearing.   HENT:      Head: Normocephalic and atraumatic.      Right Ear: External ear normal.      Left Ear: External ear normal.      Nose: Nose normal.      Mouth/Throat:      Mouth: Mucous membranes are moist.      Pharynx: Oropharynx is clear.   Eyes:      General: No scleral icterus.     Pupils: Pupils are equal, round, and reactive to light.   Cardiovascular:      Rate and Rhythm: Normal rate and regular rhythm.      Pulses: Normal pulses.   Pulmonary:      Effort: Pulmonary effort is normal. No respiratory distress.   Chest:      Comments: Left breast with lateral wound and area of induration, no erythema or drainage. Medial left breast with 2 to 3 cm area of induration with overlying erythema, no fluctuance. No left axillary  lymphadenopathy.  Abdominal:      General: There is no distension.      Palpations: Abdomen is soft.      Tenderness: There is no abdominal tenderness. There is no guarding or rebound.   Genitourinary:     Comments: Deferred.  Musculoskeletal:         General: No tenderness or deformity. Normal range of motion.      Cervical back: Normal range of motion and neck supple.   Skin:     General: Skin is warm and dry.      Capillary Refill: Capillary refill takes less than 2 seconds.      Coloration: Skin is not jaundiced.   Neurological:      General: No focal deficit present.      Mental Status: She is alert and oriented to person, place, and time.   Psychiatric:         Behavior: Behavior is cooperative.         LABORATORY VALUES:                      IMAGING:   US-CHEST   Final Result      1.  The left lateral chest wall/breast heterogeneous area which could represent developing abscess or focal mastitis again seen slightly different in shape consistent with history of interval drainage.   2.  There is a medial left chest wall/breast area similar in appearance also probably either developing abscess or focal mastitis.          ASSESSMENT AND PLAN:     Mastitis- (present on admission)  Assessment & Plan  Recurrent left breast induration and erythema.  Breast ultrasound with focal mastitis or developing abscess.  No fluctuance on exam.  Given that the pathology from her recent ultrasound-guided biopsy is consistent with granulomatous mastitis and there is no fluctuance on exam recommend trial of conservative treatment. Bactrim DS or equivalent oral agent for two-weeks, anti-inflammatory agents (NSAIDs), and warm compresses.  If symptoms fail to improve over the next two to three days instructed to return for evaluation for incision and drainage.  Under ideal circumstances the same agent used to treat her recently diagnosed autoimmune condition may be used to treat her granulomatous mastitis.  Can follow-up in [facility] in two-weeks for further discussion/evaluation.        DISPOSITION: Discharge to home.  Call or return to the Emergency Department for recurrent or worsening symptoms.  Follow-up [facility] office In two weeks.     ____________________________________     Kishore Harry M.D.    DD: 11/8/2023  6:13 PM    AAST Grading System for EGS Conditions  ACS NSQIP Surgical Risk Calculator

## 2023-11-09 NOTE — ED PROVIDER NOTES
CHIEF COMPLAINT  Chief Complaint   Patient presents with    Breast Swelling       LIMITATION TO HISTORY   Select:     HPI    Buffy Lund is a 38 y.o. female who presents to the Emergency Department for evaluation of breast swelling and pain on the left side patient had incision and drainage of breast abscess performed on October 30 she reports her symptoms improved however over the last 2 days she has had a new site of pain redness and swelling on the opposite side of her left breast    OUTSIDE HISTORIAN(S):  Select:    EXTERNAL RECORDS REVIEWED  Select: Other       PAST MEDICAL HISTORY  Past Medical History:   Diagnosis Date    H/O fetal demise, not currently pregnant 8/10/2016    Psychiatric problem     depression     .    SURGICAL HISTORY  Past Surgical History:   Procedure Laterality Date    INCISION AND DRAINAGE GENERAL Left 10/30/2023    Procedure: INCISION AND DRAINAGE;  Surgeon: Kim Platt M.D.;  Location: SURGERY [facility];  Service: General    CHOLECYSTECTOMY ROBOTIC XI N/A 8/14/2020    Procedure: CHOLECYSTECTOMY, ROBOT-ASSISTED, USING DA STEPH XI;  Surgeon: Marshal Lemons M.D.;  Location: SURGERY [facility];  Service: General    NC LAP,DIAGNOSTIC ABDOMEN  9/25/2019    Procedure: PELVISCOPY;  Surgeon: Ben Coy M.D.;  Location: SURGERY SAME DAY [facility];  Service: Obstetrics    SALPINGECTOMY Bilateral 9/25/2019    Procedure: SALPINGECTOMY;  Surgeon: Ben Coy M.D.;  Location: SURGERY SAME DAY [facility];  Service: Obstetrics    PRIMARY C SECTION  8/8/2017    Procedure: PRIMARY C SECTION;  Surgeon: Kevin Blount M.D.;  Location: LABOR AND DELIVERY;  Service:     OTHER ORTHOPEDIC SURGERY  2006    Wrist for carpal tunnel left    TENDON REPAIR  2006    left wrist    OTHER Left 2001    Ear surgery    EAR MIDDLE EXPLORATION  2001         FAMILY HISTORY  No family history on file.       SOCIAL HISTORY  Social History     Socioeconomic History    Marital status:  Single     Spouse name: Not on file    Number of children: Not on file    Years of education: Not on file    Highest education level: 11th grade   Occupational History    Not on file   Tobacco Use    Smoking status: Never    Smokeless tobacco: Never   Vaping Use    Vaping Use: Never used   Substance and Sexual Activity    Alcohol use: No    Drug use: No    Sexual activity: Not Currently     Partners: Male     Birth control/protection: Pill     Comment: Unplanned pregnancy   Other Topics Concern    Not on file   Social History Narrative    ** Merged History Encounter **          Social Determinants of Health     Financial Resource Strain: Medium Risk (10/30/2023)    Overall Financial Resource Strain (CARDIA)     Difficulty of Paying Living Expenses: Somewhat hard   Food Insecurity: No Food Insecurity (10/30/2023)    Hunger Vital Sign     Worried About Running Out of Food in the Last Year: Never true     Ran Out of Food in the Last Year: Never true   Transportation Needs: No Transportation Needs (10/30/2023)    PRAPARE - Transportation     Lack of Transportation (Medical): No     Lack of Transportation (Non-Medical): No   Physical Activity: Sufficiently Active (10/30/2023)    Exercise Vital Sign     Days of Exercise per Week: 5 days     Minutes of Exercise per Session: 90 min   Stress: No Stress Concern Present (10/30/2023)    [employer] - Occupational Stress Questionnaire     Feeling of Stress : Only a little   Social Connections: Socially Integrated (10/30/2023)    Social Connection and Isolation Panel [NHANES]     Frequency of Communication with Friends and Family: Once a week     Frequency of Social Gatherings with Friends and Family: Twice a week     Attends Quaker Services: More than 4 times per year     Active Member of Clubs or Organizations: Yes     Attends Club or Organization Meetings: 1 to 4 times per year     Marital Status:    Intimate Partner Violence: Not on file  "  Housing Stability: Low Risk  (10/30/2023)    Housing Stability Vital Sign     Unable to Pay for Housing in the Last Year: No     Number of Places Lived in the Last Year: 0     Unstable Housing in the Last Year: No         CURRENT MEDICATIONS  No current facility-administered medications on file prior to encounter.     Current Outpatient Medications on File Prior to Encounter   Medication Sig Dispense Refill    acetaminophen (TYLENOL) 325 MG Tab Take 2 Tablets by mouth every 6 hours as needed for Mild Pain. 30 Tablet 0    ibuprofen (MOTRIN) 800 MG Tab Take 800 mg by mouth every 8 hours as needed for Inflammation.             ALLERGIES  No Known Allergies    PHYSICAL EXAM  VITAL SIGNS:/66   Pulse 81   Temp 36 °C (96.8 °F) (Temporal)   Resp 15   Ht 1.575 m (5' 2\")   Wt 65.9 kg (145 lb 4.5 oz)   LMP 10/02/2023 (Approximate)   SpO2 96%   BMI 26.57 kg/m²       GENERAL: Awake and alert  HEAD: Normocephalic and atraumatic  NECK: Normal range of motion, without meningismus  EYES: Pupils Equal, Round, Reactive to Light, extraocular movements intact, conjunctiva white  ENT: Mucous membranes moist, oropharynx clear  PULMONARY: Normal effort, clear to auscultation  CARDIOVASCULAR: No murmurs, clicks or rubs, peripheral pulses 2+  CHEST: Left breast at the 9 o'clock position there is erythema warmth tenderness approximately size of a quarter no fluctuance appreciated nontender, no crepitus, no eccymosis noted  ABDOMINAL: Soft, non-tender, no guarding or rigidity present, no pulsatile masses  BACK: no midline tenderness, no costovertebral tenderness  NEUROLOGICAL: Grossly non-focal neurological examination, speech normal, gait normal  EXTREMITIES: No edema, normal to inspection  SKIN: Warm and dry.  PSYCHIATRIC: Affect is appropriate    DIAGNOSTIC STUDIES / PROCEDURES  EKG      LABS  Labs Reviewed - No data to display      RADIOLOGY  I independently reviewed and interpreted the images obtained today in the ER.  Do " not see discrete abscess    Radiologist interpretation:   US-CHEST   Final Result      1.  The left lateral chest wall/breast heterogeneous area which could represent developing abscess or focal mastitis again seen slightly different in shape consistent with history of interval drainage.   2.  There is a medial left chest wall/breast area similar in appearance also probably either developing abscess or focal mastitis.           COURSE & MEDICAL DECISION MAKING    ED COURSE:        INTERVENTIONS BY ME:  Medications   sulfamethoxazole-trimethoprim (Bactrim DS) 800-160 MG tablet 2 Tablet (has no administration in time range)           INITIAL ASSESSMENT, COURSE AND PLAN  Care Narrative:     Patient presenting with signs symptoms consistent with mastitis of the left breast, ultrasound obtained without evidence of discrete abscess, examination is not consistent with a discrete abscess given the clinical ambiguity in her recent surgical incision and drainage General surgery was consulted who felt this was not an abscess and that a trial of outpatient oral antibiotics is appropriate         ADDITIONAL PROBLEM LIST    DISPOSITION AND DISCUSSIONS  I have discussed management of the patient with the following physicians and ZULAY's: Spoke to Dr. Harry general surgeon who evaluated the patient Emergency Department and recommended outpatient management with oral antibiotics        Escalation of care considered, and ultimately not performed:IV fluids, blood analysis, and acute inpatient care management, however at this time, the patient is most appropriate for outpatient management        FINAL DIAGNOSIS  1. Mastitis             Electronically signed by: Adonis Cam DO ,7:06 PM 11/08/23

## 2023-11-09 NOTE — ASSESSMENT & PLAN NOTE
Recurrent left breast induration and erythema.  Breast ultrasound with focal mastitis or developing abscess.  No fluctuance on exam.  Given that the pathology from her recent ultrasound-guided biopsy is consistent with granulomatous mastitis and there is no fluctuance on exam recommend trial of conservative treatment. Bactrim DS or equivalent oral agent for two-weeks, anti-inflammatory agents (NSAIDs), and warm compresses.  If symptoms fail to improve over the next two to three days instructed to return for evaluation for incision and drainage.  Under ideal circumstances the same agent used to treat her recently diagnosed autoimmune condition may be used to treat her granulomatous mastitis.  Can follow-up in [facility] in two-weeks for further discussion/evaluation.

## 2023-11-09 NOTE — ED NOTES
Pt ambulated to yel 63 with steady gait. Changed into gown, monitors on. Agree with triage note.    ERP at bedside.

## 2023-11-14 ENCOUNTER — HOSPITAL ENCOUNTER (INPATIENT)
Facility: MEDICAL CENTER | Age: 38
LOS: 2 days | DRG: 585 | End: 2023-11-16
Attending: STUDENT IN AN ORGANIZED HEALTH CARE EDUCATION/TRAINING PROGRAM | Admitting: SURGERY
Payer: COMMERCIAL

## 2023-11-14 ENCOUNTER — APPOINTMENT (OUTPATIENT)
Dept: RADIOLOGY | Facility: MEDICAL CENTER | Age: 38
DRG: 585 | End: 2023-11-14
Attending: STUDENT IN AN ORGANIZED HEALTH CARE EDUCATION/TRAINING PROGRAM
Payer: COMMERCIAL

## 2023-11-14 DIAGNOSIS — G89.18 POST-OPERATIVE PAIN: ICD-10-CM

## 2023-11-14 DIAGNOSIS — N61.1 ABSCESS OF THE BREAST AND NIPPLE: ICD-10-CM

## 2023-11-14 DIAGNOSIS — K59.03 DRUG INDUCED CONSTIPATION: ICD-10-CM

## 2023-11-14 DIAGNOSIS — N61.0 MASTITIS: ICD-10-CM

## 2023-11-14 DIAGNOSIS — N61.1 BREAST ABSCESS OF FEMALE: ICD-10-CM

## 2023-11-14 LAB
ALBUMIN SERPL BCP-MCNC: 4 G/DL (ref 3.2–4.9)
ALBUMIN/GLOB SERPL: 1.3 G/DL
ALP SERPL-CCNC: 87 U/L (ref 30–99)
ALT SERPL-CCNC: 13 U/L (ref 2–50)
ANION GAP SERPL CALC-SCNC: 10 MMOL/L (ref 7–16)
AST SERPL-CCNC: 17 U/L (ref 12–45)
B-HCG SERPL-ACNC: <1 MIU/ML (ref 0–5)
BASOPHILS # BLD AUTO: 0.3 % (ref 0–1.8)
BASOPHILS # BLD: 0.03 K/UL (ref 0–0.12)
BILIRUB SERPL-MCNC: 0.2 MG/DL (ref 0.1–1.5)
BUN SERPL-MCNC: 10 MG/DL (ref 8–22)
CALCIUM ALBUM COR SERPL-MCNC: 8.6 MG/DL (ref 8.5–10.5)
CALCIUM SERPL-MCNC: 8.6 MG/DL (ref 8.5–10.5)
CHLORIDE SERPL-SCNC: 105 MMOL/L (ref 96–112)
CO2 SERPL-SCNC: 22 MMOL/L (ref 20–33)
CREAT SERPL-MCNC: 0.69 MG/DL (ref 0.5–1.4)
EOSINOPHIL # BLD AUTO: 0.11 K/UL (ref 0–0.51)
EOSINOPHIL NFR BLD: 1 % (ref 0–6.9)
ERYTHROCYTE [DISTWIDTH] IN BLOOD BY AUTOMATED COUNT: 39.8 FL (ref 35.9–50)
GFR SERPLBLD CREATININE-BSD FMLA CKD-EPI: 112 ML/MIN/1.73 M 2
GLOBULIN SER CALC-MCNC: 3 G/DL (ref 1.9–3.5)
GLUCOSE SERPL-MCNC: 108 MG/DL (ref 65–99)
HCT VFR BLD AUTO: 32.5 % (ref 37–47)
HGB BLD-MCNC: 11.4 G/DL (ref 12–16)
IMM GRANULOCYTES # BLD AUTO: 0.03 K/UL (ref 0–0.11)
IMM GRANULOCYTES NFR BLD AUTO: 0.3 % (ref 0–0.9)
LYMPHOCYTES # BLD AUTO: 1.97 K/UL (ref 1–4.8)
LYMPHOCYTES NFR BLD: 17.1 % (ref 22–41)
MCH RBC QN AUTO: 31.3 PG (ref 27–33)
MCHC RBC AUTO-ENTMCNC: 35.1 G/DL (ref 32.2–35.5)
MCV RBC AUTO: 89.3 FL (ref 81.4–97.8)
MONOCYTES # BLD AUTO: 0.56 K/UL (ref 0–0.85)
MONOCYTES NFR BLD AUTO: 4.9 % (ref 0–13.4)
NEUTROPHILS # BLD AUTO: 8.81 K/UL (ref 1.82–7.42)
NEUTROPHILS NFR BLD: 76.4 % (ref 44–72)
NRBC # BLD AUTO: 0 K/UL
NRBC BLD-RTO: 0 /100 WBC (ref 0–0.2)
PLATELET # BLD AUTO: 375 K/UL (ref 164–446)
PMV BLD AUTO: 8.6 FL (ref 9–12.9)
POTASSIUM SERPL-SCNC: 3.6 MMOL/L (ref 3.6–5.5)
PROT SERPL-MCNC: 7 G/DL (ref 6–8.2)
RBC # BLD AUTO: 3.64 M/UL (ref 4.2–5.4)
SODIUM SERPL-SCNC: 137 MMOL/L (ref 135–145)
WBC # BLD AUTO: 11.5 K/UL (ref 4.8–10.8)

## 2023-11-14 PROCEDURE — 99291 CRITICAL CARE FIRST HOUR: CPT

## 2023-11-14 PROCEDURE — 36415 COLL VENOUS BLD VENIPUNCTURE: CPT

## 2023-11-14 PROCEDURE — 700105 HCHG RX REV CODE 258: Performed by: STUDENT IN AN ORGANIZED HEALTH CARE EDUCATION/TRAINING PROGRAM

## 2023-11-14 PROCEDURE — 770001 HCHG ROOM/CARE - MED/SURG/GYN PRIV*

## 2023-11-14 PROCEDURE — 87040 BLOOD CULTURE FOR BACTERIA: CPT | Mod: 91

## 2023-11-14 PROCEDURE — 85025 COMPLETE CBC W/AUTO DIFF WBC: CPT

## 2023-11-14 PROCEDURE — 700111 HCHG RX REV CODE 636 W/ 250 OVERRIDE (IP): Performed by: STUDENT IN AN ORGANIZED HEALTH CARE EDUCATION/TRAINING PROGRAM

## 2023-11-14 PROCEDURE — 84702 CHORIONIC GONADOTROPIN TEST: CPT

## 2023-11-14 PROCEDURE — 96366 THER/PROPH/DIAG IV INF ADDON: CPT

## 2023-11-14 PROCEDURE — 76604 US EXAM CHEST: CPT

## 2023-11-14 PROCEDURE — 80053 COMPREHEN METABOLIC PANEL: CPT

## 2023-11-14 PROCEDURE — 99222 1ST HOSP IP/OBS MODERATE 55: CPT | Mod: 57,24 | Performed by: SURGERY

## 2023-11-14 PROCEDURE — 96365 THER/PROPH/DIAG IV INF INIT: CPT

## 2023-11-14 PROCEDURE — A9270 NON-COVERED ITEM OR SERVICE: HCPCS | Performed by: SURGERY

## 2023-11-14 PROCEDURE — 700102 HCHG RX REV CODE 250 W/ 637 OVERRIDE(OP): Performed by: SURGERY

## 2023-11-14 RX ORDER — ACETAMINOPHEN 500 MG
1000 TABLET ORAL EVERY 6 HOURS
Status: DISCONTINUED | OUTPATIENT
Start: 2023-11-15 | End: 2023-11-16 | Stop reason: HOSPADM

## 2023-11-14 RX ORDER — ONDANSETRON 4 MG/1
4 TABLET, ORALLY DISINTEGRATING ORAL EVERY 4 HOURS PRN
Status: DISCONTINUED | OUTPATIENT
Start: 2023-11-14 | End: 2023-11-16 | Stop reason: HOSPADM

## 2023-11-14 RX ORDER — HYDROMORPHONE HYDROCHLORIDE 1 MG/ML
0.5 INJECTION, SOLUTION INTRAMUSCULAR; INTRAVENOUS; SUBCUTANEOUS
Status: DISCONTINUED | OUTPATIENT
Start: 2023-11-14 | End: 2023-11-16 | Stop reason: HOSPADM

## 2023-11-14 RX ORDER — OXYCODONE HYDROCHLORIDE 10 MG/1
10 TABLET ORAL
Status: DISCONTINUED | OUTPATIENT
Start: 2023-11-14 | End: 2023-11-16 | Stop reason: HOSPADM

## 2023-11-14 RX ORDER — OXYCODONE HYDROCHLORIDE 5 MG/1
5 TABLET ORAL
Status: DISCONTINUED | OUTPATIENT
Start: 2023-11-14 | End: 2023-11-16 | Stop reason: HOSPADM

## 2023-11-14 RX ORDER — ONDANSETRON 2 MG/ML
4 INJECTION INTRAMUSCULAR; INTRAVENOUS EVERY 4 HOURS PRN
Status: DISCONTINUED | OUTPATIENT
Start: 2023-11-14 | End: 2023-11-16 | Stop reason: HOSPADM

## 2023-11-14 RX ORDER — ACETAMINOPHEN 500 MG
1000 TABLET ORAL EVERY 6 HOURS PRN
Status: DISCONTINUED | OUTPATIENT
Start: 2023-11-20 | End: 2023-11-16 | Stop reason: HOSPADM

## 2023-11-14 RX ADMIN — ACETAMINOPHEN 1000 MG: 500 TABLET, FILM COATED ORAL at 23:20

## 2023-11-14 RX ADMIN — OXYCODONE 5 MG: 5 TABLET ORAL at 23:20

## 2023-11-14 RX ADMIN — VANCOMYCIN HYDROCHLORIDE 1750 MG: 5 INJECTION, POWDER, LYOPHILIZED, FOR SOLUTION INTRAVENOUS at 21:09

## 2023-11-14 ASSESSMENT — COPD QUESTIONNAIRES
DO YOU EVER COUGH UP ANY MUCUS OR PHLEGM?: NO/ONLY WITH OCCASIONAL COLDS OR INFECTIONS
DURING THE PAST 4 WEEKS HOW MUCH DID YOU FEEL SHORT OF BREATH: NONE/LITTLE OF THE TIME
HAVE YOU SMOKED AT LEAST 100 CIGARETTES IN YOUR ENTIRE LIFE: NO/DON'T KNOW
COPD SCREENING SCORE: 0

## 2023-11-14 ASSESSMENT — FIBROSIS 4 INDEX: FIB4 SCORE: 0.82

## 2023-11-14 NOTE — LETTER
2023        Buffy Lund  : 1985     To Whom it May Concern,     Please excuse Buffy from work until 2023. Please follow up with [facility] for questions or concerns.     Thanks,     Milagro Bustamante P.A.-C.

## 2023-11-14 NOTE — LETTER
[facility], EMERGENCY DEPT   [address], [place] [address]  Phone: Dept: [phone] - Fax:        [facility] Progress Report and Disability Certification  Date of Service: 11/14/2023   No Show:  No  Date / Time of Next Visit:     Claim Information   Patient Name: Buffy Lund  Claim Number:     Employer:    Date of Injury: 9/22/2023     Insurer / TPA: [employer] ID / SSN: [id number]    Occupation: [employer] Diagnosis: The encounter diagnosis was Breast abscess of female.    Medical Information   Related to Industrial Injury?   ***   Subjective Complaints:      Objective Findings:     Pre-Existing Condition(s):     Assessment:        Status:    Permanent Disability:     Plan:      Diagnostics:      Comments:       Disability Information   Status:      From:     Through:   Restrictions are:     Physical Restrictions   Sitting:    Standing:    Stooping:    Bending:      Squatting:    Walking:    Climbing:    Pushing:      Pulling:    Other:    Reaching Above Shoulder (L):   Reaching Above Shoulder (R):       Reaching Below Shoulder (L):    Reaching Below Shoulder (R):      Not to exceed Weight Limits   Carrying(hrs):   Weight Limit(lb):   Lifting(hrs):   Weight  Limit(lb):     Comments:      Repetitive Actions   Hands: i.e. Fine Manipulations from Grasping:     Feet: i.e. Operating Foot Controls:     Driving / Operate Machinery:     Physician Name: Malachi Simpson Physician Signature:   e-Signature:  , Medical Director   Clinic Name / Location: [facility], EMERGENCY DEPT  [address] [address] [phone]     Clinic Phone Number: Dept: [phone]   Appointment Time:  Visit Start Time:    Check-In Time:  6:25 PM Visit Discharge Time:    Original-Treating Physician or Chiropractor    Page 2-Insurer/TPA    Page 3-Employer    Page 4-Employee

## 2023-11-14 NOTE — LETTER
[facility], EMERGENCY DEPT   [address], [place] [address]  Phone: Dept: [phone] - Fax:        [facility] Progress Report and Disability Certification  Date of Service: 11/14/2023   No Show:  No  Date / Time of Next Visit:     Claim Information   Patient Name: Buffy Lund  Claim Number:     Employer:    Date of Injury: 9/22/2023     Insurer / TPA: [employer] ID / SSN: [id number]    Occupation: [employer] Diagnosis: The encounter diagnosis was Breast abscess of female.    Medical Information   Related to Industrial Injury?   ***   Subjective Complaints:      Objective Findings:     Pre-Existing Condition(s):     Assessment:        Status:    Permanent Disability:     Plan:      Diagnostics:      Comments:       Disability Information   Status:      From:     Through:   Restrictions are:     Physical Restrictions   Sitting:    Standing:    Stooping:    Bending:      Squatting:    Walking:    Climbing:    Pushing:      Pulling:    Other:    Reaching Above Shoulder (L):   Reaching Above Shoulder (R):       Reaching Below Shoulder (L):    Reaching Below Shoulder (R):      Not to exceed Weight Limits   Carrying(hrs):   Weight Limit(lb):   Lifting(hrs):   Weight  Limit(lb):     Comments:      Repetitive Actions   Hands: i.e. Fine Manipulations from Grasping:     Feet: i.e. Operating Foot Controls:     Driving / Operate Machinery:     Physician Name: Malachi Simpson Physician Signature:   e-Signature:  , Medical Director   Clinic Name / Location: [facility], EMERGENCY DEPT  [address] [address] [phone]     Clinic Phone Number: Dept: [phone]   Appointment Time:  Visit Start Time:    Check-In Time:  6:25 PM Visit Discharge Time:    Original-Treating Physician or Chiropractor    Page 2-Insurer/TPA    Page 3-Employer    Page 4-Employee

## 2023-11-14 NOTE — LETTER
[facility], EMERGENCY DEPT   [address], [place] [address]  Phone: Dept: [phone] - Fax:        [facility] Progress Report and Disability Certification  Date of Service: 11/14/2023   No Show:  No  Date / Time of Next Visit:     Claim Information   Patient Name: Buffy Lund  Claim Number:     Employer:    Date of Injury: 9/22/2023     Insurer / TPA: [employer] ID / SSN: [id number]    Occupation: Build [employer] Diagnosis: The encounter diagnosis was Breast abscess of female.    Medical Information   Related to Industrial Injury?   ***   Subjective Complaints:      Objective Findings:     Pre-Existing Condition(s):     Assessment:        Status:    Permanent Disability:     Plan:      Diagnostics:      Comments:       Disability Information   Status:      From:     Through:   Restrictions are:     Physical Restrictions   Sitting:    Standing:    Stooping:    Bending:      Squatting:    Walking:    Climbing:    Pushing:      Pulling:    Other:    Reaching Above Shoulder (L):   Reaching Above Shoulder (R):       Reaching Below Shoulder (L):    Reaching Below Shoulder (R):      Not to exceed Weight Limits   Carrying(hrs):   Weight Limit(lb):   Lifting(hrs):   Weight  Limit(lb):     Comments:      Repetitive Actions   Hands: i.e. Fine Manipulations from Grasping:     Feet: i.e. Operating Foot Controls:     Driving / Operate Machinery:     Physician Name: Malachi Simpson Physician Signature:   e-Signature:  , Medical Director   Clinic Name / Location: [facility], EMERGENCY DEPT  [address] [address] [phone]     Clinic Phone Number: Dept: [phone]   Appointment Time:  Visit Start Time:    Check-In Time:  6:25 PM Visit Discharge Time:    Original-Treating Physician or Chiropractor    Page 2-Insurer/TPA    Page 3-Employer    Page 4-Employee

## 2023-11-14 NOTE — LETTER
[facility], EMERGENCY DEPT   [address], [place] [address]  Phone: Dept: [phone] - Fax:        [facility] Progress Report and Disability Certification  Date of Service: 11/14/2023   No Show:  No  Date / Time of Next Visit:     Claim Information   Patient Name: Buffy Lund  Claim Number:     Employer:    Date of Injury:      Insurer / TPA: [employer] ID / SSN: [id number]    Occupation:  Diagnosis: There were no encounter diagnoses.    Medical Information   Related to Industrial Injury?   ***   Subjective Complaints:      Objective Findings:     Pre-Existing Condition(s):     Assessment:        Status:    Permanent Disability:     Plan:      Diagnostics:      Comments:       Disability Information   Status:      From:     Through:   Restrictions are:     Physical Restrictions   Sitting:    Standing:    Stooping:    Bending:      Squatting:    Walking:    Climbing:    Pushing:      Pulling:    Other:    Reaching Above Shoulder (L):   Reaching Above Shoulder (R):       Reaching Below Shoulder (L):    Reaching Below Shoulder (R):      Not to exceed Weight Limits   Carrying(hrs):   Weight Limit(lb):   Lifting(hrs):   Weight  Limit(lb):     Comments:      Repetitive Actions   Hands: i.e. Fine Manipulations from Grasping:     Feet: i.e. Operating Foot Controls:     Driving / Operate Machinery:     Physician Name: Malachi Simpson Physician Signature:   e-Signature:  , Medical Director   Clinic Name / Location: [facility], EMERGENCY DEPT  [address] [place] [id number] [phone]     Clinic Phone Number: Dept: [phone]   Appointment Time:  Visit Start Time:    Check-In Time:  6:25 PM Visit Discharge Time:    Original-Treating Physician or Chiropractor    Page 2-Insurer/TPA    Page 3-Employer    Page 4-Employee

## 2023-11-14 NOTE — LETTER
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    Medical Information   Related to Industrial Injury? Yes    Subjective Complaints:  Left breast pain   Objective Findings: Multiple abscesses to the left breast   Pre-Existing Condition(s):     Assessment:   Condition Worsened    Status: Additional Care Required  Permanent Disability:     Plan: MedicationConsultation    Diagnostics: Other (see comment)    Comments:  Ultrasound left breast    Disability Information   Status:      From:     Through:   Restrictions are:     Physical Restrictions   Sitting:    Standing:    Stooping:    Bending:      Squatting:    Walking:    Climbing:    Pushing:      Pulling:    Other:    Reaching Above Shoulder (L):   Reaching Above Shoulder (R):       Reaching Below Shoulder (L):    Reaching Below Shoulder (R):      Not to exceed Weight Limits   Carrying(hrs):   Weight Limit(lb):   Lifting(hrs):   Weight  Limit(lb):     Comments: Patient undergo treatment by general surgery for abscess of the left breast.  See general surgery recommendations for restrictions    Repetitive Actions   Hands: i.e. Fine Manipulations from Grasping:     Feet: i.e. Operating Foot Controls:     Driving / Operate Machinery:     Physician Name: Malachi Simpson Physician Signature: MALACHI Sheth D.O. e-Signature:  , Medical Director   Clinic Name / Location: [facility], EMERGENCY DEPT  [address]  [place] [address]  [phone]     Clinic Phone Number: Dept: [phone]   Appointment Time:  Visit Start Time:    Check-In Time:  6:25 PM Visit Discharge Time:    Original-Treating Physician or Chiropractor    Page 2-Insurer/TPA    Page 3-Employer    Page 4-Employee

## 2023-11-14 NOTE — LETTER
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    Medical Information   Related to Industrial Injury?   ***   Subjective Complaints:      Objective Findings:     Pre-Existing Condition(s):     Assessment:        Status:    Permanent Disability:     Plan:      Diagnostics:      Comments:       Disability Information   Status:      From:     Through:   Restrictions are:     Physical Restrictions   Sitting:    Standing:    Stooping:    Bending:      Squatting:    Walking:    Climbing:    Pushing:      Pulling:    Other:    Reaching Above Shoulder (L):   Reaching Above Shoulder (R):       Reaching Below Shoulder (L):    Reaching Below Shoulder (R):      Not to exceed Weight Limits   Carrying(hrs):   Weight Limit(lb):   Lifting(hrs):   Weight  Limit(lb):     Comments:      Repetitive Actions   Hands: i.e. Fine Manipulations from Grasping:     Feet: i.e. Operating Foot Controls:     Driving / Operate Machinery:     Physician Name: Malachi Simpson Physician Signature:   e-Signature:  , Medical Director   Clinic Name / Location: [facility], EMERGENCY DEPT  [address] [address] [phone]     Clinic Phone Number: Dept: [phone]   Appointment Time:  Visit Start Time:    Check-In Time:  6:25 PM Visit Discharge Time:    Original-Treating Physician or Chiropractor    Page 2-Insurer/TPA    Page 3-Employer    Page 4-Employee

## 2023-11-14 NOTE — LETTER
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    Medical Information   Related to Industrial Injury?   ***   Subjective Complaints:      Objective Findings:     Pre-Existing Condition(s):     Assessment:        Status:    Permanent Disability:     Plan:      Diagnostics:      Comments:       Disability Information   Status:      From:     Through:   Restrictions are:     Physical Restrictions   Sitting:    Standing:    Stooping:    Bending:      Squatting:    Walking:    Climbing:    Pushing:      Pulling:    Other:    Reaching Above Shoulder (L):   Reaching Above Shoulder (R):       Reaching Below Shoulder (L):    Reaching Below Shoulder (R):      Not to exceed Weight Limits   Carrying(hrs):   Weight Limit(lb):   Lifting(hrs):   Weight  Limit(lb):     Comments:      Repetitive Actions   Hands: i.e. Fine Manipulations from Grasping:     Feet: i.e. Operating Foot Controls:     Driving / Operate Machinery:     Physician Name: Malachi Simpson Physician Signature:   e-Signature:  , Medical Director   Clinic Name / Location: [facility], EMERGENCY DEPT  [address] [address] [phone]     Clinic Phone Number: Dept: [phone]   Appointment Time:  Visit Start Time:    Check-In Time:  6:25 PM Visit Discharge Time:    Original-Treating Physician or Chiropractor    Page 2-Insurer/TPA    Page 3-Employer    Page 4-Employee

## 2023-11-14 NOTE — LETTER
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    Medical Information   Related to Industrial Injury?   ***   Subjective Complaints:      Objective Findings:     Pre-Existing Condition(s):     Assessment:        Status:    Permanent Disability:     Plan:      Diagnostics:      Comments:       Disability Information   Status:      From:     Through:   Restrictions are:     Physical Restrictions   Sitting:    Standing:    Stooping:    Bending:      Squatting:    Walking:    Climbing:    Pushing:      Pulling:    Other:    Reaching Above Shoulder (L):   Reaching Above Shoulder (R):       Reaching Below Shoulder (L):    Reaching Below Shoulder (R):      Not to exceed Weight Limits   Carrying(hrs):   Weight Limit(lb):   Lifting(hrs):   Weight  Limit(lb):     Comments:      Repetitive Actions   Hands: i.e. Fine Manipulations from Grasping:     Feet: i.e. Operating Foot Controls:     Driving / Operate Machinery:     Physician Name: Malachi Simpson Physician Signature:   e-Signature:  , Medical Director   Clinic Name / Location: [facility], EMERGENCY DEPT  [address] [address] [phone]     Clinic Phone Number: Dept: [phone]   Appointment Time:  Visit Start Time:    Check-In Time:  6:25 PM Visit Discharge Time:    Original-Treating Physician or Chiropractor    Page 2-Insurer/TPA    Page 3-Employer    Page 4-Employee

## 2023-11-14 NOTE — LETTER
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    Medical Information   Related to Industrial Injury?   ***   Subjective Complaints:      Objective Findings:     Pre-Existing Condition(s):     Assessment:        Status:    Permanent Disability:     Plan:      Diagnostics:      Comments:       Disability Information   Status:      From:     Through:   Restrictions are:     Physical Restrictions   Sitting:    Standing:    Stooping:    Bending:      Squatting:    Walking:    Climbing:    Pushing:      Pulling:    Other:    Reaching Above Shoulder (L):   Reaching Above Shoulder (R):       Reaching Below Shoulder (L):    Reaching Below Shoulder (R):      Not to exceed Weight Limits   Carrying(hrs):   Weight Limit(lb):   Lifting(hrs):   Weight  Limit(lb):     Comments:      Repetitive Actions   Hands: i.e. Fine Manipulations from Grasping:     Feet: i.e. Operating Foot Controls:     Driving / Operate Machinery:     Physician Name: Malachi Simpson Physician Signature:   e-Signature:  , Medical Director   Clinic Name / Location: [facility], EMERGENCY DEPT  [address] [facility] [address] [phone]     Clinic Phone Number: Dept: [phone]   Appointment Time:  Visit Start Time:    Check-In Time:  6:25 PM Visit Discharge Time:    Original-Treating Physician or Chiropractor    Page 2-Insurer/TPA    Page 3-Employer    Page 4-Employee

## 2023-11-14 NOTE — LETTER
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    Medical Information   Related to Industrial Injury?   ***   Subjective Complaints:      Objective Findings:     Pre-Existing Condition(s):     Assessment:        Status:    Permanent Disability:     Plan:      Diagnostics:      Comments:       Disability Information   Status:      From:     Through:   Restrictions are:     Physical Restrictions   Sitting:    Standing:    Stooping:    Bending:      Squatting:    Walking:    Climbing:    Pushing:      Pulling:    Other:    Reaching Above Shoulder (L):   Reaching Above Shoulder (R):       Reaching Below Shoulder (L):    Reaching Below Shoulder (R):      Not to exceed Weight Limits   Carrying(hrs):   Weight Limit(lb):   Lifting(hrs):   Weight  Limit(lb):     Comments:      Repetitive Actions   Hands: i.e. Fine Manipulations from Grasping:     Feet: i.e. Operating Foot Controls:     Driving / Operate Machinery:     Physician Name: Malachi Simpson Physician Signature:   e-Signature:  , Medical Director   Clinic Name / Location: [facility], EMERGENCY DEPT  [address] [address] [phone]     Clinic Phone Number: Dept: [phone]   Appointment Time:  Visit Start Time:    Check-In Time:  6:25 PM Visit Discharge Time:    Original-Treating Physician or Chiropractor    Page 2-Insurer/TPA    Page 3-Employer    Page 4-Employee

## 2023-11-15 ENCOUNTER — ANESTHESIA EVENT (OUTPATIENT)
Dept: SURGERY | Facility: MEDICAL CENTER | Age: 38
DRG: 585 | End: 2023-11-15
Payer: COMMERCIAL

## 2023-11-15 ENCOUNTER — ANESTHESIA (OUTPATIENT)
Dept: SURGERY | Facility: MEDICAL CENTER | Age: 38
DRG: 585 | End: 2023-11-15
Payer: COMMERCIAL

## 2023-11-15 LAB
FUNGUS SPEC FUNGUS STN: NORMAL
GRAM STN SPEC: NORMAL
SCCMEC + MECA PNL NOSE NAA+PROBE: NEGATIVE
SIGNIFICANT IND 70042: NORMAL
SIGNIFICANT IND 70042: NORMAL
SITE SITE: NORMAL
SITE SITE: NORMAL
SOURCE SOURCE: NORMAL
SOURCE SOURCE: NORMAL

## 2023-11-15 PROCEDURE — 700111 HCHG RX REV CODE 636 W/ 250 OVERRIDE (IP): Mod: JZ | Performed by: STUDENT IN AN ORGANIZED HEALTH CARE EDUCATION/TRAINING PROGRAM

## 2023-11-15 PROCEDURE — 87070 CULTURE OTHR SPECIMN AEROBIC: CPT

## 2023-11-15 PROCEDURE — 87075 CULTR BACTERIA EXCEPT BLOOD: CPT

## 2023-11-15 PROCEDURE — 87077 CULTURE AEROBIC IDENTIFY: CPT

## 2023-11-15 PROCEDURE — 700101 HCHG RX REV CODE 250: Performed by: SURGERY

## 2023-11-15 PROCEDURE — 700111 HCHG RX REV CODE 636 W/ 250 OVERRIDE (IP): Performed by: STUDENT IN AN ORGANIZED HEALTH CARE EDUCATION/TRAINING PROGRAM

## 2023-11-15 PROCEDURE — 700102 HCHG RX REV CODE 250 W/ 637 OVERRIDE(OP): Performed by: STUDENT IN AN ORGANIZED HEALTH CARE EDUCATION/TRAINING PROGRAM

## 2023-11-15 PROCEDURE — 700105 HCHG RX REV CODE 258: Performed by: SURGERY

## 2023-11-15 PROCEDURE — A9270 NON-COVERED ITEM OR SERVICE: HCPCS | Performed by: SURGERY

## 2023-11-15 PROCEDURE — A9270 NON-COVERED ITEM OR SERVICE: HCPCS | Performed by: STUDENT IN AN ORGANIZED HEALTH CARE EDUCATION/TRAINING PROGRAM

## 2023-11-15 PROCEDURE — 96366 THER/PROPH/DIAG IV INF ADDON: CPT

## 2023-11-15 PROCEDURE — 160048 HCHG OR STATISTICAL LEVEL 1-5: Performed by: SURGERY

## 2023-11-15 PROCEDURE — 700105 HCHG RX REV CODE 258: Performed by: STUDENT IN AN ORGANIZED HEALTH CARE EDUCATION/TRAINING PROGRAM

## 2023-11-15 PROCEDURE — 19020 MASTOTOMY EXPL DRG ABSC DP: CPT | Mod: 78,RT | Performed by: SURGERY

## 2023-11-15 PROCEDURE — 700102 HCHG RX REV CODE 250 W/ 637 OVERRIDE(OP): Performed by: SURGERY

## 2023-11-15 PROCEDURE — 700111 HCHG RX REV CODE 636 W/ 250 OVERRIDE (IP): Performed by: SURGERY

## 2023-11-15 PROCEDURE — 160027 HCHG SURGERY MINUTES - 1ST 30 MINS LEVEL 2: Performed by: SURGERY

## 2023-11-15 PROCEDURE — 160002 HCHG RECOVERY MINUTES (STAT): Performed by: SURGERY

## 2023-11-15 PROCEDURE — 160035 HCHG PACU - 1ST 60 MINS PHASE I: Performed by: SURGERY

## 2023-11-15 PROCEDURE — 160009 HCHG ANES TIME/MIN: Performed by: SURGERY

## 2023-11-15 PROCEDURE — 87641 MR-STAPH DNA AMP PROBE: CPT

## 2023-11-15 PROCEDURE — 87102 FUNGUS ISOLATION CULTURE: CPT

## 2023-11-15 PROCEDURE — 770001 HCHG ROOM/CARE - MED/SURG/GYN PRIV*

## 2023-11-15 PROCEDURE — 700101 HCHG RX REV CODE 250: Performed by: STUDENT IN AN ORGANIZED HEALTH CARE EDUCATION/TRAINING PROGRAM

## 2023-11-15 PROCEDURE — 0H9U0ZZ DRAINAGE OF LEFT BREAST, OPEN APPROACH: ICD-10-PCS | Performed by: SURGERY

## 2023-11-15 PROCEDURE — 160038 HCHG SURGERY MINUTES - EA ADDL 1 MIN LEVEL 2: Performed by: SURGERY

## 2023-11-15 PROCEDURE — 87205 SMEAR GRAM STAIN: CPT | Mod: 91

## 2023-11-15 RX ORDER — LABETALOL HYDROCHLORIDE 5 MG/ML
5 INJECTION, SOLUTION INTRAVENOUS
Status: DISCONTINUED | OUTPATIENT
Start: 2023-11-15 | End: 2023-11-15 | Stop reason: HOSPADM

## 2023-11-15 RX ORDER — IPRATROPIUM BROMIDE AND ALBUTEROL SULFATE 2.5; .5 MG/3ML; MG/3ML
3 SOLUTION RESPIRATORY (INHALATION)
Status: DISCONTINUED | OUTPATIENT
Start: 2023-11-15 | End: 2023-11-15 | Stop reason: HOSPADM

## 2023-11-15 RX ORDER — OXYCODONE HCL 5 MG/5 ML
5 SOLUTION, ORAL ORAL
Status: COMPLETED | OUTPATIENT
Start: 2023-11-15 | End: 2023-11-15

## 2023-11-15 RX ORDER — HYDRALAZINE HYDROCHLORIDE 20 MG/ML
5 INJECTION INTRAMUSCULAR; INTRAVENOUS
Status: DISCONTINUED | OUTPATIENT
Start: 2023-11-15 | End: 2023-11-15 | Stop reason: HOSPADM

## 2023-11-15 RX ORDER — LIDOCAINE HYDROCHLORIDE 20 MG/ML
INJECTION, SOLUTION EPIDURAL; INFILTRATION; INTRACAUDAL; PERINEURAL PRN
Status: DISCONTINUED | OUTPATIENT
Start: 2023-11-15 | End: 2023-11-15 | Stop reason: SURG

## 2023-11-15 RX ORDER — HALOPERIDOL 5 MG/ML
1 INJECTION INTRAMUSCULAR
Status: DISCONTINUED | OUTPATIENT
Start: 2023-11-15 | End: 2023-11-15 | Stop reason: HOSPADM

## 2023-11-15 RX ORDER — DIPHENHYDRAMINE HYDROCHLORIDE 50 MG/ML
12.5 INJECTION INTRAMUSCULAR; INTRAVENOUS
Status: DISCONTINUED | OUTPATIENT
Start: 2023-11-15 | End: 2023-11-15 | Stop reason: HOSPADM

## 2023-11-15 RX ORDER — SODIUM CHLORIDE, SODIUM LACTATE, POTASSIUM CHLORIDE, CALCIUM CHLORIDE 600; 310; 30; 20 MG/100ML; MG/100ML; MG/100ML; MG/100ML
INJECTION, SOLUTION INTRAVENOUS CONTINUOUS
Status: DISCONTINUED | OUTPATIENT
Start: 2023-11-15 | End: 2023-11-15 | Stop reason: HOSPADM

## 2023-11-15 RX ORDER — METHYLPREDNISOLONE 4 MG/1
4 TABLET ORAL 2 TIMES DAILY WITH MEALS
Status: DISCONTINUED | OUTPATIENT
Start: 2023-11-19 | End: 2023-11-16 | Stop reason: HOSPADM

## 2023-11-15 RX ORDER — HYDROMORPHONE HYDROCHLORIDE 1 MG/ML
0.2 INJECTION, SOLUTION INTRAMUSCULAR; INTRAVENOUS; SUBCUTANEOUS
Status: DISCONTINUED | OUTPATIENT
Start: 2023-11-15 | End: 2023-11-15 | Stop reason: HOSPADM

## 2023-11-15 RX ORDER — ONDANSETRON 2 MG/ML
INJECTION INTRAMUSCULAR; INTRAVENOUS PRN
Status: DISCONTINUED | OUTPATIENT
Start: 2023-11-15 | End: 2023-11-15 | Stop reason: SURG

## 2023-11-15 RX ORDER — BUPIVACAINE HYDROCHLORIDE AND EPINEPHRINE 5; 5 MG/ML; UG/ML
INJECTION, SOLUTION EPIDURAL; INTRACAUDAL; PERINEURAL
Status: DISCONTINUED | OUTPATIENT
Start: 2023-11-15 | End: 2023-11-15 | Stop reason: HOSPADM

## 2023-11-15 RX ORDER — DEXAMETHASONE SODIUM PHOSPHATE 4 MG/ML
INJECTION, SOLUTION INTRA-ARTICULAR; INTRALESIONAL; INTRAMUSCULAR; INTRAVENOUS; SOFT TISSUE PRN
Status: DISCONTINUED | OUTPATIENT
Start: 2023-11-15 | End: 2023-11-15 | Stop reason: SURG

## 2023-11-15 RX ORDER — METHYLPREDNISOLONE 4 MG/1
4 TABLET ORAL
Status: DISCONTINUED | OUTPATIENT
Start: 2023-11-16 | End: 2023-11-16 | Stop reason: HOSPADM

## 2023-11-15 RX ORDER — SODIUM CHLORIDE, SODIUM LACTATE, POTASSIUM CHLORIDE, CALCIUM CHLORIDE 600; 310; 30; 20 MG/100ML; MG/100ML; MG/100ML; MG/100ML
INJECTION, SOLUTION INTRAVENOUS
Status: DISCONTINUED | OUTPATIENT
Start: 2023-11-15 | End: 2023-11-15 | Stop reason: SURG

## 2023-11-15 RX ORDER — EPHEDRINE SULFATE 50 MG/ML
5 INJECTION, SOLUTION INTRAVENOUS
Status: DISCONTINUED | OUTPATIENT
Start: 2023-11-15 | End: 2023-11-15 | Stop reason: HOSPADM

## 2023-11-15 RX ORDER — METHYLPREDNISOLONE 4 MG/1
8 TABLET ORAL
Status: DISCONTINUED | OUTPATIENT
Start: 2023-11-16 | End: 2023-11-16 | Stop reason: HOSPADM

## 2023-11-15 RX ORDER — ONDANSETRON 2 MG/ML
4 INJECTION INTRAMUSCULAR; INTRAVENOUS
Status: DISCONTINUED | OUTPATIENT
Start: 2023-11-15 | End: 2023-11-15 | Stop reason: HOSPADM

## 2023-11-15 RX ORDER — OXYCODONE HCL 5 MG/5 ML
10 SOLUTION, ORAL ORAL
Status: COMPLETED | OUTPATIENT
Start: 2023-11-15 | End: 2023-11-15

## 2023-11-15 RX ORDER — METHYLPREDNISOLONE 4 MG/1
4 TABLET ORAL
Status: DISCONTINUED | OUTPATIENT
Start: 2023-11-18 | End: 2023-11-16 | Stop reason: HOSPADM

## 2023-11-15 RX ORDER — CEFAZOLIN SODIUM 1 G/3ML
INJECTION, POWDER, FOR SOLUTION INTRAMUSCULAR; INTRAVENOUS PRN
Status: DISCONTINUED | OUTPATIENT
Start: 2023-11-15 | End: 2023-11-15 | Stop reason: SURG

## 2023-11-15 RX ORDER — METHYLPREDNISOLONE 4 MG/1
4 TABLET ORAL
Status: DISCONTINUED | OUTPATIENT
Start: 2023-11-17 | End: 2023-11-16 | Stop reason: HOSPADM

## 2023-11-15 RX ORDER — MEPERIDINE HYDROCHLORIDE 25 MG/ML
12.5 INJECTION INTRAMUSCULAR; INTRAVENOUS; SUBCUTANEOUS
Status: DISCONTINUED | OUTPATIENT
Start: 2023-11-15 | End: 2023-11-15 | Stop reason: HOSPADM

## 2023-11-15 RX ORDER — MIDAZOLAM HYDROCHLORIDE 1 MG/ML
INJECTION INTRAMUSCULAR; INTRAVENOUS PRN
Status: DISCONTINUED | OUTPATIENT
Start: 2023-11-15 | End: 2023-11-15 | Stop reason: SURG

## 2023-11-15 RX ORDER — HYDROMORPHONE HYDROCHLORIDE 1 MG/ML
0.4 INJECTION, SOLUTION INTRAMUSCULAR; INTRAVENOUS; SUBCUTANEOUS
Status: DISCONTINUED | OUTPATIENT
Start: 2023-11-15 | End: 2023-11-15 | Stop reason: HOSPADM

## 2023-11-15 RX ORDER — HYDROMORPHONE HYDROCHLORIDE 1 MG/ML
0.1 INJECTION, SOLUTION INTRAMUSCULAR; INTRAVENOUS; SUBCUTANEOUS
Status: DISCONTINUED | OUTPATIENT
Start: 2023-11-15 | End: 2023-11-15 | Stop reason: HOSPADM

## 2023-11-15 RX ADMIN — METHYLPREDNISOLONE 24 MG: 16 TABLET ORAL at 18:39

## 2023-11-15 RX ADMIN — SODIUM CHLORIDE, POTASSIUM CHLORIDE, SODIUM LACTATE AND CALCIUM CHLORIDE: 600; 310; 30; 20 INJECTION, SOLUTION INTRAVENOUS at 11:58

## 2023-11-15 RX ADMIN — ONDANSETRON 4 MG: 2 INJECTION INTRAMUSCULAR; INTRAVENOUS at 12:23

## 2023-11-15 RX ADMIN — ACETAMINOPHEN 1000 MG: 500 TABLET, FILM COATED ORAL at 16:30

## 2023-11-15 RX ADMIN — MIDAZOLAM 2 MG: 1 INJECTION, SOLUTION INTRAMUSCULAR; INTRAVENOUS at 11:59

## 2023-11-15 RX ADMIN — ACETAMINOPHEN 1000 MG: 500 TABLET, FILM COATED ORAL at 05:32

## 2023-11-15 RX ADMIN — OXYCODONE HYDROCHLORIDE 10 MG: 10 TABLET ORAL at 16:31

## 2023-11-15 RX ADMIN — OXYCODONE HYDROCHLORIDE 10 MG: 10 TABLET ORAL at 21:15

## 2023-11-15 RX ADMIN — VANCOMYCIN HYDROCHLORIDE 1000 MG: 5 INJECTION, POWDER, LYOPHILIZED, FOR SOLUTION INTRAVENOUS at 18:19

## 2023-11-15 RX ADMIN — FENTANYL CITRATE 100 MCG: 50 INJECTION, SOLUTION INTRAMUSCULAR; INTRAVENOUS at 12:04

## 2023-11-15 RX ADMIN — FENTANYL CITRATE 50 MCG: 50 INJECTION, SOLUTION INTRAMUSCULAR; INTRAVENOUS at 13:25

## 2023-11-15 RX ADMIN — CEFAZOLIN 2 G: 1 INJECTION, POWDER, FOR SOLUTION INTRAMUSCULAR; INTRAVENOUS at 12:06

## 2023-11-15 RX ADMIN — PROPOFOL 150 MG: 10 INJECTION, EMULSION INTRAVENOUS at 12:04

## 2023-11-15 RX ADMIN — DEXAMETHASONE SODIUM PHOSPHATE 4 MG: 4 INJECTION INTRA-ARTICULAR; INTRALESIONAL; INTRAMUSCULAR; INTRAVENOUS; SOFT TISSUE at 12:06

## 2023-11-15 RX ADMIN — LIDOCAINE HYDROCHLORIDE 80 MG: 20 INJECTION, SOLUTION EPIDURAL; INFILTRATION; INTRACAUDAL at 12:04

## 2023-11-15 RX ADMIN — OXYCODONE HYDROCHLORIDE 10 MG: 5 SOLUTION ORAL at 12:59

## 2023-11-15 RX ADMIN — FENTANYL CITRATE 50 MCG: 50 INJECTION, SOLUTION INTRAMUSCULAR; INTRAVENOUS at 12:22

## 2023-11-15 ASSESSMENT — COGNITIVE AND FUNCTIONAL STATUS - GENERAL
TURNING FROM BACK TO SIDE WHILE IN FLAT BAD: A LITTLE
HELP NEEDED FOR BATHING: A LITTLE
DRESSING REGULAR UPPER BODY CLOTHING: A LITTLE
STANDING UP FROM CHAIR USING ARMS: A LITTLE
DAILY ACTIVITIY SCORE: 21
MOVING FROM LYING ON BACK TO SITTING ON SIDE OF FLAT BED: A LOT
DRESSING REGULAR LOWER BODY CLOTHING: A LITTLE
SUGGESTED CMS G CODE MODIFIER DAILY ACTIVITY: CJ
MOVING TO AND FROM BED TO CHAIR: A LITTLE
MOBILITY SCORE: 19
SUGGESTED CMS G CODE MODIFIER MOBILITY: CK

## 2023-11-15 ASSESSMENT — PAIN DESCRIPTION - PAIN TYPE
TYPE: SURGICAL PAIN
TYPE: SURGICAL PAIN
TYPE: ACUTE PAIN
TYPE: ACUTE PAIN;SURGICAL PAIN
TYPE: SURGICAL PAIN
TYPE: SURGICAL PAIN
TYPE: SURGICAL PAIN;ACUTE PAIN
TYPE: SURGICAL PAIN

## 2023-11-15 ASSESSMENT — LIFESTYLE VARIABLES
HOW MANY TIMES IN THE PAST YEAR HAVE YOU HAD 5 OR MORE DRINKS IN A DAY: 0
HAVE PEOPLE ANNOYED YOU BY CRITICIZING YOUR DRINKING: NO
CONSUMPTION TOTAL: NEGATIVE
TOTAL SCORE: 0
EVER FELT BAD OR GUILTY ABOUT YOUR DRINKING: NO
EVER HAD A DRINK FIRST THING IN THE MORNING TO STEADY YOUR NERVES TO GET RID OF A HANGOVER: NO
HAVE YOU EVER FELT YOU SHOULD CUT DOWN ON YOUR DRINKING: NO
ON A TYPICAL DAY WHEN YOU DRINK ALCOHOL HOW MANY DRINKS DO YOU HAVE: 0
DOES PATIENT WANT TO STOP DRINKING: NO
AVERAGE NUMBER OF DAYS PER WEEK YOU HAVE A DRINK CONTAINING ALCOHOL: 0
TOTAL SCORE: 0
TOTAL SCORE: 0
ALCOHOL_USE: NO

## 2023-11-15 ASSESSMENT — PAIN SCALES - GENERAL: PAIN_LEVEL: 2

## 2023-11-15 ASSESSMENT — PATIENT HEALTH QUESTIONNAIRE - PHQ9
1. LITTLE INTEREST OR PLEASURE IN DOING THINGS: NOT AT ALL
2. FEELING DOWN, DEPRESSED, IRRITABLE, OR HOPELESS: NOT AT ALL
SUM OF ALL RESPONSES TO PHQ9 QUESTIONS 1 AND 2: 0

## 2023-11-15 NOTE — ED TRIAGE NOTES
Chief Complaint   Patient presents with    Breast Swelling     Left side seen here diagnosed with Mastitis and had I&D     Pt ambulated to triage c/o left breast swelling, she completed antibiotic that was prescribed 11/8 but did not help with swelling and pain.

## 2023-11-15 NOTE — ANESTHESIA PREPROCEDURE EVALUATION
Case: [id number] Date/Time: 11/15/23 1128    Procedure: IRRIGATION AND DEBRIDEMENT, WOUND ABSCESS (Left: Breast)    Location: [facility] OR 11 / SURGERY [facility]    Surgeons: Raji Deluca M.D.          37 yo F w/ L breast wound    Relevant Problems   No relevant active problems       Physical Exam    Airway   Mallampati: II  TM distance: >3 FB  Neck ROM: full       Cardiovascular - normal exam  Rhythm: regular  Rate: normal  (-) murmur     Dental - normal exam           Pulmonary - normal exam  Breath sounds clear to auscultation     Abdominal    Neurological - normal exam                   Anesthesia Plan    ASA 2       Plan - general       Airway plan will be LMA          Induction: intravenous    Postoperative Plan: Postoperative administration of opioids is intended.    Pertinent diagnostic labs and testing reviewed    Informed Consent:    Anesthetic plan and risks discussed with patient.    Use of blood products discussed with: patient whom consented to blood products.

## 2023-11-15 NOTE — ED NOTES
Pharmacy Medication Reconciliation      ~Medication reconciliation updated and complete per patient at bedside.  ~Allergies have been verified and updated   ~No oral ABX within the last 30 days  ~Patient home pharmacy :  [facility]      ~Anticoagulants (rivaroxaban, apixaban, edoxaban, dabigatran, warfarin, enoxaparin) taken in the last 14 days? NO  ~Anticoagulant: N/A Last dose: N/A

## 2023-11-15 NOTE — ANESTHESIA POSTPROCEDURE EVALUATION
Patient: Buffy Lund    Procedure Summary       Date: 11/15/23 Room / Location: [place] [address] / SURGERY [facility]    Anesthesia Start: 1158 Anesthesia Stop: 1239    Procedure: IRRIGATION AND DEBRIDEMENT, WOUND ABSCESS (Left: Breast) Diagnosis: (BREAST ABSCESS)    Surgeons: Malachi Estrada D.O. Responsible Provider: Hay Varghese M.D.    Anesthesia Type: general ASA Status: 2            Final Anesthesia Type: general  Last vitals  BP   Blood Pressure: (!) 83/51    Temp   36.2 °C (97.2 °F)    Pulse   65   Resp   16    SpO2   97 %      Anesthesia Post Evaluation    Patient location during evaluation: PACU  Patient participation: complete - patient participated  Level of consciousness: awake and alert  Pain score: 2    Airway patency: patent  Anesthetic complications: no  Cardiovascular status: hemodynamically stable  Respiratory status: acceptable  Hydration status: euvolemic    PONV: none          There were no known notable events for this encounter.     Nurse Pain Score: 1 (NPRS)

## 2023-11-15 NOTE — ED PROVIDER NOTES
ED Provider Note    CHIEF COMPLAINT  Chief Complaint   Patient presents with    Breast Swelling     Left side seen here diagnosed with Mastitis and had I&D       EXTERNAL RECORDS REVIEWED  Internal emergency department note 11/8/2023, presentation for right breast swelling.  General surgery note 10/30/2023, postop note for left breast incision drainage of abscess      HPI/ROS  LIMITATION TO HISTORY   Select: : None  OUTSIDE HISTORIAN(S):  partner    Buffy Lund is a 38 y.o. female with a past medical history of left breast abscess presenting to the emergency department for recurrence of left breast abscess.  Patient underwent incision and drainage on 1030 by general surgery for a large breast abscess.  Gram stain without evidence of bacteria, cultures grew Cutibacterium.     Was seen in the emergency department several days after and then ultimately on 11 8, was started on Bactrim, at that time ultrasound showed no evidence of obvious abscess, general surgery was consulted, felt outpatient oral antibiotics were appropriate and patient was discharged home.  She says she has had worsening pain, swelling of the left breast despite being compliant with antibiotics.  Endorsing low-grade fevers, chills, body aches over the last several days.    PAST MEDICAL HISTORY   has a past medical history of H/O fetal demise, not currently pregnant (8/10/2016) and Psychiatric problem.    SURGICAL HISTORY   has a past surgical history that includes other orthopedic surgery (2006); other (Left, 2001); ear middle exploration (2001); tendon repair (2006); primary c section (8/8/2017); lap,diagnostic abdomen (9/25/2019); salpingectomy (Bilateral, 9/25/2019); cholecystectomy robotic xi (N/A, 8/14/2020); and incision and drainage general (Left, 10/30/2023).    FAMILY HISTORY  No family history on file.    SOCIAL HISTORY  Social History     Tobacco Use    Smoking status: Never    Smokeless tobacco: Never   Vaping Use    Vaping Use:  "Never used   Substance and Sexual Activity    Alcohol use: No    Drug use: No    Sexual activity: Not Currently     Partners: Male     Birth control/protection: Pill     Comment: Unplanned pregnancy       CURRENT MEDICATIONS  Home Medications       Reviewed by Naun Walker (Pharmacy Tech) on 11/15/23 at 0123  Med List Status: Complete     Medication Last Dose Status   acetaminophen (TYLENOL) 325 MG Tab PRN Active   ibuprofen (MOTRIN) 800 MG Tab 2023 Active   sulfamethoxazole-trimethoprim (BACTRIM DS) 800-160 MG tablet 2023 Active                    ALLERGIES  No Known Allergies    PHYSICAL EXAM  VITAL SIGNS: BP 93/57   Pulse 66   Temp 37.8 °C (100 °F) (Oral)   Resp 18   Ht 1.575 m (5' 2\")   Wt 66.2 kg (145 lb 15.1 oz)   LMP 10/02/2023 (Approximate)   SpO2 97%   BMI 26.69 kg/m²    General: non-toxic, no acute distress  Neuro: oriented x 3, moving all extremities.   HEENT:   - Head: Normocephalic, atraumatic  - Eyes: PERRL  - Ears/Nose: normal external nose and ears  - Mouth: moist mucosal membranes  Chest: Left breast with erythema/fluctuance/induration in the 11 o'clock position in the 5 o'clock position.  There is a nonhealing incision in the 5 o'clock position from prior incision and drainage.  Resp: clear to auscultation, no increased work of breathing  CV: Regular rate and rhythm  Abd: Soft, non-tender, non-distended  Extremities: No peripheral edema  Psych: lucid and conversational         DIAGNOSTIC STUDIES / PROCEDURES    EKG  My independent EKG interpretation:  Results for orders placed or performed during the hospital encounter of 20   EKG (Now)   Result Value Ref Range    Report       [facility] Emergency Dept.    Test Date:  2020  Pt Name:    KISHAN KASPER           Department: ER  MRN:        [id number]                      Room:        36  Gender:     Female                       Technician: [id number]  :        1985                   " Requested By:MARYJANE FALL  Order #:    [id number]                    Reading MD: Maryjane Fall    Measurements  Intervals                                Axis  Rate:       59                           P:          40  IN:         132                          QRS:        31  QRSD:       90                           T:          8  QT:         404  QTc:        401    Interpretive Statements  SINUS BRADYCARDIA  No previous ECG available for comparison  Electronically Signed On 8-1-2020 23:58:19 PDT by Maryjane Fall         LABS  Results for orders placed or performed during the hospital encounter of 11/14/23   CBC WITH DIFFERENTIAL   Result Value Ref Range    WBC 11.5 (H) 4.8 - 10.8 K/uL    RBC 3.64 (L) 4.20 - 5.40 M/uL    Hemoglobin 11.4 (L) 12.0 - 16.0 g/dL    Hematocrit 32.5 (L) 37.0 - 47.0 %    MCV 89.3 81.4 - 97.8 fL    MCH 31.3 27.0 - 33.0 pg    MCHC 35.1 32.2 - 35.5 g/dL    RDW 39.8 35.9 - 50.0 fL    Platelet Count 375 164 - 446 K/uL    MPV 8.6 (L) 9.0 - 12.9 fL    Neutrophils-Polys 76.40 (H) 44.00 - 72.00 %    Lymphocytes 17.10 (L) 22.00 - 41.00 %    Monocytes 4.90 0.00 - 13.40 %    Eosinophils 1.00 0.00 - 6.90 %    Basophils 0.30 0.00 - 1.80 %    Immature Granulocytes 0.30 0.00 - 0.90 %    Nucleated RBC 0.00 0.00 - 0.20 /100 WBC    Neutrophils (Absolute) 8.81 (H) 1.82 - 7.42 K/uL    Lymphs (Absolute) 1.97 1.00 - 4.80 K/uL    Monos (Absolute) 0.56 0.00 - 0.85 K/uL    Eos (Absolute) 0.11 0.00 - 0.51 K/uL    Baso (Absolute) 0.03 0.00 - 0.12 K/uL    Immature Granulocytes (abs) 0.03 0.00 - 0.11 K/uL    NRBC (Absolute) 0.00 K/uL   COMP METABOLIC PANEL   Result Value Ref Range    Sodium 137 135 - 145 mmol/L    Potassium 3.6 3.6 - 5.5 mmol/L    Chloride 105 96 - 112 mmol/L    Co2 22 20 - 33 mmol/L    Anion Gap 10.0 7.0 - 16.0    Glucose 108 (H) 65 - 99 mg/dL    Bun 10 8 - 22 mg/dL    Creatinine 0.69 0.50 - 1.40 mg/dL    Calcium 8.6 8.5 - 10.5 mg/dL    Correct Calcium 8.6 8.5 - 10.5 mg/dL    AST(SGOT) 17 12 - 45 U/L    ALT(SGPT)  13 2 - 50 U/L    Alkaline Phosphatase 87 30 - 99 U/L    Total Bilirubin 0.2 0.1 - 1.5 mg/dL    Albumin 4.0 3.2 - 4.9 g/dL    Total Protein 7.0 6.0 - 8.2 g/dL    Globulin 3.0 1.9 - 3.5 g/dL    A-G Ratio 1.3 g/dL   HCG QUANTITATIVE SERUM   Result Value Ref Range    Bhcg <1.0 0.0 - 5.0 mIU/mL   ESTIMATED GFR   Result Value Ref Range    GFR (CKD-EPI) 112 >60 mL/min/1.73 m 2       RADIOLOGY  I have independently interpreted the diagnostic imaging associated with this visit and am waiting the final reading from the radiologist.   My preliminary interpretation is as follows:   -   Radiologist interpretation:   US-CHEST   Final Result      Irregular hypoechoic area at the 11:00 position measuring 2.3 x 2.6 x 1.2 cm, previously 3.2 x 2.2 x 1.6 cm with some surrounding hyperemia in the area of current concern. At the 5:00 position there is a 3.2 x 2.2 x 2.5 cm ill-defined hypoechoic area    which previously measured 3.2 x 1.9 x 2.0 cm. These findings could be consistent with abscess in the appropriate clinical setting.              MEDICAL DECISION MAKING    ED Observation Status? No; Patient does not meet criteria for ED Observation.     ED COURSE AND PLAN    Buffy Lund is a 38 y.o. female presenting to the emergency department for worsening left breast pain.  History of left breast abscess.  Endorsing low-grade fevers, chills, body aches.  Generally well-appearing on presentation.  Borderline febrile at 37.8.  Vital signs otherwise stable.  Not septic.  Bedside ultrasound of the left breast shows that she redeveloped multiple abscesses in the left breast as described above.  I obtained a formal ultrasound of the left breast which showed multiple abscesses.  Treated with vancomycin  I discussed with Dr. Yip who kindly admitted patient, she will be taken to the operating room for incision and drainage tomorrow.      ---Pertinent ED Course---:    8:01 PM I reviewed the patient's old records in Epic, medication list,  allergies, past medical history and performed a physical examination.                 Procedures:  Point of Care Ultrasound    ED POINT OF CARE ULTRASOUND: LIMITED Breast    Indication for exam: Concern for left breast abscess x2  Findings: Exam consistent with breast abscess x2 in the 11:00 and 5 o'clock position     Image retained through [facility] as seen below:      Additional interpretation:    This study is a limited ultrasound examination performed and interpreted to evaluate for limited conditions as outlined above. There may be other clinically important information contained in the images that is outside this scope. When clinically warranted, a comprehensive ultrasound through the appropriate department is considered.      ----------------------------------------------------------------------------------  DISCUSSIONS    I have discussed management of the patient with the following physicians and ZULAY's: General surgery    Discussion of management with other [facility] or appropriate source(s): Pharmacy    Escalation of care considered, and ultimately not performed:    Barriers to care at this time, including but not limited to:     Decision tools and prescription drugs considered including, but not limited to: Antibiotic utilization      FINAL IMPRESSION    1. Breast abscess of female          DISPOSITION        Admission: The patient will be admitted for further evaluation and treatment. Discussed case with consultants and relayed all necessary information.        This chart was dictated using an electronic voice recognition software. The chart has been reviewed and edited but there is still possibility for dictation errors due to limitation of software.    Malachi Simpson,  11/14/2023

## 2023-11-15 NOTE — H&P
"  CHIEF COMPLAINT: Increasing and recurrent left breast pain    HISTORY OF PRESENT ILLNESS: The patient is a 38-year-old woman who underwent an I&D of a left breast abscess several weeks ago.  She was discharged with oral antibiotics which she completed.  She now has recurrent symptoms and work-up is revealed 2 separate abscesses in her left breast.  She has had some subjective fevers.    PAST MEDICAL HISTORY:       PAST SURGICAL HISTORY:  has a past surgical history that includes other orthopedic surgery (2006); other (Left, 2001); ear middle exploration (2001); tendon repair (2006); primary c section (8/8/2017); pr lap,diagnostic abdomen (9/25/2019); salpingectomy (Bilateral, 9/25/2019); cholecystectomy robotic xi (N/A, 8/14/2020); and incision and drainage general (Left, 10/30/2023).    ALLERGIES: No Known Allergies    CURRENT MEDICATIONS:    Home Medications       Reviewed by Chelsea Perez R.N. (Registered Nurse) on 11/14/23 at 1834  Med List Status: Not Addressed     Medication Last Dose Status   acetaminophen (TYLENOL) 325 MG Tab  Active   ibuprofen (MOTRIN) 800 MG Tab  Active   sulfamethoxazole-trimethoprim (BACTRIM DS) 800-160 MG tablet  Active                    FAMILY HISTORY: family history is not on file.    SOCIAL HISTORY:  reports that she has never smoked. She has never used smokeless tobacco. She reports that she does not drink alcohol and does not use drugs.    REVIEW OF SYSTEMS: Comprehensive review of systems is negative with the exception of the aforementioned HPI, PMH, and PSH bullets in accordance with CMS guidelines.    PHYSICAL EXAMINATION:      Constitutional:     Vital Signs: BP 95/61   Pulse 81   Temp 37.8 °C (100 °F) (Oral)   Resp 18   Ht 1.575 m (5' 2\")   Wt 66.2 kg (145 lb 15.1 oz)   SpO2 97%    General Appearance: alert in no acute distress.   HEENT:    Demonstrates symmetric, reactive pupils. Extraocular muscles   are intact. Nares and oropharynx are clear.   Neck:    Supple. " No adenopathy.  Respiratory:   Inspection: Unlabored respirations, no intercostal retractions, paradoxical motion, or accessory muscle use.   Auscultation: normal.  Breast: Tender fluctuant areas at 5 and 11:00 consistent with breast abscess  Cardiovascular:   Inspection: The skin is warm.  Auscultation: Regular rate and rhythm.   Peripheral Pulses: Normal.   Abdomen:  Inspection: Abdominal inspection reveals no abrasions, contusions, lacerations or penetrating wounds.   Palpation: Palpation is remarkable for no significant tenderness, guarding, or peritoneal findings. No abdominal wall hernias.  Extremities:   Examination of the upper and lower extremities demonstrates no cyanosis edema or clubbing.  Neurologic:   Alert & oriented to person, time and place. Normal motor function. Normal sensory function. No focal deficits noted.    LABORATORY VALUES:   Recent Labs     11/14/23 2000   WBC 11.5*   RBC 3.64*   HEMOGLOBIN 11.4*   HEMATOCRIT 32.5*   MCV 89.3   MCH 31.3   MCHC 35.1   RDW 39.8   PLATELETCT 375   MPV 8.6*     Recent Labs     11/14/23 2000   SODIUM 137   POTASSIUM 3.6   CHLORIDE 105   CO2 22   GLUCOSE 108*   BUN 10   CREATININE 0.69   CALCIUM 8.6     Recent Labs     11/14/23 2000   ASTSGOT 17   ALTSGPT 13   TBILIRUBIN 0.2   ALKPHOSPHAT 87   GLOBULIN 3.0            IMAGING:   US-CHEST   Final Result      Irregular hypoechoic area at the 11:00 position measuring 2.3 x 2.6 x 1.2 cm, previously 3.2 x 2.2 x 1.6 cm with some surrounding hyperemia in the area of current concern. At the 5:00 position there is a 3.2 x 2.2 x 2.5 cm ill-defined hypoechoic area    which previously measured 3.2 x 1.9 x 2.0 cm. These findings could be consistent with abscess in the appropriate clinical setting.          ASSESSMENT AND PLAN:   38-year-old woman with an unclear history of lupus now with clinically recurrent breast abscesses.  She has been refractory to previous I&D and management with outpatient oral antibiotics.   Repeat I&D is therefore indicated as well as IV antibiotics which have been initiated.  We will make arrangements for repeat I&D of her left breast.         ____________________________________     Braulio Yip M.D.    DD: 11/14/2023  10:43 PM    AAST Grading System for EGS Conditions

## 2023-11-15 NOTE — PROGRESS NOTES
Handoff assessment: complaints of left breast aching. Left breast is tender, red, brownish/pinkish drainage noted. Dressing applied. Plan of care reviewed including labs, IV antibiotics, possible surgery in am. Verbalized understanding. Able to make needs known.

## 2023-11-15 NOTE — ANESTHESIA PROCEDURE NOTES
Airway    Date/Time: 11/15/2023 12:05 PM    Performed by: Hay Varghese M.D.  Authorized by: Hay Varghese M.D.    Location:  OR  Urgency:  Elective  Indications for Airway Management:  Anesthesia      Spontaneous Ventilation: absent    Sedation Level:  Deep  Preoxygenated: Yes    Mask Difficulty Assessment:  1 - vent by mask  Final Airway Type:  Supraglottic airway  Final Supraglottic Airway:  Standard LMA    SGA Size:  4  Number of Attempts at Approach:  1         Other

## 2023-11-15 NOTE — ED NOTES
Pt transferred to [place] [address]. Pt ambulatory to room. NAD noted. Call light within reack.     Bedside report to May RN. Pt connected to IV vanco. No supplemental O2 use at this time.

## 2023-11-15 NOTE — OP REPORT
OPERATIVE REPORT     PreOp Diagnosis: Left breast abscess      PostOp Diagnosis: Left breast abscess      Procedure(s):  IRRIGATION AND DEBRIDEMENT, WOUND ABSCESS - Wound Class: Dirty or Infected    Surgeon(s):  Malachi Estrada D.O.    Anesthesiologist/Type of Anesthesia:  Anesthesiologist: Hay Varghese M.D./General    Surgical Staff:  Circulator: Charan Dwyer R.N.  Relief Circulator: Radha Ko R.N.  Scrub Person: Alexandra Ann; Beverley Reyes C.N.A.    Specimens removed if any:  ID Type Source Tests Collected by Time Destination   1 : BREAST ABCESS Body Fluid Breast AFB CULTURE, FUNGAL CULTURE, AEROBIC/ANAEROBIC CULTURE (SURGERY) Malachi Estrada D.O. 11/15/2023 12:19 PM        Estimated Blood Loss: Minimal    Findings: Upper inner quadrant breast abscess with well-defined cavity.  Lower outer quadrant breast abscess cavity with purulent fluid within.      Complications: None    Indication: 38-year-old female with history of left breast abscess status post incision and drainage who presents with a new abscess in the same breast.    Operative report: Patient was taken to the operating room placed in supine position operating table.  She was placed under anesthesia and LMA was placed by the anesthesiologist.  The left breast was then examined.  There was a large indurated area with fluctuant collection under the skin of the upper inner quadrant of the left breast.  Prior an incision site in the lower outer quadrant around the periareolar skin was seen as well.  On palpation some purulent fluid drained from the old site.  The breast area was prepped with Betadine and draped out in standard fashion.  Field block was placed around the areola and the breast abscess on that side.  A 1.5 cm incision was made on the areolar margin and we dissected towards the area of fluctuance using a clamp.  Fair amount of pus was expressed.  This was cultured.  We broke up loculations within the cavity with finger and clamp.   We then irrigated and placed packing.  We then looked at the other I&D site.  Some purulent fluid was able to be expressed from the cavity which was of for the most part under the areola.  We explored the cavity and broke up any loculations.  We then irrigated it well and packed that site.  Area was then cleaned and dried and a bulky dressing was applied.  The patient was then awakened from anesthesia taken the postanesthesia care unit in stable condition.  The sponge, needle and instrument counts were correct at the end of the case.      11/15/2023 1:28 PM Malachi Estrada D.O.

## 2023-11-15 NOTE — PROGRESS NOTES
Bedside report received from DEION Vaughan. Patient is resting comfortably, A&Ox4, room air. Mild c/o pain, declines pain meds at this time. Updated on plan of care. Patient verbalized understanding.

## 2023-11-15 NOTE — PROGRESS NOTES
"Pharmacy Vancomycin Kinetics Note for 11/15/2023     38 y.o. female on Vancomycin day # 1     Vancomycin Indication (AUC Dosing): Skin/skin structure infection (Brast abscess)    Provider specified end date: 11/20/23    Active Antibiotics (From admission, onward)      Ordered     Ordering Provider       Wed Nov 15, 2023  7:52 AM    11/15/23 0752  MD Alert...Vancomycin per Pharmacy  PHARMACY TO DOSE        Question:  Indication(s) for vancomycin?  Answer:  Other (comments)  Comment:  Recurrent breast abscess    Raji Deluca M.D.            Dosing Weight: 66.2 kg (145 lb 15.1 oz)      Admission History: Admitted on 11/14/2023 for Abscess of the breast and nipple [N61.1]  Pertinent history: Patient presented to ED with recurrent breast abscesses. Patient recently admitted for the same s/p I&D and cefazolin while admitted and Bactrim on discharge. Previous abscess culture returned positive for cutibacterium acnes. Work-up shows 2 separate abscesses in L breast. 1750mg loading dose of vancomycin received 11/14 at 2100.    Allergies:     Patient has no known allergies.     Pertinent cultures to date:     Results       Procedure Component Value Units Date/Time    BLOOD CULTURE [[id number]] Collected: 11/14/23 2000    Order Status: Sent Specimen: Blood from Peripheral Updated: 11/14/23 2102    Narrative:      Per Hospital Policy: Only change Specimen Src: to \"Line\" if  specified by physician order.  Release to patient->Immediate    BLOOD CULTURE [[id number]] Collected: 11/14/23 2039    Order Status: Sent Specimen: Blood from Peripheral Updated: 11/14/23 2048    Narrative:      Per Hospital Policy: Only change Specimen Src: to \"Line\" if  specified by physician order.  Release to patient->Immediate            Labs:     Estimated Creatinine Clearance: 96.7 mL/min (by C-G formula based on SCr of 0.69 mg/dL).  Recent Labs     11/14/23 2000   WBC 11.5*   NEUTSPOLYS 76.40*     Recent Labs     11/14/23 2000   BUN 10 " "  CREATININE 0.69   ALBUMIN 4.0     No intake or output data in the 24 hours ending 11/15/23 0819   /52   Pulse 63   Temp 36.3 °C (97.4 °F) (Temporal)   Resp 18   Ht 1.575 m (5' 2\")   Wt 66.2 kg (145 lb 15.1 oz)   SpO2 94%  Temp (24hrs), Av.7 °C (98.1 °F), Min:36.1 °C (97 °F), Max:37.8 °C (100 °F)      List concerns for Vancomycin clearance:          Pharmacokinetics:     AUC kinetics:   Ke (hr ^-1): 0.0841 hr^-1  Half life: 8.24 hr  Clearance: 3.619  Estimated TDD: 1809.5  Estimated Dose: 769  Estimated interval: 10.2    A/P:     -  Vancomycin dose: 1000mg q12h (0900, 2100)    -  Next vancomycin level(s): Should treatment continue would check levels prior to 4th maintenance dose. (Not yet ordered.)   -  @ 0000   - Vt @ 0830     -  Predicted vancomycin AUC from initial AUC test calculator: 553 mg·hr/L    -  Comments: Scr 0.69 within historical range of 0.6-0.7. No immediate concerns for clearance of vancomycin noted. BMP ordered for .     Anatoliy Carbajal, PharmD    "

## 2023-11-15 NOTE — ANESTHESIA TIME REPORT
Anesthesia Start and Stop Event Times       Date Time Event    11/15/2023 1146 Ready for Procedure     1158 Anesthesia Start     1239 Anesthesia Stop          Responsible Staff  11/15/23      Name Role Begin End    Hay Varghese M.D. Anesth 1158 1239          Overtime Reason:  no overtime (within assigned shift)    Comments:

## 2023-11-16 ENCOUNTER — PHARMACY VISIT (OUTPATIENT)
Dept: PHARMACY | Facility: MEDICAL CENTER | Age: 38
End: 2023-11-16
Payer: COMMERCIAL

## 2023-11-16 VITALS
RESPIRATION RATE: 17 BRPM | DIASTOLIC BLOOD PRESSURE: 58 MMHG | HEIGHT: 62 IN | WEIGHT: 145.94 LBS | SYSTOLIC BLOOD PRESSURE: 105 MMHG | HEART RATE: 61 BPM | OXYGEN SATURATION: 97 % | BODY MASS INDEX: 26.86 KG/M2 | TEMPERATURE: 98.6 F

## 2023-11-16 LAB
ANION GAP SERPL CALC-SCNC: 8 MMOL/L (ref 7–16)
BUN SERPL-MCNC: 7 MG/DL (ref 8–22)
CALCIUM SERPL-MCNC: 8.5 MG/DL (ref 8.5–10.5)
CHLORIDE SERPL-SCNC: 103 MMOL/L (ref 96–112)
CO2 SERPL-SCNC: 26 MMOL/L (ref 20–33)
CREAT SERPL-MCNC: 0.47 MG/DL (ref 0.5–1.4)
ERYTHROCYTE [DISTWIDTH] IN BLOOD BY AUTOMATED COUNT: 39.6 FL (ref 35.9–50)
GFR SERPLBLD CREATININE-BSD FMLA CKD-EPI: 123 ML/MIN/1.73 M 2
GLUCOSE SERPL-MCNC: 154 MG/DL (ref 65–99)
HCT VFR BLD AUTO: 34 % (ref 37–47)
HGB BLD-MCNC: 11.5 G/DL (ref 12–16)
MCH RBC QN AUTO: 30.8 PG (ref 27–33)
MCHC RBC AUTO-ENTMCNC: 33.8 G/DL (ref 32.2–35.5)
MCV RBC AUTO: 91.2 FL (ref 81.4–97.8)
PLATELET # BLD AUTO: 359 K/UL (ref 164–446)
PMV BLD AUTO: 8.6 FL (ref 9–12.9)
POTASSIUM SERPL-SCNC: 4.4 MMOL/L (ref 3.6–5.5)
RBC # BLD AUTO: 3.73 M/UL (ref 4.2–5.4)
SODIUM SERPL-SCNC: 137 MMOL/L (ref 135–145)
WBC # BLD AUTO: 10.4 K/UL (ref 4.8–10.8)

## 2023-11-16 PROCEDURE — 99024 POSTOP FOLLOW-UP VISIT: CPT | Performed by: PHYSICIAN ASSISTANT

## 2023-11-16 PROCEDURE — RXMED WILLOW AMBULATORY MEDICATION CHARGE: Performed by: PHYSICIAN ASSISTANT

## 2023-11-16 PROCEDURE — 700111 HCHG RX REV CODE 636 W/ 250 OVERRIDE (IP): Performed by: SURGERY

## 2023-11-16 PROCEDURE — A9270 NON-COVERED ITEM OR SERVICE: HCPCS | Performed by: SURGERY

## 2023-11-16 PROCEDURE — 80048 BASIC METABOLIC PNL TOTAL CA: CPT

## 2023-11-16 PROCEDURE — 85027 COMPLETE CBC AUTOMATED: CPT

## 2023-11-16 PROCEDURE — 700105 HCHG RX REV CODE 258: Performed by: SURGERY

## 2023-11-16 PROCEDURE — 700102 HCHG RX REV CODE 250 W/ 637 OVERRIDE(OP): Performed by: SURGERY

## 2023-11-16 PROCEDURE — 700102 HCHG RX REV CODE 250 W/ 637 OVERRIDE(OP): Performed by: STUDENT IN AN ORGANIZED HEALTH CARE EDUCATION/TRAINING PROGRAM

## 2023-11-16 RX ORDER — DOCUSATE SODIUM 100 MG/1
100 CAPSULE, LIQUID FILLED ORAL 2 TIMES DAILY
Qty: 10 CAPSULE | Refills: 0 | Status: SHIPPED | OUTPATIENT
Start: 2023-11-16 | End: 2023-11-21

## 2023-11-16 RX ORDER — OXYCODONE HYDROCHLORIDE 5 MG/1
5 TABLET ORAL EVERY 8 HOURS PRN
Qty: 9 TABLET | Refills: 0 | Status: SHIPPED | OUTPATIENT
Start: 2023-11-16 | End: 2023-11-19

## 2023-11-16 RX ORDER — DOXYCYCLINE 100 MG/1
100 CAPSULE ORAL 2 TIMES DAILY
Qty: 28 CAPSULE | Refills: 0 | Status: ACTIVE | OUTPATIENT
Start: 2023-11-16 | End: 2023-11-30

## 2023-11-16 RX ORDER — METHYLPREDNISOLONE 4 MG/1
TABLET ORAL
Qty: 21 TABLET | Refills: 0 | Status: SHIPPED | OUTPATIENT
Start: 2023-11-16 | End: 2023-11-28 | Stop reason: SDUPTHER

## 2023-11-16 RX ADMIN — VANCOMYCIN HYDROCHLORIDE 1000 MG: 5 INJECTION, POWDER, LYOPHILIZED, FOR SOLUTION INTRAVENOUS at 05:09

## 2023-11-16 RX ADMIN — OXYCODONE HYDROCHLORIDE 10 MG: 10 TABLET ORAL at 10:18

## 2023-11-16 RX ADMIN — METHYLPREDNISOLONE 4 MG: 4 TABLET ORAL at 10:18

## 2023-11-16 RX ADMIN — ACETAMINOPHEN 1000 MG: 500 TABLET, FILM COATED ORAL at 05:09

## 2023-11-16 RX ADMIN — ACETAMINOPHEN 1000 MG: 500 TABLET, FILM COATED ORAL at 00:22

## 2023-11-16 ASSESSMENT — PAIN DESCRIPTION - PAIN TYPE
TYPE: ACUTE PAIN
TYPE: ACUTE PAIN

## 2023-11-16 NOTE — PROGRESS NOTES
Pt is A&O 4  Pain + medicated per MAR, ice packs provided   - nausea  Tolerating a regular diet   Incision + to left breast, DIP  - Drains  + Voids  + flatus  - BM  Up independently   SCD's off ambulating frequently to restroom  Bed alarm off, pt no fall risk per philip storey  Reviewed plan of care with patient, bed in lowest position and locked, pt resting comfortably now, call light within reach, all needs met at this time. Interventions will be executed per plan of care

## 2023-11-16 NOTE — DISCHARGE SUMMARY
DISCHARGE  SUMMARY    DATE OF ADMISSION: 11/14/2023    DATE OF DISCHARGE: 11/16/2023    DISCHARGE DIAGNOSIS:  Principal Problem:    Abscess of the breast and nipple  Resolved Problems:    * No resolved hospital problems. *      CONSULTATIONS:  None     PROCEDURES:  Irrigation and debridement of left breast abscess by Dr. Malachi Estrada on 11/15/2023.     BRIEF HPI and HOSPITAL COURSE:  38 year old female with presented to ED for worsening left breast abscess. She was admitted for I&D on 10/30/2023 and completed a 7 day course of Augmentin on discharge. She then presented to the ED on 11/8 for persistent infection and was treated with a 14 day course of Bactrim. On 11/14/2023 she was re-admitted for additional I&D and IV antibiotics. On day of discharge, she was transitioned to a 14 day course of Doxycycline which was discussed with pharmacy, her pain is controlled, and labs without leukocytosis.     DISPOSITION:   Discharged home on 11/16/2023. The patient was counseled and questions were answered. Specifically, signs and symptoms of infection, respiratory decompensation, and persistent or worsening pain were discussed and the patient agrees to seek medical attention if any of these develop.    DISCHARGE MEDICATIONS:  The patients controlled substance history was reviewed and a controlled substance use informed consent (if applicable) was provided by [facility] and the patient has been prescribed.     Medication List        ASK your doctor about these medications        Instructions   acetaminophen 325 MG Tabs  Commonly known as: Tylenol   Take 2 Tablets by mouth every 6 hours as needed for Mild Pain.  Dose: 650 mg     ibuprofen 800 MG Tabs  Commonly known as: Motrin   Take 800 mg by mouth every 8 hours as needed for Inflammation.  Dose: 800 mg     sulfamethoxazole-trimethoprim 800-160 MG tablet  Commonly known as: Bactrim DS   Take 1 Tablet by mouth every 12 hours for 14 days.  Dose: 1  Tablet            You will be given a prescription for pain medication at discharge. Please take these as directed. It is important to remember not to take medications on an empty stomach as this may cause nausea.  You may also take over the counter acetaminophen and/or NSAIDS (ibuprofen, Aleve, Advil, Motrin) per the package instructions.  You may also use ice to the wound to decrease pain and swelling. You may alternate 20 minutes on and 20 minutes off with the ice for the first 24-48 hours. Make sure you place a washcloth or towel between the ice pack and your skin.  Please note that narcotic pain medication cannot be refilled unless you are seen by a doctor. Make sure you call the office if you are running low on medication or if the dose you have been prescribed is not working well for you.    ACTIVITY:  After discharge from the hospital, you may resume full routine activities; however, there should be no heavy lifting (greater than 20 pounds or a bag of groceries) and no strenuous activities for at least 2 weeks. The duration may be longer, depending on your surgical procedure. Routine activities of daily living are acceptable. Activity level should be addressed at your post-op follow up appointment. You may drive whenever you are off pain medications and are able to perform the activities needed to drive, i.e., turning, bending, twisting, etc.      WOUND CARE:  You may shower, but do not submerge in a bath for at least two weeks. If you have wound dressings, they may come off after 48 hours. If you have skin glue to the wound, this will fall off on its own, do not pick at it. If you have steri strips to the wound, these will fall off on their own, do not pick at them, may trim the edges if needed.    DIET:  Upon discharge from the hospital, you may resume your normal preoperative diet, unless specifically directed otherwise. Depending on how you are feeling and whether you have nausea or not, you may wish to  stay with a bland diet for the first few days. However, you can advance this as quickly as you feel ready.      FOLLOW UP:  No follow-up provider specified.  Call the office if you have: (1) Fevers to more than 101F, (2) Unusual chest or leg pain, (3) Drainage or fluid from incision that may be foul smelling, increased tenderness or soreness at the wound or the wound edges are no longer together, redness or swelling at the incision site. Do not hesitate to call with any other questions.    TIME SPENT ON DISCHARGE: 26 minutes      ____________________________________________  Milagro Bustamante P.A.-C.    DD: 11/16/2023 8:40 AM

## 2023-11-16 NOTE — CARE PLAN
The patient is Stable - Low risk of patient condition declining or worsening    Shift Goals  Clinical Goals: pain control, abx, rest, discharge  Patient Goals: discharge    Progress made toward(s) clinical / shift goals:    Problem: Pain - Standard  Goal: Alleviation of pain or a reduction in pain to the patient’s comfort goal  Outcome: Progressing     Problem: Knowledge Deficit - Standard  Goal: Patient and family/care givers will demonstrate understanding of plan of care, disease process/condition, diagnostic tests and medications  Outcome: Progressing

## 2023-11-16 NOTE — DISCHARGE PLANNING
Case Management Discharge Planning    Admission Date: 11/14/2023  GMLOS: 2.1  ALOS: 2    6-Clicks ADL Score: 21  6-Clicks Mobility Score: 19      Anticipated Discharge Dispo: Discharge Disposition: Discharged to home/self care (01)    DME Needed: No    Action(s) Taken: DC Assessment Complete (See below)    Escalations Completed: None    Medically Clear: Yes    Next Steps: Patient has no needs at d/c.     Barriers to Discharge: None    Is the patient up for discharge tomorrow: No  D/c today Spouse to transport home.         Care Transition Team Assessment  Case Management role explained to patient and spouse. Demographics verified. Post-op Irrigation and debridement of left breast abscess     Information Source  Orientation Level: Oriented X4  Information Given By: Patient  Informant's Name: Buffy  Who is responsible for making decisions for patient? : Patient    Readmission Evaluation  Is this a readmission?: No    Elopement Risk  Legal Hold: No  Ambulatory or Self Mobile in Wheelchair: Yes  Disoriented: No  Psychiatric Symptoms: None  History of Wandering: No  Elopement this Admit: No  Vocalizing Wanting to Leave: No  Displays Behaviors, Body Language Wanting to Leave: No-Not at Risk for Elopement  Elopement Risk: Not at Risk for Elopement    Interdisciplinary Discharge Planning  Primary Care Physician: Eric Ortiz PA-C  Lives with - Patient's Self Care Capacity: Spouse, Child Less than 18 Years of Age  Patient or legal guardian wants to designate a caregiver: No  Support Systems: Family Member(s), Spouse / Significant Other, [employer]  Housing / Facility: 1 Story Apartment / Condo  Patient Prefers to be Discharged to:: home  Assistance Needed: No  Durable Medical Equipment: Not Applicable    Discharge Preparedness  What is your plan after discharge?: Home with help  What are your discharge supports?: Spouse  Prior Functional Level: Ambulatory, Drives Self, Independent with Activities of Daily  Living    Functional Assesment  Prior Functional Level: Ambulatory, Drives Self, Independent with Activities of Daily Living    Finances  Financial Barriers to Discharge: No  Prescription Coverage: Yes    Vision / Hearing Impairment  Vision Impairment : No (wears cheaters to read)  Hearing Impairment : No         Advance Directive  Advance Directive?: None  Advance Directive offered?: AD Booklet given    Domestic Abuse  Have you ever been the victim of abuse or violence?: No  Physical Abuse or Sexual Abuse: No  Verbal Abuse or Emotional Abuse: No  Possible Abuse/Neglect Reported to:: Not Applicable    Psychological Assessment  History of Substance Abuse: None  History of Psychiatric Problems: Yes (depression-no meds currently)    Discharge Risks or Barriers  Discharge risks or barriers?: No    Anticipated Discharge Information  Discharge Disposition: Discharged to home/self care (01)

## 2023-11-16 NOTE — PROGRESS NOTES
4 Eyes Skin Assessment Completed by DEION Beal and DEION Castorena.    Head WDL  Ears WDL  Nose WDL  Mouth WDL  Neck WDL  Breast/Chest L breast with packing dressing in place, OSIRIS under dressing  Shoulder Blades WDL  Spine WDL  (R) Arm/Elbow/Hand WDL  (L) Arm/Elbow/Hand WDL  Abdomen WDL  Groin WDL  Scrotum/Coccyx/Buttocks WDL  (R) Leg WDL  (L) Leg WDL  (R) Heel/Foot/Toe WDL  (L) Heel/Foot/Toe WDL          Devices In Places Pulse Ox and SCD's      Interventions In Place Pillows and Pressure Redistribution Mattress    Possible Skin Injury No    Pictures Uploaded Into Epic N/A  Wound Consult Placed Yes  RN Wound Prevention Protocol Ordered No

## 2023-11-16 NOTE — ASSESSMENT & PLAN NOTE
11/15 Incision and drainage of left breast abscess   11/16 Transitioned to 14 day course of oral doxycycline.   Outpatient follow up with Dr. Meaghan Foster.   ACS Blue Service.

## 2023-11-16 NOTE — CARE PLAN
The patient is Stable - Low risk of patient condition declining or worsening    Shift Goals  Clinical Goals: pain control, ABX therapy, rest  Patient Goals: Pain control    Progress made toward(s) clinical / shift goals:  ABX and pain medication administered  Problem: Knowledge Deficit - Standard  Goal: Patient and family/care givers will demonstrate understanding of plan of care, disease process/condition, diagnostic tests and medications  Description: Target End Date:  1-3 days or as soon as patient condition allows    Document in Patient Education    1.  Patient and family/caregiver oriented to unit, equipment, visitation policy and means for communicating concern  2.  Complete/review Learning Assessment  3.  Assess knowledge level of disease process/condition, treatment plan, diagnostic tests and medications  4.  Explain disease process/condition, treatment plan, diagnostic tests and medications  Outcome: Progressing     Problem: Pain - Standard  Goal: Alleviation of pain or a reduction in pain to the patient’s comfort goal  Description: Target End Date:  Prior to discharge or change in level of care    Document on Vitals flowsheet    1.  Document pain using the appropriate pain scale per order or unit policy  2.  Educate and implement non-pharmacologic comfort measures (i.e. relaxation, distraction, massage, cold/heat therapy, etc.)  3.  Pain management medications as ordered  4.  Reassess pain after pain med administration per policy  5.  If opiods administered assess patient's response to pain medication is appropriate per POSS sedation scale  6.  Follow pain management plan developed in collaboration with patient and interdisciplinary team (including palliative care or pain specialists if applicable)  Outcome: Progressing    per MAR instructions. Ice packs provided to patient. Resting at this time       Patient is not progressing towards the following goals:

## 2023-11-16 NOTE — DISCHARGE INSTRUCTIONS
Post Operative Discharge Instructions:    1. DIET: Upon discharge from the hospital, you may resume your normal preoperative diet, unless specifically directed otherwise. Depending on how you are feeling and whether you have nausea or not, you may wish to stay with a bland diet for the first few days. However, you can advance this as quickly as you feel ready.    2. ACTIVITIES: After discharge from the hospital, you may resume full routine activities; however, there should be no heavy lifting (greater than 20 pounds or a bag of groceries) and no strenuous activities for at least 2 weeks. The duration may be longer, depending on your surgical procedure. Routine activities of daily living are acceptable. Activity level should be addressed at your post-op follow up appointment.    3. DRIVING: You may drive whenever you are off pain medications and are able to perform the activities needed to drive, i.e., turning, bending, twisting, etc.    4. BATHING: You may get the wound wet at any time after leaving the hospital. You may shower, but do not submerge in a bath for at least two weeks.  If you have wound dressings, they may come off after 48 hours.  If you have skin glue to the wound, this will fall off on its own, do not pick at it.  If you have Steri-Strips to the wound, these will fall off on their own, do not pick at them. You may trim the edges if needed.    5. BOWEL FUNCTION:   After surgery, it is not uncommon for patients to develop either frequent or loose stools after meals. This usually corrects itself after a few days, to a few weeks. If this occurs, do not worry; this will resolve on its own.  Constipation is much more common than loose stools. The cause is the combination of pain medication and decreased activity level and possibly the nature of the surgical procedure performed. If you feel this is occurring, you may use an over-the-counter treatment such as MiraLAX (or Milk of Magnesia, Ex-Lax,  Senokot, etc.) until the problem has resolved. Drink plenty of water and try to wean off narcotic pain medications as soon as is comfortable for you.    6. PAIN MEDICATION:   You will be given a prescription for pain medication at discharge. Please take these as directed. It is important to remember not to take medications on an empty stomach as this may cause nausea.  You may also take over the counter acetaminophen and/or NSAIDS (ibuprofen, Aleve, Advil, Motrin) per the package instructions.  You may also use ice to the wound to decrease pain and swelling. You may alternate 20 minutes on and 20 minutes off with the ice for the first 24-48 hours. Make sure you place a washcloth or towel between the ice pack and your skin.  Please note that narcotic pain medication cannot be refilled unless you are seen by a doctor. Make sure you call the office if you are running low on medication or if the dose you have been prescribed is not working well for you.    7.CALL THE [facility] AT [phone] IF YOU HAVE:  (1) Fevers to more than 101F, (2) Unusual chest or leg pain, (3) Drainage or fluid from incision that may be foul smelling, increased tenderness or soreness at the wound or the wound edges are no longer together, redness or swelling at the incision site. Do not hesitate to call with any other questions.

## 2023-11-16 NOTE — CARE PLAN
The patient is Stable - Low risk of patient condition declining or worsening    Shift Goals  Clinical Goals: IV abx, pain control, rest  Patient Goals: pain control, rest    Progress made toward(s) clinical / shift goals:    Problem: Pain - Standard  Goal: Alleviation of pain or a reduction in pain to the patient’s comfort goal  Outcome: Progressing     Problem: Knowledge Deficit - Standard  Goal: Patient and family/care givers will demonstrate understanding of plan of care, disease process/condition, diagnostic tests and medications  Outcome: Progressing

## 2023-11-16 NOTE — PROGRESS NOTES
4 Eyes Skin Assessment Completed by DEION Berry and DEION Mckeon.    Head WDL  Ears WDL  Nose WDL  Mouth WDL  Neck WDL  Breast/Chest Redness and biopsy site with steri strips   Shoulder Blades WDL  Spine WDL  (R) Arm/Elbow/Hand WDL  (L) Arm/Elbow/Hand WDL  Abdomen WDL  Groin WDL  Scrotum/Coccyx/Buttocks WDL  (R) Leg WDL  (L) Leg WDL  (R) Heel/Foot/Toe WDL  (L) Heel/Foot/Toe WDL          Devices In Places Pulse Ox      Interventions In Place Pillows    Possible Skin Injury No    Pictures Uploaded Into Epic N/A  Wound Consult Placed N/A  RN Wound Prevention Protocol Ordered No     MODIFIED BARIUM SWALLOW STUDY  (MBSS)    Date of Evaluation: 11/16/2023     Name: Jesus Mccabe   MRN: [id number]    Diagnosis: Skin flap necrosis    Recommendations:     Consistency Recommendations:  NPO  Aspiration Precautions:  strict aspiration precautions  Specialist Referrals: GI: possible long alternative means of nutrition if appropriate.  Therapy: Dysphagia therapy is recommended including Effortful swallow.  Communication Strategies: communication board    Subjective   No notes on file     Past Medical History: Jesus Mccabe  has a past medical history of Advanced liver fibrosis, Coronary artery disease, Gout, History of hepatitis C, treated / cured (SVR12 - 8/2020), Language barrier, MI (myocardial infarction), and Ulcer.  Jesus Mccabe  has a past surgical history that includes Abdominal surgery; Extensor tendon of forearm / wrist repair; Cardiac surgery; Intramedullary rodding of humerus (Left, 10/20/2019); Esophagogastroduodenoscopy (08/31/2013); Colonoscopy (09/02/2013); Open reduction and internal fixation (ORIF) of fracture of proximal humerus (Right, 6/15/2023); Excision of mass of hand (Left, 10/16/2023); and tenodesis, finger extensor, at wrist (Left, 10/16/2023).    The patient is a 61 y.o. male who demonstrated overt s/s of aspiration with thin, mildly-thick, pureed, and soft textured trials during bedside swallow evaluation by this ST.     The following observations were made:   -Mental status: Alert and Cooperative  -Factors affecting study: impairment or difficulty in following directions  -Feeding Method: dependent for feeding    Respiratory Status: Room air    Pneumonia History: unknown past hx of pneumonia. However, recent chest xray (11/15/23) reveals no acute abnormality.  Previous MBSS: Yes - per patient's report. However, unknown date or results.  Previous FEES: unknown    Pain Scale:  0/10 on VAS currently.   Pain Location:     Objective     Modified Barium Swallow Study  Purpose: to  "evaluate anatomy and physiology of the oropharyngeal swallow, to determine effectiveness of rehabilitation strategies, and to determine diet consistency and intervention recommendations. The study was performed using the "Gold Standard" of 30 fps with as low as reasonably achievable (ALARA) exposure.     The patient was seen in radiology seated in High Hernández's position in a video imaging chair for lateral views of the larynx. The study was conducted using Varibar thin liquid (IDDSI 0), Varibar nectar liquid (IDDSI 2), and Varibar pudding (IDDSI 4). He tolerated the procedure well.     Oral Musculature Evaluation:  Oral Musculature Evaluation  Oral Musculature: facial asymmetry present  Dentition: teeth in poor condition       CONSISTENCIES ADMINISTERED:    Consistency  Presentation  Findings Rosenbeck's Penetration/Aspiration Scale (PAS) Strategy Attempted    Thin (IDDSI 0) 15 mL x 3 oz    Views:  - Lateral view   Oral phase: delayed anterior-posterior movement    Pharyngeal phase: min-mod amount of residue noted in the vallecular space after the swallow, min-mod residue noted in the pyriform sinus after the swallow. Deep penetration noted during the swallow without withdrawal, and aspiration noted after the swallow    Attempt at double swallows or multiple swallows to clear pharyngeal residue after first swallow. However, patient was unable to clear said residue and min-mod remained in pharyngeal space.    Esophageal sweep: WFL Best: (5) Material enters the airway, contacts the vocal folds, and is not ejected from the airway    Worst: (7) Material enters the airway, passes below the vocal folds, and is not ejected from the trachea despite effort   Chin tuck-not successful at reducing pharyngeal residue and/or preventing penetration or aspiration events.  Right and left head turn with chin tuck: both were not successful at preventing penetration or aspiration events. Minimally assisted with reducing pharyngeal " residue. However, min amount remained.   Nectar thick (mildly thick/IDDSI 2) [id number]    Views:  - Lateral view   Oral phase: delayed anterior-posterior movement    Pharyngeal phase: Mod-severe residue noted in the vallecular and pyriform sinuses after the swallow. Again, multiple swallows were attempted to clear this residue. However, patient was unable to trigger double swallow. Patient was able to produce adequate cough when prompted and was able to expectorate material from pharyngeal space into towel given.    Deep penetration noted during the swallow, and aspiration noted after the swallow.     Esophageal sweep:WFL    Best: (5) Material enters the airway, contacts the vocal folds, and is not ejected from the airway    Worst: (7) Material enters the airway, passes below the vocal folds, and is not ejected from the trachea despite effort   Strategies were not attempted due to unsuccessful trials prior to this.    Puree (extremely thick/ IDDSI 4) 1 tablespoon       Views:  - Lateral view   Oral phase: Patient unable to trigger swallow initiation. Premature spillage into vallecular space noted.    Pharyngeal phase: Profound amount of residue noted in the vallecular space due to premature spillage. Overfilling of vallecular space noted to overspill into pharyngeal space and into pyriform sinuses as well. Liquid wash given to assist with swallow initiation.     Esophageal sweep: WFL   Best: (1) Material does not enter the airway However, due to profound amount of residue in pharyngeal cavity, patient is at very high risk for aspiration of residue.     Worst: (1) Material does not enter the airway However, due to profound amount of residue in pharyngeal cavity, patient is at very high risk for aspiration of residue.   Liquid wash: did assist in triggering swallow initiation to clear out residue. However, severe amount of pharyngeal residue remained.        Treatment   Total Treatment Time:  60 minutes  Patient  educated regarding results and recommendations of the evaluation. See the recommendations section below.    Education: Plan of Care, role of SLP in care, and anatomy and physiology of swallow mechanism as it relates to MBSS findings and recommendations were discussed with the patient. Patient expressed understanding. All questions were answered.     Assessment     Jesus Mccabe is a 61 y.o. male referred for Modified Barium Swallow Study with a medical diagnosis of Skin flap necrosis. The patient presents with severe-profound oropharyngeal dysphagia as determined by the Dysphagia Outcome and Severity Scale (ELEANOR). Level 1: Severe Dysphagia.    Modified Barium Swallow Study (MBSS) revealed oral phase characterized by decreased bolus control with premature spillage, slow oral transit, and absent swallow initiation with pureed texture trial.    Pharyngeal phase characterized by decreased base of tongue retraction, decreased pharyngeal wall contraction, poor epiglottic inversion, pyriform sinus stasis, decreased laryngeal elevation, decreased hyolaryngeal excursion, scattered pharyngeal residue, valleculae stasis, and penetration and aspiration events with thin and mildly-thick liquid consistencies.       Impressions: Patient presents with severe-profound Oropharyngeal dysphagia characterized by . Both swallow safety and efficiency are impaired. .     Patient appears to be at high risk for aspiration related pneumonia from a primary oropharyngeal dysphagia in consideration of three pillars of aspiration pneumonia (David, 2005) including oral health status, overall health/immune status, and laryngeal vestibule closure/severity of dysphagia. However, unable to assess risk related to aspiration pneumonia cause by the aspiration of gastric content. Patient appears to be a fair candidate for behavioral swallow rehabilitation.     References:  CHAMP Hernandez (2005, March). Pneumonia: Factors Beyond Aspiration. Perspectives  in Swallowing and Swallowing Disorders (Dysphagia), 14, 10-16.    Prognosis: Fair    Barriers: None    Note: Conditions during a Modified barium Swallow Study are often considered optimal. This includes position of patient, timing and control of bolus, environment, and cueing for any necessary strategies    Goals:  Multidisciplinary Problems       SLP Goals          Problem: SLP    Goal Priority Disciplines Outcome   SLP Goal     SLP Ongoing, Progressing   Description: 1) Patient will tolerate least restrictive diet without s/s of aspiration.     2) Patient  and family education will be provided regarding current swallow function as well as aspiration precautions.                       Plan:   Patient to be seen:  Therapy Frequency: 2 x/week   Plan of Care expires:     Plan of Care reviewed with:  patient, other (see comments) (RN and MD)        Discharge recommendations:      Barriers to Discharge:  None    Time Tracking:   SLP Treatment Date:   11/16/23  Speech Start Time:  1330  Speech Stop Time:  1430   Speech Total Time (min):  60 min      11/16/2023  Therapist's Name:   Stormy Merrill CCC-SLP   Speech Language Pathologist

## 2023-11-19 LAB
BACTERIA BLD CULT: NORMAL
BACTERIA BLD CULT: NORMAL
BACTERIA SPEC ANAEROBE CULT: NORMAL
BACTERIA WND AEROBE CULT: ABNORMAL
BACTERIA WND AEROBE CULT: ABNORMAL
GRAM STN SPEC: ABNORMAL
SIGNIFICANT IND 70042: ABNORMAL
SIGNIFICANT IND 70042: NORMAL
SITE SITE: ABNORMAL
SITE SITE: NORMAL
SOURCE SOURCE: ABNORMAL
SOURCE SOURCE: NORMAL

## 2023-11-21 ENCOUNTER — OFFICE VISIT (OUTPATIENT)
Dept: SURGERY | Facility: MEDICAL CENTER | Age: 38
End: 2023-11-21
Attending: SURGERY
Payer: COMMERCIAL

## 2023-11-21 VITALS
RESPIRATION RATE: 16 BRPM | BODY MASS INDEX: 26.52 KG/M2 | WEIGHT: 145 LBS | SYSTOLIC BLOOD PRESSURE: 120 MMHG | OXYGEN SATURATION: 97 % | DIASTOLIC BLOOD PRESSURE: 76 MMHG | HEART RATE: 72 BPM

## 2023-11-21 DIAGNOSIS — N61.1 ABSCESS OF THE BREAST AND NIPPLE: ICD-10-CM

## 2023-11-21 PROCEDURE — 3078F DIAST BP <80 MM HG: CPT | Performed by: PHYSICIAN ASSISTANT

## 2023-11-21 PROCEDURE — 99024 POSTOP FOLLOW-UP VISIT: CPT | Performed by: PHYSICIAN ASSISTANT

## 2023-11-21 PROCEDURE — 3074F SYST BP LT 130 MM HG: CPT | Performed by: PHYSICIAN ASSISTANT

## 2023-11-21 ASSESSMENT — FIBROSIS 4 INDEX: FIB4 SCORE: 0.53

## 2023-11-21 NOTE — LETTER
November 21, 2023       Patient: Buffy Lund   YOB: 1985   Date of Visit: 11/21/2023         To Whom It May Concern:    In my medical opinion, I recommend that Buffy Lund not return to work until next scheduled appointment in next week. She may not lift greater than 10 pounds.     If you have any questions or concerns, please don't hesitate to call [phone]          Sincerely,    Milagro Bustamante PA-C       SURGICAL SERVICES POST OP, GENERIC  Electronically Signed

## 2023-11-21 NOTE — LETTER
November 21, 2023    To [facility] Providers,     Buffy Lund    Is unable to follow up in wound care clinic or [facility] due to the holiday hours and scheduling. She has I&D of breast abscess in the OR on 10/30 and 11/4. Most recent packing change was at [facility] on 11/21/2023. She need repacking done on Friday, 11/24/2023. Can you help bridge the gap in care until she can be seen next week.       Thank you,       Milagro Bustamante P.A.-C.    Electronically signed

## 2023-11-21 NOTE — PROGRESS NOTES
HISTORY OF PRESENT ILLNESS: 38 year old female with presented to ED for worsening left breast abscess. She was admitted for I&D on 10/30/2023 and completed a 7 day course of Augmentin on discharge. She then presented to the ED on 11/8 for persistent infection and was treated with a 14 day course of Bactrim. On 11/14/2023 she was re-admitted for additional I&D and IV antibiotics. On day of discharge, she was transitioned to a 14 day course of Doxycycline which was discussed with pharmacy, her pain is controlled, and labs without leukocytosis.     Today she presents for follow up and repacking of breast abscesses. Her pain has been controlled. She has been taking her antibiotics as prescribed.     CURRENT MEDICATIONS:  Current Outpatient Medications   Medication Sig    methylPREDNISolone (MEDROL DOSEPAK) 4 MG Tablet Therapy Pack Follow schedule on package instructions.    doxycycline (MONODOX) 100 MG capsule Take 1 Capsule by mouth 2 times a day for 14 days.    docusate sodium (COLACE) 100 MG Cap Take 1 Capsule by mouth 2 times a day for 5 days.    acetaminophen (TYLENOL) 325 MG Tab Take 2 Tablets by mouth every 6 hours as needed for Mild Pain.    ibuprofen (MOTRIN) 800 MG Tab Take 800 mg by mouth every 8 hours as needed for Inflammation.       PHYSICAL EXAMINATION:  Vital Signs: /76 (BP Location: Right arm, Patient Position: Sitting)   Pulse 72   Resp 16   Wt 65.8 kg (145 lb)   SpO2 97%   Physical Exam  Vitals and nursing note reviewed.   Constitutional:       General: She is not in acute distress.     Appearance: She is not ill-appearing.   Cardiovascular:      Rate and Rhythm: Normal rate.   Pulmonary:      Effort: Pulmonary effort is normal. No respiratory distress.   Chest:      Comments: 2 abscesses of left breast with improving surrounding induration. No erythema or warmth. Moderate tenderness   Neurological:      Mental Status: She is alert.         LABORATORY VALUES:  Lab Results   Component  "Value Date/Time    WBC 10.4 11/16/2023 04:58 AM    RBC 3.73 (L) 11/16/2023 04:58 AM    HEMOGLOBIN 11.5 (L) 11/16/2023 04:58 AM    HEMATOCRIT 34.0 (L) 11/16/2023 04:58 AM    MCV 91.2 11/16/2023 04:58 AM    MCH 30.8 11/16/2023 04:58 AM    MCHC 33.8 11/16/2023 04:58 AM    MPV 8.6 (L) 11/16/2023 04:58 AM    NEUTSPOLYS 76.40 (H) 11/14/2023 08:00 PM    LYMPHOCYTES 17.10 (L) 11/14/2023 08:00 PM    MONOCYTES 4.90 11/14/2023 08:00 PM    EOSINOPHILS 1.00 11/14/2023 08:00 PM    BASOPHILS 0.30 11/14/2023 08:00 PM     Lab Results   Component Value Date/Time    SODIUM 137 11/16/2023 04:58 AM    POTASSIUM 4.4 11/16/2023 04:58 AM    CHLORIDE 103 11/16/2023 04:58 AM    CO2 26 11/16/2023 04:58 AM    GLUCOSE 154 (H) 11/16/2023 04:58 AM    BUN 7 (L) 11/16/2023 04:58 AM    CREATININE 0.47 (L) 11/16/2023 04:58 AM     No results found for: \"PROTHROMBTM\", \"INR\"    IMAGING:  No orders to display       PATHOLOGY: Final pathology from 10/25/2023 demonstrated   Acute on chronic mastitis with dilated ducts, rare multinucleated giant cells and focal granuloma formation   Negative for acid-fast and fungal organisms by AFB/GMS special stains   Negative for atypia and malignancy   B. Left axillary node, ultrasound biopsy: Reactive lymphoid elements, negative for carcinoma     Procedure:   Repacking of left breast abscess using 4 inches of packing for abscess located at the ~10 o'clock position and ~5 Inches for abscess at the 3 o'clock.     ASSESSMENT AND PLAN:  1. Abscess of the breast and nipple  Continue previously prescribed Doxycycline as directed.   OTC medications for pain such as tylenol or ibuprofen.     Discharge from the [facility].  Referral to [facility] [place] for follow up with breast specialist due to complicated abscesses with surgical I&D x2.      ____________________________________     Milagro Bustamante P.A.-C.    DD: 11/21/2023  3:56 PM    "

## 2023-11-24 ENCOUNTER — OFFICE VISIT (OUTPATIENT)
Dept: URGENT CARE | Facility: PHYSICIAN GROUP | Age: 38
End: 2023-11-24
Payer: COMMERCIAL

## 2023-11-24 VITALS
WEIGHT: 141 LBS | RESPIRATION RATE: 16 BRPM | HEART RATE: 84 BPM | SYSTOLIC BLOOD PRESSURE: 108 MMHG | OXYGEN SATURATION: 97 % | BODY MASS INDEX: 25.95 KG/M2 | HEIGHT: 62 IN | DIASTOLIC BLOOD PRESSURE: 60 MMHG | TEMPERATURE: 97.7 F

## 2023-11-24 DIAGNOSIS — Z48.00 ENCOUNTER FOR CHANGE OF DRESSING: ICD-10-CM

## 2023-11-24 PROCEDURE — 3074F SYST BP LT 130 MM HG: CPT | Performed by: NURSE PRACTITIONER

## 2023-11-24 PROCEDURE — 3078F DIAST BP <80 MM HG: CPT | Performed by: NURSE PRACTITIONER

## 2023-11-24 PROCEDURE — 99213 OFFICE O/P EST LOW 20 MIN: CPT | Performed by: NURSE PRACTITIONER

## 2023-11-24 ASSESSMENT — FIBROSIS 4 INDEX: FIB4 SCORE: 0.53

## 2023-11-26 ASSESSMENT — ENCOUNTER SYMPTOMS
FEVER: 0
CHILLS: 0
GASTROINTESTINAL NEGATIVE: 1
EYES NEGATIVE: 1
RESPIRATORY NEGATIVE: 1
CARDIOVASCULAR NEGATIVE: 1
CONSTITUTIONAL NEGATIVE: 1

## 2023-11-26 NOTE — PROGRESS NOTES
"Subjective:   Buffy Lund is a 38 y.o. female who presents for Dressing Change (Dressing change on left breast )      Patient presents with a letter from [facility] indicating need for packing change to her left breast wounds due to holiday hours.  Patient is known to me as I have seen her and ordered her diagnostic mammo and ultrasound initially for breast abscess.  Upon chart review, patient has had multiple visits to UC and ER for worsening abscesses to her left breast, and has had I&D by Dr. Foster as well.  She is here today for packing change (courtesy to [facility]).  Patient reports tenderness to palpation, but no worsening of her condition since she has seen [facility] last week.  She denies fever or chills.  She does have pain to palpation over the areas, and does continue to have drainage to both areas, one is at 9 oclock and one is at 5 o'clock.  She is on oral antibiotics managed by [facility].  Patient is unsure whether this is a workmen's compensation claim or not, so this is done as an urgent care visit today.  However, she will let me know if documentation needs to be changed for her.    Dressing Change  This is a new problem. The current episode started more than 1 month ago. The problem occurs constantly. The problem has been unchanged. Pertinent negatives include no chills or fever. Exacerbated by: movement. Treatments tried: Oral abx.       Review of Systems   Constitutional: Negative.  Negative for chills and fever.   HENT: Negative.     Eyes: Negative.    Respiratory: Negative.     Cardiovascular: Negative.    Gastrointestinal: Negative.    Skin: Negative.         Abscess x2 with packing to left breast       Medications, Allergies, and current problem list reviewed today in Epic.     Objective:     /60 (BP Location: Right arm, Patient Position: Sitting, BP Cuff Size: Adult)   Pulse 84   Temp 36.5 °C (97.7 °F) (Temporal)   Resp 16   Ht 1.575 m (5' 2\")   Wt 64 kg (141 lb)   SpO2 97%     Physical " Exam  Vitals reviewed.   Constitutional:       Appearance: Normal appearance.   HENT:      Head: Normocephalic and atraumatic.      Nose: Nose normal.      Mouth/Throat:      Mouth: Mucous membranes are moist.      Pharynx: Oropharynx is clear.   Eyes:      Extraocular Movements: Extraocular movements intact.      Conjunctiva/sclera: Conjunctivae normal.      Pupils: Pupils are equal, round, and reactive to light.   Cardiovascular:      Rate and Rhythm: Normal rate and regular rhythm.      Pulses: Normal pulses.      Heart sounds: Normal heart sounds.   Pulmonary:      Effort: Pulmonary effort is normal.      Breath sounds: Normal breath sounds.   Chest:   Breasts:     Right: Normal.      Left: Swelling and tenderness present. No inverted nipple, mass or nipple discharge.          Comments: There are two open wounds with packing to the left breast. One is at 9 o'clock and the other is at 5 o'clock.  The one at 5 o'clock has some induration noted to the more lateral aspect with surrounding erythema and pain to palpation. There is pain to palpation there.  The one to the 9 o'clock position has some surrounding erythema with more mild pain to palpation.    Abdominal:      General: Abdomen is flat.      Palpations: Abdomen is soft.   Musculoskeletal:         General: Normal range of motion.      Cervical back: Normal range of motion and neck supple.   Skin:     General: Skin is warm and dry.      Capillary Refill: Capillary refill takes less than 2 seconds.   Neurological:      General: No focal deficit present.      Mental Status: She is alert.   Psychiatric:         Mood and Affect: Mood normal.         Behavior: Behavior normal.         Assessment/Plan:     Diagnosis and associated orders:     1. Encounter for change of dressing           Comments/MDM:     Packing was changed to both wounds and new dressing was applied for patient today.  Patient will follow up with [facility] as scheduled next week.          Differential  diagnosis, natural history, supportive care, and indications for immediate follow-up discussed.    Advised the patient to follow-up with the primary care physician for recheck, reevaluation, and consideration of further management.    Please note that this dictation was created using voice recognition software. I have made a reasonable attempt to correct obvious errors, but I expect that there are errors of grammar and possibly content that I did not discover before finalizing the note.    This note was electronically signed by ARTIE Dickey

## 2023-11-28 ENCOUNTER — OFFICE VISIT (OUTPATIENT)
Dept: SURGERY | Facility: MEDICAL CENTER | Age: 38
End: 2023-11-28
Attending: SURGERY
Payer: COMMERCIAL

## 2023-11-28 VITALS
DIASTOLIC BLOOD PRESSURE: 72 MMHG | HEART RATE: 90 BPM | WEIGHT: 141 LBS | SYSTOLIC BLOOD PRESSURE: 120 MMHG | HEIGHT: 62 IN | RESPIRATION RATE: 16 BRPM | BODY MASS INDEX: 25.95 KG/M2 | OXYGEN SATURATION: 98 %

## 2023-11-28 DIAGNOSIS — N61.1 LEFT BREAST ABSCESS: ICD-10-CM

## 2023-11-28 PROCEDURE — 3078F DIAST BP <80 MM HG: CPT | Performed by: SURGERY

## 2023-11-28 PROCEDURE — 99024 POSTOP FOLLOW-UP VISIT: CPT | Performed by: SURGERY

## 2023-11-28 PROCEDURE — 3074F SYST BP LT 130 MM HG: CPT | Performed by: SURGERY

## 2023-11-28 RX ORDER — METHYLPREDNISOLONE 4 MG/1
TABLET ORAL
Qty: 21 TABLET | Refills: 0 | Status: SHIPPED
Start: 2023-11-28

## 2023-11-28 RX ORDER — METHYLPREDNISOLONE 4 MG/1
TABLET ORAL
Qty: 21 TABLET | Refills: 0 | Status: SHIPPED | OUTPATIENT
Start: 2023-11-28

## 2023-11-28 ASSESSMENT — FIBROSIS 4 INDEX: FIB4 SCORE: 0.53

## 2023-11-28 NOTE — PROGRESS NOTES
"Subjective     Buffy Ludn is a 38 y.o. female who presents with Post-op (Abscess of the breast and nipple)            HPI Since last visit, still has packing.     ROS           Objective     /72 (BP Location: Right arm, Patient Position: Sitting)   Pulse 90   Resp 16   Ht 1.575 m (5' 2\")   Wt 64 kg (141 lb)   LMP 10/02/2023 (Approximate)   SpO2 98%   BMI 25.79 kg/m²      Physical Exam  Pulmonary:      Effort: Pulmonary effort is normal.   Skin:     Comments: L breast - wound clean  DC packing.    Neurological:      Mental Status: She is alert.                             Assessment & Plan        Granulomatous mastititis    [place] with steroids  Stop packing  FU with me in 1 week                "

## 2023-12-05 ENCOUNTER — APPOINTMENT (OUTPATIENT)
Dept: RADIOLOGY | Facility: MEDICAL CENTER | Age: 38
End: 2023-12-05
Attending: STUDENT IN AN ORGANIZED HEALTH CARE EDUCATION/TRAINING PROGRAM
Payer: COMMERCIAL

## 2023-12-05 ENCOUNTER — HOSPITAL ENCOUNTER (EMERGENCY)
Facility: MEDICAL CENTER | Age: 38
End: 2023-12-06
Attending: STUDENT IN AN ORGANIZED HEALTH CARE EDUCATION/TRAINING PROGRAM
Payer: COMMERCIAL

## 2023-12-05 DIAGNOSIS — N61.0 MASTITIS: ICD-10-CM

## 2023-12-05 LAB
ALBUMIN SERPL BCP-MCNC: 4 G/DL (ref 3.2–4.9)
ALBUMIN/GLOB SERPL: 1.5 G/DL
ALP SERPL-CCNC: 70 U/L (ref 30–99)
ALT SERPL-CCNC: 8 U/L (ref 2–50)
ANION GAP SERPL CALC-SCNC: 12 MMOL/L (ref 7–16)
AST SERPL-CCNC: 7 U/L (ref 12–45)
BASOPHILS # BLD AUTO: 0.4 % (ref 0–1.8)
BASOPHILS # BLD: 0.05 K/UL (ref 0–0.12)
BILIRUB SERPL-MCNC: <0.2 MG/DL (ref 0.1–1.5)
BUN SERPL-MCNC: 24 MG/DL (ref 8–22)
CALCIUM ALBUM COR SERPL-MCNC: 9.1 MG/DL (ref 8.5–10.5)
CALCIUM SERPL-MCNC: 9.1 MG/DL (ref 8.5–10.5)
CHLORIDE SERPL-SCNC: 101 MMOL/L (ref 96–112)
CO2 SERPL-SCNC: 24 MMOL/L (ref 20–33)
CREAT SERPL-MCNC: 0.73 MG/DL (ref 0.5–1.4)
EOSINOPHIL # BLD AUTO: 0.14 K/UL (ref 0–0.51)
EOSINOPHIL NFR BLD: 1.1 % (ref 0–6.9)
ERYTHROCYTE [DISTWIDTH] IN BLOOD BY AUTOMATED COUNT: 42.8 FL (ref 35.9–50)
GFR SERPLBLD CREATININE-BSD FMLA CKD-EPI: 106 ML/MIN/1.73 M 2
GLOBULIN SER CALC-MCNC: 2.6 G/DL (ref 1.9–3.5)
GLUCOSE SERPL-MCNC: 113 MG/DL (ref 65–99)
HCT VFR BLD AUTO: 35.3 % (ref 37–47)
HGB BLD-MCNC: 11.8 G/DL (ref 12–16)
IMM GRANULOCYTES # BLD AUTO: 0.03 K/UL (ref 0–0.11)
IMM GRANULOCYTES NFR BLD AUTO: 0.2 % (ref 0–0.9)
LYMPHOCYTES # BLD AUTO: 4.17 K/UL (ref 1–4.8)
LYMPHOCYTES NFR BLD: 33.3 % (ref 22–41)
MCH RBC QN AUTO: 31.2 PG (ref 27–33)
MCHC RBC AUTO-ENTMCNC: 33.4 G/DL (ref 32.2–35.5)
MCV RBC AUTO: 93.4 FL (ref 81.4–97.8)
MONOCYTES # BLD AUTO: 0.69 K/UL (ref 0–0.85)
MONOCYTES NFR BLD AUTO: 5.5 % (ref 0–13.4)
NEUTROPHILS # BLD AUTO: 7.43 K/UL (ref 1.82–7.42)
NEUTROPHILS NFR BLD: 59.5 % (ref 44–72)
NRBC # BLD AUTO: 0 K/UL
NRBC BLD-RTO: 0 /100 WBC (ref 0–0.2)
PLATELET # BLD AUTO: 330 K/UL (ref 164–446)
PMV BLD AUTO: 8.9 FL (ref 9–12.9)
POTASSIUM SERPL-SCNC: 3.8 MMOL/L (ref 3.6–5.5)
PROT SERPL-MCNC: 6.6 G/DL (ref 6–8.2)
RBC # BLD AUTO: 3.78 M/UL (ref 4.2–5.4)
SODIUM SERPL-SCNC: 137 MMOL/L (ref 135–145)
WBC # BLD AUTO: 12.5 K/UL (ref 4.8–10.8)

## 2023-12-05 PROCEDURE — 99284 EMERGENCY DEPT VISIT MOD MDM: CPT

## 2023-12-05 PROCEDURE — 85025 COMPLETE CBC W/AUTO DIFF WBC: CPT

## 2023-12-05 PROCEDURE — 80053 COMPREHEN METABOLIC PANEL: CPT

## 2023-12-05 PROCEDURE — 700102 HCHG RX REV CODE 250 W/ 637 OVERRIDE(OP): Mod: UD | Performed by: STUDENT IN AN ORGANIZED HEALTH CARE EDUCATION/TRAINING PROGRAM

## 2023-12-05 PROCEDURE — 76604 US EXAM CHEST: CPT

## 2023-12-05 PROCEDURE — 36415 COLL VENOUS BLD VENIPUNCTURE: CPT

## 2023-12-05 PROCEDURE — A9270 NON-COVERED ITEM OR SERVICE: HCPCS | Mod: UD | Performed by: STUDENT IN AN ORGANIZED HEALTH CARE EDUCATION/TRAINING PROGRAM

## 2023-12-05 RX ORDER — SULFAMETHOXAZOLE AND TRIMETHOPRIM 800; 160 MG/1; MG/1
1 TABLET ORAL 2 TIMES DAILY
Qty: 20 TABLET | Refills: 0 | Status: ACTIVE | OUTPATIENT
Start: 2023-12-05 | End: 2023-12-15

## 2023-12-05 RX ORDER — SULFAMETHOXAZOLE AND TRIMETHOPRIM 800; 160 MG/1; MG/1
1 TABLET ORAL ONCE
Status: COMPLETED | OUTPATIENT
Start: 2023-12-05 | End: 2023-12-05

## 2023-12-05 RX ADMIN — SULFAMETHOXAZOLE AND TRIMETHOPRIM 1 TABLET: 800; 160 TABLET ORAL at 20:51

## 2023-12-05 ASSESSMENT — FIBROSIS 4 INDEX: FIB4 SCORE: 0.53

## 2023-12-06 VITALS
RESPIRATION RATE: 16 BRPM | HEART RATE: 72 BPM | HEIGHT: 62 IN | DIASTOLIC BLOOD PRESSURE: 51 MMHG | WEIGHT: 143.96 LBS | TEMPERATURE: 98.4 F | BODY MASS INDEX: 26.49 KG/M2 | OXYGEN SATURATION: 96 % | SYSTOLIC BLOOD PRESSURE: 94 MMHG

## 2023-12-06 NOTE — ED TRIAGE NOTES
Buffy Lund  38 y.o.  Chief Complaint   Patient presents with    Breast Pain     LEFT  States discoloration to breast  Hx. Multiple L breast abscesses - states this feels similar     Ambulatory to triage with steady gait for above. A & O X 4, GCS 15.    States multiple surgeries for breast abscesses this year with Dr. Foster. Finished PO abx last week, finished PO steroid taper today.    Triage process explained to patient, apologized for wait time, and returned to lobby.

## 2023-12-06 NOTE — ED NOTES
Patient remains comfortable on gurney, no identifiable needs at this time. Equal chest rise and fall bilaterally, pt connected to cardiac monitor. Pending ultrasound. Safety measures in place, call light within reach.

## 2023-12-06 NOTE — ED PROVIDER NOTES
ED Provider Note    CHIEF COMPLAINT  Chief Complaint   Patient presents with    Breast Pain     LEFT  States discoloration to breast  Hx. Multiple L breast abscesses - states this feels similar       EXTERNAL RECORDS REVIEWED  Admission note discharge summary 11/16/2023, admission for left breast abscess.    HPI/ROS  LIMITATION TO HISTORY   Select: : None  OUTSIDE HISTORIAN(S):  none    LizethNELI Lund is a 38 y.o. female with a past medical history of recurrent left breast abscess presenting to the emergency department for left breast pain, left breast mass.  No fevers, chills.  Was last seen by this author on 11/14/2023, at that time was diagnosed with multiple abscesses in the left breast, underwent incision and drainage by Dr. Estrada on 11/15/2023, wound cultures grew out coag negative staph.  Patient was ultimately discharged in the next day, patient was discharged on doxycycline.  Patient says that over the last several days, pain and pressure in the left breast is reaccumulated.  No associated fevers, chills, nausea, vomiting.  No chest pain, shortness of breath.      PAST MEDICAL HISTORY   has a past medical history of Breast abscess, H/O fetal demise, not currently pregnant (08/10/2016), Psychiatric problem, and Sterilization consult (09/24/2019).    SURGICAL HISTORY   has a past surgical history that includes other orthopedic surgery (2006); other (Left, 2001); ear middle exploration (2001); tendon repair (2006); primary c section (8/8/2017); lap,diagnostic abdomen (9/25/2019); salpingectomy (Bilateral, 9/25/2019); cholecystectomy robotic xi (N/A, 8/14/2020); incision and drainage general (Left, 10/30/2023); and irrigation & debridement general (Left, 11/15/2023).    FAMILY HISTORY  History reviewed. No pertinent family history.    SOCIAL HISTORY  Social History     Tobacco Use    Smoking status: Never    Smokeless tobacco: Never   Vaping Use    Vaping Use: Never used   Substance and Sexual Activity  "   Alcohol use: No    Drug use: No    Sexual activity: Not Currently     Partners: Male     Birth control/protection: Pill     Comment: Unplanned pregnancy       CURRENT MEDICATIONS  Home Medications       Reviewed by Kelle Link R.N. (Registered Nurse) on 12/05/23 at 2013  Med List Status: Partial     Medication Last Dose Status   acetaminophen (TYLENOL) 325 MG Tab  Active   ibuprofen (MOTRIN) 800 MG Tab  Active   methylPREDNISolone (MEDROL DOSEPAK) 4 MG Tablet Therapy Pack  Active   methylPREDNISolone (MEDROL DOSEPAK) 4 MG Tablet Therapy Pack  Active   methylPREDNISolone (MEDROL DOSEPAK) 4 MG Tablet Therapy Pack  Active   methylPREDNISolone (MEDROL DOSEPAK) 4 MG Tablet Therapy Pack  Active   methylPREDNISolone (MEDROL DOSEPAK) 4 MG Tablet Therapy Pack  Active   methylPREDNISolone (MEDROL DOSEPAK) 4 MG Tablet Therapy Pack  Active                    ALLERGIES  No Known Allergies    PHYSICAL EXAM  VITAL SIGNS: BP 94/51   Pulse 72   Temp 36.9 °C (98.4 °F)   Resp 16   Ht 1.575 m (5' 2\")   Wt 65.3 kg (143 lb 15.4 oz)   LMP 11/27/2023 (Approximate)   SpO2 96%   BMI 26.33 kg/m²    General: Well- appearing , non-toxic, no acute distress  Neuro: oriented x 3, moving all extremities.   HEENT:   - Head: Normocephalic, atraumatic  - Eyes: PERRL  - Ears/Nose: normal external nose and ears  - Mouth: moist mucosal membranes  Chest: Large area of induration to the lateral aspect of the left breast lateral to the areola, approximately 8 cm in diameter, no fluctuance, no dimpling of the skin or retraction of the nipple.  Resp: clear to auscultation, no increased work of breathing  CV: Regular rate and rhythm  Abd: Soft, non-tender, non-distended  Extremities: No peripheral edema  Psych: lucid and conversational           DIAGNOSTIC STUDIES / PROCEDURES    EKG  My independent EKG interpretation:  Results for orders placed or performed during the hospital encounter of 08/01/20   EKG (Now)   Result Value Ref Range    Report "       [facility] Emergency Dept.    Test Date:  2020  Pt Name:    KISHAN KASPER           Department: ER  MRN:        [id number]                      Room:        36  Gender:     Female                       Technician: [id number]  :        [id number]                   Requested By:MARYJANE FALL  Order #:    [id number]                    Reading MD: Maryjane Fall    Measurements  Intervals                                Axis  Rate:       59                           P:          40  PA:         132                          QRS:        31  QRSD:       90                           T:          8  QT:         404  QTc:        401    Interpretive Statements  SINUS BRADYCARDIA  No previous ECG available for comparison  Electronically Signed On 2020 23:58:19 PDT by Maryjane Fall         LABS  Results for orders placed or performed during the hospital encounter of 23   CBC WITH DIFFERENTIAL   Result Value Ref Range    WBC 12.5 (H) 4.8 - 10.8 K/uL    RBC 3.78 (L) 4.20 - 5.40 M/uL    Hemoglobin 11.8 (L) 12.0 - 16.0 g/dL    Hematocrit 35.3 (L) 37.0 - 47.0 %    MCV 93.4 81.4 - 97.8 fL    MCH 31.2 27.0 - 33.0 pg    MCHC 33.4 32.2 - 35.5 g/dL    RDW 42.8 35.9 - 50.0 fL    Platelet Count 330 164 - 446 K/uL    MPV 8.9 (L) 9.0 - 12.9 fL    Neutrophils-Polys 59.50 44.00 - 72.00 %    Lymphocytes 33.30 22.00 - 41.00 %    Monocytes 5.50 0.00 - 13.40 %    Eosinophils 1.10 0.00 - 6.90 %    Basophils 0.40 0.00 - 1.80 %    Immature Granulocytes 0.20 0.00 - 0.90 %    Nucleated RBC 0.00 0.00 - 0.20 /100 WBC    Neutrophils (Absolute) 7.43 (H) 1.82 - 7.42 K/uL    Lymphs (Absolute) 4.17 1.00 - 4.80 K/uL    Monos (Absolute) 0.69 0.00 - 0.85 K/uL    Eos (Absolute) 0.14 0.00 - 0.51 K/uL    Baso (Absolute) 0.05 0.00 - 0.12 K/uL    Immature Granulocytes (abs) 0.03 0.00 - 0.11 K/uL    NRBC (Absolute) 0.00 K/uL   COMP METABOLIC PANEL   Result Value Ref Range    Sodium 137 135 - 145 mmol/L    Potassium 3.8 3.6 - 5.5 mmol/L     Chloride 101 96 - 112 mmol/L    Co2 24 20 - 33 mmol/L    Anion Gap 12.0 7.0 - 16.0    Glucose 113 (H) 65 - 99 mg/dL    Bun 24 (H) 8 - 22 mg/dL    Creatinine 0.73 0.50 - 1.40 mg/dL    Calcium 9.1 8.5 - 10.5 mg/dL    Correct Calcium 9.1 8.5 - 10.5 mg/dL    AST(SGOT) 7 (L) 12 - 45 U/L    ALT(SGPT) 8 2 - 50 U/L    Alkaline Phosphatase 70 30 - 99 U/L    Total Bilirubin <0.2 0.1 - 1.5 mg/dL    Albumin 4.0 3.2 - 4.9 g/dL    Total Protein 6.6 6.0 - 8.2 g/dL    Globulin 2.6 1.9 - 3.5 g/dL    A-G Ratio 1.5 g/dL   ESTIMATED GFR   Result Value Ref Range    GFR (CKD-EPI) 106 >60 mL/min/1.73 m 2       RADIOLOGY  I have independently interpreted the diagnostic imaging associated with this visit and am waiting the final reading from the radiologist.   My preliminary interpretation is as follows:   - Ultrasound of the left breast reviewed reveals phlegmon, no clear evidence of abscess  Radiologist interpretation:   US-CHEST   Final Result         1.  Irregular hypoechoic areas lateral, medial, and beneath the left nipple. Appearance favoring phlegmon and evolving abscess. Appears similar to prior study given differences of technique. Recommend follow-up by breast center.              MEDICAL DECISION MAKING    ED Observation Status? Yes; I am placing the patient in to an observation status due to a diagnostic uncertainty as well as therapeutic intensity. Patient placed in observation status at 8:15 PM on 12/5/2023    Observation plan is as follows: Obtain ultrasound left breast, baseline labs, treat with antibiotics.    Upon Reevaluation, the patient's condition has: Improved; and will be discharged.    Patient discharged from ED Observation status at 12:00 AM on 12/6/2023    ED COURSE AND PLAN    Buffy Lund is a 38 y.o. female with a history of recurrent left breast abscesses presenting to the emergency department for pain in swelling in the  lateral aspect of the left breast.   Patient is not septic on arrival to the  emergency department.  She has a large area of induration/fluctuance in the lateral aspect of the left breast concerning for recrudescence of abscess.   I obtain baseline labs, initially treated with a dose of Bactrim, obtained a ultrasound of the left breast.  Ultrasound shows a developing abscess.  We will plan to discuss with surgery.    ---Pertinent ED Course---:    8:52 PM I reviewed the patient's old records in Epic, medication list, allergies, past medical history and performed a physical examination.     11:45 PM ultrasound reviewed reveals phlegmon, possible early abscess formation.  I discussed case with general surgeon Dr. Yip, does not feel that ultrasound represents a true abscess that would be amenable to incision and drainage.  Recommending oral antibiotics and close follow-up in outpatient clinic.  He kindly offered that patient can follow-up in his clinic on Thursday.  I reevaluated patient, she informed me that she has an outpatient follow-up appointment with Dr. Foster on Friday.    Will send home with a course of Bactrim.  Advised on strict return precautions.  Appropriate for discharge at this time.              Procedures:      ----------------------------------------------------------------------------------  DISCUSSIONS    I have discussed management of the patient with the following physicians and ZULAY's:      Discussion of management with other [facility] or appropriate source(s):     Escalation of care considered, and ultimately not performed: Considered admission to the hospital for IV antibiotics, see discussion with general surgery as above    Barriers to care at this time, including but not limited to: Limited financial capacity    Decision tools and prescription drugs considered including, but not limited to: Antibiotic utilization      FINAL IMPRESSION    1. Mastitis          DISPOSITION    Home, Stable    Discharge: Diagnostic tests were reviewed and questions answered. Diagnosis, care  plan and treatment options were discussed. The patient  verbalizes understanding of the diagnosis, instructions, and agrees to follow up as directed.        This chart was dictated using an electronic voice recognition software. The chart has been reviewed and edited but there is still possibility for dictation errors due to limitation of software.    Malachi Simpson,  12/5/2023

## 2023-12-06 NOTE — ED NOTES
Pt ambulatory to yellow 54 with ER tech, steady gait observed. Pt changed into gown, connected to monitor. Care assumed, chart up for ERP.

## 2023-12-06 NOTE — DISCHARGE INSTRUCTIONS
You were seen in the emergency department for left breast infection.  Infection has not yet reaccumulated into an abscess.  We discussed her case with general surgery and they recommended oral antibiotics and follow-up in general surgery clinic.  You have a follow-up appointment on Friday with Dr. Foster  please make sure to make that appointment.     If you develop worsening fevers, increased pain or swelling in your breast you should be reevaluated in the emergency department.    Do not miss your appointment with surgery on Friday

## 2023-12-11 LAB
FUNGUS SPEC CULT: NORMAL
FUNGUS SPEC FUNGUS STN: NORMAL
SIGNIFICANT IND 70042: NORMAL
SITE SITE: NORMAL
SOURCE SOURCE: NORMAL

## 2024-03-12 ENCOUNTER — HOSPITAL ENCOUNTER (OUTPATIENT)
Dept: RADIOLOGY | Facility: MEDICAL CENTER | Age: 39
End: 2024-03-12
Attending: SURGERY
Payer: COMMERCIAL

## 2024-03-12 DIAGNOSIS — N61.0 MASTITIS: ICD-10-CM

## 2024-03-12 DIAGNOSIS — N61.0 MASTITIS WITHOUT ABSCESS: ICD-10-CM

## 2024-03-12 PROCEDURE — 76642 ULTRASOUND BREAST LIMITED: CPT | Mod: LT

## 2024-05-16 NOTE — OP REPORT
Section Operative Note    Date of Operation: 2017    Preoperative Diagnosis:  Term Pregnancy                                              Failed Induction                                              Arrest of Descent     Postoperative Diagnosis:  Term Pregnancy                                 Induction of Labor , Failed                                Chorioamnionitis                                Arrest of Descent       Principal Procedure: Primary low transverse  Section and closure of 2-layer    Surgeon: Kevin Blount    Assistant: SABRIAN Maloney    Anesthesiologist: Anesthesiologist: (Unknown)/Spinal    Anesthesia: regional    Principal Procedure As Follows:  After adequate regional anesthesia was ascertained, the patient was prepped and draped in a normal supine position with a wedge under the right hip.  A pfannensteil incision was made.  The incision was taken down to the fascia, which was incised medial to lateral.  The rectus muscles were dissected off the rectus sheath.  There was separation of the rectus sheath superiorly and the peritoneum was entered atraumatically, extended superiorly and inferiorly.  The lower uterine segment was identified.  A low transverse incision was made in the uterus.  It was then extended digitally and the membranes were previously ruptured with clear fluid.  The infant was born in a vertex position.  It was a viable male infant, Apgar 7 and 9, and weight pending. Gases Obtained  Placenta was manually delivered.  The uterus was exteriorized, cleansed of all clots and membranes.  The lower segment was dilated for lochia egress.  Attention was made towards closing the uterus in a 2-layer fashion with #1 Chromic suture ligature in a running interlocking stitch with hemostatis assured.  The gutters were cleansed of all clots.  Tubal ligation was not performed.  The uterus was reduced with normal tubes and ovaries and multiple sheets of Interceed  ----- Message from Frank Garcia sent at 5/16/2024  6:40 AM CDT -----  Regarding: FW: Appointment scheduled from Patient Portal  Contact: [phone]  Patient  needs lab orders for upcoming apt   ----- Message -----  From: Kristi Bailey  Sent: 5/16/2024   5:58 AM CDT  To: [facility] Sched Recep Msg Pool  Subject: Appointment scheduled from Patient Portal        Appointment For: Kristi Bailey ([id number])  Visit Type: PC ADULT PREVENTATIVE ([id number])    5/22/2024   11:45 AM  30 mins.  Tessa Leung MD         [facility]    Patient Comments:  Need a routine physical exam     were placed in the peritoneal cavity to prevent additional adhesion formation.  The peritoneum was closed with running 0 Vicryl.  The rectus muscles were inspected, found to be hemostatic, and were closed with 0 Vicryl in a interrupted fashion.  The fascia was closed with 0 Vicryl going right to left, left to right, crossing in the midline with a running interlocking stitch.  The subcutaneous tissues was copiously irrigated.  Small capillary bleeders individually coagulated.  The subcutaneous tissues were approximated with interrupted 3-0 Vicryl and the skin with staples.  Estimated blood loss 650 mL and sponge and needle count were correct x3.    Kevin Blount M.D.

## 2024-05-18 ENCOUNTER — OFFICE VISIT (OUTPATIENT)
Dept: URGENT CARE | Facility: PHYSICIAN GROUP | Age: 39
End: 2024-05-18
Payer: COMMERCIAL

## 2024-05-18 VITALS
SYSTOLIC BLOOD PRESSURE: 126 MMHG | HEART RATE: 108 BPM | TEMPERATURE: 99.6 F | HEIGHT: 61 IN | DIASTOLIC BLOOD PRESSURE: 78 MMHG | BODY MASS INDEX: 28.32 KG/M2 | OXYGEN SATURATION: 96 % | RESPIRATION RATE: 16 BRPM | WEIGHT: 150 LBS

## 2024-05-18 DIAGNOSIS — R11.0 NAUSEA: ICD-10-CM

## 2024-05-18 DIAGNOSIS — B34.9 NONSPECIFIC SYNDROME SUGGESTIVE OF VIRAL ILLNESS: ICD-10-CM

## 2024-05-18 LAB
FLUAV RNA SPEC QL NAA+PROBE: NEGATIVE
FLUBV RNA SPEC QL NAA+PROBE: NEGATIVE
RSV RNA SPEC QL NAA+PROBE: NEGATIVE
SARS-COV-2 RNA RESP QL NAA+PROBE: NEGATIVE

## 2024-05-18 PROCEDURE — 3074F SYST BP LT 130 MM HG: CPT

## 2024-05-18 PROCEDURE — 87637 SARSCOV2&INF A&B&RSV AMP PRB: CPT | Mod: QW

## 2024-05-18 PROCEDURE — 3078F DIAST BP <80 MM HG: CPT

## 2024-05-18 PROCEDURE — 99214 OFFICE O/P EST MOD 30 MIN: CPT

## 2024-05-18 RX ORDER — ONDANSETRON 4 MG/1
4 TABLET, FILM COATED ORAL EVERY 6 HOURS PRN
Qty: 12 TABLET | Refills: 0 | Status: SHIPPED | OUTPATIENT
Start: 2024-05-18 | End: 2024-05-21

## 2024-05-18 RX ORDER — IBUPROFEN 200 MG
600 TABLET ORAL ONCE
Status: COMPLETED | OUTPATIENT
Start: 2024-05-18 | End: 2024-05-18

## 2024-05-18 RX ADMIN — Medication 600 MG: at 15:12

## 2024-05-18 ASSESSMENT — ENCOUNTER SYMPTOMS
ABDOMINAL PAIN: 1
VOMITING: 0
DIARRHEA: 0
FEVER: 1
NAUSEA: 1
HEADACHES: 1
SORE THROAT: 0
CHILLS: 1

## 2024-05-18 ASSESSMENT — FIBROSIS 4 INDEX: FIB4 SCORE: 0.3

## 2024-05-18 NOTE — PROGRESS NOTES
CHIEF COMPLAINT  Chief Complaint   Patient presents with    Fever     Sx started this morning with stomach pain, nausea, fever, body aches, chills, mild head aches, loss of appetite        Subjective:   Buffy Lund is a 38 y.o. female who presents to urgent care with complaints of fever, intermittent abdominal pain, nausea, body aches and mild headaches x 1 day.  Patient also reports associated symptoms of decreased appetite.  She denies any symptoms of vomiting or diarrhea.  Patient does report fevers as high as 101.5 today.  She denies any use of OTC medications for alleviation of fever symptoms, as symptoms began earlier today.  She denies any other pertinent past medical history.    Review of Systems   Constitutional:  Positive for chills, fever and malaise/fatigue.   HENT:  Negative for sore throat.    Gastrointestinal:  Positive for abdominal pain and nausea. Negative for diarrhea and vomiting.   Neurological:  Positive for headaches.       PAST MEDICAL HISTORY  Patient Active Problem List    Diagnosis Date Noted    Abscess of the breast and nipple 2023    Mastitis 10/30/2023    Postoperative visit 10/31/2019    Sterilization consult 2019    Encounter for sterilization 06/10/2019    Hx of  section 04/15/2019    History of depression 2017    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, antepartum 02/15/2017    H/O fetal demise 08/10/2016       SURGICAL HISTORY   has a past surgical history that includes other orthopedic surgery (); other (Left, ); ear middle exploration (); tendon repair (); primary c section (2017); lap,diagnostic abdomen (2019); salpingectomy (Bilateral, 2019); cholecystectomy robotic xi (N/A, 2020); incision and drainage general (Left, 10/30/2023); and irrigation & debridement general (Left, 11/15/2023).    ALLERGIES  No Known Allergies    CURRENT MEDICATIONS  Home Medications        "Reviewed by Sukhi Ontiveros't (Medical Assistant) on 05/18/24 at 1433  Med List Status: <None>     Medication Last Dose Status   acetaminophen (TYLENOL) 325 MG Tab  Active   ibuprofen (MOTRIN) 800 MG Tab  Active   methylPREDNISolone (MEDROL DOSEPAK) 4 MG Tablet Therapy Pack  Active   methylPREDNISolone (MEDROL DOSEPAK) 4 MG Tablet Therapy Pack  Active   methylPREDNISolone (MEDROL DOSEPAK) 4 MG Tablet Therapy Pack  Active   methylPREDNISolone (MEDROL DOSEPAK) 4 MG Tablet Therapy Pack  Active   methylPREDNISolone (MEDROL DOSEPAK) 4 MG Tablet Therapy Pack  Active   methylPREDNISolone (MEDROL DOSEPAK) 4 MG Tablet Therapy Pack  Active                    SOCIAL HISTORY  Social History     Tobacco Use    Smoking status: Never    Smokeless tobacco: Never   Vaping Use    Vaping status: Never Used   Substance and Sexual Activity    Alcohol use: No    Drug use: No    Sexual activity: Not Currently     Partners: Male     Birth control/protection: Pill     Comment: Unplanned pregnancy       FAMILY HISTORY  No family history on file.      Medications, Allergies, and current problem list reviewed today in Epic.     Objective:     /78 (BP Location: Left arm, Patient Position: Sitting, BP Cuff Size: Adult long)   Pulse (!) 108   Temp 37.6 °C (99.6 °F) (Temporal)   Resp 16   Ht 1.549 m (5' 1\")   Wt 68 kg (150 lb)   SpO2 96%     Physical Exam  Vitals reviewed.   Constitutional:       General: She is not in acute distress.     Appearance: Normal appearance. She is not ill-appearing or toxic-appearing.   HENT:      Head: Normocephalic.      Right Ear: Tympanic membrane normal.      Left Ear: Tympanic membrane normal.      Nose: Nose normal.      Mouth/Throat:      Mouth: Mucous membranes are moist.      Pharynx: Oropharynx is clear. No oropharyngeal exudate or posterior oropharyngeal erythema.   Cardiovascular:      Rate and Rhythm: Normal rate and regular rhythm.      Pulses: Normal pulses.      Heart " sounds: Normal heart sounds.   Pulmonary:      Effort: Pulmonary effort is normal. No respiratory distress.      Breath sounds: Normal breath sounds. No stridor. No wheezing, rhonchi or rales.   Chest:      Chest wall: No tenderness.   Abdominal:      General: Abdomen is flat. There is no distension.      Palpations: Abdomen is soft. There is no hepatomegaly, splenomegaly or mass.      Tenderness: There is no abdominal tenderness. There is no guarding or rebound.   Musculoskeletal:      Cervical back: Normal range of motion. No rigidity or tenderness.   Lymphadenopathy:      Cervical: No cervical adenopathy.   Skin:     General: Skin is warm.      Capillary Refill: Capillary refill takes less than 2 seconds.   Neurological:      General: No focal deficit present.      Mental Status: She is alert.   Psychiatric:         Mood and Affect: Mood normal.         Assessment/Plan:     Diagnosis and associated orders:     1. Nausea  ondansetron (ZOFRAN) 4 MG Tab tablet      2. Nonspecific syndrome suggestive of viral illness  POCT CoV-2, Flu A/B, RSV by PCR    ibuprofen (Motrin) tablet 600 mg         Comments/MDM:     Presentation is most consistent with viral illness with no evidence of focal bacterial infection on exam.  Patient is non-toxic.   Will test for Covid/Flu/RSV as they had at least 2 days of symptoms. Further viral testing deferred.  Advised to continue symptomatic care with OTC and encouraging fluids.  Prescription for Zofran provided for alleviation of nausea.  If vomiting, you may start with clear liquid diet for 24 hours taking small sips very frequently. [place] diet such as bananas, rice, applesauce, toast, crackers, mashed potatoes, chicken noodle soup, cream of wheat.  Advance diet as tolerated extensive ED precautions discussed. Return to clinic fever greater than 5 days, bloody vomit or diarrhea, diarrhea greater than 10 days, vomiting greater than 3 days.         Differential diagnosis, natural history,  supportive care, and indications for immediate follow-up discussed.    Advised the patient to follow-up with the primary care physician for recheck, reevaluation, and consideration of further management.    Please note that this dictation was created using voice recognition software. I have made a reasonable attempt to correct obvious errors, but I expect that there are errors of grammar and possibly content that I did not discover before finalizing the note.    This note was electronically signed by BRIAN Alatorre

## 2024-09-09 ENCOUNTER — HOSPITAL ENCOUNTER (OUTPATIENT)
Dept: RADIOLOGY | Facility: MEDICAL CENTER | Age: 39
End: 2024-09-09
Attending: NURSE PRACTITIONER
Payer: COMMERCIAL

## 2024-09-09 DIAGNOSIS — N64.4 MASTODYNIA: ICD-10-CM

## 2024-09-09 DIAGNOSIS — N61.0 MASTITIS: ICD-10-CM

## 2024-09-09 PROCEDURE — 76642 ULTRASOUND BREAST LIMITED: CPT | Mod: LT

## 2024-09-09 PROCEDURE — G0279 TOMOSYNTHESIS, MAMMO: HCPCS

## 2024-11-08 ENCOUNTER — APPOINTMENT (OUTPATIENT)
Dept: URGENT CARE | Facility: PHYSICIAN GROUP | Age: 39
End: 2024-11-08
Payer: COMMERCIAL

## 2024-11-08 ENCOUNTER — OFFICE VISIT (OUTPATIENT)
Dept: URGENT CARE | Facility: PHYSICIAN GROUP | Age: 39
End: 2024-11-08
Payer: COMMERCIAL

## 2024-11-08 VITALS
BODY MASS INDEX: 26.85 KG/M2 | OXYGEN SATURATION: 97 % | HEART RATE: 68 BPM | SYSTOLIC BLOOD PRESSURE: 102 MMHG | TEMPERATURE: 97.3 F | DIASTOLIC BLOOD PRESSURE: 74 MMHG | HEIGHT: 61 IN | WEIGHT: 142.2 LBS | RESPIRATION RATE: 18 BRPM

## 2024-11-08 DIAGNOSIS — R05.2 SUBACUTE COUGH: ICD-10-CM

## 2024-11-08 DIAGNOSIS — J01.90 ACUTE SINUSITIS, RECURRENCE NOT SPECIFIED, UNSPECIFIED LOCATION: ICD-10-CM

## 2024-11-08 PROCEDURE — 3078F DIAST BP <80 MM HG: CPT | Performed by: NURSE PRACTITIONER

## 2024-11-08 PROCEDURE — 99213 OFFICE O/P EST LOW 20 MIN: CPT | Performed by: NURSE PRACTITIONER

## 2024-11-08 PROCEDURE — 3074F SYST BP LT 130 MM HG: CPT | Performed by: NURSE PRACTITIONER

## 2024-11-08 RX ORDER — BENZONATATE 100 MG/1
100 CAPSULE ORAL 3 TIMES DAILY PRN
Qty: 60 CAPSULE | Refills: 0 | Status: SHIPPED | OUTPATIENT
Start: 2024-11-08

## 2024-11-08 RX ORDER — FLUTICASONE PROPIONATE 50 MCG
2 SPRAY, SUSPENSION (ML) NASAL DAILY
Qty: 9.9 ML | Refills: 0 | Status: SHIPPED | OUTPATIENT
Start: 2024-11-08

## 2024-11-08 RX ORDER — LORATADINE 10 MG/1
10 CAPSULE, LIQUID FILLED ORAL DAILY
Qty: 30 CAPSULE | Refills: 0 | Status: SHIPPED | OUTPATIENT
Start: 2024-11-08

## 2024-11-08 RX ORDER — PREDNISONE 10 MG/1
10 TABLET ORAL DAILY
Qty: 5 TABLET | Refills: 0 | Status: SHIPPED | OUTPATIENT
Start: 2024-11-08 | End: 2024-11-13

## 2024-11-08 ASSESSMENT — ENCOUNTER SYMPTOMS
GASTROINTESTINAL NEGATIVE: 1
MUSCULOSKELETAL NEGATIVE: 1
NEUROLOGICAL NEGATIVE: 1
WHEEZING: 0
SHORTNESS OF BREATH: 0
CHILLS: 0
SINUS PAIN: 0
CARDIOVASCULAR NEGATIVE: 1
FEVER: 0
COUGH: 1
SORE THROAT: 1

## 2024-11-08 ASSESSMENT — FIBROSIS 4 INDEX: FIB4 SCORE: 0.31

## 2024-11-09 NOTE — PROGRESS NOTES
Subjective     Buffy Lund is a 39 y.o. female who presents with No chief complaint on file.            HPI  Buffy has come into urgent care for upper respiratory symptoms x 1 month.  Complains of sore throat, cough, post nasal drainage, phlegm green/yellow.  Has not taken any over the counter medications for symptoms.  Denies history of asthma, no wheeze or shortness of breath with cough. Denies fever or malaise.  History of seasonal allergies.    PMH:  has a past medical history of Breast abscess, H/O fetal demise, not currently pregnant (08/10/2016), Psychiatric problem, and Sterilization consult (09/24/2019).    She has no past medical history of Addisons disease ([facility]), Adrenal disorder ([facility]ly [facility]), Allergy, Anemia, Anxiety, Clotting disorder (Formerly [facility]), COPD (chronic obstructive pulmonary disease) (Formerly [facility]), Cushings syndrome ([facility]ly [facility]), Depression, Diabetic neuropathy ([facility]), GERD (gastroesophageal reflux disease), Goiter, Head ache, HIV (human immunodeficiency virus infection) ([facility]ly [facility]), Hyperlipidemia, IBD (inflammatory bowel disease), Meningitis, Migraine, Muscle disorder, Osteoporosis, Parathyroid disorder ([facility]ly [facility]), Pituitary disease ([facility]), Sickle cell disease ([facility]ly [facility]), Substance abuse ([facility]ly [facility]), or Urinary tract infection.  MEDS:   Current Outpatient Medications:     amoxicillin-clavulanate (AUGMENTIN) 875-125 MG Tab, Take 1 Tablet by mouth 2 times a day for 5 days., Disp: 10 Tablet, Rfl: 0    predniSONE (DELTASONE) 10 MG Tab, Take 1 Tablet by mouth every day for 5 days., Disp: 5 Tablet, Rfl: 0    fluticasone (FLONASE) 50 MCG/ACT nasal spray, Administer 2 Sprays into affected nostril(S) every day., Disp: 9.9 mL, Rfl: 0    benzonatate (TESSALON) 100 MG Cap, Take 1 Capsule by mouth 3 times a day as needed for Cough., Disp: 60 Capsule, Rfl: 0    Loratadine (CLARITIN) 10 MG Cap, Take 10 Tablets by mouth every day., Disp: 30 Capsule, Rfl: 0    methylPREDNISolone (MEDROL DOSEPAK) 4 MG Tablet Therapy Pack, Follow schedule on package  instructions., Disp: 21 Tablet, Rfl: 0    methylPREDNISolone (MEDROL DOSEPAK) 4 MG Tablet Therapy Pack, Follow schedule on package instructions., Disp: 21 Tablet, Rfl: 0    methylPREDNISolone (MEDROL DOSEPAK) 4 MG Tablet Therapy Pack, Follow schedule on package instructions., Disp: 21 Tablet, Rfl: 0    methylPREDNISolone (MEDROL DOSEPAK) 4 MG Tablet Therapy Pack, Follow schedule on package instructions. (Patient not taking: Reported on 11/8/2024), Disp: 21 Tablet, Rfl: 0    methylPREDNISolone (MEDROL DOSEPAK) 4 MG Tablet Therapy Pack, Follow schedule on package instructions. (Patient not taking: Reported on 11/8/2024), Disp: 21 Tablet, Rfl: 0    methylPREDNISolone (MEDROL DOSEPAK) 4 MG Tablet Therapy Pack, Follow schedule on package instructions. (Patient not taking: Reported on 11/8/2024), Disp: 21 Tablet, Rfl: 0    acetaminophen (TYLENOL) 325 MG Tab, Take 2 Tablets by mouth every 6 hours as needed for Mild Pain., Disp: 30 Tablet, Rfl: 0    ibuprofen (MOTRIN) 800 MG Tab, Take 800 mg by mouth every 8 hours as needed for Inflammation. (Patient not taking: Reported on 11/8/2024), Disp: , Rfl:   ALLERGIES: No Known Allergies  SURGHX:   Past Surgical History:   Procedure Laterality Date    IRRIGATION & DEBRIDEMENT GENERAL Left 11/15/2023    Procedure: IRRIGATION AND DEBRIDEMENT, WOUND ABSCESS;  Surgeon: Malachi Estrada D.O.;  Location: [facility];  Service: General    INCISION AND DRAINAGE GENERAL Left 10/30/2023    Procedure: INCISION AND DRAINAGE;  Surgeon: Kim Platt M.D.;  Location: SURGERY [facility];  Service: General    CHOLECYSTECTOMY ROBOTIC XI N/A 8/14/2020    Procedure: CHOLECYSTECTOMY, ROBOT-ASSISTED, USING DA STEPH XI;  Surgeon: Marshal Lemons M.D.;  Location: SURGERY [facility];  Service: General    IL LAP,DIAGNOSTIC ABDOMEN  9/25/2019    Procedure: PELVISCOPY;  Surgeon: Ben Coy M.D.;  Location: SURGERY SAME DAY [facility];  Service: Obstetrics    SALPINGECTOMY Bilateral  9/25/2019    Procedure: SALPINGECTOMY;  Surgeon: Ben Coy M.D.;  Location: SURGERY SAME DAY [facility];  Service: Obstetrics    PRIMARY C SECTION  8/8/2017    Procedure: PRIMARY C SECTION;  Surgeon: Kevin Blount M.D.;  Location: LABOR AND DELIVERY;  Service:     OTHER ORTHOPEDIC SURGERY  2006    Wrist for carpal tunnel left    TENDON REPAIR  2006    left wrist    OTHER Left 2001    Ear surgery    EAR MIDDLE EXPLORATION  2001     SOCHX:  reports that she has never smoked. She has never used smokeless tobacco. She reports that she does not drink alcohol and does not use drugs.  FH: Family history was reviewed, no pertinent findings to report    Review of Systems   Constitutional:  Negative for chills, fever and malaise/fatigue.   HENT:  Positive for congestion and sore throat. Negative for ear pain and sinus pain.    Respiratory:  Positive for cough. Negative for shortness of breath and wheezing.    Cardiovascular: Negative.    Gastrointestinal: Negative.    Musculoskeletal: Negative.    Neurological: Negative.    Endo/Heme/Allergies:  Positive for environmental allergies.   All other systems reviewed and are negative.             Objective     There were no vitals taken for this visit.     Physical Exam  Vitals reviewed.   Constitutional:       General: She is awake. She is not in acute distress.     Appearance: Normal appearance. She is not ill-appearing, toxic-appearing or diaphoretic.   HENT:      Right Ear: Ear canal and external ear normal. A middle ear effusion is present.      Left Ear: Ear canal and external ear normal. A middle ear effusion is present.      Nose: Mucosal edema, congestion and rhinorrhea present.      Right Turbinates: Swollen.      Mouth/Throat:      Lips: Pink.      Mouth: Mucous membranes are dry.      Pharynx: Uvula midline. Pharyngeal swelling and posterior oropharyngeal erythema present.   Cardiovascular:      Rate and Rhythm: Normal rate.   Pulmonary:      Effort:  Pulmonary effort is normal. No tachypnea, accessory muscle usage or respiratory distress.      Breath sounds: Normal breath sounds and air entry. No stridor, decreased air movement or transmitted upper airway sounds. No decreased breath sounds, wheezing, rhonchi or rales.   Musculoskeletal:      Cervical back: Normal range of motion and neck supple.   Skin:     General: Skin is warm and dry.   Neurological:      Mental Status: She is alert and oriented to person, place, and time.   Psychiatric:         Attention and Perception: Attention normal.         Mood and Affect: Mood normal.         Speech: Speech normal.         Behavior: Behavior normal. Behavior is cooperative.                             Assessment & Plan        Assessment & Plan  Subacute cough    Orders:    benzonatate (TESSALON) 100 MG Cap; Take 1 Capsule by mouth 3 times a day as needed for Cough.    Acute sinusitis, recurrence not specified, unspecified location    Orders:    amoxicillin-clavulanate (AUGMENTIN) 875-125 MG Tab; Take 1 Tablet by mouth 2 times a day for 5 days.    predniSONE (DELTASONE) 10 MG Tab; Take 1 Tablet by mouth every day for 5 days.    fluticasone (FLONASE) 50 MCG/ACT nasal spray; Administer 2 Sprays into affected nostril(S) every day.    Loratadine (CLARITIN) 10 MG Cap; Take 10 Tablets by mouth every day.    -Maintain hydration/water intake  -Get rest  -May use over the counter Ibuprofen/Tylenol as needed for any fever, body aches or ear/sinus/throat pain  -May take over the counter longer acting antihistamine for nasal congestion/runny nose/post nasal drip symptoms (chose one like Claritin/Zyrtec/Allegra without decongestant) x 1 week then as needed   -May use over the counter saline nasal spray for nasal congestion/post nasal drip up to 4x/day  -May use over the counter Flonase or Nasocort for sinus nasal congestion/ear pressure as needed x 1 week then as needed   -May gargle with salt water as needed for throat discomfort  up to 4x/day (1 tsp salt in one cup warm water)  -Monitor for worsening or persistent symptoms, increased facial pressure, dizziness, fever, worse cough- need re-evaluation in urgent care

## 2024-11-18 NOTE — MR AVS SNAPSHOT
Buffy QUINTEROS Arlette   2017 3:00 PM   Routine Prenatal   MRN: [id number]    [facility]   Dept Phone:  [phone]    Description:  Female : 1985   Provider:  Chelsea Torres D.N.P.           Allergies as of 2017     No Known Allergies      You were diagnosed with     H/O fetal demise, not currently pregnant   [[id number]]         Vital Signs     Last Menstrual Period Smoking Status                2016 Never Smoker           Basic Information     Date Of Birth Sex Race Ethnicity Preferred Language    1985 Female  or , White  Origin (Northern Irish,Nauruan,[place]ian,[place], etc) English      Problem List              ICD-10-CM Priority Class Noted - Resolved    H/O fetal demise Z87.59   8/10/2016 - Present    Supervision of other high risk pregnancies, first trimester O09.891   2/15/2017 - Present    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 Review all of your home medications and newly ordered medications with your provider and/or pharmacist. Follow medication instructions as directed by your provider and/or pharmacist. Please keep your medication list with you and share with your provider. Update the information when medications are discontinued, doses are changed, or new medications (including over-the-counter products) are added; and carry medication information at all times in the event of emergency situations     Allergies:  No Known Allergies    Medication: trazodone passed protocol.   Last office visit date: 10/30/24  Next appointment scheduled?: Yes   Number of refills given: 1              Medications  Valid as of: July 20, 2017 -  4:06 PM    Generic Name Brand Name Tablet Size Instructions for use    Prenatal MV-Min-Fe Fum-FA-DHA   Take  by mouth.        .                 Medicines prescribed today were sent to:     [id number] - [place] - [address]    [address] [place] [address]    Phone: [phone] Fax: [phone]    Open 24 Hours?: No      Medication refill instructions:       If your prescription bottle indicates you have medication refills left, it is not necessary to call your provider’s office. Please contact your pharmacy and they will refill your medication.    If your prescription bottle indicates you do not have any refills left, you may request refills at any time through one of the following ways: The online [employer]ogn[employer]ics system (except Urgent Care), by calling your provider’s office, or by asking your pharmacy to contact your provider’s office with a refill request. Medication refills are processed only during regular business hours and may not be available until the next business day. Your provider may request additional information or to have a follow-up visit with you prior to refilling your medication.   *Please Note: Medication refills are assigned a new Rx number when refilled electronically. Your pharmacy may indicate that no refills were authorized even though a new prescription for the same medication is available at the pharmacy. Please request the medicine by name with the pharmacy before contacting your provider for a refill.        Other Notes About Your Plan     Baby Boy - Srinivasa Barlow           Cognitics Access Code: [email or web address]  Expires: 8/9/2017  2:39 PM    Cognitics  A secure, online tool to manage your health information     [employer]’s Cognitics® is a secure, online tool that connects you to your personalized health information from the privacy of your home -- day or night - making it very easy for you to manage your healthcare. Once  the activation process is completed, you can even access your medical information using the TEVIZZ corey, which is available for free in the Apple Corey store or Google Play store.     TE[employer] provides the following levels of access (as shown below):   My Chart Features   [facility] Doctor [facility]  Specialists [facility]  Urgent  Care Non-[facility]  Primary Care  Doctor   Email your healthcare team securely and privately 24/7 X X X    Manage appointments: schedule your next appointment; view details of past/upcoming appointments X      Request prescription refills. X      View recent personal medical records, including lab and immunizations X X X X   View health record, including health history, allergies, medications X X X X   Read reports about your outpatient visits, procedures, consult and ER notes X X X X   See your discharge summary, which is a recap of your hospital and/or ER visit that includes your diagnosis, lab results, and care plan. X X       How to register for TEVIZZ:  1. Go to  [email or web address].  2. Click on the Sign Up Now box, which takes you to the New Member Sign Up page. You will need to provide the following information:  a. Enter your TEVIZZ Access Code exactly as it appears at the top of this page. (You will not need to use this code after you’ve completed the sign-up process. If you do not sign up before the expiration date, you must request a new code.)   b. Enter your date of birth.   c. Enter your home email address.   d. Click Submit, and follow the next screen’s instructions.  3. Create a TEVIZZ ID. This will be your TEVIZZ login ID and cannot be changed, so think of one that is secure and easy to remember.  4. Create a TEVIZZ password. You can change your password at any time.  5. Enter your Password Reset Question and Answer. This can be used at a later time if you forget your password.   6. Enter your e-mail address. This allows you to receive e-mail notifications when  new information is available in [employer].  7. Click Sign Up. You can now view your health information.    For assistance activating your [employer] account, call [phone]

## (undated) DEVICE — GLOVE BIOGEL INDICATOR SZ 7SURGICAL PF LTX - (50/BX 4BX/CA)

## (undated) DEVICE — ELECTRODE DUAL RETURN W/ CORD - (50/PK)

## (undated) DEVICE — GLOVE BIOGEL PI ORTHO SZ 7 PF LF (40PR/BX)

## (undated) DEVICE — WATER IRRIG. STER. 1500 ML - (9/CA)

## (undated) DEVICE — BLANKET UNDERBODY FULL ACCES - (5/CA)

## (undated) DEVICE — GLOVE BIOGEL PI INDICATOR SZ 7.5 SURGICAL PF LF -(50/BX 4BX/CA)

## (undated) DEVICE — DRAPE COLUMN  BOX OF 20

## (undated) DEVICE — SUTURE 2-0 CHROMIC GUT CT-1 27 (36PK/BX)"

## (undated) DEVICE — SUTURE GENERAL

## (undated) DEVICE — GOWN WARMING STANDARD FLEX - (30/CA)

## (undated) DEVICE — SUCTION INSTRUMENT YANKAUER BULBOUS TIP W/O VENT (50EA/CA)

## (undated) DEVICE — SENSOR SPO2 NEO LNCS ADHESIVE (20/BX) SEE USER NOTES

## (undated) DEVICE — TUBING CLEARLINK DUO-VENT - C-FLO (48EA/CA)

## (undated) DEVICE — TROCAR 5X100 SEPARTATOR ADV - FIXATION (6/BX)

## (undated) DEVICE — LACTATED RINGERS INJ 1000 ML - (14EA/CA 60CA/PF)

## (undated) DEVICE — SHEATH RO 4F 10CM (10EA/BX)

## (undated) DEVICE — DETERGENT RENUZYME PLUS 10 OZ PACKET (50/BX)

## (undated) DEVICE — GOWN SURGEONS X-LARGE - DISP. (30/CA)

## (undated) DEVICE — SODIUM CHL IRRIGATION 0.9% 1000ML (12EA/CA)

## (undated) DEVICE — WATER IRRIGATION STERILE 1000ML (12EA/CA)

## (undated) DEVICE — GLOVE BIOGEL PI INDICATOR SZ 6.5 SURGICAL PF LF - (50/BX 4BX/CA)

## (undated) DEVICE — GLOVE BIOGEL SZ 8 SURGICAL PF LTX - (50PR/BX 4BX/CA)

## (undated) DEVICE — PACK C-SECTION (2EA/CA)

## (undated) DEVICE — DRESSING INTERCEED ABSORBABLE ADHESION BARRIER TC7 (10EA/CA)

## (undated) DEVICE — CHLORAPREP 26 ML APPLICATOR - ORANGE TINT(25/CA)

## (undated) DEVICE — GLOVESZ 8.5 BIOGEL PI MICRO - PF LF (50PR/BX)

## (undated) DEVICE — PACK MINOR BASIN - (2EA/CA)

## (undated) DEVICE — SLEEVE, VASO, THIGH, MED

## (undated) DEVICE — GLOVE BIOGEL INDICATOR SZ 6.5 SURGICAL PF LTX - (50PR/BX 4BX/CA)

## (undated) DEVICE — BLADE SURGICAL #15 - (50/BX 3BX/CA)

## (undated) DEVICE — TRAY BLADDER CARE W/ 16 FR FOLEY CATHETER STATLOCK  (10/CA)

## (undated) DEVICE — NEEDLE INSFL 120MM 14GA VRRS - (20/BX)

## (undated) DEVICE — TRAY SRGPRP PVP IOD WT PRP - (20/CA)

## (undated) DEVICE — CANISTER SUCTION 3000ML MECHANICAL FILTER AUTO SHUTOFF MEDI-VAC NONSTERILE LF DISP  (40EA/CA)

## (undated) DEVICE — COVER LIGHT HANDLE ALC PLUS DISP (18EA/BX)

## (undated) DEVICE — SET LEADWIRE 5 LEAD BEDSIDE DISPOSABLE ECG (1SET OF 5/EA)

## (undated) DEVICE — PACK LAPAROSCOPY - (1/CA)

## (undated) DEVICE — CLOSURE SKIN STRIP 1/2 X 4 IN - (STERI STRIP) (50/BX 4BX/CA)

## (undated) DEVICE — PROTECTOR ULNA NERVE - (36PR/CA)

## (undated) DEVICE — GLOVE SZ 7.5 BIOGEL PI MICRO - PF LF (50PR/BX)

## (undated) DEVICE — SEAL 5MM-8MM UNIVERSAL  BOX OF 10

## (undated) DEVICE — SPONGE GAUZESTER 4 X 4 4PLY - (128PK/CA)

## (undated) DEVICE — SENSOR OXIMETER ADULT SPO2 RD SET (20EA/BX)

## (undated) DEVICE — SLEEVE VASO CALF MED - (10PR/CA)

## (undated) DEVICE — SUTURE 3-0 VICRYL PLUS CT-1 - 36 INCH (36/BX)

## (undated) DEVICE — TUBE E-T HI-LO CUFF 6.5MM (10EA/BX)

## (undated) DEVICE — ELECTRODE 850 FOAM ADHESIVE - HYDROGEL RADIOTRNSPRNT (50/PK)

## (undated) DEVICE — MASK ANESTHESIA ADULT  - (100/CA)

## (undated) DEVICE — GLOVE SZ 7 BIOGEL PI MICRO - PF LF (50PR/BX 4BX/CA)

## (undated) DEVICE — SUTURE 4-0 VICRYL PLUSFS-1 - 27 INCH (36/BX)

## (undated) DEVICE — DRAPE IOBAN II INCISE 23X17 - (10EA/BX 4BX/CA)

## (undated) DEVICE — DERMABOND ADVANCED - (12EA/BX)

## (undated) DEVICE — TUBE CONNECT SUCTION CLEAR 120 X 1/4" (50EA/CA)"

## (undated) DEVICE — UTERINE MANIPULATOR 4.5MM ZSI - 1151 12/BX

## (undated) DEVICE — STAPLER SKIN DISP - (6/BX 10BX/CA) VISISTAT

## (undated) DEVICE — HEAD HOLDER JUNIOR/ADULT

## (undated) DEVICE — GLOVE BIOGEL SZ 7 SURGICAL PF LTX - (50PR/BX 4BX/CA)

## (undated) DEVICE — PAD SANITARY 11IN MAXI IND WRAPPED  (12EA/PK 24PK/CA)

## (undated) DEVICE — HOOK PERMANENT CAUTERY DA VINCI 10X'S REUSABLE

## (undated) DEVICE — BAG RETRIEVAL 5MM (10EA/BX)

## (undated) DEVICE — SLEEVE, SEQUENTIAL CALF REG

## (undated) DEVICE — SUTURE 1 CHROMIC CTX ETHICON - (36PK/BX)

## (undated) DEVICE — DRAPE ARM  BOX OF 20

## (undated) DEVICE — GLOVE BIOGEL SZ 6.5 SURGICAL PF LTX (50PR/BX 4BX/CA)

## (undated) DEVICE — TRAY SPINAL ANESTHESIA NON-SAFETY (10/CA)

## (undated) DEVICE — TUBE CONNECTING SUCTION - CLEAR PLASTIC STERILE 72 IN (50EA/CA)

## (undated) DEVICE — CATHETER IV NON-SAFETY 18 GA X 1 1/4 (50/BX 4BX/CA)

## (undated) DEVICE — KIT ANESTHESIA W/CIRCUIT & 3/LT BAG W/FILTER (20EA/CA)

## (undated) DEVICE — FORCEPS PROGRASP DA VINCI 10X'S REUSABLE

## (undated) DEVICE — SET EXTENSION WITH 2 PORTS (48EA/CA) ***PART #2C8610 IS A SUBSTITUTE*****

## (undated) DEVICE — KIT  I.V. START (100EA/CA)

## (undated) DEVICE — GLOVE BIOGEL INDICATOR SZ 6 SURGICAL PF LTX -(50/BX)

## (undated) DEVICE — LIGASURE LAPAROSCOPIC 5MM - (6EA/CA)

## (undated) DEVICE — CLIP HEMOLOCK PURPLE - (14/BX)

## (undated) DEVICE — CANISTER SUCTION RIGID RED 1500CC (40EA/CA)

## (undated) DEVICE — TAPE CLOTH MEDIPORE 6 INCH - (12RL/CA)

## (undated) DEVICE — GLOVE BIOGEL INDICATOR SZ 7.5 SURGICAL PF LTX - (50PR/BX 4BX/CA)

## (undated) DEVICE — SUTURE 4-0 MONOCRYL PLUS PS-2 - 27 INCH (36/BX)

## (undated) DEVICE — SYSTEM CLEARIFY VISUALIZATION (10EA/PK)

## (undated) DEVICE — NEPTUNE 4 PORT MANIFOLD - (20/PK)

## (undated) DEVICE — SUTURE 0 VICRYL PLUS CT 36 (36PK/BX)"

## (undated) DEVICE — ARMREST CRADLE FOAM - (2PR/PK 12PR/CA)

## (undated) DEVICE — ROBOTIC SURGERY SERVICES

## (undated) DEVICE — CLIP APPLIER LARGE DA VINCI 100X'S REUSABLE

## (undated) DEVICE — SUTURE 3-0 VICRYL PLUS SH - 8X 18 INCH (12/BX)

## (undated) DEVICE — SUTURE 0 VICRYL PLUS CT-1 - 36 INCH (36/BX)

## (undated) DEVICE — GVL 3 STAT DISPOSABLE - (10/BX)

## (undated) DEVICE — PACK LAP CHOLE OR - (2EA/CA)

## (undated) DEVICE — GLOVE BIOGEL SZ 6 PF LATEX - (50EA/BX 4BX/CA)

## (undated) DEVICE — OBTURATOR BLADELESS STANDARD 8MM (6EA/BX)

## (undated) DEVICE — BOVIE BLADE COATED - (50/PK)

## (undated) DEVICE — SET TUBING PNEUMOCLEAR HIGH FLOW SMOKE EVACUATION (10EA/BX)